# Patient Record
Sex: MALE | Race: WHITE | NOT HISPANIC OR LATINO | Employment: OTHER | URBAN - METROPOLITAN AREA
[De-identification: names, ages, dates, MRNs, and addresses within clinical notes are randomized per-mention and may not be internally consistent; named-entity substitution may affect disease eponyms.]

---

## 2017-01-30 ENCOUNTER — ALLSCRIPTS OFFICE VISIT (OUTPATIENT)
Dept: OTHER | Facility: OTHER | Age: 67
End: 2017-01-30

## 2017-01-30 DIAGNOSIS — R94.39 ABNORMAL RESULT OF OTHER CARDIOVASCULAR FUNCTION STUDY: ICD-10-CM

## 2017-01-30 DIAGNOSIS — R06.02 SHORTNESS OF BREATH: ICD-10-CM

## 2017-02-01 ENCOUNTER — GENERIC CONVERSION - ENCOUNTER (OUTPATIENT)
Dept: OTHER | Facility: OTHER | Age: 67
End: 2017-02-01

## 2017-02-06 ENCOUNTER — GENERIC CONVERSION - ENCOUNTER (OUTPATIENT)
Dept: OTHER | Facility: OTHER | Age: 67
End: 2017-02-06

## 2017-02-06 ENCOUNTER — HOSPITAL ENCOUNTER (OUTPATIENT)
Dept: NON INVASIVE DIAGNOSTICS | Facility: CLINIC | Age: 67
Discharge: HOME | End: 2017-02-06
Payer: MEDICARE

## 2017-02-06 DIAGNOSIS — R06.02 SHORTNESS OF BREATH: ICD-10-CM

## 2017-02-06 PROCEDURE — 93350 STRESS TTE ONLY: CPT

## 2017-02-08 ENCOUNTER — GENERIC CONVERSION - ENCOUNTER (OUTPATIENT)
Dept: OTHER | Facility: OTHER | Age: 67
End: 2017-02-08

## 2017-02-08 LAB
CHEST PAIN STATEMENT: NORMAL
MAX DIASTOLIC BP: 71 MMHG
MAX HEART RATE: 139 BPM
MAX PREDICTED HEART RATE: 152 BPM
MAX. SYSTOLIC BP: 218 MMHG
PROTOCOL NAME: NORMAL
REASON FOR TERMINATION: NORMAL
TARGET HR FORMULA: NORMAL
TEST INDICATION: NORMAL
TIME IN EXERCISE PHASE: 660 S

## 2017-02-16 ENCOUNTER — GENERIC CONVERSION - ENCOUNTER (OUTPATIENT)
Dept: OTHER | Facility: OTHER | Age: 67
End: 2017-02-16

## 2017-03-09 ENCOUNTER — HOSPITAL ENCOUNTER (OUTPATIENT)
Dept: CT IMAGING | Facility: HOSPITAL | Age: 67
Discharge: HOME | End: 2017-03-09
Attending: INTERNAL MEDICINE
Payer: MEDICARE

## 2017-03-09 VITALS
OXYGEN SATURATION: 98 % | RESPIRATION RATE: 18 BRPM | DIASTOLIC BLOOD PRESSURE: 70 MMHG | SYSTOLIC BLOOD PRESSURE: 127 MMHG | HEART RATE: 56 BPM

## 2017-03-09 DIAGNOSIS — R06.02 SHORTNESS OF BREATH: ICD-10-CM

## 2017-03-09 DIAGNOSIS — R94.39 ABNORMAL RESULT OF OTHER CARDIOVASCULAR FUNCTION STUDY: ICD-10-CM

## 2017-03-09 PROCEDURE — 75574 CT ANGIO HRT W/3D IMAGE: CPT

## 2017-03-09 RX ORDER — NITROGLYCERIN 0.4 MG/1
0.4 TABLET SUBLINGUAL ONCE
Status: COMPLETED | OUTPATIENT
Start: 2017-03-09 | End: 2017-03-09

## 2017-03-09 RX ADMIN — IODIXANOL 100 ML: 320 INJECTION, SOLUTION INTRAVASCULAR at 11:35

## 2017-03-09 RX ADMIN — NITROGLYCERIN 0.4 MG: 0.4 TABLET SUBLINGUAL at 11:24

## 2017-05-12 ENCOUNTER — ALLSCRIPTS OFFICE VISIT (OUTPATIENT)
Dept: OTHER | Facility: OTHER | Age: 67
End: 2017-05-12

## 2017-06-28 ENCOUNTER — TRANSCRIBE ORDERS (OUTPATIENT)
Dept: ADMINISTRATIVE | Facility: HOSPITAL | Age: 67
End: 2017-06-28

## 2017-06-28 ENCOUNTER — ALLSCRIPTS OFFICE VISIT (OUTPATIENT)
Dept: OTHER | Facility: OTHER | Age: 67
End: 2017-06-28

## 2017-06-28 DIAGNOSIS — R20.2 TINGLING SENSATION: Primary | ICD-10-CM

## 2017-07-12 ENCOUNTER — OFFICE VISIT (OUTPATIENT)
Dept: RADIOLOGY | Facility: CLINIC | Age: 67
End: 2017-07-12
Payer: MEDICARE

## 2017-07-12 DIAGNOSIS — R20.2 TINGLING SENSATION: ICD-10-CM

## 2017-07-12 PROCEDURE — 95911 NRV CNDJ TEST 9-10 STUDIES: CPT

## 2017-07-12 PROCEDURE — 95886 MUSC TEST DONE W/N TEST COMP: CPT

## 2017-07-21 ENCOUNTER — ALLSCRIPTS OFFICE VISIT (OUTPATIENT)
Dept: OTHER | Facility: OTHER | Age: 67
End: 2017-07-21

## 2017-07-24 ENCOUNTER — APPOINTMENT (OUTPATIENT)
Dept: PHYSICAL THERAPY | Facility: CLINIC | Age: 67
End: 2017-07-24
Payer: MEDICARE

## 2017-07-24 ENCOUNTER — ALLSCRIPTS OFFICE VISIT (OUTPATIENT)
Dept: OTHER | Facility: OTHER | Age: 67
End: 2017-07-24

## 2017-07-24 DIAGNOSIS — M72.0 PALMAR FASCIAL FIBROMATOSIS: ICD-10-CM

## 2017-07-24 PROCEDURE — 97760 ORTHOTIC MGMT&TRAING 1ST ENC: CPT

## 2017-07-24 PROCEDURE — G8984 CARRY CURRENT STATUS: HCPCS

## 2017-07-24 PROCEDURE — G8985 CARRY GOAL STATUS: HCPCS

## 2017-07-24 PROCEDURE — G8986 CARRY D/C STATUS: HCPCS

## 2017-07-31 ENCOUNTER — GENERIC CONVERSION - ENCOUNTER (OUTPATIENT)
Dept: OTHER | Facility: OTHER | Age: 67
End: 2017-07-31

## 2017-09-06 ENCOUNTER — HOSPITAL ENCOUNTER (OUTPATIENT)
Dept: RADIOLOGY | Facility: HOSPITAL | Age: 67
Discharge: HOME | End: 2017-09-06
Attending: ORTHOPAEDIC SURGERY
Payer: MEDICARE

## 2017-09-06 ENCOUNTER — ALLSCRIPTS OFFICE VISIT (OUTPATIENT)
Dept: OTHER | Facility: OTHER | Age: 67
End: 2017-09-06

## 2017-09-06 DIAGNOSIS — M54.10 RADICULOPATHY: ICD-10-CM

## 2017-09-06 DIAGNOSIS — M72.0 PALMAR FASCIAL FIBROMATOSIS: ICD-10-CM

## 2017-09-06 PROCEDURE — 72050 X-RAY EXAM NECK SPINE 4/5VWS: CPT

## 2017-09-12 ENCOUNTER — GENERIC CONVERSION - ENCOUNTER (OUTPATIENT)
Dept: OTHER | Facility: OTHER | Age: 67
End: 2017-09-12

## 2018-01-09 NOTE — RESULT NOTES
Message   I called, stress echo normal, but EKG portion abnl, I recommend follow up with card, pt already talked with cardiology and is scheduled for another diagnostic study     Verified Results  ECHO STRESS TEST W CONTRAST IF INDICATED  10:03AM Ethyl Blight Order Number: [id number]    - Patient Instructions: To schedule this appointment, please contact Central Scheduling at [phone]  Test Name Result Flag Reference   ECHO STRESS TEST W CONTRAST IF INDICATED (Report)     [address]   [place]   [place] , [address]   [phone]     Exercise Stress Echocardiography     Study date: 2017     Patient: Moses Martinez   MR number: [id number]   Account number: [de-identified]   : 1950   Age: 74 years   Gender: Male   Study date: 2017   Status: Outpatient   Location: [facility] lab   Height: 69 in   Weight: 171 lb   BP: 135// 73 mmHg     Indications: Detection of coronary artery disease  Diagnosis: R06 02 - Shortness of breath     Sonographer: Soledad Baird KAYLA AE-PE   Primary Physician: Rosalind Rubin MD   Referring Physician: Rosalind Rubin MD   Group: [facility]   cc: Loyd Cline MD   RN: Mariya Elkins RN   Interpreting Physician: Ariel Salinas MD     IMPRESSIONS:   Equivocal study after maximal exercise with reproduction of symptoms  Left ventricular systolic function was normal      SUMMARY     STRESS RESULTS:   Duration of exercise was 11 min and 0 sec  Maximal work rate was 13 4 METs  Maximal heart rate during stress was 139 bpm    There was no chest pain during stress  ECG CONCLUSIONS:   The stress ECG was consistent with myocardial ischemia  BASELINE:   There were no regional wall motion abnormalities  Estimated left ventricular ejection fraction was 60 %        ECHO CONCLUSIONS:   There was no diagnostic evidence for stress-induced ischemia  HISTORY: The patient is a 74year old  male  Chest pain status: no chest pain  Other symptoms: dyspnea of recent onset  Coronary artery disease risk factors: family history of coronary artery disease  Cardiovascular history: none   significant  PHYSICAL EXAM: Baseline physical exam screening: no wheezes audible  REST ECG: Normal sinus rhythm  PROCEDURE: The study was performed in the stress lab  The study was performed in the [facility] [place]  The procedure was explained to the patient and informed consent was obtained  Treadmill exercise testing was   performed, using the Cliff protocol  Stress and rest echocardiographic evaluation with 2D imaging was performed from multiple acoustic windows for evaluation of ventricular function  CLIFF PROTOCOL:   HR bpm SBP mmHg DBP mmHg Symptoms ST change Rhythm/conduct   Baseline 68 135 73 none none NSR   Stage 1 95 -- -- none none NSR   Stage 2 113 184 74 none none sinus tach   Stage 3 123 190 67 dyspnea horizontal depression, 1 0 mm sinus tach   Stage 4 136 149 68 dyspnea, fatigue same as above sinus tach, occasional PAC's, rare PVC's   Recovery 1 120 -- -- subsiding same as above sinus tach   Recovery 2 114 218 71 subsiding return to baseline sinus tach   Recovery 3 108 215 72 subsiding -- --   Recovery 5 99 205 98 none -- --     MEDICATIONS GIVEN: No medications or fluids given  STRESS RESULTS: Duration of exercise was 11 min and 0 sec  The patient exercised to protocol stage 4  Maximal work rate was 13 4 METs  Functional capacity was normal  Maximal heart rate during stress was 139 bpm  The heart rate response to   stress was normal  Maximal systolic blood pressure during stress was 218 mmHg  There was normal resting blood pressure with an appropriate response to stress  The rate-pressure product for the peak heart rate and blood pressure was 22929  There was no chest pain during stress   The stress test was terminated due to dyspnea and fatigue  NOTE: Dr Roslyn Vasquez present for testing  After the procedure, the patient was discharged to home  ECG CONCLUSIONS: The stress ECG was consistent with myocardial ischemia  Arrhythmia during stress: isolated atrial premature beats and isolated premature ventricular beats  STRESS 2D ECHO RESULTS:     BASELINE: There were no regional wall motion abnormalities  Left ventricular size was normal  Overall left ventricular systolic function was normal  Estimated left ventricular ejection fraction was 60 %   PEAK STRESS: There was no evidence for new left ventricular regional wall motion abnormalities  There was an appropriate reduction in left ventricular size  There was an appropriate augmentation in LV function  OTHER ECHO FINDINGS: Trace tricuspid regurgitation is present  There is no pericardial effusion  RV function was normal      ECHO CONCLUSIONS: There was no diagnostic evidence for stress-induced ischemia  The image quality was good       Prepared and electronically signed by     Maria A Shrestha MD   Signed [id number] 16:25:53

## 2018-01-10 NOTE — PROGRESS NOTES
Assessment    1  BPH (benign prostatic hyperplasia) (600 00) (N40 0)   2  Erectile dysfunction (607 84) (N52 9)   3  Hyperlipemia (272 4) (E78 5)   4  Impaired fasting glucose (790 21) (R73 01)   5  Shortness of breath on exertion (786 05) (R06 02)    Plan      BPH (benign prostatic hyperplasia), Erectile dysfunction    · Cialis 5 MG Oral Tablet; Take 1 tablet daily     Need for prophylactic vaccination and inoculation against influenza    · Fluzone High-Dose 0 5 ML Intramuscular Suspension Prefilled Syringe     Need for vaccination with 13-polyvalent pneumococcal conjugate vaccine    · Prevnar 13 Intramuscular Suspension     Shortness of breath on exertion    · EKG/ECG- POC; Status:Complete;   Done: [id number] 03:53PM    ECHO STRESS TEST W CONTRAST IF INDICATED; Status:Hold For - Scheduling; Requested IJI:[id number];   Perform:[facility]; THS:[id number]; Ordered; For:Shortness of breath on exertion; Ordered By:Génesis Joyner; Discussion/Summary    With some dyspnea on exertion while skiing at altitude which isn't normal for him  Will get stress echo  Carpal tunnel symptoms: try cock-up wrist splint for this    Epistaxis: pt gets intermittent nosebleeds, and for this reason I don't recommend going on ASA  Hyperchol: discussed calculated 10 yr risk of CVD of 13 7% which is greater than 7 5%, hence moderate to high intensity statin would be indicated  Will try atorvastatin 20 mg daily  Discussed potential risks of statins with myalgias  Impression: Subsequent Annual Wellness Visit, with preventive exam as well as age and risk appropriate counseling completed  Cardiovascular screening and counseling: the risks and benefits of screening were discussed and screening is current  Diabetes screening and counseling: the risks and benefits of screening were discussed and screening is current     Colorectal cancer screening and counseling: the risks and benefits of screening were discussed and screening is current  Prostate cancer screening and counseling: the risks and benefits of screening were discussed and screening is current  Chief Complaint  The patient presents today for annual Medicare wellness  History of Present Illness  HPI: has nightmares where pt is defending himself and hits things at night sometimes  Welcome to [employer] and Wellness Visits: The patient is being seen for the subsequent annual wellness visit  Medicare Screening and Risk Factors   Hospitalizations: he has been previously hospitalizied and none in past year  Medicare Screening Tests Risk Questions   Drug and Alcohol Use: The patient has never smoked cigarettes  The patient reports frequent alcohol use  Alcohol concern:   The patient has no concerns about alcohol abuse  Diet and Physical Activity: Current diet includes well balanced meals  He exercises daily  Exercise: strength training, elliptical    Mood Disorder and Cognitive Impairment Screening: He denies feeling down, depressed, or hopeless over the past two weeks  He denies feeling little interest or pleasure in doing things over the past two weeks  Cognitive impairment screening: denies difficulty learning/retaining new information, denies difficulty handling complex tasks, denies difficulty with reasoning, denies difficulty with spatial ability and orientation, denies difficulty with language and denies difficulty with behavior  Functional Ability/Level of Safety: Hearing is slightly decreased and a hearing aid is used  The patient is currently able to do activities of daily living without limitations, able to do instrumental activities of daily living without limitations and able to participate in social activities without limitations   Activities of daily living details: does not need help using the phone, no transportation help needed, does not need help shopping, no meal preparation help needed, does not need help doing housework, does not need help doing laundry, does not need help managing medications and does not need help managing money  Fall risk factors: The patient fell 0 times in the past 12 months  Home safety risk factors:  no unfamiliar surroundings, no loose rugs, no poor household lighting, no uneven floors, no household clutter, grab bars in the bathroom and handrails on the stairs  Advance Directives: Advance directives: living will, durable power of  for health care directives and advance directives  Co-Managers and Medical Equipment/Suppliers: See Patient Care Team   Preventive Quality Program 65 and Older: Urinary Incontinence Symptoms includes: urinary frequency and post-void dribbling        Review of Systems    Constitutional: no fever, no chills, no malaise and no fatigue  Cardiovascular: no chest pain, no palpitations, the heart is not racing, no lightheadedness and no lower extremity edema  Respiratory: SOB on exertion, but no shortness of breath, no wheezing and no cough  Gastrointestinal: no abdominal pain, no nausea, no vomiting, no diarrhea and no constipation  Genitourinary: no dysuria  Psychiatric: as noted in HPI, no anxiety and no depression  Active Problems    1  Encounter for colorectal cancer screening (V76 51) (Z12 11,Z12 12)   2  Hematuria (599 70) (R31 9)   3  Hyperlipemia (272 4) (E78 5)   4  Impaired fasting glucose (790 21) (R73 01)    Social History  The social history was reviewed and updated today  Vitals  Signs    Temperature: 97 8 F, OralHeart Rate: 57XOVYFEOHUOH: 16Systolic: 092, SittingDiastolic: 70, SittingWeight: 171 lb O2 Saturation: 98    Physical Exam    Constitutional   General appearance: No acute distress, well appearing and well nourished  Head and Face   Head and face: Normal     Eyes   Conjunctiva and lids: No erythema, swelling or discharge      Ears, Nose, Mouth, and Throat   External inspection of ears and nose: Normal     Oropharynx: Normal with no erythema, edema, exudate or lesions  Neck   Neck: Supple, symmetric, trachea midline, no masses  Thyroid: Normal, no thyromegaly  Pulmonary   Respiratory effort: No increased work of breathing or signs of respiratory distress  Auscultation of lungs: Clear to auscultation  Cardiovascular   Auscultation of heart: Normal rate and rhythm, normal S1 and S2, no murmurs  Carotid pulses: 2+ bilaterally  Examination of extremities for edema and/or varicosities: Normal     Abdomen   Abdomen: Non-tender, no masses  Liver and spleen: No hepatomegaly or splenomegaly  Lymphatic   Palpation of lymph nodes in neck: No lymphadenopathy  Musculoskeletal   Gait and station: Normal     Inspection/palpation of digits and nails: Normal without clubbing or cyanosis  Neurologic   Cortical function: Normal mental status  Psychiatric   Recent and remote memory: Intact      Mood and affect: Normal        Signatures   Electronically signed by : RONY Bright ; Jan 30 2017  4:22PM EST                       (Author)

## 2018-01-12 VITALS
SYSTOLIC BLOOD PRESSURE: 122 MMHG | WEIGHT: 172 LBS | RESPIRATION RATE: 16 BRPM | HEART RATE: 72 BPM | BODY MASS INDEX: 26.07 KG/M2 | DIASTOLIC BLOOD PRESSURE: 68 MMHG | HEIGHT: 68 IN

## 2018-01-12 NOTE — RESULT NOTES
Message   Glucose a little up, but average of sugars over past 3 months is good, no blood in urine, other labs all good, will also review at upcoming appt, stool test not back yet     Verified Results  (1) CBC/PLT/DIFF [id number] 07:25AM Jenniffer Bal     Test Name Result Flag Reference   WHITE BLOOD CELL COUNT 6 3 Thousand/uL  3 8-10 8   RED BLOOD CELL COUNT 4 77 Million/uL  4 20-5 80   HEMOGLOBIN 14 2 g/dL  13 2-17 1   HEMATOCRIT 43 4 %  38 5-50 0   MCV 91 0 fL  80 0-100 0   MCH 29 7 pg  27 0-33 0   MCHC 32 6 g/dL  32 0-36 0   RDW 13 8 %  11 0-15 0   PLATELET COUNT 981 Thousand/uL  140-400   MPV 8 6 fL  7 5-11 5   ABSOLUTE NEUTROPHILS 3736 cells/uL  8604-5011   ABSOLUTE LYMPHOCYTES 1846 cells/uL  850-3900   ABSOLUTE MONOCYTES 498 cells/uL  200-950   ABSOLUTE EOSINOPHILS 170 cells/uL     ABSOLUTE BASOPHILS 50 cells/uL  0-200   NEUTROPHILS 59 3 %     LYMPHOCYTES 29 3 %     MONOCYTES 7 9 %     EOSINOPHILS 2 7 %     BASOPHILS 0 8 %       (1) COMPREHENSIVE METABOLIC PANEL [id number] 94:53II Jenniffer Bal     Test Name Result Flag Reference   GLUCOSE 109 mg/dL H 65-99   Fasting reference interval   UREA NITROGEN (BUN) 15 mg/dL  7-25   CREATININE 0 88 mg/dL  0 70-1 25   For patients >52years of age, the reference limit  for Creatinine is approximately 13% higher for people  identified as -American  eGFR NON-AFR   AMERICAN 88 mL/min/1 73m2  > OR = 60   eGFR AFRICAN AMERICAN 102 mL/min/1 73m2  > OR = 60   BUN/CREATININE RATIO   8-95   NOT APPLICABLE (calc)   SODIUM 138 mmol/L  135-146   POTASSIUM 4 5 mmol/L  3 5-5 3   CHLORIDE 102 mmol/L     CARBON DIOXIDE 24 mmol/L  20-31   CALCIUM 9 0 mg/dL  8 6-10 3   PROTEIN, TOTAL 6 6 g/dL  6 1-8 1   ALBUMIN 4 3 g/dL  3 6-5 1   GLOBULIN 2 3 g/dL (calc)  1 9-3 7   ALBUMIN/GLOBULIN RATIO 1 9 (calc)  1 0-2 5   BILIRUBIN, TOTAL 1 2 mg/dL  0 2-1 2   ALKALINE PHOSPHATASE 56 U/L     AST 19 U/L  10-35   ALT 27 U/L  9-46     (1) LIPID PANEL, FASTING [id number] 07: 25AM Jill Chowdhury     Test Name Result Flag Reference   CHOLESTEROL, TOTAL 226 mg/dL H 125-200   HDL CHOLESTEROL 81 mg/dL  > OR = 40   TRIGLICERIDES 79 mg/dL  <785   LDL-CHOLESTEROL 129 mg/dL (calc)  <130   Desirable range <100 mg/dL for patients with CHD or  diabetes and <70 mg/dL for diabetic patients with  known heart disease  CHOL/HDLC RATIO 2 8 (calc)  < OR = 5 0   NON HDL CHOLESTEROL 145 mg/dL (calc)     Target for non-HDL cholesterol is 30 mg/dL higher than   LDL cholesterol target  (1) URINALYSIS w URINE C/S REFLEX (will reflex a microscopy if leukocytes, occult blood, or nitrites are not within normal limits) [id number] 07:25AM Jill Chowdhury     Test Name Result Flag Reference   COLOR YELLOW  YELLOW   APPEARANCE CLEAR  CLEAR   SPECIFIC GRAVITY 1 011  1 001-1 035   PH 6 0  5 0-8 0   GLUCOSE NEGATIVE  NEGATIVE   BILIRUBIN NEGATIVE  NEGATIVE   KETONES NEGATIVE  NEGATIVE   OCCULT BLOOD NEGATIVE  NEGATIVE   PROTEIN NEGATIVE  NEGATIVE   NITRITE NEGATIVE  NEGATIVE   LEUKOCYTE ESTERASE NEGATIVE  NEGATIVE   WBC NONE SEEN /HPF  < OR = 5   RBC NONE SEEN /HPF  < OR = 2   SQUAMOUS EPITHELIAL CELLS NONE SEEN /HPF  < OR = 5   BACTERIA NONE SEEN /HPF  NONE SEEN   HYALINE CAST NONE SEEN /LPF  NONE SEEN   REFLEXIVE URINE CULTURE NO CULTURE INDICATED       (Q) TSH, 3RD GENERATION [id number] 07:25AM Jill Chowdhury     Test Name Result Flag Reference   TSH 3 61 mIU/L  0 40-4 50     (Q) HEMOGLOBIN A1c [id number] 07:25AM Jill Chowdhury   REPORT COMMENT:  FASTING:YES  COLLECTION KIT GIVEN TO PATIENT  PATIENT ADVISED TO RETURN  Test Name Result Flag Reference   HEMOGLOBIN A1c 5 7 % of total Hgb H <5 7   According to ADA guidelines, hemoglobin A1c <7 0%  represents optimal control in non-pregnant diabetic  patients  Different metrics may apply to specific  patient populations  Standards of Medical Care in  [phone]  Diabetes Care   2013;36:s11-s66     For the purpose of screening for the presence of  diabetes  <5 7% Consistent with the absence of diabetes  5 7-6 4%    Consistent with increased risk for diabetes              (prediabetes)  >or=6 5%    Consistent with diabetes     This assay result is consistent with an increased risk  of diabetes  Currently, no consensus exists for use of hemoglobin  A1c for diagnosis of diabetes for children

## 2018-01-12 NOTE — RESULT NOTES
Message   stool test for colorectal cancer screening was normal     Verified Results  (Q) FECAL GLOBIN BY IMMUNOCHEM  (MEDICARE) [id number] 12:00AM Awa Jackson     Test Name Result Flag Reference   FECAL GLOBIN BY$IMMUNOCHEM  (MEDICARE)      FECAL GLOBIN BY IMMUNOCHEM   (MEDICARE)         MICRO NUMBER:      [id number]    TEST STATUS:       FINAL    SPECIMEN SOURCE:   INSURE () FOBT TEST CARD    SPECIMEN QUALITY:  ADEQUATE    RESULT:            Not Detected

## 2018-01-13 VITALS
SYSTOLIC BLOOD PRESSURE: 140 MMHG | BODY MASS INDEX: 25.92 KG/M2 | HEIGHT: 68 IN | RESPIRATION RATE: 16 BRPM | OXYGEN SATURATION: 96 % | WEIGHT: 171.01 LBS | DIASTOLIC BLOOD PRESSURE: 82 MMHG | HEART RATE: 62 BPM

## 2018-01-13 VITALS
WEIGHT: 174 LBS | HEIGHT: 68 IN | BODY MASS INDEX: 26.37 KG/M2 | HEART RATE: 62 BPM | DIASTOLIC BLOOD PRESSURE: 78 MMHG | SYSTOLIC BLOOD PRESSURE: 162 MMHG

## 2018-01-13 VITALS
WEIGHT: 171.25 LBS | HEART RATE: 66 BPM | SYSTOLIC BLOOD PRESSURE: 133 MMHG | DIASTOLIC BLOOD PRESSURE: 65 MMHG | HEIGHT: 68 IN | BODY MASS INDEX: 25.95 KG/M2

## 2018-01-13 VITALS
HEART RATE: 61 BPM | RESPIRATION RATE: 16 BRPM | DIASTOLIC BLOOD PRESSURE: 70 MMHG | TEMPERATURE: 97.8 F | OXYGEN SATURATION: 98 % | WEIGHT: 171 LBS | SYSTOLIC BLOOD PRESSURE: 130 MMHG | BODY MASS INDEX: 26 KG/M2

## 2018-01-14 VITALS
BODY MASS INDEX: 25.91 KG/M2 | HEIGHT: 68 IN | DIASTOLIC BLOOD PRESSURE: 82 MMHG | SYSTOLIC BLOOD PRESSURE: 155 MMHG | HEART RATE: 61 BPM | WEIGHT: 171 LBS

## 2018-01-14 VITALS
HEIGHT: 68 IN | SYSTOLIC BLOOD PRESSURE: 135 MMHG | HEART RATE: 60 BPM | WEIGHT: 171 LBS | DIASTOLIC BLOOD PRESSURE: 81 MMHG | BODY MASS INDEX: 25.91 KG/M2

## 2018-01-19 DIAGNOSIS — Z12.12 ENCOUNTER FOR SCREENING FOR MALIGNANT NEOPLASM OF RECTUM: ICD-10-CM

## 2018-01-19 DIAGNOSIS — Z11.59 ENCOUNTER FOR SCREENING FOR OTHER VIRAL DISEASES (CODE): ICD-10-CM

## 2018-01-19 DIAGNOSIS — R31.9 HEMATURIA: ICD-10-CM

## 2018-01-19 DIAGNOSIS — Z12.11 ENCOUNTER FOR SCREENING FOR MALIGNANT NEOPLASM OF COLON: ICD-10-CM

## 2018-01-19 DIAGNOSIS — E78.5 HYPERLIPIDEMIA: ICD-10-CM

## 2018-01-19 DIAGNOSIS — Z12.5 ENCOUNTER FOR SCREENING FOR MALIGNANT NEOPLASM OF PROSTATE: ICD-10-CM

## 2018-01-19 DIAGNOSIS — R73.01 IMPAIRED FASTING GLUCOSE: ICD-10-CM

## 2018-02-01 LAB
ALBUMIN SERPL-MCNC: 4.1 G/DL (ref 3.6–5.1)
ALBUMIN/GLOB SERPL: 1.7 (CALC) (ref 1–2.5)
ALP SERPL-CCNC: 55 U/L (ref 40–115)
ALT SERPL-CCNC: 31 U/L (ref 9–46)
APPEARANCE UR: CLEAR
AST SERPL-CCNC: 22 U/L (ref 10–35)
BASOPHILS # BLD AUTO: 63 CELLS/UL (ref 0–200)
BASOPHILS NFR BLD AUTO: 1.1 %
BILIRUB SERPL-MCNC: 1.6 MG/DL (ref 0.2–1.2)
BILIRUB UR QL STRIP: NEGATIVE
BUN SERPL-MCNC: 13 MG/DL (ref 7–25)
BUN/CREAT SERPL: ABNORMAL (CALC) (ref 6–22)
CALCIUM SERPL-MCNC: 9.2 MG/DL (ref 8.6–10.3)
CHLORIDE SERPL-SCNC: 103 MMOL/L (ref 98–110)
CHOLEST SERPL-MCNC: 169 MG/DL
CHOLEST/HDLC SERPL: 2.1 (CALC)
CO2 SERPL-SCNC: 27 MMOL/L (ref 20–31)
COLOR UR: YELLOW
CREAT SERPL-MCNC: 1 MG/DL (ref 0.7–1.25)
EOSINOPHIL # BLD AUTO: 182 CELLS/UL (ref 15–500)
EOSINOPHIL NFR BLD AUTO: 3.2 %
ERYTHROCYTE [DISTWIDTH] IN BLOOD BY AUTOMATED COUNT: 13.1 % (ref 11–15)
GLOBULIN SER CALC-MCNC: 2.4 G/DL (CALC) (ref 1.9–3.7)
GLUCOSE SERPL-MCNC: 113 MG/DL (ref 65–99)
GLUCOSE UR QL STRIP: NEGATIVE
HBA1C MFR BLD: 5.4 % OF TOTAL HGB
HCT VFR BLD AUTO: 41.5 % (ref 38.5–50)
HCV AB S/CO SERPL IA: 0.01
HCV AB SERPL QL IA: NORMAL
HDLC SERPL-MCNC: 79 MG/DL
HGB BLD-MCNC: 13.8 G/DL (ref 13.2–17.1)
HGB UR QL STRIP: NEGATIVE
KETONES UR QL STRIP: NEGATIVE
LDLC SERPL CALC-MCNC: 74 MG/DL (CALC)
LEUKOCYTE ESTERASE UR QL STRIP: NEGATIVE
LYMPHOCYTES # BLD AUTO: 1636 CELLS/UL (ref 850–3900)
LYMPHOCYTES NFR BLD AUTO: 28.7 %
MCH RBC QN AUTO: 29.8 PG (ref 27–33)
MCHC RBC AUTO-ENTMCNC: 33.3 G/DL (ref 32–36)
MCV RBC AUTO: 89.6 FL (ref 80–100)
MONOCYTES # BLD AUTO: 502 CELLS/UL (ref 200–950)
MONOCYTES NFR BLD AUTO: 8.8 %
NEUTROPHILS # BLD AUTO: 3317 CELLS/UL (ref 1500–7800)
NEUTROPHILS NFR BLD AUTO: 58.2 %
NITRITE UR QL STRIP: NEGATIVE
NONHDLC SERPL-MCNC: 90 MG/DL (CALC)
PH UR STRIP: 6 [PH] (ref 5–8)
PLATELET # BLD AUTO: 187 THOUSAND/UL (ref 140–400)
PMV BLD REES-ECKER: 10.5 FL (ref 7.5–12.5)
POTASSIUM SERPL-SCNC: 4.1 MMOL/L (ref 3.5–5.3)
PROT SERPL-MCNC: 6.5 G/DL (ref 6.1–8.1)
PROT UR QL STRIP: NEGATIVE
PSA SERPL-MCNC: 2.7 NG/ML
RBC # BLD AUTO: 4.63 MILLION/UL (ref 4.2–5.8)
SL AMB EGFR AFRICAN AMERICAN: 89 ML/MIN/1.73M2
SL AMB EGFR NON AFRICAN AMERICAN: 76 ML/MIN/1.73M2
SODIUM SERPL-SCNC: 137 MMOL/L (ref 135–146)
SP GR UR STRIP: 1.01 (ref 1–1.03)
TRIGL SERPL-MCNC: 75 MG/DL
TSH SERPL-ACNC: 2.95 MIU/L (ref 0.4–4.5)
WBC # BLD AUTO: 5.7 THOUSAND/UL (ref 3.8–10.8)

## 2018-02-06 ENCOUNTER — OFFICE VISIT (OUTPATIENT)
Dept: INTERNAL MEDICINE CLINIC | Facility: CLINIC | Age: 68
End: 2018-02-06
Payer: MEDICARE

## 2018-02-06 VITALS
TEMPERATURE: 97.8 F | DIASTOLIC BLOOD PRESSURE: 90 MMHG | BODY MASS INDEX: 27.15 KG/M2 | HEART RATE: 63 BPM | OXYGEN SATURATION: 98 % | SYSTOLIC BLOOD PRESSURE: 148 MMHG | WEIGHT: 173 LBS | HEIGHT: 67 IN | RESPIRATION RATE: 18 BRPM

## 2018-02-06 DIAGNOSIS — K21.9 CHRONIC GERD: ICD-10-CM

## 2018-02-06 DIAGNOSIS — R91.1 PULMONARY NODULE: ICD-10-CM

## 2018-02-06 DIAGNOSIS — R06.02 SHORT OF BREATH ON EXERTION: ICD-10-CM

## 2018-02-06 DIAGNOSIS — R06.02 SHORTNESS OF BREATH ON EXERTION: ICD-10-CM

## 2018-02-06 DIAGNOSIS — G56.03 BILATERAL CARPAL TUNNEL SYNDROME: ICD-10-CM

## 2018-02-06 DIAGNOSIS — E78.00 HYPERCHOLESTEREMIA: ICD-10-CM

## 2018-02-06 DIAGNOSIS — I10 HTN (HYPERTENSION), BENIGN: ICD-10-CM

## 2018-02-06 DIAGNOSIS — R17 ELEVATED BILIRUBIN: ICD-10-CM

## 2018-02-06 DIAGNOSIS — Z23 ENCOUNTER FOR IMMUNIZATION: ICD-10-CM

## 2018-02-06 DIAGNOSIS — Z00.00 MEDICARE ANNUAL WELLNESS VISIT, SUBSEQUENT: ICD-10-CM

## 2018-02-06 DIAGNOSIS — N52.9 ERECTILE DYSFUNCTION, UNSPECIFIED ERECTILE DYSFUNCTION TYPE: ICD-10-CM

## 2018-02-06 DIAGNOSIS — R73.01 IMPAIRED FASTING GLUCOSE: ICD-10-CM

## 2018-02-06 DIAGNOSIS — Z23 NEED FOR INFLUENZA VACCINATION: Primary | ICD-10-CM

## 2018-02-06 PROCEDURE — 90662 IIV NO PRSV INCREASED AG IM: CPT | Performed by: INTERNAL MEDICINE

## 2018-02-06 PROCEDURE — 99214 OFFICE O/P EST MOD 30 MIN: CPT | Performed by: INTERNAL MEDICINE

## 2018-02-06 PROCEDURE — 90471 IMMUNIZATION ADMIN: CPT | Performed by: INTERNAL MEDICINE

## 2018-02-06 PROCEDURE — G0439 PPPS, SUBSEQ VISIT: HCPCS | Performed by: INTERNAL MEDICINE

## 2018-02-06 RX ORDER — ATORVASTATIN CALCIUM 20 MG/1
20 TABLET, FILM COATED ORAL DAILY
COMMUNITY
Start: 2017-01-30 | End: 2018-02-06 | Stop reason: SDUPTHER

## 2018-02-06 RX ORDER — ALUMINUM ZIRCONIUM OCTACHLOROHYDREX GLY 16 G/100G
GEL TOPICAL DAILY
COMMUNITY
Start: 2017-01-30

## 2018-02-06 RX ORDER — ATORVASTATIN CALCIUM 20 MG/1
20 TABLET, FILM COATED ORAL DAILY
Qty: 90 TABLET | Refills: 3 | Status: SHIPPED | OUTPATIENT
Start: 2018-02-06 | End: 2019-01-21 | Stop reason: SDUPTHER

## 2018-02-06 RX ORDER — OMEPRAZOLE 20 MG/1
CAPSULE, DELAYED RELEASE ORAL DAILY PRN
COMMUNITY
Start: 2017-01-30 | End: 2021-06-22 | Stop reason: SDUPTHER

## 2018-02-06 RX ORDER — TADALAFIL 5 MG/1
1 TABLET ORAL DAILY
COMMUNITY
Start: 2017-01-30 | End: 2018-04-13 | Stop reason: SDUPTHER

## 2018-02-06 RX ORDER — UBIDECARENONE 75 MG
CAPSULE ORAL
COMMUNITY
Start: 2017-01-30 | End: 2018-07-31

## 2018-02-06 NOTE — PROGRESS NOTES
Assessment/Plan:    Bilateral carpal tunnel syndrome   Patient struck to try cock-up wrist splints at night to see if improvement, and follow-up hand surgeon if further interventions are needed such as carpal tunnel release, but I recommend conservative treatment 1st     Pulmonary nodule   This showed up incidentally on the coronary calcium testing he was doing, patient has a slip to get this Re checked in May 2018    Shortness of breath on exertion   I recommend patient follow up with Cardiology for this including review of his coronary calcium testing, and have discussions about possible cardiac catheterization  Elevated bilirubin   Discussed with patient this is most likely related to Gilbert's Syndrome where the bilirubin can be slightly elevated when fasting for blood tests, all other liver function tests are normal     Impaired fasting glucose   Glucose is slightly elevated, but not in the diabetic range, hemoglobin A1c is excellent, no need for medications, patient encouraged to continue to watch his diet for this  We can continue to monitor the A1c on a yearly basis  Hypercholesteremia    Continue statin, continue to watch diet  Recent LDL cholesterol was excellent  Chronic GERD   GERD: can do trial off the proton pump inhibitor to see if symptoms of heartburn return  Try behavioral changes to reduce GERD including watching diet and avoiding foods that cause symptoms  Common foods that cause symptoms are coffee, alcohol, chocolate, spicy foods, and fatty foods  Try to titrate down med slowly to avoid possible rebound hyperacidity  Patient will try every other day dosing for now    Erectile dysfunction   Patient will follow up with Urology for this tomorrow    Will also check testosterone level    HTN (hypertension), benign   Slightly elevated today, patient not on medications for this, he has a little workup with dealing with multiple medical concerns at the moment, which could explain his mildly elevated blood pressure  His last blood pressure was excellent in the office so will not start medications for this  Continue to monitor  Diagnoses and all orders for this visit:    Need for influenza vaccination  -     Flu Vaccine High Dose Split Preservative Free IM Medicare annual wellness visit, subsequent    Encounter for immunization    Bilateral carpal tunnel syndrome    Pulmonary nodule    Short of breath on exertion  -     Ambulatory referral to Cardiology; Future    Elevated bilirubin    Shortness of breath on exertion    Impaired fasting glucose    Hypercholesteremia  -     atorvastatin (LIPITOR) 20 mg tablet; Take 1 tablet (20 mg total) by mouth daily    Chronic GERD    Erectile dysfunction, unspecified erectile dysfunction type  -     Testosterone; Future    HTN (hypertension), benign    Other orders  -     Discontinue: atorvastatin (LIPITOR) 20 mg tablet; Take 20 mg by mouth daily            Subjective:      Patient ID: Ruthellen Bernheim is a 69 y o  male  Hand issues:  Patient had surgery on right 5th finger, he also has carpal tunnel syndrome has been doing exercises for this      Hypercholesterolemia:  Patient tolerating statin without any significant muscle aches  Impaired fasting glucose:  Patient watches his diet for this  GERD: pt titrated down PPI from 40 mg to 20 mg and was   Doing well on the 20 mg, then when he stopped it he had return of his symptoms  Erectile dysfunction:  Patient has been using Cialis 5 mg daily without much benefit  He also used  Viagra without much benefit  The following portions of the patient's history were reviewed and updated as appropriate: allergies, current medications, past family history, past medical history, past social history, past surgical history and problem list     No family history on file  No past medical history on file    Social History     Social History    Marital status: /Civil Union     Spouse name: N/A  Number of children: N/A    Years of education: N/A     Occupational History          Social History Main Topics    Smoking status: Never Smoker    Smokeless tobacco: Not on file    Alcohol use 8 4 oz/week     14 Standard drinks or equivalent per week      Comment: 2 Drinks a night    Drug use: Unknown    Sexual activity: Not on file     Other Topics Concern    Not on file     Social History Narrative    No narrative on file       Current Outpatient Prescriptions:     atorvastatin (LIPITOR) 20 mg tablet, Take 1 tablet (20 mg total) by mouth daily, Disp: 90 tablet, Rfl: 3    B Complex-C (SUPER B COMPLEX PO), Take by mouth, Disp: , Rfl:     cyanocobalamin (VITAMIN B-12) 100 mcg tablet, Take by mouth, Disp: , Rfl:     omeprazole (PriLOSEC) 20 mg delayed release capsule, Take by mouth, Disp: , Rfl:     Probiotic Product (PROBIOTIC DAILY PO), Take by mouth, Disp: , Rfl:     psyllium (METAMUCIL SMOOTH TEXTURE) 58 6 % powder, Take by mouth, Disp: , Rfl:     tadalafil (CIALIS) 5 MG tablet, Take 1 tablet by mouth daily, Disp: , Rfl:   No Known Allergies      Review of Systems   Constitutional: Negative for chills, fatigue and fever  HENT: Negative for congestion, nosebleeds and postnasal drip  Eyes: Negative for pain and visual disturbance  Respiratory: Positive for shortness of breath (with exertion)  Negative for cough, chest tightness and wheezing  Cardiovascular: Negative for chest pain, palpitations and leg swelling  Gastrointestinal: Negative for abdominal pain, constipation, diarrhea, nausea and vomiting  Endocrine: Negative for polydipsia and polyuria  Genitourinary: Negative for dysuria, flank pain and hematuria  Musculoskeletal: Negative for arthralgias  Skin: Negative for rash  Neurological: Negative for dizziness, tremors and headaches  Hematological: Does not bruise/bleed easily  Psychiatric/Behavioral: Negative for confusion and dysphoric mood   The patient is not nervous/anxious  Objective:     Physical Exam   Constitutional: He is oriented to person, place, and time  He appears well-developed and well-nourished  No distress  HENT:   Head: Normocephalic and atraumatic  Right Ear: External ear normal    Left Ear: External ear normal    Eyes: Conjunctivae are normal  No scleral icterus  Neck: Normal range of motion  Neck supple  No tracheal deviation present  No thyromegaly present  Cardiovascular: Normal rate, regular rhythm and normal heart sounds  No murmur heard  Pulmonary/Chest: Effort normal and breath sounds normal  No respiratory distress  He has no wheezes  He has no rales  Abdominal: Soft  Bowel sounds are normal  There is no tenderness  There is no rebound and no guarding  Hernia confirmed negative in the right inguinal area and confirmed negative in the left inguinal area  Genitourinary: Testes normal and penis normal  Right testis shows no mass  Left testis shows no mass  Musculoskeletal: He exhibits no edema  Lymphadenopathy:     He has no cervical adenopathy  Neurological: He is alert and oriented to person, place, and time  Psychiatric: He has a normal mood and affect  His behavior is normal  Judgment and thought content normal    Vitals reviewed

## 2018-02-06 NOTE — ASSESSMENT & PLAN NOTE
Slightly elevated today, patient not on medications for this, he has a little workup with dealing with multiple medical concerns at the moment, which could explain his mildly elevated blood pressure  His last blood pressure was excellent in the office so will not start medications for this  Continue to monitor

## 2018-02-06 NOTE — ASSESSMENT & PLAN NOTE
Glucose is slightly elevated, but not in the diabetic range, hemoglobin A1c is excellent, no need for medications, patient encouraged to continue to watch his diet for this  We can continue to monitor the A1c on a yearly basis

## 2018-02-06 NOTE — PROGRESS NOTES
HPI:  Pedro Simms is a 69 y o  male here for his Subsequent Wellness Visit  There is no problem list on file for this patient  No past medical history on file  Past Surgical History:   Procedure Laterality Date    APPENDECTOMY      As a child    INGUINAL HERNIA REPAIR Left 2015     No family history on file  History   Smoking Status    Never Smoker   Smokeless Tobacco    Not on file     History   Alcohol Use    8 4 oz/week    14 Standard drinks or equivalent per week     Comment: 2 Drinks a night      History   Drug use: Unknown       Current Outpatient Prescriptions   Medication Sig Dispense Refill    atorvastatin (LIPITOR) 20 mg tablet Take 20 mg by mouth daily        B Complex-C (SUPER B COMPLEX PO) Take by mouth      cyanocobalamin (VITAMIN B-12) 100 mcg tablet Take by mouth      omeprazole (PriLOSEC) 20 mg delayed release capsule Take by mouth      Probiotic Product (PROBIOTIC DAILY PO) Take by mouth      psyllium (METAMUCIL SMOOTH TEXTURE) 58 6 % powder Take by mouth      tadalafil (CIALIS) 5 MG tablet Take 1 tablet by mouth daily       No current facility-administered medications for this visit        No Known Allergies  Immunization History   Administered Date(s) Administered    Influenza Split High Dose Preservative Free IM 11/02/2015, 01/30/2017    Pneumococcal Conjugate 13-Valent 01/30/2017    Pneumococcal Polysaccharide PPV23 09/03/2014       Patient Care Team:  Balbir Caal MD as PCP - General    Medicare Screening Tests and Risk Assessments:  AWV Clinical     ISAR:       Once in a Lifetime Medicare Screening:       Medicare Screening Tests and Risk Assessment:   AAA Risk Assessment    Osteoporosis Risk Assessment    HIV Risk Assessment        Drug and Alcohol Use:   Tobacco use    Tobacco use duration    Tobacco Cessation Readiness    Alcohol use    Alcohol Treatment Readiness   Illicit Drug Use        Diet & Exercise:   Diet   How many servings a day of the following: Exercise        Cognitive Impairment Screening:   Cognitive Impairment Screening        Functional Ability/Level of Safety:   Hearing    Hearing Impairment Assessment    Current Activities    Help needed with the folllowing:    ADL    Fall Risk   Injury History       Home Safety:   Home Safety Risk Factors       Advanced Directives:   Advanced Directives    Patient's End of Life Decisions        Urinary Incontinence:       Glaucoma:            Provider Screening    No data filed        No exam data present    Physical Exam :  Physical Exam    Reviewed Updated [facility] Prior Wellness Visits:   Last Medicare wellness visit information was reviewed, patient interviewed , no change since last AWVno  Last Medicare wellness visit information was reviewed, patient interviewed and updates made to the record today yes    Assessment and Plan:  No diagnosis found      Health Maintenance Due   Topic Date Due    DTaP,Tdap,and Td Vaccines (1 - Tdap) 08/13/1957    GLAUCOMA SCREENING 67+ YR  08/13/2015    INFLUENZA VACCINE  09/01/2017

## 2018-02-06 NOTE — ASSESSMENT & PLAN NOTE
GERD: can do trial off the proton pump inhibitor to see if symptoms of heartburn return  Try behavioral changes to reduce GERD including watching diet and avoiding foods that cause symptoms  Common foods that cause symptoms are coffee, alcohol, chocolate, spicy foods, and fatty foods  Try to titrate down med slowly to avoid possible rebound hyperacidity   Patient will try every other day dosing for now

## 2018-02-06 NOTE — ASSESSMENT & PLAN NOTE
Discussed with patient this is most likely related to Gilbert's Syndrome where the bilirubin can be slightly elevated when fasting for blood tests, all other liver function tests are normal

## 2018-02-06 NOTE — ASSESSMENT & PLAN NOTE
This showed up incidentally on the coronary calcium testing he was doing, patient has a slip to get this Re checked in May 2018

## 2018-02-06 NOTE — ASSESSMENT & PLAN NOTE
I recommend patient follow up with Cardiology for this including review of his coronary calcium testing, and have discussions about possible cardiac catheterization

## 2018-02-07 ENCOUNTER — OFFICE VISIT (OUTPATIENT)
Dept: UROLOGY | Facility: CLINIC | Age: 68
End: 2018-02-07
Payer: MEDICARE

## 2018-02-07 VITALS
DIASTOLIC BLOOD PRESSURE: 60 MMHG | SYSTOLIC BLOOD PRESSURE: 122 MMHG | BODY MASS INDEX: 27.94 KG/M2 | HEIGHT: 67 IN | WEIGHT: 178 LBS

## 2018-02-07 DIAGNOSIS — I25.9 CHRONIC ISCHEMIC HEART DISEASE: ICD-10-CM

## 2018-02-07 DIAGNOSIS — R06.00 DYSPNEA ON EXERTION: Primary | ICD-10-CM

## 2018-02-07 DIAGNOSIS — N40.0 BENIGN PROSTATIC HYPERPLASIA WITHOUT LOWER URINARY TRACT SYMPTOMS: Primary | ICD-10-CM

## 2018-02-07 LAB
SL AMB  POCT GLUCOSE, UA: NORMAL
SL AMB LEUKOCYTE ESTERASE,UA: NORMAL
SL AMB POCT BILIRUBIN,UA: NORMAL
SL AMB POCT BLOOD,UA: NORMAL
SL AMB POCT CLARITY,UA: CLEAR
SL AMB POCT COLOR,UA: YELLOW
SL AMB POCT KETONES,UA: NORMAL
SL AMB POCT NITRITE,UA: NORMAL
SL AMB POCT PH,UA: 5
SL AMB POCT SPECIFIC GRAVITY,UA: NORMAL
SL AMB POCT URINE PROTEIN: NEGATIVE

## 2018-02-07 PROCEDURE — 99213 OFFICE O/P EST LOW 20 MIN: CPT | Performed by: UROLOGY

## 2018-02-07 PROCEDURE — 81000 URINALYSIS NONAUTO W/SCOPE: CPT | Performed by: UROLOGY

## 2018-02-07 NOTE — PROGRESS NOTES
Progress Note - Urology  Yogi Sue 69 y o  male MRN: [id number]  Encounter: [id number]      Chief Complaint:   Chief Complaint   Patient presents with    Benign Prostatic Hypertrophy     1 Year FU / PSA 2 7 (01/31/18)       HPI:     follow-up BPH  He has no significant changes symptomatology  Nocturia x1 no hesitation to flow feels comfortable has some degree of urgency at times  No UTI symptoms  Does complain of diminished sexual function  He is active 5 - 6 times a week  He is unable to achieve orgasm half the time  He also reports that the erection is not as firm as he would anticipated it should be      MEDS:    Current Outpatient Prescriptions:     atorvastatin (LIPITOR) 20 mg tablet, Take 1 tablet (20 mg total) by mouth daily, Disp: 90 tablet, Rfl: 3    B Complex-C (SUPER B COMPLEX PO), Take by mouth, Disp: , Rfl:     collagenase clostridium histolyticum (XIAFLEX) 0 9 MG SOLR, Inject 0 31 mL as directed, Disp: , Rfl:     cyanocobalamin (VITAMIN B-12) 100 mcg tablet, Take by mouth, Disp: , Rfl:     omeprazole (PriLOSEC) 20 mg delayed release capsule, Take by mouth, Disp: , Rfl:     Probiotic Product (PROBIOTIC DAILY PO), Take by mouth, Disp: , Rfl:     psyllium (METAMUCIL SMOOTH TEXTURE) 58 6 % powder, Take by mouth, Disp: , Rfl:     tadalafil (CIALIS) 5 MG tablet, Take 1 tablet by mouth daily, Disp: , Rfl:       PMH:  Past Medical History:   Diagnosis Date    ED (erectile dysfunction)     GERD (gastroesophageal reflux disease)     Hypercholesteremia          ROS:  Review of Systems   Constitutional: Negative  Respiratory: Negative  Cardiovascular:         Patient  States he experienced some shortness of breath with heart rate above 130 on his exercise routine  Vitals:  Blood pressure 122/60, height 5' 7" (1 702 m), weight 80 7 kg (178 lb)  Physical Exam:       Well-developed male  No acute distress appears his stated age  The back reveals no CVA tenderness    The abdomen is soft  There is no pattern megaly or splenomegaly no tenderness is noted  Bladder is not distended  No inguinal adenopathy  Phallus is normal scrotum is normal testes normal rectal tone is normal prostate is symmetrical benign roughly 28 g    Lab, Imaging and other studies:  Recent Results (from the past 48 hour(s))   POCT urine dip    Collection Time: 02/07/18  9:03 AM   Result Value Ref Range     COLOR,UA YELLOW      CLARITY,UA CLEAR      SPECIFIC GRAVITY,UA N/A      PH,UA 5     LEUKOCYTE ESTERASE,UA NEG      NITRITE,UA NEG     GLUCOSE, UA NEG      KETONES,UA NEG      BILIRUBIN,UA N/A      BLOOD,UA NEG     SL AMB POCT URINE PROTEIN Negative          IMPRESSION:     BPH   erectile dysfunction (mild)      PLAN:     patient currently using Cialis 5 mg nightly and will continue this    No intervention required for the BPH at this time

## 2018-02-10 LAB
REF LAB TEST NAME: NORMAL
REF LAB TEST: NORMAL
SL AMB CLIENT CONTACT: NORMAL

## 2018-02-13 LAB — TESTOST SERPL-MCNC: 608 NG/DL (ref 250–827)

## 2018-02-14 ENCOUNTER — TELEPHONE (OUTPATIENT)
Dept: INTERNAL MEDICINE CLINIC | Facility: CLINIC | Age: 68
End: 2018-02-14

## 2018-02-14 DIAGNOSIS — J06.9 UPPER RESPIRATORY TRACT INFECTION, UNSPECIFIED TYPE: Primary | ICD-10-CM

## 2018-02-14 RX ORDER — OSELTAMIVIR PHOSPHATE 75 MG/1
75 CAPSULE ORAL EVERY 12 HOURS SCHEDULED
Qty: 10 CAPSULE | Refills: 0 | Status: SHIPPED | OUTPATIENT
Start: 2018-02-14 | End: 2018-02-19

## 2018-02-26 ENCOUNTER — HOSPITAL ENCOUNTER (OUTPATIENT)
Dept: NON INVASIVE DIAGNOSTICS | Facility: CLINIC | Age: 68
Discharge: HOME | End: 2018-02-26
Payer: MEDICARE

## 2018-02-26 DIAGNOSIS — I25.9 CHRONIC ISCHEMIC HEART DISEASE: ICD-10-CM

## 2018-02-26 DIAGNOSIS — R06.00 DYSPNEA ON EXERTION: ICD-10-CM

## 2018-02-26 PROCEDURE — 93306 TTE W/DOPPLER COMPLETE: CPT | Performed by: INTERNAL MEDICINE

## 2018-02-26 PROCEDURE — 78452 HT MUSCLE IMAGE SPECT MULT: CPT

## 2018-02-26 PROCEDURE — A9502 TC99M TETROFOSMIN: HCPCS

## 2018-02-26 PROCEDURE — 93306 TTE W/DOPPLER COMPLETE: CPT

## 2018-02-26 PROCEDURE — 93017 CV STRESS TEST TRACING ONLY: CPT

## 2018-02-27 LAB
CHEST PAIN STATEMENT: NORMAL
MAX DIASTOLIC BP: 74 MMHG
MAX HEART RATE: 133 BPM
MAX PREDICTED HEART RATE: 151 BPM
MAX. SYSTOLIC BP: 180 MMHG
PROTOCOL NAME: NORMAL
REASON FOR TERMINATION: NORMAL
TARGET HR FORMULA: NORMAL
TEST INDICATION: NORMAL
TIME IN EXERCISE PHASE: NORMAL

## 2018-03-22 ENCOUNTER — OFFICE VISIT (OUTPATIENT)
Dept: CARDIOLOGY CLINIC | Facility: CLINIC | Age: 68
End: 2018-03-22
Payer: MEDICARE

## 2018-03-22 VITALS
SYSTOLIC BLOOD PRESSURE: 138 MMHG | WEIGHT: 173.3 LBS | RESPIRATION RATE: 16 BRPM | HEIGHT: 67 IN | HEART RATE: 72 BPM | DIASTOLIC BLOOD PRESSURE: 72 MMHG | BODY MASS INDEX: 27.2 KG/M2

## 2018-03-22 DIAGNOSIS — E78.00 HYPERCHOLESTEREMIA: ICD-10-CM

## 2018-03-22 DIAGNOSIS — R06.02 SOB (SHORTNESS OF BREATH) ON EXERTION: Primary | ICD-10-CM

## 2018-03-22 DIAGNOSIS — R06.02 SHORT OF BREATH ON EXERTION: ICD-10-CM

## 2018-03-22 DIAGNOSIS — R06.02 SHORTNESS OF BREATH ON EXERTION: Primary | ICD-10-CM

## 2018-03-22 DIAGNOSIS — I10 HTN (HYPERTENSION), BENIGN: ICD-10-CM

## 2018-03-22 PROCEDURE — 99204 OFFICE O/P NEW MOD 45 MIN: CPT | Performed by: INTERNAL MEDICINE

## 2018-03-22 RX ORDER — RANITIDINE 150 MG/1
150 TABLET ORAL DAILY
Refills: 0 | COMMUNITY
Start: 2018-02-07 | End: 2018-07-31

## 2018-03-22 NOTE — PROGRESS NOTES
Cardiology Follow Up    Trina Loaiza  1950  [id number]  [address] [facility]  [address] [place] [address] [address]    1  Shortness of breath on exertion     2  Short of breath on exertion  Ambulatory referral to Cardiology    Cardiac catheterization   3  Hypercholesteremia  Cardiac catheterization   4  HTN (hypertension), benign  Cardiac catheterization      History:  Jesusita Lindo has had what he proved describes is progressive dyspnea on exertion for 2 years  He is very active  He works out on a regular basis  He told me when he does things like running he will feel winded at the start but then after running for a bit will feel well  Throughout this  He has been 14  He 1st started noticing the dyspnea with being up at high altitude skiing and powder  He in fact ski yesterday without incident    He denies any chest pain orthopnea paroxysmal nocturnal dyspnea or syncope    Patient Active Problem List   Diagnosis    Bilateral carpal tunnel syndrome    Pulmonary nodule    Shortness of breath on exertion    Elevated bilirubin    Impaired fasting glucose    Hypercholesteremia    Chronic GERD    Erectile dysfunction    HTN (hypertension), benign     Past Medical History:   Diagnosis Date    ED (erectile dysfunction)     GERD (gastroesophageal reflux disease)     Hypercholesteremia      Social History     Social History    Marital status: /Civil Union     Spouse name: N/A    Number of children: N/A    Years of education: N/A     Occupational History          Social History Main Topics    Smoking status: Never Smoker    Smokeless tobacco: Never Used    Alcohol use 8 4 oz/week     14 Standard drinks or equivalent per week      Comment: 2 Drinks a night    Drug use: No    Sexual activity: Not on file     Other Topics Concern    Not on file     Social History Narrative    No narrative on file      Family History   Problem Relation Age of Onset    Colon cancer Father     Heart attack Mother     Breast cancer Mother      Past Surgical History:   Procedure Laterality Date    APPENDECTOMY      As a child    INGUINAL HERNIA REPAIR Left 2015       Current Outpatient Prescriptions:     atorvastatin (LIPITOR) 20 mg tablet, Take 1 tablet (20 mg total) by mouth daily, Disp: 90 tablet, Rfl: 3    omeprazole (PriLOSEC) 20 mg delayed release capsule, Take by mouth, Disp: , Rfl:     Probiotic Product (PROBIOTIC DAILY PO), Take by mouth, Disp: , Rfl:     psyllium (METAMUCIL SMOOTH TEXTURE) 58 6 % powder, Take by mouth, Disp: , Rfl:     tadalafil (CIALIS) 5 MG tablet, Take 1 tablet by mouth daily, Disp: , Rfl:     B Complex-C (SUPER B COMPLEX PO), Take by mouth, Disp: , Rfl:     collagenase clostridium histolyticum (XIAFLEX) 0 9 MG SOLR, Inject 0 31 mL as directed, Disp: , Rfl:     cyanocobalamin (VITAMIN B-12) 100 mcg tablet, Take by mouth, Disp: , Rfl:     ranitidine (ZANTAC) 150 mg tablet, Take 150 mg by mouth daily, Disp: , Rfl: 0  No Known Allergies    Labs:  Hospital Outpatient Visit on 02/26/2018   Component Date Value    Protocol Name 02/26/2018 CLIFF     Time In Exercise Phase 02/26/2018 00:08:01     MAX   SYSTOLIC BP 17/57/4134 836     Max Diastolic Bp 11/60/4505 74     Max Heart Rate 02/26/2018 133     Max Predicted Heart Rate 02/26/2018 151     Reason for Termination 02/26/2018 Dyspnea, throat "dryness"     Test Indication 02/26/2018 Screening for CAD     Target Hr Formular 02/26/2018 (220 - Age)*100%     Chest Pain Statement 02/26/2018 none    Orders Only on 02/12/2018   Component Date Value    TESTOSTERONE TOTAL 02/12/2018 608    Office Visit on 02/07/2018   Component Date Value     COLOR,UA 02/07/2018 YELLOW      CLARITY,UA 02/07/2018 CLEAR      SPECIFIC GRAVITY,UA 02/07/2018 N/A      PH,UA 02/07/2018 5     LEUKOCYTE ESTERASE,UA 02/07/2018 NEG      Zarate Pipe 02/07/2018 NEG     GLUCOSE, UA 02/07/2018 NEG      KETONES,UA 02/07/2018 NEG      BILIRUBIN,UA 02/07/2018 N/A      BLOOD,UA 02/07/2018 NEG     SL AMB POCT URINE PROTEIN 02/07/2018 Negative    Orders Only on 01/31/2018   Component Date Value    Test Name 01/31/2018  FECAL GLOBIN BY     Test Code 01/31/2018  32194L     Client Contact 01/31/2018 TRACY CONNECT     Report Always Message Si* 01/31/2018      Always Message 01/31/2018      Fecal Globin by Immunoch* 01/31/2018     Orders Only on 01/31/2018   Component Date Value    Prostate Specific Antige* 01/31/2018 2 7    Orders Only on 01/31/2018   Component Date Value    Total Cholesterol 01/31/2018 169     SL AMB HDL CHOLESTEROL 01/31/2018 79     Triglycerides 01/31/2018 75     SL AMB LDL-CHOLESTEROL 01/31/2018 74     SL AMB CHOL/HDLC RATIO 01/31/2018 2 1     SL AMB NON HDL CHOLESTER* 01/31/2018 90     SL AMB GLUCOSE 01/31/2018 113*    BUN 01/31/2018 13     Creatinine, Serum 01/31/2018 1 00     eGFR Non  01/31/2018 76     SL AMB EGFR  AMER* 01/31/2018 89     SL AMB BUN/CREATININE RA* 96/16/1097 NOT APPLICABLE     SL AMB SODIUM 01/31/2018 137     SL AMB POTASSIUM 01/31/2018 4 1     SL AMB CHLORIDE 01/31/2018 103     SL AMB CARBON DIOXIDE 01/31/2018 27     SL AMB CALCIUM 01/31/2018 9 2     SL AMB PROTEIN, TOTAL 01/31/2018 6 5     Serum Albumin 01/31/2018 4 1     SL AMB GLOBULIN 01/31/2018 2 4     SL AMB ALBUMIN/GLOBULIN * 01/31/2018 1 7     SL AMB BILIRUBIN, TOTAL 01/31/2018 1 6*    SL AMB ALKALINE PHOSPHAT* 01/31/2018 55     SL AMB AST 01/31/2018 22     SL AMB ALT 01/31/2018 31     SL AMB LAB WHITE BLOOD C* 01/31/2018 5 7     SL AMB LAB RED BLOOD MEGHAN* 01/31/2018 4 63     Hemoglobin 01/31/2018 13 8     Hematocrit 01/31/2018 41 5     MCV 01/31/2018 89 6     MCH 01/31/2018 29 8     MCHC 01/31/2018 33 3     RDW 01/31/2018 13 1     Platelet Count 76/38/9580 187     SL AMB MPV 01/31/2018 10 5     Neutrophils (Absolute) 01/31/2018 3317     Lymphocytes (Absolute) 01/31/2018 1636     Monocytes (Absolute) 01/31/2018 502     Eosinophils (Absolute) 01/31/2018 182     Basophils (Absolute) 01/31/2018 63     Neutrophils 01/31/2018 58 2     Lymphocytes 01/31/2018 28 7     Monocytes 01/31/2018 8 8     Eosinophils 01/31/2018 3 2     Basophils 01/31/2018 1 1     SL AMB HEP C AB 01/31/2018 NON-REACTIVE     Signal to Cut-Off 01/31/2018 0 01     TSH 01/31/2018 2 95     Hemoglobin A1C 01/31/2018 5 4     SL AMB COLOR, URINE 01/31/2018 YELLOW     Urine Appearance 01/31/2018 CLEAR     Specific Gravity 01/31/2018 1 012     Ph 01/31/2018 6 0     SL AMB GLUCOSE, URINE, Q* 01/31/2018 NEGATIVE     SL AMB BILIRUBIN, URINE 01/31/2018 NEGATIVE     SL AMB KETONE, URINE, QU* 01/31/2018 NEGATIVE     SL AMB BLOOD, URINE 01/31/2018 NEGATIVE     SL AMB PROTEIN, URINE, Q* 01/31/2018 NEGATIVE     SL AMB NITRITES URINE, Q* 01/31/2018 NEGATIVE     SL AMB LEUKOCYTE ESTERAS* 01/31/2018 NEGATIVE      Imaging: No results found  Review of Systems:  Review of Systems    Physical Exam:  Physical Exam    Discussion/Summary:  Brayden Alejandro has progressive dyspnea on exertion  He underwent exercise echocardiography in February of 2017  At that time he walked 11 minutes on a Abdifatah protocol  He had ST segment depression consistent with ischemia however that echocardiography at peak stress showed no evidence of ischemia  He subsequently had a cardiac CT done  The CT noted a calcium score the 40s  It described a 50% lad lesion and a PDA that filled well but appeared to be occluded and collateralized  Upon further review by a cardiology specialist in [place] he felt that the PDA was a dominant vessel off the circumflex and in fact not occlusion with collateralization  Brayden Alejandro had a nuclear stress test earlier this month he walked 8 minutes on a Abdifatah Abdifatah protocol and the test was terminated secondary to achievement of his maximum predicted heart rate  He told me he feels he could walk further  He again had ischemic ST changes on the EKG in the setting of baseline ST T wave changes and again the nuclear portion of the test showed no ischemia  He has also had a echocardiogram that showed normal left ventricular size and function  Top-normal right ventricular size and no significant valvular pathology  Pulmonary artery pressures appear normal  His summary to Janelle Hakan has dyspnea on exertion  He has good functional capacity  He has ischemic EKG changes with exercise but no imaging abnormalities  He also has a coronary CT that suggests obstruction of LAD and occlusion of PDA  I told him his dyspnea is possibly related to coronary artery disease however the results are variable on different studies  I also told him it may represent deconditioning  I also told him at this time lung disease cannot be at absolutely excluded though there is nothing that points to that I reviewed with him assessing the coronary anatomy to rule out obstructive coronary artery disease as the cause of his symptoms I described risks of the catheterization procedure in detail and also if there was a tight obstruction the risks of stenting as per the consent form he agrees and accepts and would like to proceed with coronary angiography  This will be scheduled in the near future  Further recommendations will be made based on the findings

## 2018-03-28 LAB
ALBUMIN SERPL-MCNC: 4.2 G/DL (ref 3.6–5.1)
ALBUMIN/GLOB SERPL: 2 (CALC) (ref 1–2.5)
ALP SERPL-CCNC: 62 U/L (ref 40–115)
ALT SERPL-CCNC: 28 U/L (ref 9–46)
AST SERPL-CCNC: 19 U/L (ref 10–35)
BASOPHILS # BLD AUTO: 51 CELLS/UL (ref 0–200)
BASOPHILS NFR BLD AUTO: 0.9 %
BILIRUB SERPL-MCNC: 1.1 MG/DL (ref 0.2–1.2)
BUN SERPL-MCNC: 18 MG/DL (ref 7–25)
BUN/CREAT SERPL: ABNORMAL (CALC) (ref 6–22)
CALCIUM SERPL-MCNC: 9.2 MG/DL (ref 8.6–10.3)
CHLORIDE SERPL-SCNC: 105 MMOL/L (ref 98–110)
CO2 SERPL-SCNC: 27 MMOL/L (ref 20–31)
CREAT SERPL-MCNC: 0.95 MG/DL (ref 0.7–1.25)
EOSINOPHIL # BLD AUTO: 188 CELLS/UL (ref 15–500)
EOSINOPHIL NFR BLD AUTO: 3.3 %
ERYTHROCYTE [DISTWIDTH] IN BLOOD BY AUTOMATED COUNT: 12.9 % (ref 11–15)
GLOBULIN SER CALC-MCNC: 2.1 G/DL (CALC) (ref 1.9–3.7)
GLUCOSE SERPL-MCNC: 118 MG/DL (ref 65–99)
HCT VFR BLD AUTO: 42.4 % (ref 38.5–50)
HGB BLD-MCNC: 14.2 G/DL (ref 13.2–17.1)
LYMPHOCYTES # BLD AUTO: 1431 CELLS/UL (ref 850–3900)
LYMPHOCYTES NFR BLD AUTO: 25.1 %
MCH RBC QN AUTO: 30.4 PG (ref 27–33)
MCHC RBC AUTO-ENTMCNC: 33.5 G/DL (ref 32–36)
MCV RBC AUTO: 90.8 FL (ref 80–100)
MONOCYTES # BLD AUTO: 393 CELLS/UL (ref 200–950)
MONOCYTES NFR BLD AUTO: 6.9 %
NEUTROPHILS # BLD AUTO: 3637 CELLS/UL (ref 1500–7800)
NEUTROPHILS NFR BLD AUTO: 63.8 %
PLATELET # BLD AUTO: 187 THOUSAND/UL (ref 140–400)
PMV BLD REES-ECKER: 10.2 FL (ref 7.5–12.5)
POTASSIUM SERPL-SCNC: 4.6 MMOL/L (ref 3.5–5.3)
PROT SERPL-MCNC: 6.3 G/DL (ref 6.1–8.1)
RBC # BLD AUTO: 4.67 MILLION/UL (ref 4.2–5.8)
SL AMB EGFR AFRICAN AMERICAN: 94 ML/MIN/1.73M2
SL AMB EGFR NON AFRICAN AMERICAN: 81 ML/MIN/1.73M2
SODIUM SERPL-SCNC: 140 MMOL/L (ref 135–146)
WBC # BLD AUTO: 5.7 THOUSAND/UL (ref 3.8–10.8)

## 2018-04-06 RX ORDER — ASPIRIN 81 MG/1
324 TABLET, CHEWABLE ORAL ONCE
Status: CANCELLED | OUTPATIENT
Start: 2018-04-06 | End: 2018-04-06

## 2018-04-06 RX ORDER — SODIUM CHLORIDE 9 MG/ML
125 INJECTION, SOLUTION INTRAVENOUS CONTINUOUS
Status: CANCELLED | OUTPATIENT
Start: 2018-04-06

## 2018-04-09 ENCOUNTER — HOSPITAL ENCOUNTER (OUTPATIENT)
Dept: NON INVASIVE DIAGNOSTICS | Facility: HOSPITAL | Age: 68
Discharge: HOME | End: 2018-04-09
Attending: INTERNAL MEDICINE | Admitting: INTERNAL MEDICINE
Payer: MEDICARE

## 2018-04-09 VITALS
RESPIRATION RATE: 18 BRPM | DIASTOLIC BLOOD PRESSURE: 60 MMHG | SYSTOLIC BLOOD PRESSURE: 126 MMHG | HEIGHT: 68 IN | TEMPERATURE: 97.6 F | WEIGHT: 161 LBS | HEART RATE: 59 BPM | BODY MASS INDEX: 24.4 KG/M2 | OXYGEN SATURATION: 94 %

## 2018-04-09 DIAGNOSIS — R06.02 SOB (SHORTNESS OF BREATH) ON EXERTION: ICD-10-CM

## 2018-04-09 LAB
ANION GAP SERPL CALCULATED.3IONS-SCNC: 4 MMOL/L (ref 4–13)
ATRIAL RATE: 56 BPM
BUN SERPL-MCNC: 12 MG/DL (ref 5–25)
CALCIUM SERPL-MCNC: 8.7 MG/DL
CHLORIDE SERPL-SCNC: 109 MMOL/L (ref 100–108)
CHOLEST SERPL-MCNC: 165 MG/DL (ref 50–200)
CO2 SERPL-SCNC: 27 MMOL/L (ref 21–32)
CREAT SERPL-MCNC: 0.94 MG/DL (ref 0.6–1.3)
ERYTHROCYTE [DISTWIDTH] IN BLOOD BY AUTOMATED COUNT: 13.1 % (ref 11.6–15.1)
GFR SERPL CREATININE-BSD FRML MDRD: 82 ML/MIN/1.73SQ M
GLUCOSE P FAST SERPL-MCNC: 107 MG/DL (ref 65–99)
GLUCOSE SERPL-MCNC: 107 MG/DL (ref 65–140)
HCT VFR BLD AUTO: 42.9 % (ref 36.5–49.3)
HDLC SERPL-MCNC: 80 MG/DL (ref 40–60)
HGB BLD-MCNC: 14.4 G/DL (ref 12–17)
INR PPP: 0.95 (ref 0.86–1.16)
LDLC SERPL CALC-MCNC: 72 MG/DL (ref 0–100)
MAGNESIUM SERPL-MCNC: 2 MG/DL (ref 1.6–2.6)
MCH RBC QN AUTO: 30.1 PG (ref 26.8–34.3)
MCHC RBC AUTO-ENTMCNC: 33.6 G/DL (ref 31.4–37.4)
MCV RBC AUTO: 90 FL (ref 82–98)
NONHDLC SERPL-MCNC: 85 MG/DL
P AXIS: 64 DEGREES
PLATELET # BLD AUTO: 179 THOUSANDS/UL (ref 149–390)
PMV BLD AUTO: 9.6 FL (ref 8.9–12.7)
POTASSIUM SERPL-SCNC: 4.2 MMOL/L (ref 3.5–5.3)
PR INTERVAL: 124 MS
PROTHROMBIN TIME: 12.7 SECONDS (ref 12.1–14.4)
QRS AXIS: 7 DEGREES
QRSD INTERVAL: 86 MS
QT INTERVAL: 410 MS
QTC INTERVAL: 395 MS
RBC # BLD AUTO: 4.79 MILLION/UL (ref 3.88–5.62)
SODIUM SERPL-SCNC: 140 MMOL/L (ref 136–145)
T WAVE AXIS: -3 DEGREES
TRIGL SERPL-MCNC: 67 MG/DL
VENTRICULAR RATE: 56 BPM
WBC # BLD AUTO: 5.61 THOUSAND/UL (ref 4.31–10.16)

## 2018-04-09 PROCEDURE — 93458 L HRT ARTERY/VENTRICLE ANGIO: CPT | Performed by: INTERNAL MEDICINE

## 2018-04-09 PROCEDURE — 85027 COMPLETE CBC AUTOMATED: CPT | Performed by: INTERNAL MEDICINE

## 2018-04-09 PROCEDURE — 99152 MOD SED SAME PHYS/QHP 5/>YRS: CPT | Performed by: INTERNAL MEDICINE

## 2018-04-09 PROCEDURE — 85610 PROTHROMBIN TIME: CPT | Performed by: INTERNAL MEDICINE

## 2018-04-09 PROCEDURE — 80048 BASIC METABOLIC PNL TOTAL CA: CPT | Performed by: INTERNAL MEDICINE

## 2018-04-09 PROCEDURE — C1769 GUIDE WIRE: HCPCS | Performed by: INTERNAL MEDICINE

## 2018-04-09 PROCEDURE — 93005 ELECTROCARDIOGRAM TRACING: CPT | Performed by: INTERNAL MEDICINE

## 2018-04-09 PROCEDURE — 99153 MOD SED SAME PHYS/QHP EA: CPT | Performed by: INTERNAL MEDICINE

## 2018-04-09 PROCEDURE — C1894 INTRO/SHEATH, NON-LASER: HCPCS | Performed by: INTERNAL MEDICINE

## 2018-04-09 PROCEDURE — 80061 LIPID PANEL: CPT | Performed by: INTERNAL MEDICINE

## 2018-04-09 PROCEDURE — 93010 ELECTROCARDIOGRAM REPORT: CPT | Performed by: INTERNAL MEDICINE

## 2018-04-09 PROCEDURE — 83735 ASSAY OF MAGNESIUM: CPT | Performed by: INTERNAL MEDICINE

## 2018-04-09 RX ORDER — LIDOCAINE HYDROCHLORIDE 10 MG/ML
INJECTION, SOLUTION INFILTRATION; PERINEURAL CODE/TRAUMA/SEDATION MEDICATION
Status: COMPLETED | OUTPATIENT
Start: 2018-04-09 | End: 2018-04-09

## 2018-04-09 RX ORDER — LOSARTAN POTASSIUM 25 MG/1
25 TABLET ORAL DAILY
Qty: 30 TABLET | Refills: 3 | Status: SHIPPED | OUTPATIENT
Start: 2018-04-10 | End: 2018-07-31 | Stop reason: SDUPTHER

## 2018-04-09 RX ORDER — HEPARIN SODIUM 1000 [USP'U]/ML
INJECTION, SOLUTION INTRAVENOUS; SUBCUTANEOUS CODE/TRAUMA/SEDATION MEDICATION
Status: COMPLETED | OUTPATIENT
Start: 2018-04-09 | End: 2018-04-09

## 2018-04-09 RX ORDER — FENTANYL CITRATE 50 UG/ML
INJECTION, SOLUTION INTRAMUSCULAR; INTRAVENOUS CODE/TRAUMA/SEDATION MEDICATION
Status: COMPLETED | OUTPATIENT
Start: 2018-04-09 | End: 2018-04-09

## 2018-04-09 RX ORDER — ASPIRIN 81 MG/1
81 TABLET, CHEWABLE ORAL DAILY
COMMUNITY
End: 2019-09-11 | Stop reason: ALTCHOICE

## 2018-04-09 RX ORDER — NITROGLYCERIN 20 MG/100ML
INJECTION INTRAVENOUS CODE/TRAUMA/SEDATION MEDICATION
Status: COMPLETED | OUTPATIENT
Start: 2018-04-09 | End: 2018-04-09

## 2018-04-09 RX ORDER — SODIUM CHLORIDE 9 MG/ML
125 INJECTION, SOLUTION INTRAVENOUS CONTINUOUS
Status: DISCONTINUED | OUTPATIENT
Start: 2018-04-09 | End: 2018-04-09

## 2018-04-09 RX ORDER — VERAPAMIL HYDROCHLORIDE 2.5 MG/ML
INJECTION, SOLUTION INTRAVENOUS CODE/TRAUMA/SEDATION MEDICATION
Status: COMPLETED | OUTPATIENT
Start: 2018-04-09 | End: 2018-04-09

## 2018-04-09 RX ORDER — SODIUM CHLORIDE 9 MG/ML
200 INJECTION, SOLUTION INTRAVENOUS CONTINUOUS
Status: DISCONTINUED | OUTPATIENT
Start: 2018-04-09 | End: 2018-04-09 | Stop reason: HOSPADM

## 2018-04-09 RX ORDER — ASPIRIN 81 MG/1
324 TABLET, CHEWABLE ORAL ONCE
Status: COMPLETED | OUTPATIENT
Start: 2018-04-09 | End: 2018-04-09

## 2018-04-09 RX ORDER — LOSARTAN POTASSIUM 25 MG/1
25 TABLET ORAL DAILY
Status: DISCONTINUED | OUTPATIENT
Start: 2018-04-10 | End: 2018-04-09 | Stop reason: HOSPADM

## 2018-04-09 RX ORDER — LOSARTAN POTASSIUM 25 MG/1
25 TABLET ORAL DAILY
Status: DISCONTINUED | OUTPATIENT
Start: 2018-04-09 | End: 2018-04-09

## 2018-04-09 RX ORDER — MIDAZOLAM HYDROCHLORIDE 1 MG/ML
INJECTION INTRAMUSCULAR; INTRAVENOUS CODE/TRAUMA/SEDATION MEDICATION
Status: COMPLETED | OUTPATIENT
Start: 2018-04-09 | End: 2018-04-09

## 2018-04-09 RX ADMIN — IOHEXOL 80 ML: 350 INJECTION, SOLUTION INTRAVENOUS at 14:15

## 2018-04-09 RX ADMIN — HEPARIN SODIUM 4000 UNITS: 1000 INJECTION INTRAVENOUS; SUBCUTANEOUS at 14:02

## 2018-04-09 RX ADMIN — FENTANYL CITRATE 50 MCG: 50 INJECTION, SOLUTION INTRAMUSCULAR; INTRAVENOUS at 13:57

## 2018-04-09 RX ADMIN — MIDAZOLAM 2 MG: 1 INJECTION INTRAMUSCULAR; INTRAVENOUS at 13:58

## 2018-04-09 RX ADMIN — FENTANYL CITRATE 25 MCG: 50 INJECTION, SOLUTION INTRAMUSCULAR; INTRAVENOUS at 14:02

## 2018-04-09 RX ADMIN — ASPIRIN 81 MG 324 MG: 81 TABLET ORAL at 10:57

## 2018-04-09 RX ADMIN — FENTANYL CITRATE 25 MCG: 50 INJECTION, SOLUTION INTRAMUSCULAR; INTRAVENOUS at 13:59

## 2018-04-09 RX ADMIN — NITROGLYCERIN 200 MCG: 20 INJECTION INTRAVENOUS at 14:02

## 2018-04-09 RX ADMIN — VERAPAMIL HYDROCHLORIDE 2.5 MG: 2.5 INJECTION INTRAVENOUS at 14:02

## 2018-04-09 RX ADMIN — LIDOCAINE HYDROCHLORIDE 1 ML: 10 INJECTION, SOLUTION INFILTRATION; PERINEURAL at 13:59

## 2018-04-09 RX ADMIN — SODIUM CHLORIDE 125 ML/HR: 0.9 INJECTION, SOLUTION INTRAVENOUS at 10:57

## 2018-04-09 RX ADMIN — MIDAZOLAM 1 MG: 1 INJECTION INTRAMUSCULAR; INTRAVENOUS at 14:02

## 2018-04-09 RX ADMIN — MIDAZOLAM 1 MG: 1 INJECTION INTRAMUSCULAR; INTRAVENOUS at 13:59

## 2018-04-09 NOTE — H&P (VIEW-ONLY)
Cardiology Follow Up    Ozzy Doll  1950  [id number]  [address] [facility]  [address] [place] [address] [address]    1  Shortness of breath on exertion     2  Short of breath on exertion  Ambulatory referral to Cardiology    Cardiac catheterization   3  Hypercholesteremia  Cardiac catheterization   4  HTN (hypertension), benign  Cardiac catheterization      History:  Earnest Young has had what he proved describes is progressive dyspnea on exertion for 2 years  He is very active  He works out on a regular basis  He told me when he does things like running he will feel winded at the start but then after running for a bit will feel well  Throughout this  He has been 14  He 1st started noticing the dyspnea with being up at high altitude skiing and powder  He in fact ski yesterday without incident    He denies any chest pain orthopnea paroxysmal nocturnal dyspnea or syncope    Patient Active Problem List   Diagnosis    Bilateral carpal tunnel syndrome    Pulmonary nodule    Shortness of breath on exertion    Elevated bilirubin    Impaired fasting glucose    Hypercholesteremia    Chronic GERD    Erectile dysfunction    HTN (hypertension), benign     Past Medical History:   Diagnosis Date    ED (erectile dysfunction)     GERD (gastroesophageal reflux disease)     Hypercholesteremia      Social History     Social History    Marital status: /Civil Union     Spouse name: N/A    Number of children: N/A    Years of education: N/A     Occupational History          Social History Main Topics    Smoking status: Never Smoker    Smokeless tobacco: Never Used    Alcohol use 8 4 oz/week     14 Standard drinks or equivalent per week      Comment: 2 Drinks a night    Drug use: No    Sexual activity: Not on file     Other Topics Concern    Not on file     Social History Narrative    No narrative on file      Family History   Problem Relation Age of Onset    Colon cancer Father     Heart attack Mother     Breast cancer Mother      Past Surgical History:   Procedure Laterality Date    APPENDECTOMY      As a child    INGUINAL HERNIA REPAIR Left 2015       Current Outpatient Prescriptions:     atorvastatin (LIPITOR) 20 mg tablet, Take 1 tablet (20 mg total) by mouth daily, Disp: 90 tablet, Rfl: 3    omeprazole (PriLOSEC) 20 mg delayed release capsule, Take by mouth, Disp: , Rfl:     Probiotic Product (PROBIOTIC DAILY PO), Take by mouth, Disp: , Rfl:     psyllium (METAMUCIL SMOOTH TEXTURE) 58 6 % powder, Take by mouth, Disp: , Rfl:     tadalafil (CIALIS) 5 MG tablet, Take 1 tablet by mouth daily, Disp: , Rfl:     B Complex-C (SUPER B COMPLEX PO), Take by mouth, Disp: , Rfl:     collagenase clostridium histolyticum (XIAFLEX) 0 9 MG SOLR, Inject 0 31 mL as directed, Disp: , Rfl:     cyanocobalamin (VITAMIN B-12) 100 mcg tablet, Take by mouth, Disp: , Rfl:     ranitidine (ZANTAC) 150 mg tablet, Take 150 mg by mouth daily, Disp: , Rfl: 0  No Known Allergies    Labs:  Hospital Outpatient Visit on 02/26/2018   Component Date Value    Protocol Name 02/26/2018 CLIFF     Time In Exercise Phase 02/26/2018 00:08:01     MAX   SYSTOLIC BP 92/04/9384 038     Max Diastolic Bp 81/68/8978 74     Max Heart Rate 02/26/2018 133     Max Predicted Heart Rate 02/26/2018 151     Reason for Termination 02/26/2018 Dyspnea, throat "dryness"     Test Indication 02/26/2018 Screening for CAD     Target Hr Formular 02/26/2018 (220 - Age)*100%     Chest Pain Statement 02/26/2018 none    Orders Only on 02/12/2018   Component Date Value    TESTOSTERONE TOTAL 02/12/2018 608    Office Visit on 02/07/2018   Component Date Value     COLOR,UA 02/07/2018 YELLOW      CLARITY,UA 02/07/2018 CLEAR      SPECIFIC GRAVITY,UA 02/07/2018 N/A      PH,UA 02/07/2018 5     LEUKOCYTE ESTERASE,UA 02/07/2018 NEG      Larina Porch 02/07/2018 NEG     GLUCOSE, UA 02/07/2018 NEG      KETONES,UA 02/07/2018 NEG      BILIRUBIN,UA 02/07/2018 N/A      BLOOD,UA 02/07/2018 NEG     SL AMB POCT URINE PROTEIN 02/07/2018 Negative    Orders Only on 01/31/2018   Component Date Value    Test Name 01/31/2018  FECAL GLOBIN BY     Test Code 01/31/2018  36033C     Client Contact 01/31/2018 TRACY CONNECT     Report Always Message Si* 01/31/2018      Always Message 01/31/2018      Fecal Globin by Immunoch* 01/31/2018     Orders Only on 01/31/2018   Component Date Value    Prostate Specific Antige* 01/31/2018 2 7    Orders Only on 01/31/2018   Component Date Value    Total Cholesterol 01/31/2018 169     SL AMB HDL CHOLESTEROL 01/31/2018 79     Triglycerides 01/31/2018 75     SL AMB LDL-CHOLESTEROL 01/31/2018 74     SL AMB CHOL/HDLC RATIO 01/31/2018 2 1     SL AMB NON HDL CHOLESTER* 01/31/2018 90     SL AMB GLUCOSE 01/31/2018 113*    BUN 01/31/2018 13     Creatinine, Serum 01/31/2018 1 00     eGFR Non  01/31/2018 76     SL AMB EGFR  AMER* 01/31/2018 89     SL AMB BUN/CREATININE RA* 23/96/4940 NOT APPLICABLE     SL AMB SODIUM 01/31/2018 137     SL AMB POTASSIUM 01/31/2018 4 1     SL AMB CHLORIDE 01/31/2018 103     SL AMB CARBON DIOXIDE 01/31/2018 27     SL AMB CALCIUM 01/31/2018 9 2     SL AMB PROTEIN, TOTAL 01/31/2018 6 5     Serum Albumin 01/31/2018 4 1     SL AMB GLOBULIN 01/31/2018 2 4     SL AMB ALBUMIN/GLOBULIN * 01/31/2018 1 7     SL AMB BILIRUBIN, TOTAL 01/31/2018 1 6*    SL AMB ALKALINE PHOSPHAT* 01/31/2018 55     SL AMB AST 01/31/2018 22     SL AMB ALT 01/31/2018 31     SL AMB LAB WHITE BLOOD C* 01/31/2018 5 7     SL AMB LAB RED BLOOD MEGHAN* 01/31/2018 4 63     Hemoglobin 01/31/2018 13 8     Hematocrit 01/31/2018 41 5     MCV 01/31/2018 89 6     MCH 01/31/2018 29 8     MCHC 01/31/2018 33 3     RDW 01/31/2018 13 1     Platelet Count 21/96/8011 187     SL AMB MPV 01/31/2018 10 5     Neutrophils (Absolute) 01/31/2018 3317     Lymphocytes (Absolute) 01/31/2018 1636     Monocytes (Absolute) 01/31/2018 502     Eosinophils (Absolute) 01/31/2018 182     Basophils (Absolute) 01/31/2018 63     Neutrophils 01/31/2018 58 2     Lymphocytes 01/31/2018 28 7     Monocytes 01/31/2018 8 8     Eosinophils 01/31/2018 3 2     Basophils 01/31/2018 1 1     SL AMB HEP C AB 01/31/2018 NON-REACTIVE     Signal to Cut-Off 01/31/2018 0 01     TSH 01/31/2018 2 95     Hemoglobin A1C 01/31/2018 5 4     SL AMB COLOR, URINE 01/31/2018 YELLOW     Urine Appearance 01/31/2018 CLEAR     Specific Gravity 01/31/2018 1 012     Ph 01/31/2018 6 0     SL AMB GLUCOSE, URINE, Q* 01/31/2018 NEGATIVE     SL AMB BILIRUBIN, URINE 01/31/2018 NEGATIVE     SL AMB KETONE, URINE, QU* 01/31/2018 NEGATIVE     SL AMB BLOOD, URINE 01/31/2018 NEGATIVE     SL AMB PROTEIN, URINE, Q* 01/31/2018 NEGATIVE     SL AMB NITRITES URINE, Q* 01/31/2018 NEGATIVE     SL AMB LEUKOCYTE ESTERAS* 01/31/2018 NEGATIVE      Imaging: No results found  Review of Systems:  Review of Systems    Physical Exam:  Physical Exam    Discussion/Summary:  Rigoberto Galicia has progressive dyspnea on exertion  He underwent exercise echocardiography in February of 2017  At that time he walked 11 minutes on a Abdifatah protocol  He had ST segment depression consistent with ischemia however that echocardiography at peak stress showed no evidence of ischemia  He subsequently had a cardiac CT done  The CT noted a calcium score the 40s  It described a 50% lad lesion and a PDA that filled well but appeared to be occluded and collateralized  Upon further review by a cardiology specialist in [place] he felt that the PDA was a dominant vessel off the circumflex and in fact not occlusion with collateralization  Rigoberto Galicia had a nuclear stress test earlier this month he walked 8 minutes on a Abdifatah Abdifatah protocol and the test was terminated secondary to achievement of his maximum predicted heart rate  He told me he feels he could walk further  He again had ischemic ST changes on the EKG in the setting of baseline ST T wave changes and again the nuclear portion of the test showed no ischemia  He has also had a echocardiogram that showed normal left ventricular size and function  Top-normal right ventricular size and no significant valvular pathology  Pulmonary artery pressures appear normal  His summary to [facility] has dyspnea on exertion  He has good functional capacity  He has ischemic EKG changes with exercise but no imaging abnormalities  He also has a coronary CT that suggests obstruction of LAD and occlusion of PDA  I told him his dyspnea is possibly related to coronary artery disease however the results are variable on different studies  I also told him it may represent deconditioning  I also told him at this time lung disease cannot be at absolutely excluded though there is nothing that points to that I reviewed with him assessing the coronary anatomy to rule out obstructive coronary artery disease as the cause of his symptoms I described risks of the catheterization procedure in detail and also if there was a tight obstruction the risks of stenting as per the consent form he agrees and accepts and would like to proceed with coronary angiography  This will be scheduled in the near future  Further recommendations will be made based on the findings

## 2018-04-09 NOTE — INTERVAL H&P NOTE
Update    H&P reviewed  After examining the patient, I find no changed to the H&P since it had been written  Patient re-evaluated  Accept Dr Haddad's office note from 3/22 as history and physical     Plan  Elective coronary angio in v/o dyspnea with exertion and abnor Ex stress  Risks and benefits were explained  Informed consent obtained       Sushant Waddell MD/April 9, 2018/7:41 PM

## 2018-04-09 NOTE — PROCEDURES
Elective coronary angiography and left heart catheterization    Indication: Dyspnea with exertion with abnormal exercise stress test    Right radial access was employed  5F TIG cath was used  Findings: Minimal luminal irregularities of prox LAD and prox Ramus w/o obstructive CAD  Recommendations: Start anti-hypertensive Rx  Continue ASA and statin rx       Complications: None

## 2018-04-13 DIAGNOSIS — N52.9 ERECTILE DYSFUNCTION, UNSPECIFIED ERECTILE DYSFUNCTION TYPE: Primary | ICD-10-CM

## 2018-04-13 RX ORDER — TADALAFIL 5 MG/1
5 TABLET ORAL DAILY
Qty: 90 TABLET | Refills: 3 | Status: SHIPPED | OUTPATIENT
Start: 2018-04-13 | End: 2019-02-04 | Stop reason: SDUPTHER

## 2018-04-13 RX ORDER — TADALAFIL 5 MG/1
5 TABLET ORAL DAILY PRN
Qty: 10 TABLET | Refills: 0 | Status: CANCELLED | OUTPATIENT
Start: 2018-04-13

## 2018-04-13 RX ORDER — TADALAFIL 5 MG/1
5 TABLET ORAL DAILY
Qty: 90 TABLET | Refills: 0 | Status: CANCELLED | OUTPATIENT
Start: 2018-04-13

## 2018-05-12 DIAGNOSIS — R91.1 SOLITARY PULMONARY NODULE: ICD-10-CM

## 2018-06-26 DIAGNOSIS — R91.1 SOLITARY LUNG NODULE: Primary | ICD-10-CM

## 2018-06-29 ENCOUNTER — OFFICE VISIT (OUTPATIENT)
Dept: PLASTIC SURGERY | Facility: CLINIC | Age: 68
End: 2018-06-29
Payer: MEDICARE

## 2018-06-29 VITALS — WEIGHT: 166 LBS | HEIGHT: 68 IN | BODY MASS INDEX: 25.16 KG/M2

## 2018-06-29 DIAGNOSIS — L98.9 SKIN LESION OF BACK: Primary | ICD-10-CM

## 2018-06-29 PROCEDURE — 99202 OFFICE O/P NEW SF 15 MIN: CPT | Performed by: SURGERY

## 2018-06-29 PROCEDURE — 88305 TISSUE EXAM BY PATHOLOGIST: CPT | Performed by: PATHOLOGY

## 2018-06-29 PROCEDURE — 11100 PR BIOPSY OF SKIN LESION: CPT | Performed by: SURGERY

## 2018-06-29 NOTE — PROGRESS NOTES
Assessment/Plan:  Please see HPI  The 2 larger, darkly pigmented lesions of the left back are clinically consistent with seborrheic keratoses  There is an area of the upper mid back with a more suspicious appearance, and this was biopsied utilizing a shave technique today  After prepping with alcohol, the area was infiltrated with xylocaine with epinephrine and a shave biopsy performed with a 15 blade  The base was cauterized with silver nitrate and a Band-Aid applied  Small skin tag of the right cheek was excised sharply after the area was prepped and infiltrated with xylocaine with epinephrine  It too was cauterized with silver nitrate  We will await biopsy results of the midback prior to addressing these lesions, usual post biopsy instructions were given  Diagnoses and all orders for this visit:    Skin lesion of back  -     Cancel: Tissue Exam  -     Tissue Exam          Subjective:  Lesions of back     Patient ID: Sudhakar Wheatley is a 69 y o  male  HPI he has been made aware of several lesions of his back that have enlarged over time, this has been pointed out by his wife  He is here for evaluation  The following portions of the patient's history were reviewed and updated as appropriate: allergies, current medications, past family history, past medical history, past social history, past surgical history and problem list     Review of Systems   Constitutional: Negative for chills and fever  HENT: Negative for hearing loss  Eyes: Positive for visual disturbance  Negative for discharge  Wears eyeglasses   Respiratory: Positive for shortness of breath  Negative for chest tightness  Shortness of breath with exertion   Cardiovascular: Negative for chest pain and leg swelling  Genitourinary: Negative for dysuria  Musculoskeletal: Negative for gait problem  Skin: Negative for rash  Allergic/Immunologic: Negative for immunocompromised state     Neurological: Negative for seizures and headaches  Hematological: Does not bruise/bleed easily  Psychiatric/Behavioral: Negative for dysphoric mood  The patient is not nervous/anxious  Objective:      Ht 5' 8" (1 727 m)   Wt 75 3 kg (166 lb)   BMI 25 24 kg/m²          Physical Exam   Constitutional: He appears well-developed and well-nourished  HENT:   Head: Normocephalic  Eyes: Pupils are equal, round, and reactive to light  Neck: Normal range of motion  Pulmonary/Chest: Effort normal    Musculoskeletal: Normal range of motion  Neurological: He is alert  Skin: Skin is warm and dry  Dark brown, nearly black pigmented lesions left lateral and left lower back, approximately 2 5 cm in diameter, slightly elevated from surrounding skin, clinically consistent with seborrheic keratoses    1 5 cm lesion of upper mid back, irregular margins, slightly elevated from surrounding skin, pink in color with central ulceration, rule out neoplasm

## 2018-07-20 PROBLEM — D04.5 SQUAMOUS CELL CARCINOMA IN SITU (SCCIS) OF SKIN OF BACK: Status: ACTIVE | Noted: 2018-07-20

## 2018-07-26 ENCOUNTER — HOSPITAL ENCOUNTER (OUTPATIENT)
Dept: CT IMAGING | Facility: HOSPITAL | Age: 68
Discharge: HOME | End: 2018-07-26
Payer: MEDICARE

## 2018-07-26 DIAGNOSIS — R91.1 SOLITARY PULMONARY NODULE: ICD-10-CM

## 2018-07-26 PROCEDURE — 71250 CT THORAX DX C-: CPT

## 2018-07-27 DIAGNOSIS — R91.1 PULMONARY NODULE: Primary | ICD-10-CM

## 2018-07-31 DIAGNOSIS — R06.02 SOB (SHORTNESS OF BREATH) ON EXERTION: ICD-10-CM

## 2018-07-31 DIAGNOSIS — I10 HTN (HYPERTENSION), BENIGN: Primary | ICD-10-CM

## 2018-07-31 RX ORDER — LOSARTAN POTASSIUM 25 MG/1
25 TABLET ORAL DAILY
Qty: 30 TABLET | Refills: 10 | Status: CANCELLED | OUTPATIENT
Start: 2018-07-31

## 2018-07-31 RX ORDER — LOSARTAN POTASSIUM 25 MG/1
25 TABLET ORAL DAILY
Qty: 30 TABLET | Refills: 5 | Status: SHIPPED | OUTPATIENT
Start: 2018-07-31 | End: 2018-11-13 | Stop reason: SDUPTHER

## 2018-07-31 NOTE — PRE-PROCEDURE INSTRUCTIONS
Pre-Surgery Instructions:   Medication Instructions    aspirin 81 mg chewable tablet Instructed patient per Anesthesia Guidelines   atorvastatin (LIPITOR) 20 mg tablet Instructed patient per Anesthesia Guidelines   losartan (COZAAR) 25 mg tablet Instructed patient per Anesthesia Guidelines   omeprazole (PriLOSEC) 20 mg delayed release capsule Instructed patient per Anesthesia Guidelines   Probiotic Product (PROBIOTIC DAILY PO) Instructed patient per Anesthesia Guidelines   psyllium (METAMUCIL SMOOTH TEXTURE) 58 6 % powder Instructed patient per Anesthesia Guidelines   tadalafil (CIALIS) 5 MG tablet Instructed patient per Anesthesia Guidelines  Pre-op and bathing instructions given  Patient will purchase hibiclens

## 2018-08-03 ENCOUNTER — ANESTHESIA EVENT (OUTPATIENT)
Dept: PERIOP | Facility: AMBULARY SURGERY CENTER | Age: 68
End: 2018-08-03
Payer: MEDICARE

## 2018-08-10 NOTE — H&P
Assessment/Plan:  Please see HPI  The 2 larger, darkly pigmented lesions of the left back are clinically consistent with seborrheic keratoses  There is an area of the upper mid back with a more suspicious appearance, and this was biopsied utilizing a shave technique today  After prepping with alcohol, the area was infiltrated with xylocaine with epinephrine and a shave biopsy performed with a 15 blade  The base was cauterized with silver nitrate and a Band-Aid applied  Small skin tag of the right cheek was excised sharply after the area was prepped and infiltrated with xylocaine with epinephrine  It too was cauterized with silver nitrate  We will await biopsy results of the midback prior to addressing these lesions, usual post biopsy instructions were given             Diagnoses and all orders for this visit:     Skin lesion of back  -     Cancel: Tissue Exam  -     Tissue Exam            Subjective:  Lesions of back      Patient ID: Piotr Mcintosh is a 69 y o  male      HPI he has been made aware of several lesions of his back that have enlarged over time, this has been pointed out by his wife  He is here for evaluation      The following portions of the patient's history were reviewed and updated as appropriate: allergies, current medications, past family history, past medical history, past social history, past surgical history and problem list      Review of Systems   Constitutional: Negative for chills and fever  HENT: Negative for hearing loss  Eyes: Positive for visual disturbance  Negative for discharge  Wears eyeglasses   Respiratory: Positive for shortness of breath  Negative for chest tightness  Shortness of breath with exertion   Cardiovascular: Negative for chest pain and leg swelling  Genitourinary: Negative for dysuria  Musculoskeletal: Negative for gait problem  Skin: Negative for rash  Allergic/Immunologic: Negative for immunocompromised state     Neurological: Negative for seizures and headaches  Hematological: Does not bruise/bleed easily  Psychiatric/Behavioral: Negative for dysphoric mood  The patient is not nervous/anxious            Objective:        Ht 5' 8" (1 727 m)   Wt 75 3 kg (166 lb)   BMI 25 24 kg/m²             Physical Exam   Constitutional: He appears well-developed and well-nourished  HENT:   Head: Normocephalic  Eyes: Pupils are equal, round, and reactive to light  Neck: Normal range of motion  Pulmonary/Chest: CTAB without wheezes Heart: RRR without murmurs  Musculoskeletal: Normal range of motion  Neurological: He is alert  Skin: Skin is warm and dry  Dark brown, nearly black pigmented lesions left lateral and left lower back, approximately 2 5 cm in diameter, slightly elevated from surrounding skin, clinically consistent with seborrheic keratoses    1 5 cm lesion of upper mid back, irregular margins, slightly elevated from surrounding skin, pink in color with central ulceration, rule out neoplasm

## 2018-08-14 ENCOUNTER — ANESTHESIA (OUTPATIENT)
Dept: PERIOP | Facility: AMBULARY SURGERY CENTER | Age: 68
End: 2018-08-14
Payer: MEDICARE

## 2018-08-14 ENCOUNTER — HOSPITAL ENCOUNTER (OUTPATIENT)
Facility: AMBULARY SURGERY CENTER | Age: 68
Setting detail: OUTPATIENT SURGERY
Discharge: HOME | End: 2018-08-14
Attending: SURGERY | Admitting: SURGERY
Payer: MEDICARE

## 2018-08-14 VITALS
HEART RATE: 79 BPM | HEIGHT: 69 IN | SYSTOLIC BLOOD PRESSURE: 146 MMHG | RESPIRATION RATE: 18 BRPM | TEMPERATURE: 97.4 F | DIASTOLIC BLOOD PRESSURE: 65 MMHG | BODY MASS INDEX: 24.73 KG/M2 | OXYGEN SATURATION: 95 % | WEIGHT: 167 LBS

## 2018-08-14 DIAGNOSIS — D04.5 SQUAMOUS CELL CARCINOMA IN SITU (SCCIS) OF SKIN OF BACK: ICD-10-CM

## 2018-08-14 PROCEDURE — 88305 TISSUE EXAM BY PATHOLOGIST: CPT | Performed by: PATHOLOGY

## 2018-08-14 PROCEDURE — 13100 CMPLX RPR TRUNK 1.1-2.5 CM: CPT | Performed by: SURGERY

## 2018-08-14 PROCEDURE — 11302 SHAVE SKIN LESION 1.1-2.0 CM: CPT | Performed by: SURGERY

## 2018-08-14 PROCEDURE — 11603 EXC TR-EXT MAL+MARG 2.1-3 CM: CPT | Performed by: SURGERY

## 2018-08-14 PROCEDURE — 11303 SHAVE SKIN LESION >2.0 CM: CPT | Performed by: SURGERY

## 2018-08-14 RX ORDER — MIDAZOLAM HYDROCHLORIDE 1 MG/ML
INJECTION INTRAMUSCULAR; INTRAVENOUS AS NEEDED
Status: DISCONTINUED | OUTPATIENT
Start: 2018-08-14 | End: 2018-08-14 | Stop reason: SURG

## 2018-08-14 RX ORDER — SODIUM CHLORIDE, SODIUM LACTATE, POTASSIUM CHLORIDE, CALCIUM CHLORIDE 600; 310; 30; 20 MG/100ML; MG/100ML; MG/100ML; MG/100ML
125 INJECTION, SOLUTION INTRAVENOUS CONTINUOUS
Status: DISCONTINUED | OUTPATIENT
Start: 2018-08-14 | End: 2018-08-14 | Stop reason: HOSPADM

## 2018-08-14 RX ORDER — MAGNESIUM HYDROXIDE 1200 MG/15ML
LIQUID ORAL AS NEEDED
Status: DISCONTINUED | OUTPATIENT
Start: 2018-08-14 | End: 2018-08-14 | Stop reason: HOSPADM

## 2018-08-14 RX ORDER — CEFAZOLIN SODIUM 1 G/3ML
INJECTION, POWDER, FOR SOLUTION INTRAMUSCULAR; INTRAVENOUS AS NEEDED
Status: DISCONTINUED | OUTPATIENT
Start: 2018-08-14 | End: 2018-08-14 | Stop reason: SURG

## 2018-08-14 RX ORDER — FENTANYL CITRATE/PF 50 MCG/ML
25 SYRINGE (ML) INJECTION
Status: DISCONTINUED | OUTPATIENT
Start: 2018-08-14 | End: 2018-08-14 | Stop reason: HOSPADM

## 2018-08-14 RX ORDER — LIDOCAINE HYDROCHLORIDE AND EPINEPHRINE 10; 10 MG/ML; UG/ML
INJECTION, SOLUTION INFILTRATION; PERINEURAL AS NEEDED
Status: DISCONTINUED | OUTPATIENT
Start: 2018-08-14 | End: 2018-08-14 | Stop reason: HOSPADM

## 2018-08-14 RX ORDER — FENTANYL CITRATE 50 UG/ML
INJECTION, SOLUTION INTRAMUSCULAR; INTRAVENOUS AS NEEDED
Status: DISCONTINUED | OUTPATIENT
Start: 2018-08-14 | End: 2018-08-14 | Stop reason: SURG

## 2018-08-14 RX ORDER — LIDOCAINE HYDROCHLORIDE 10 MG/ML
INJECTION, SOLUTION INFILTRATION; PERINEURAL AS NEEDED
Status: DISCONTINUED | OUTPATIENT
Start: 2018-08-14 | End: 2018-08-14 | Stop reason: SURG

## 2018-08-14 RX ORDER — ONDANSETRON 2 MG/ML
INJECTION INTRAMUSCULAR; INTRAVENOUS AS NEEDED
Status: DISCONTINUED | OUTPATIENT
Start: 2018-08-14 | End: 2018-08-14 | Stop reason: SURG

## 2018-08-14 RX ORDER — PROPOFOL 10 MG/ML
INJECTION, EMULSION INTRAVENOUS AS NEEDED
Status: DISCONTINUED | OUTPATIENT
Start: 2018-08-14 | End: 2018-08-14 | Stop reason: SURG

## 2018-08-14 RX ORDER — PROPOFOL 10 MG/ML
INJECTION, EMULSION INTRAVENOUS CONTINUOUS PRN
Status: DISCONTINUED | OUTPATIENT
Start: 2018-08-14 | End: 2018-08-14 | Stop reason: SURG

## 2018-08-14 RX ORDER — SODIUM CHLORIDE 9 MG/ML
INJECTION, SOLUTION INTRAVENOUS CONTINUOUS PRN
Status: DISCONTINUED | OUTPATIENT
Start: 2018-08-14 | End: 2018-08-14 | Stop reason: SURG

## 2018-08-14 RX ADMIN — ONDANSETRON 4 MG: 2 INJECTION INTRAMUSCULAR; INTRAVENOUS at 10:50

## 2018-08-14 RX ADMIN — CEFAZOLIN SODIUM 1000 MG: 1 INJECTION, POWDER, FOR SOLUTION INTRAMUSCULAR; INTRAVENOUS at 10:55

## 2018-08-14 RX ADMIN — PROPOFOL 40 MG: 10 INJECTION, EMULSION INTRAVENOUS at 10:52

## 2018-08-14 RX ADMIN — PROPOFOL 75 MCG/KG/MIN: 10 INJECTION, EMULSION INTRAVENOUS at 10:55

## 2018-08-14 RX ADMIN — SODIUM CHLORIDE: 0.9 INJECTION, SOLUTION INTRAVENOUS at 10:20

## 2018-08-14 RX ADMIN — PROPOFOL 20 MG: 10 INJECTION, EMULSION INTRAVENOUS at 11:00

## 2018-08-14 RX ADMIN — MIDAZOLAM HYDROCHLORIDE 2 MG: 1 INJECTION, SOLUTION INTRAMUSCULAR; INTRAVENOUS at 10:50

## 2018-08-14 RX ADMIN — FENTANYL CITRATE 100 MCG: 50 INJECTION, SOLUTION INTRAMUSCULAR; INTRAVENOUS at 10:50

## 2018-08-14 RX ADMIN — LIDOCAINE HYDROCHLORIDE 50 MG: 10 INJECTION, SOLUTION INFILTRATION; PERINEURAL at 10:50

## 2018-08-14 NOTE — OP NOTE
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 [D04 5]    Procedure:  1  Excision squamous cell carcinoma in situ mid back 2 8 cm excision, 4 8 cm complex closure mid back wound 2  Shave excision lesion left mid back 2 1 cm 3  Shave excision lesion left lower back 1 2 cm  Specimen(s):  ID Type Source Tests Collected by Time Destination   1 : midline scc insitu Tissue Back TISSUE Dk Shrestha MD 2018 1107    2 : midline lesion Tissue Back TISSUE EXAM Don Tesfaye MD 2018 1111    3 : left inferior lesion Tissue Back TISSUE EXAM Don Tesfaye MD 2018 1112        Estimated Blood Loss:   Minimal    Drains: The areas were marked for excision and infiltrated with xylocaine with epinephrine  Squamous cell carcinoma in situ the upper back was marked for an elliptical excision to include a margin of normal-appearing skin  A 15 blade used to create the skin incision is carried down through the dermis, the lesion was excised deep in the plane of subcutaneous fat utilizing the Bovie cautery  The specimen was marked with sutures and placed in formalin  In order to close the wound without significant, USP, widened circumferential undermining of the wound margins was performed  This wide undermining was performed with the Bovie cautery deep to the dermis around the entire circumference of the wound  The wound was then irrigated hemostasis assured with the Bovie cautery  Closure was then undertaken with 3-0 PDS sutures buried at the level of the deep dermis, this was followed by running subcuticular 3-0 strata fix  Benzoin Steri-Strips and a dry sterile dressing were applied  The lesions of the left mid and left lower back were then shaved at the surface of the surrounding skin utilizing an 11 blade  The base was then cauterized and bacitracin and Band-Aids applied  Patient was transferred to the recovery room stable condition  Anesthesia Type:   IV Sedation with Anesthesia    Operative Indications:  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 [D04 5]  Seborrheic keratoses    Operative Findings:  As above    Complications:   None    Procedure and Technique:  The patient was seen in the holding area, the surgical sites were marked with his participation  We reviewed the planned procedure as well as potential risks, complications, and limitations  He was taken to the operating room and underwent introduction of intravenous sedation by the anesthesia personnel  The operative field was prepped and draped in sterile fashion a proper time-out was performed  2 5 loupe magnification was used aid visualization     I was present for the entire procedure    Patient Disposition:  PACU     SIGNATURE: Benjamin Valdez MD  DATE: August 14, 2018  TIME: 12:24 PM

## 2018-08-14 NOTE — ANESTHESIA PREPROCEDURE EVALUATION
Review of Systems/Medical History    Chart reviewed  No history of anesthetic complications     Cardiovascular  EKG reviewed, Exercise tolerance (METS): >4, Exercise comment: METS as high as 10; pt runs and ski Hyperlipidemia, Hypertension ,    Pulmonary  Negative pulmonary ROS Shortness of breath,   Comment: SOB with (-) w/u and pt METS to 10; SOB noted at increased elevation while skiing     GI/Hepatic    GERD well controlled,        Negative  ROS        Endo/Other    Comment: SCC    GYN       Hematology   Musculoskeletal  Negative musculoskeletal ROS        Neurology  Negative neurology ROS      Psychology           Physical Exam    Airway    Mallampati score: III  TM Distance: >3 FB  Neck ROM: full     Dental   No notable dental hx     Cardiovascular      Pulmonary      Other Findings        Anesthesia Plan  ASA Score- 2     Anesthesia Type- IV sedation with anesthesia with ASA Monitors  Additional Monitors:   Airway Plan:     Comment: GA backup  Plan Factors-    Induction- intravenous  Postoperative Plan-     Informed Consent- Anesthetic plan and risks discussed with patient  I personally reviewed this patient with the CRNA  Discussed and agreed on the Anesthesia Plan with the DUNG Cline

## 2018-08-14 NOTE — DISCHARGE INSTRUCTIONS
[facility]  Dr Karina Starks   [address] [facility], [address]  Phone: [phone]     Postoperative Instructions for Outpatient Surgery     These instructions are being provided by your doctor to give you basic guidelines during your post-op recovery  Please let our office know if your contact information has changed       Please call the office today for an appointment in about 1 week for postoperative care      Dressings: Please leave upper back outer dressing intact for 24 hours  Please leave inner steristrips intact, do not peel off  If they start to peel, please reinforce with new steristrips  Please leave lower back bandaids intact for 24 hours  Please apply bacitracin to lower back 3 times a day for 5-7 days      Activity Restrictions: None     Bathing: You may shower tomorrow evening (8/15/18)     Medications:    Resume pre-op medications     You may take tylenol, aleve, or ibuprofen for pain control

## 2018-08-15 ENCOUNTER — TELEPHONE (OUTPATIENT)
Dept: PLASTIC SURGERY | Facility: CLINIC | Age: 68
End: 2018-08-15

## 2018-08-15 NOTE — TELEPHONE ENCOUNTER
Patient states he is doing well post op with no questions  Follow up scheduled for 8/28/18 for suture removal  Encouraged to call sooner with any questions

## 2018-08-28 ENCOUNTER — OFFICE VISIT (OUTPATIENT)
Dept: PLASTIC SURGERY | Facility: CLINIC | Age: 68
End: 2018-08-28

## 2018-08-28 DIAGNOSIS — Z98.890 POST-OPERATIVE STATE: Primary | ICD-10-CM

## 2018-08-28 PROCEDURE — 99024 POSTOP FOLLOW-UP VISIT: CPT

## 2018-08-28 NOTE — PATIENT INSTRUCTIONS
Post Op Scar Care Instructions:  -Massage silicone scar gel (ex, Biocorneum, [employer] Silicone Scar Gel, Scar Away) into incision twice daily for 3 months  Or   -Apply silicone scar patch (ex, Oleeva) to incision for up to 23 hours per day for 3 months    -Wear SPF 30+ on incision at all times with any sun exposure  Call with any questions or concerns

## 2018-08-28 NOTE — PROGRESS NOTES
Patient presents for suture removal s/p SCCIS excision of upper mid back and two SKs of lower back 8/14/18  Incision on upper back well healed  Sutures removed  Low back lesions shaved  Scabbing still present but healing well  Pathology reviewed with patient who verbalized understanding that margins of upper back excision were negative and low back lesions were benign seborrheic keratoses  Post op scar care instructions reviewed as noted in Patient Instructions  Declined AVS  Encouraged to follow up in 6-8 weeks with Dr Gabrielle Bro as needed

## 2018-09-05 ENCOUNTER — OFFICE VISIT (OUTPATIENT)
Dept: INTERNAL MEDICINE CLINIC | Facility: CLINIC | Age: 68
End: 2018-09-05
Payer: MEDICARE

## 2018-09-05 ENCOUNTER — TELEPHONE (OUTPATIENT)
Dept: INTERNAL MEDICINE CLINIC | Facility: CLINIC | Age: 68
End: 2018-09-05

## 2018-09-05 VITALS
WEIGHT: 171.6 LBS | HEIGHT: 69 IN | DIASTOLIC BLOOD PRESSURE: 66 MMHG | BODY MASS INDEX: 25.42 KG/M2 | SYSTOLIC BLOOD PRESSURE: 142 MMHG | OXYGEN SATURATION: 97 % | HEART RATE: 73 BPM

## 2018-09-05 DIAGNOSIS — I10 HTN (HYPERTENSION), BENIGN: ICD-10-CM

## 2018-09-05 DIAGNOSIS — Z23 NEEDS FLU SHOT: ICD-10-CM

## 2018-09-05 DIAGNOSIS — E78.2 MIXED HYPERLIPIDEMIA: ICD-10-CM

## 2018-09-05 DIAGNOSIS — R73.01 IMPAIRED FASTING GLUCOSE: ICD-10-CM

## 2018-09-05 DIAGNOSIS — Z23 NEED FOR SHINGLES VACCINE: Primary | ICD-10-CM

## 2018-09-05 DIAGNOSIS — K21.9 CHRONIC GERD: ICD-10-CM

## 2018-09-05 DIAGNOSIS — R91.1 PULMONARY NODULE: ICD-10-CM

## 2018-09-05 DIAGNOSIS — M65.351 TRIGGER LITTLE FINGER OF RIGHT HAND: ICD-10-CM

## 2018-09-05 PROCEDURE — G0008 ADMIN INFLUENZA VIRUS VAC: HCPCS | Performed by: INTERNAL MEDICINE

## 2018-09-05 PROCEDURE — 99214 OFFICE O/P EST MOD 30 MIN: CPT | Performed by: INTERNAL MEDICINE

## 2018-09-05 PROCEDURE — 90662 IIV NO PRSV INCREASED AG IM: CPT

## 2018-09-05 NOTE — PROGRESS NOTES
Assessment and Plan:    Problem List Items Addressed This Visit     None        Health Maintenance Due   Topic Date Due    CRC Screening: Colonoscopy  1950    INFLUENZA VACCINE  09/01/2018         HPI:  Latasha Chow is a 77 y o  male here for his {AWV Welcome/Initial/Subsequent:92759}    Patient Active Problem List   Diagnosis    Bilateral carpal tunnel syndrome    Pulmonary nodule    SOB (shortness of breath) on exertion    Elevated bilirubin    Impaired fasting glucose    Hypercholesteremia    Chronic GERD    Erectile dysfunction    HTN (hypertension), benign    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     Past Medical History:   Diagnosis Date    Cancer ([address] )     scc back    Carpal tunnel syndrome     ED (erectile dysfunction)     GERD (gastroesophageal reflux disease)     [place] (hard of hearing)     wears hearing aids    Hypercholesteremia     Hypertension     Numbness in both hands      Past Surgical History:   Procedure Laterality Date    APPENDECTOMY      As a child    COLONOSCOPY      INGUINAL HERNIA REPAIR Left 2015    MASS EXCISION N/A 8/14/2018    Procedure: BACK/TRUNK MULTIPLE SEBBORHEIC KERATOSIS SHAVE/EXCISION; COMPLEX CLOSURE VERSUS FLAP RECONSTRUCTION;  Surgeon: Mohsen Oneill MD;  [facility];  Service: Plastics    SQUAMOUS CELL CARCINOMA EXCISION N/A 8/14/2018    Procedure: BACK SCC IN SITU EXCISION;  Surgeon: Mohsen Oneill MD;  [facility];  Service: Plastics     Family History   Problem Relation Age of Onset    Colon cancer Father     Heart attack Mother     Breast cancer Mother      History   Smoking Status    Never Smoker   Smokeless Tobacco    Never Used     History   Alcohol Use    8 4 oz/week    14 Standard drinks or equivalent per week     Comment: 2 Drinks a night      History   Drug Use No       Current Outpatient Prescriptions   Medication Sig Dispense Refill    aspirin 81 mg chewable tablet Chew 81 mg daily      atorvastatin (LIPITOR) 20 mg tablet Take 1 tablet (20 mg total) by mouth daily 90 tablet 3    losartan (COZAAR) 25 mg tablet Take 1 tablet (25 mg total) by mouth daily 30 tablet 5    omeprazole (PriLOSEC) 20 mg delayed release capsule Take by mouth      Probiotic Product (PROBIOTIC DAILY PO) Take by mouth      psyllium (METAMUCIL SMOOTH TEXTURE) 58 6 % powder Take by mouth      tadalafil (CIALIS) 5 MG tablet Take 1 tablet (5 mg total) by mouth daily 90 tablet 3     No current facility-administered medications for this visit        No Known Allergies  Immunization History   Administered Date(s) Administered    Influenza Split High Dose Preservative Free IM 11/02/2015, 01/30/2017, 02/06/2018    Pneumococcal Conjugate 13-Valent 01/30/2017    Pneumococcal Polysaccharide PPV23 09/03/2014       Patient Care Team:  Jared Freedman MD as PCP - General    Medicare Screening Tests and Risk Assessments:  Medicare Annual Wellness Visit

## 2018-09-05 NOTE — ASSESSMENT & PLAN NOTE
This look better on the repeat CT scan, more discoid an appearance suggestive of scar, order already entered for repeat CT of the chest July 2019

## 2018-09-05 NOTE — PROGRESS NOTES
Assessment/Plan:    Trigger little finger of right hand  Will refer ortho surg    HTN (hypertension), benign    Borderline, continue losartan 25 mg daily  If trending upwards, can always increase to 50 mg  Impaired fasting glucose   Continue to watch diet, hemoglobin A1c has been excellent  Chronic GERD    I recommend decreasing omeprazole to 20 mg every other day to see how he does  Mixed hyperlipidemia    Continue with healthy diet, regular exercise, and statin    Pulmonary nodule   This look better on the repeat CT scan, more discoid an appearance suggestive of scar, order already entered for repeat CT of the chest July 2019       Diagnoses and all orders for this visit:    Need for shingles vaccine  -     Zoster Vac Recomb Adjuvanted ([address]) 50 MCG SUSR; Inject 50 mcg into a muscle once for 1 dose Repeat one dose in 2 to 6 months    Needs flu shot  -     influenza vaccine, [id number], high-dose, PF 0 5 mL, for patients 65 yr+ (FLUZONE HIGH-DOSE)    Trigger little finger of right hand  -     Ambulatory referral to Orthopedic Surgery; Future    HTN (hypertension), benign    Impaired fasting glucose    Chronic GERD    Mixed hyperlipidemia    Pulmonary nodule    Other orders  -     Cancel: Ambulatory referral to Colorectal Surgery; Future  -     Cancel: influenza vaccine, [id number], quadrivalent, recombinant, PF, 0 5 mL, for patients 18 yr+ (FLUBLOK)  -     Cancel: influenza vaccine, [id number], high-dose, PF 0 5 mL, for patients 65 yr+ (FLUZONE HIGH-DOSE)          Subjective:      Patient ID: Oksana Hernandez is a 77 y o  male  Hypertension:  Patient on losartan 25 mg daily, tolerating it well  Patient ranges between systolic blood pressure 057 to 140 at home, with higher readings in the morning     hypercholesterolemia:  Patient tolerating statin without any significant muscle aches      GERD:  Patient has decreased about was all from 40 mg daily 20 mg daily and is not having any significant GERD problems  The following portions of the patient's history were reviewed and updated as appropriate: allergies, current medications, past family history, past medical history, past social history, past surgical history and problem list     Review of Systems   Constitutional: Negative for chills, fatigue and fever  HENT: Negative for congestion, nosebleeds, postnasal drip, sore throat and trouble swallowing  Eyes: Negative for pain  Respiratory: Negative for cough, chest tightness, shortness of breath and wheezing  Cardiovascular: Negative for chest pain, palpitations and leg swelling  Gastrointestinal: Negative for abdominal pain, constipation, diarrhea, nausea and vomiting  Endocrine: Negative for polydipsia and polyuria  Genitourinary: Negative for dysuria, flank pain and hematuria  Musculoskeletal: Negative for arthralgias  Skin: Negative for rash  Neurological: Negative for dizziness, tremors and headaches  Hematological: Does not bruise/bleed easily  Psychiatric/Behavioral: Negative for confusion and dysphoric mood  The patient is not nervous/anxious  Objective:      /66   Pulse 73   Ht 5' 9" (1 753 m)   Wt 77 8 kg (171 lb 9 6 oz)   SpO2 97%   BMI 25 34 kg/m²          Physical Exam   Constitutional: He is oriented to person, place, and time  He appears well-developed and well-nourished  No distress  HENT:   Head: Normocephalic and atraumatic  Right Ear: External ear normal    Left Ear: External ear normal    Eyes: Conjunctivae are normal  No scleral icterus  Neck: Normal range of motion  Neck supple  No tracheal deviation present  No thyromegaly present  Cardiovascular: Normal rate, regular rhythm and normal heart sounds  No murmur heard  Pulmonary/Chest: Effort normal and breath sounds normal  No respiratory distress  He has no wheezes  He has no rales  Abdominal: Soft  Bowel sounds are normal  There is no tenderness   There is no rebound and no guarding  Musculoskeletal: He exhibits no edema  Lymphadenopathy:     He has no cervical adenopathy  Neurological: He is alert and oriented to person, place, and time  Psychiatric: He has a normal mood and affect  His behavior is normal  Judgment and thought content normal    Vitals reviewed

## 2018-09-05 NOTE — PATIENT INSTRUCTIONS
Problem List Items Addressed This Visit     Pulmonary nodule      This look better on the repeat CT scan, more discoid an appearance suggestive of scar, order already entered for repeat CT of the chest July 2019         Impaired fasting glucose      Continue to watch diet, hemoglobin A1c has been excellent  Mixed hyperlipidemia       Continue with healthy diet, regular exercise, and statin         Chronic GERD       I recommend decreasing omeprazole to 20 mg every other day to see how he does  HTN (hypertension), benign       Borderline, continue losartan 25 mg daily  If trending upwards, can always increase to 50 mg           Trigger little finger of right hand     Will refer ortho surg         Relevant Orders    Ambulatory referral to Orthopedic Surgery      Other Visit Diagnoses     Need for shingles vaccine    -  Primary    Relevant Medications    Zoster Vac Recomb Adjuvanted ([facility]) 50 MCG SUSR    Needs flu shot        Relevant Orders    influenza vaccine, [id number], high-dose, PF 0 5 mL, for patients 65 yr+ (FLUZONE HIGH-DOSE)       and patient will check with Dr Brown to see when he is due for his next colonoscopy

## 2018-09-06 DIAGNOSIS — Z12.5 SCREENING FOR PROSTATE CANCER: ICD-10-CM

## 2018-09-06 DIAGNOSIS — R73.01 IMPAIRED FASTING GLUCOSE: ICD-10-CM

## 2018-09-06 DIAGNOSIS — E78.2 MIXED HYPERLIPIDEMIA: Primary | ICD-10-CM

## 2018-10-24 ENCOUNTER — OFFICE VISIT (OUTPATIENT)
Dept: OBGYN CLINIC | Facility: HOSPITAL | Age: 68
End: 2018-10-24
Payer: MEDICARE

## 2018-10-24 VITALS
DIASTOLIC BLOOD PRESSURE: 80 MMHG | HEIGHT: 68 IN | HEART RATE: 64 BPM | WEIGHT: 173 LBS | SYSTOLIC BLOOD PRESSURE: 160 MMHG | BODY MASS INDEX: 26.22 KG/M2

## 2018-10-24 DIAGNOSIS — M72.0 DUPUYTREN'S CONTRACTURE OF RIGHT HAND: ICD-10-CM

## 2018-10-24 PROCEDURE — 99214 OFFICE O/P EST MOD 30 MIN: CPT | Performed by: ORTHOPAEDIC SURGERY

## 2018-10-24 NOTE — PROGRESS NOTES
ASSESSMENT/PLAN:    Assessment:   Right hand ring and small Dupuytren's contracture  Cubital tunnel syndrome    Plan:   Resume activities as tolerated, handout provided, will maintain home exercises to improve flexion contractures  Discussed surgical intervention, repeating Xiaflex injection, versus observation  With regards to known cubital tunnel syndrome, patient wishes to observe this  Follow Up:  2  month(s)    To Do Next Visit:   re-check flexion contractures at his right ring and small fingers    General Discussions:  Dupuytren's Disease: The anatomy and physiology of Dupuytren's disease were discussed with the patient today in the office  Increased scar formation within the interval between the skin volarly and the flexor tendons dorsally can result in pit formation, nodular formation, or eventual cord formation  These pathologic cords can cause contracture at either the metacarpophalangeal joint, proximal interphalangeal joint, or both  As the cords progress towards the proximal interphalangeal joint, the neurovascular structures of the finger may become involved within the disease process  While this is a genetic condition, variable penetrance occurs within family trees  Conservative treatment options including therapy to maintain joint mobility and a tabletop test were discussed  Other treatments include Xiaflex and possible surgical intervention  Operative Discussions:    none scheduled at today's visit       _____________________________________________________  CHIEF COMPLAINT:  Chief Complaint   Patient presents with    Right Hand - Pain         SUBJECTIVE:  Chayo Starks is a 77y o  year old male who presents for follow up regarding his right hand Dupuytren's contracture at his ring and small finger  He had Xiaflex injections July 2017 with relief until recently  He denies any locking or catching or triggering of any finger at his right hand    He notes intermittent numbness and tingling which he was diagnosed with carpal as well as cubital tunnel via EMG last year however his symptoms only bother him at night      PAST MEDICAL HISTORY:  Past Medical History:   Diagnosis Date    Cancer ([address] )     scc back    Carpal tunnel syndrome     ED (erectile dysfunction)     GERD (gastroesophageal reflux disease)     [place] (hard of hearing)     wears hearing aids    Hypercholesteremia     Hypertension     Numbness in both hands        PAST SURGICAL HISTORY:  Past Surgical History:   Procedure Laterality Date    APPENDECTOMY      As a child    COLONOSCOPY      INGUINAL HERNIA REPAIR Left 2015    MASS EXCISION N/A 8/14/2018    Procedure: BACK/TRUNK MULTIPLE SEBBORHEIC KERATOSIS SHAVE/EXCISION; COMPLEX CLOSURE VERSUS FLAP RECONSTRUCTION;  Surgeon: Shannan Britt MD;  [facility];  Service: Plastics    SQUAMOUS CELL CARCINOMA EXCISION N/A 8/14/2018    Procedure: BACK SCC IN SITU EXCISION;  Surgeon: Shannan Britt MD;  [facility];  Service: Plastics       FAMILY HISTORY:  Family History   Problem Relation Age of Onset    Colon cancer Father     Heart attack Mother     Breast cancer Mother        SOCIAL HISTORY:  Social History   Substance Use Topics    Smoking status: Never Smoker    Smokeless tobacco: Never Used    Alcohol use 8 4 oz/week     14 Standard drinks or equivalent per week      Comment: 2 Drinks a night       MEDICATIONS:    Current Outpatient Prescriptions:     aspirin 81 mg chewable tablet, Chew 81 mg daily, Disp: , Rfl:     atorvastatin (LIPITOR) 20 mg tablet, Take 1 tablet (20 mg total) by mouth daily, Disp: 90 tablet, Rfl: 3    losartan (COZAAR) 25 mg tablet, Take 1 tablet (25 mg total) by mouth daily, Disp: 30 tablet, Rfl: 5    omeprazole (PriLOSEC) 20 mg delayed release capsule, Take by mouth, Disp: , Rfl:     Probiotic Product (PROBIOTIC DAILY PO), Take by mouth, Disp: , Rfl:     psyllium (METAMUCIL SMOOTH TEXTURE) 58 6 % powder, Take by mouth, Disp: , Rfl:     tadalafil (CIALIS) 5 MG tablet, Take 1 tablet (5 mg total) by mouth daily, Disp: 90 tablet, Rfl: 3    ALLERGIES:  No Known Allergies    REVIEW OF SYSTEMS:  Pertinent items are noted in HPI  A comprehensive review of systems was negative  LABS:  HgA1c:   Lab Results   Component Value Date    HGBA1C 5 4 01/31/2018     BMP:   Lab Results   Component Value Date    GLUCOSE 122 06/03/2015    CALCIUM 8 7 04/09/2018     04/09/2018    K 4 2 04/09/2018    CO2 27 04/09/2018     (H) 04/09/2018    BUN 12 04/09/2018    CREATININE 0 94 04/09/2018           _____________________________________________________  PHYSICAL EXAMINATION:  General: well developed and well nourished, alert, oriented times 3 and appears comfortable  Psychiatric: Normal  HEENT: Trachea Midline, No torticollis  Cardiovascular: No discernable arrhythmia  Pulmonary: No wheezing or stridor  Skin: No masses, erthema, lacerations, fluctation, ulcerations  Neurovascular: Sensation Intact to the Median, Ulnar, Radial Nerve, Motor Intact to the Median, Ulnar, Radial Nerve and Pulses Intact    MUSCULOSKELETAL EXAMINATION:  RIGHT SIDE:  Right Hand    cental cord inline with ring and small finger with pit and nodules at distal escobar flexion crease    Ring finger: 20 degree flexion contracture PIP flexion contracture, no contracture at the MP joint  Small finger: 38 degree flexion contracture PIP, no contracture at the MP joint        _____________________________________________________  STUDIES REVIEWED:  No Studies to review      PROCEDURES PERFORMED:  Procedures  No Procedures performed today   Scribe Attestation    I,:   Carey Khan am acting as a scribe while in the presence of the attending physician :        I,:   Vivi Serrano MD personally performed the services described in this documentation    as scribed in my presence :

## 2018-10-24 NOTE — PATIENT INSTRUCTIONS
Dupuytren's Contracture   WHAT YOU NEED TO KNOW:   What is Dupuytren's contracture? Dupuytren's contracture occurs when tissues in your hand thicken  The thickened tissue may form cords that extend from your palm to your finger  The cord may shorten, and your palm or finger may become stuck in a bent position  Dupuytren's contracture may occur in one or both of your hands  It is most common in the ring and little fingers  What increases my risk for Dupuytren's contracture? The cause of Dupuytren's contracture may not be known  Any of the following can increase your risk:  · Family history of the condition    · Older age    · Medical conditions, such as diabetes or seizures    · Alcohol use    · Hand trauma  What are the signs and symptoms of Dupuytren's contracture? · Thickened skin on your palm    · Changes on your palm, such as dimples or pitting    · One or more raised or firm lumps under the skin of your palm or finger    · A thick, firm cord of tissue on your palm or finger    · One or more bent fingers or a bent palm  How is Dupuytren's contracture diagnosed? Your healthcare provider will examine your hand and count any lumps you may have  He may measure the angle of your bent fingers  He will ask you to place your palms flat on a table to see how much you can straighten them  How is Dupuytren's contracture treated? You may not need treatment if your symptoms are mild  If your fingers become bent or you have difficulty using your hand, you may need any of the following:  · A steroid or enzyme injection  can help decrease inflammation and straighten your finger  · Surgery  may be needed to divide or remove the thickened tissue that is causing the contracture  Ask for more information on the type of surgery you may need  How can I manage my symptoms? · Use heat and massage  Apply heat on your hand to warm up your muscles and gently massage your fingers and palm  · Stretch your fingers    BALALIKEA them backward from your palm to straighten them  Do not hold objects with a tight   · Go to physical and occupational therapy  A physical therapist teaches you exercises to improve movement and strength and decrease pain  An occupational therapist teaches you skills to help with your daily activities  · Wear your splint as directed  You may need to wear a splint to help straighten your fingers  You may need to wear the splint all the time, during the day, or during the night  · Limit alcohol  Ask how much alcohol you should drink  A drink of alcohol is 12 ounces of beer, 5 ounces of wine, or 1½ ounces of liquor  When should I contact my healthcare provider? · You have a fever  · There is a new lump, dimple, or dent on your palm or finger  · You have a pocket of fluid under your skin  · Your palm or finger becomes bent again  · You feel tingling or a pricking feeling on your hand  · You have trouble straightening your finger or palm  · You have questions or concerns about your condition or care  When should I seek immediate care or call 911? · You have severe pain in your hand  · You cannot use your hand at all  CARE AGREEMENT:   You have the right to help plan your care  Learn about your health condition and how it may be treated  Discuss treatment options with your caregivers to decide what care you want to receive  You always have the right to refuse treatment  The above information is an  only  It is not intended as medical advice for individual conditions or treatments  Talk to your doctor, nurse or pharmacist before following any medical regimen to see if it is safe and effective for you  © 2017 [employer] Information is for End User's use only and may not be sold, redistributed or otherwise used for commercial purposes   All illustrations and images included in CareNotes® are the copyrighted property of [employer]  or [facility] [employer]

## 2018-11-13 ENCOUNTER — TELEPHONE (OUTPATIENT)
Dept: INTERNAL MEDICINE CLINIC | Facility: CLINIC | Age: 68
End: 2018-11-13

## 2018-11-13 DIAGNOSIS — I10 HTN (HYPERTENSION), BENIGN: ICD-10-CM

## 2018-11-13 RX ORDER — LOSARTAN POTASSIUM 50 MG/1
50 TABLET ORAL DAILY
Qty: 90 TABLET | Refills: 3 | Status: SHIPPED | OUTPATIENT
Start: 2018-11-13 | End: 2019-03-18 | Stop reason: SDUPTHER

## 2018-11-13 NOTE — TELEPHONE ENCOUNTER
Blood pressure generally spikes the morning due to what call the catecholamine release meaning adrenaline is stronger in the morning when people wake up, that is normal to have blood pressure higher in the morning than in the afternoon

## 2018-11-13 NOTE — TELEPHONE ENCOUNTER
Based on those readings he should increase the losartan from 25 mg daily to 50 mg daily, I sent a prescription to his pharmacy also

## 2018-11-21 ENCOUNTER — LAB REQUISITION (OUTPATIENT)
Dept: LAB | Facility: HOSPITAL | Age: 68
End: 2018-11-21
Payer: MEDICARE

## 2018-11-21 DIAGNOSIS — Z86.010 HISTORY OF COLONIC POLYPS: ICD-10-CM

## 2018-11-21 DIAGNOSIS — D12.5 BENIGN NEOPLASM OF SIGMOID COLON: ICD-10-CM

## 2018-11-21 DIAGNOSIS — K57.30 DIVERTICULOSIS OF LARGE INTESTINE WITHOUT PERFORATION OR ABSCESS WITHOUT BLEEDING: ICD-10-CM

## 2018-11-21 DIAGNOSIS — D12.3 BENIGN NEOPLASM OF TRANSVERSE COLON: ICD-10-CM

## 2018-11-21 PROCEDURE — 88305 TISSUE EXAM BY PATHOLOGIST: CPT | Performed by: PATHOLOGY

## 2019-01-09 ENCOUNTER — OFFICE VISIT (OUTPATIENT)
Dept: OBGYN CLINIC | Facility: HOSPITAL | Age: 69
End: 2019-01-09
Payer: MEDICARE

## 2019-01-09 VITALS
SYSTOLIC BLOOD PRESSURE: 165 MMHG | WEIGHT: 175.6 LBS | DIASTOLIC BLOOD PRESSURE: 76 MMHG | HEIGHT: 68 IN | BODY MASS INDEX: 26.61 KG/M2 | HEART RATE: 71 BPM

## 2019-01-09 DIAGNOSIS — M72.0 DUPUYTREN CONTRACTURE: Primary | ICD-10-CM

## 2019-01-09 PROCEDURE — 99213 OFFICE O/P EST LOW 20 MIN: CPT | Performed by: ORTHOPAEDIC SURGERY

## 2019-01-09 NOTE — PROGRESS NOTES
ASSESSMENT/PLAN:    Assessment:   Right hand Dupuytren's disease, stable  Right carpal and cubital tunnel syndrome, stable    Plan:   Dupuytren's has not worsened and it is not bothering him at this point so we will continue to watch this  Patient states he still has n/t, but improved and mostly just at night so will continue to watch this as well  Signs of worsening CTS and CuTS discussed and pt to call if these occur  Of note, pt's BP somewhat elevated even with repeat measurements - advise calling PCP and monitoring at home  Follow Up:  PRN      _____________________________________________________  CHIEF COMPLAINT:  Chief Complaint   Patient presents with    Right Hand - Follow-up         SUBJECTIVE:  Vira Tam is a 77y o  year old male who presents for follow up regarding right Dupuytren's disease  He is s/p Xiaflex injection in the summer with recurrence  He states he has not noticed any worsening of his contractures since his last appointment  He still is able to preform all of his activities without issue and no pain  He also continues to have n/t, primarily at night, but states that, if anything, this has improved somewhat       PAST MEDICAL HISTORY:  Past Medical History:   Diagnosis Date    Cancer ([address] )     scc back    Carpal tunnel syndrome     ED (erectile dysfunction)     GERD (gastroesophageal reflux disease)     [place] (hard of hearing)     wears hearing aids    Hypercholesteremia     Hypertension     Numbness in both hands        PAST SURGICAL HISTORY:  Past Surgical History:   Procedure Laterality Date    APPENDECTOMY      As a child    COLONOSCOPY      INGUINAL HERNIA REPAIR Left 2015    MASS EXCISION N/A 8/14/2018    Procedure: BACK/TRUNK MULTIPLE SEBBORHEIC KERATOSIS SHAVE/EXCISION; COMPLEX CLOSURE VERSUS FLAP RECONSTRUCTION;  Surgeon: Fede Katz MD;  [facility];  Service: Plastics    SQUAMOUS CELL CARCINOMA EXCISION N/A 8/14/2018    Procedure: Allan Rodríguez SCC IN SITU EXCISION;  Surgeon: Vesna Koenig MD;  [facility];  Service: Plastics       FAMILY HISTORY:  Family History   Problem Relation Age of Onset    Colon cancer Father     Heart attack Mother     Breast cancer Mother        SOCIAL HISTORY:  Social History   Substance Use Topics    Smoking status: Never Smoker    Smokeless tobacco: Never Used    Alcohol use 8 4 oz/week     14 Standard drinks or equivalent per week      Comment: 2 Drinks a night       MEDICATIONS:    Current Outpatient Prescriptions:     aspirin 81 mg chewable tablet, Chew 81 mg daily, Disp: , Rfl:     atorvastatin (LIPITOR) 20 mg tablet, Take 1 tablet (20 mg total) by mouth daily, Disp: 90 tablet, Rfl: 3    losartan (COZAAR) 50 mg tablet, Take 1 tablet (50 mg total) by mouth daily, Disp: 90 tablet, Rfl: 3    omeprazole (PriLOSEC) 20 mg delayed release capsule, Take by mouth, Disp: , Rfl:     Probiotic Product (PROBIOTIC DAILY PO), Take by mouth, Disp: , Rfl:     psyllium (METAMUCIL SMOOTH TEXTURE) 58 6 % powder, Take by mouth, Disp: , Rfl:     tadalafil (CIALIS) 5 MG tablet, Take 1 tablet (5 mg total) by mouth daily, Disp: 90 tablet, Rfl: 3    ALLERGIES:  No Known Allergies    REVIEW OF SYSTEMS:  Pertinent items are noted in HPI  A comprehensive review of systems was negative      LABS:  HgA1c:   Lab Results   Component Value Date    HGBA1C 5 4 01/31/2018     BMP:   Lab Results   Component Value Date    GLUCOSE 122 06/03/2015    CALCIUM 8 7 04/09/2018     12/20/2016    K 4 2 04/09/2018    CO2 27 04/09/2018     (H) 04/09/2018    BUN 12 04/09/2018    CREATININE 0 94 04/09/2018           _____________________________________________________  PHYSICAL EXAMINATION:  General: well developed and well nourished, alert, oriented times 3 and appears comfortable  Psychiatric: Normal  HEENT: Trachea Midline, No torticollis  Cardiovascular: No discernable arrhythmia  Pulmonary: No wheezing or stridor  Skin: No Erythema, No Lacerations, No Fluctuation, No Ulcerations  Neurovascular: Sensation Intact to the Median, Ulnar, Radial Nerve, Motor Intact to the Median, Ulnar, Radial Nerve and Pulses Intact    MUSCULOSKELETAL EXAMINATION:  RIGHT SIDE:  Finger:  Patient with central cord with pits and nodules in ling with ring and small fingers    Ring finger PIP joint contracture of 14 degrees and small finger PIP contracture of 38 degrees  No MP joint contractures    _____________________________________________________  STUDIES REVIEWED:  No Studies to review      PROCEDURES PERFORMED:  Procedures  No Procedures performed today   Scribe Attestation    I,:   Tiffany Garland PA-C am acting as a scribe while in the presence of the attending physician :        I,:   Sanjuanita Salcedo MD personally performed the services described in this documentation    as scribed in my presence :

## 2019-01-16 ENCOUNTER — TELEPHONE (OUTPATIENT)
Dept: OTHER | Facility: OTHER | Age: 69
End: 2019-01-16

## 2019-01-18 NOTE — TELEPHONE ENCOUNTER
I spoke with the patient he had wanted to confirm his appt with Dr Duane June and wanted to know if the labs that Dr Duane June is still ok  I rvwd that it is good for 1 year

## 2019-01-21 DIAGNOSIS — E78.00 HYPERCHOLESTEREMIA: ICD-10-CM

## 2019-01-21 RX ORDER — ATORVASTATIN CALCIUM 20 MG/1
20 TABLET, FILM COATED ORAL DAILY
Qty: 90 TABLET | Refills: 3 | Status: SHIPPED | OUTPATIENT
Start: 2019-01-21 | End: 2020-01-06 | Stop reason: SDUPTHER

## 2019-02-01 ENCOUNTER — TELEPHONE (OUTPATIENT)
Dept: UROLOGY | Facility: MEDICAL CENTER | Age: 69
End: 2019-02-01

## 2019-02-01 NOTE — TELEPHONE ENCOUNTER
Patient would like a refill of Cialis  He has a yearly followup 03/19/19  Fax to him at [phone]    He said it's been faxed to him in the past

## 2019-02-01 NOTE — TELEPHONE ENCOUNTER
Pt  Called back and said that for his Cialis 5 mg he would like 84 tablets and 3 refills   Pt is aware that we do not have a provider here until Monday and will wait until then

## 2019-02-04 DIAGNOSIS — N52.9 ERECTILE DYSFUNCTION, UNSPECIFIED ERECTILE DYSFUNCTION TYPE: ICD-10-CM

## 2019-02-04 RX ORDER — TADALAFIL 5 MG/1
5 TABLET ORAL DAILY
Qty: 84 TABLET | Refills: 3 | Status: SHIPPED | OUTPATIENT
Start: 2019-02-04 | End: 2019-02-12 | Stop reason: SDUPTHER

## 2019-02-11 NOTE — TELEPHONE ENCOUNTER
Patient would like rx sent to pharmacy to be canceled  Patient wants rx faxed to him per request on 2/1/19  Please print the rx and fax to him [phone]

## 2019-02-11 NOTE — TELEPHONE ENCOUNTER
Patient states he needs a copy of the script faxed over to him  Patient does not get medication from here  He gets his medication from a [facility] mail order pharmacy and needs to actual script  Please fax script [phone]  Patient requesting 3 refills with 90 day supply on each time   On his Cialis

## 2019-02-12 DIAGNOSIS — N52.9 ERECTILE DYSFUNCTION, UNSPECIFIED ERECTILE DYSFUNCTION TYPE: ICD-10-CM

## 2019-02-12 RX ORDER — TADALAFIL 5 MG/1
5 TABLET ORAL DAILY
Qty: 84 TABLET | Refills: 3 | Status: SHIPPED | OUTPATIENT
Start: 2019-02-12 | End: 2020-08-20 | Stop reason: SDUPTHER

## 2019-02-20 ENCOUNTER — TELEPHONE (OUTPATIENT)
Dept: INTERNAL MEDICINE CLINIC | Facility: CLINIC | Age: 69
End: 2019-02-20

## 2019-02-20 DIAGNOSIS — Z12.11 SCREEN FOR COLON CANCER: Primary | ICD-10-CM

## 2019-02-20 NOTE — TELEPHONE ENCOUNTER
Patient is asking if you can order the ensure fecal kit  Patient went for his blood work and the lab gave him the kit but he needs an order for it      Please advise    Fax order to patient once entered to

## 2019-02-21 LAB
ALBUMIN SERPL-MCNC: 4.3 G/DL (ref 3.6–5.1)
ALBUMIN/GLOB SERPL: 1.7 (CALC) (ref 1–2.5)
ALP SERPL-CCNC: 58 U/L (ref 40–115)
ALT SERPL-CCNC: 32 U/L (ref 9–46)
AST SERPL-CCNC: 21 U/L (ref 10–35)
BASOPHILS # BLD AUTO: 70 CELLS/UL (ref 0–200)
BASOPHILS NFR BLD AUTO: 1.2 %
BILIRUB SERPL-MCNC: 2.4 MG/DL (ref 0.2–1.2)
BUN SERPL-MCNC: 15 MG/DL (ref 7–25)
BUN/CREAT SERPL: ABNORMAL (CALC) (ref 6–22)
CALCIUM SERPL-MCNC: 9.6 MG/DL (ref 8.6–10.3)
CHLORIDE SERPL-SCNC: 102 MMOL/L (ref 98–110)
CHOLEST SERPL-MCNC: 174 MG/DL
CHOLEST/HDLC SERPL: 2.5 (CALC)
CO2 SERPL-SCNC: 28 MMOL/L (ref 20–32)
CREAT SERPL-MCNC: 0.9 MG/DL (ref 0.7–1.18)
EOSINOPHIL # BLD AUTO: 220 CELLS/UL (ref 15–500)
EOSINOPHIL NFR BLD AUTO: 3.8 %
ERYTHROCYTE [DISTWIDTH] IN BLOOD BY AUTOMATED COUNT: 13.1 % (ref 11–15)
GLOBULIN SER CALC-MCNC: 2.5 G/DL (CALC) (ref 1.9–3.7)
GLUCOSE SERPL-MCNC: 118 MG/DL (ref 65–99)
HBA1C MFR BLD: 5.6 % OF TOTAL HGB
HCT VFR BLD AUTO: 43.7 % (ref 38.5–50)
HDLC SERPL-MCNC: 70 MG/DL
HGB BLD-MCNC: 14.9 G/DL (ref 13.2–17.1)
LDLC SERPL CALC-MCNC: 85 MG/DL (CALC)
LYMPHOCYTES # BLD AUTO: 1955 CELLS/UL (ref 850–3900)
LYMPHOCYTES NFR BLD AUTO: 33.7 %
MCH RBC QN AUTO: 30.3 PG (ref 27–33)
MCHC RBC AUTO-ENTMCNC: 34.1 G/DL (ref 32–36)
MCV RBC AUTO: 88.8 FL (ref 80–100)
MONOCYTES # BLD AUTO: 545 CELLS/UL (ref 200–950)
MONOCYTES NFR BLD AUTO: 9.4 %
NEUTROPHILS # BLD AUTO: 3010 CELLS/UL (ref 1500–7800)
NEUTROPHILS NFR BLD AUTO: 51.9 %
NONHDLC SERPL-MCNC: 104 MG/DL (CALC)
PLATELET # BLD AUTO: 207 THOUSAND/UL (ref 140–400)
PMV BLD REES-ECKER: 10.6 FL (ref 7.5–12.5)
POTASSIUM SERPL-SCNC: 4.4 MMOL/L (ref 3.5–5.3)
PROT SERPL-MCNC: 6.8 G/DL (ref 6.1–8.1)
PSA SERPL-MCNC: 2.8 NG/ML
RBC # BLD AUTO: 4.92 MILLION/UL (ref 4.2–5.8)
SL AMB EGFR AFRICAN AMERICAN: 100 ML/MIN/1.73M2
SL AMB EGFR NON AFRICAN AMERICAN: 86 ML/MIN/1.73M2
SODIUM SERPL-SCNC: 138 MMOL/L (ref 135–146)
TRIGL SERPL-MCNC: 93 MG/DL
TSH SERPL-ACNC: 3.12 MIU/L (ref 0.4–4.5)
WBC # BLD AUTO: 5.8 THOUSAND/UL (ref 3.8–10.8)

## 2019-02-21 NOTE — RESULT ENCOUNTER NOTE
Labs are okay, glucose a little elevated, but the average of his sugars over 3 months is normal, PSA is in the normal range, but slightly up from previous, will review in more detail at upcoming appt

## 2019-03-18 ENCOUNTER — OFFICE VISIT (OUTPATIENT)
Dept: INTERNAL MEDICINE CLINIC | Facility: CLINIC | Age: 69
End: 2019-03-18
Payer: MEDICARE

## 2019-03-18 VITALS
WEIGHT: 176 LBS | SYSTOLIC BLOOD PRESSURE: 128 MMHG | TEMPERATURE: 98 F | HEART RATE: 76 BPM | OXYGEN SATURATION: 97 % | BODY MASS INDEX: 26.67 KG/M2 | DIASTOLIC BLOOD PRESSURE: 72 MMHG | HEIGHT: 68 IN

## 2019-03-18 DIAGNOSIS — K21.9 CHRONIC GERD: ICD-10-CM

## 2019-03-18 DIAGNOSIS — E78.2 MIXED HYPERLIPIDEMIA: ICD-10-CM

## 2019-03-18 DIAGNOSIS — N52.9 ERECTILE DYSFUNCTION, UNSPECIFIED ERECTILE DYSFUNCTION TYPE: ICD-10-CM

## 2019-03-18 DIAGNOSIS — F41.9 ANXIETY: ICD-10-CM

## 2019-03-18 DIAGNOSIS — Z00.00 MEDICARE ANNUAL WELLNESS VISIT, SUBSEQUENT: ICD-10-CM

## 2019-03-18 DIAGNOSIS — I10 HTN (HYPERTENSION), BENIGN: Primary | ICD-10-CM

## 2019-03-18 DIAGNOSIS — R73.01 IMPAIRED FASTING GLUCOSE: ICD-10-CM

## 2019-03-18 PROCEDURE — G0439 PPPS, SUBSEQ VISIT: HCPCS | Performed by: INTERNAL MEDICINE

## 2019-03-18 PROCEDURE — 99214 OFFICE O/P EST MOD 30 MIN: CPT | Performed by: INTERNAL MEDICINE

## 2019-03-18 RX ORDER — ESCITALOPRAM OXALATE 10 MG/1
10 TABLET ORAL DAILY
Qty: 30 TABLET | Refills: 5 | Status: SHIPPED | OUTPATIENT
Start: 2019-03-18 | End: 2019-09-11 | Stop reason: ALTCHOICE

## 2019-03-18 RX ORDER — LOSARTAN POTASSIUM 100 MG/1
100 TABLET ORAL DAILY
Qty: 90 TABLET | Refills: 3 | Status: SHIPPED | OUTPATIENT
Start: 2019-03-18 | End: 2020-01-29

## 2019-03-18 NOTE — ASSESSMENT & PLAN NOTE
This is mild, and more related to stress, discussed a daily medication like escitalopram, and explained that he should not feel any different taking the medication, but after couple of weeks he may notice an improvement in his symptoms  Also discussed medical marijuana as the possible treatment

## 2019-03-18 NOTE — PATIENT INSTRUCTIONS
Problem List Items Addressed This Visit        Digestive    Chronic GERD     Patient did try every other day dosing of the proton pump inhibitor and had return of his symptoms, so continue the low-dose daily            Endocrine    Impaired fasting glucose     Continue with healthy diet and exercise and monitoring         Relevant Orders    Hemoglobin A1C    Comprehensive metabolic panel       Cardiovascular and Mediastinum    HTN (hypertension), benign - Primary     With his slight elevation in blood pressure, I recommend increasing losartan from 50 mg daily to 100 mg daily and continuing to monitor blood pressure         Relevant Medications    losartan (COZAAR) 100 MG tablet       Genitourinary    Erectile dysfunction     Continue medication as needed            Other    Mixed hyperlipidemia     Continue with healthy diet and exercise         Medicare annual wellness visit, subsequent     Discussed preventative health, cancer screening, immunizations, and safety issues  I recommend getting the Shingrix shot to help prevent Shingles  You can get it a pharmacy, and they can administer it there  It is a two shot series with the second shot needed between 2-6 months after the first shot  I would not recommend getting the shot before an important or fun event in case you were to have a reaction to the shot like a sore arm or flu-like symptoms  I make the same recommendation about any shot, as people can have a reaction to any shot  Anxiety     This is mild, and more related to stress, discussed a daily medication like escitalopram, and explained that he should not feel any different taking the medication, but after couple of weeks he may notice an improvement in his symptoms  Also discussed medical marijuana as the possible treatment           Relevant Medications    escitalopram (LEXAPRO) 10 mg tablet

## 2019-03-18 NOTE — PROGRESS NOTES
Assessment/Plan:    Medicare annual wellness visit, subsequent  Discussed preventative health, cancer screening, immunizations, and safety issues  I recommend getting the Shingrix shot to help prevent Shingles  You can get it a pharmacy, and they can administer it there  It is a two shot series with the second shot needed between 2-6 months after the first shot  I would not recommend getting the shot before an important or fun event in case you were to have a reaction to the shot like a sore arm or flu-like symptoms  I make the same recommendation about any shot, as people can have a reaction to any shot  HTN (hypertension), benign  With his slight elevation in blood pressure, I recommend increasing losartan from 50 mg daily to 100 mg daily and continuing to monitor blood pressure    Anxiety  This is mild, and more related to stress, discussed a daily medication like escitalopram, and explained that he should not feel any different taking the medication, but after couple of weeks he may notice an improvement in his symptoms  Also discussed medical marijuana as the possible treatment  Erectile dysfunction  Continue medication as needed    Mixed hyperlipidemia  Continue with healthy diet and exercise    Chronic GERD  Patient did try every other day dosing of the proton pump inhibitor and had return of his symptoms, so continue the low-dose daily    Impaired fasting glucose  Continue with healthy diet and exercise and monitoring       Diagnoses and all orders for this visit:    HTN (hypertension), benign  -     losartan (COZAAR) 100 MG tablet; Take 1 tablet (100 mg total) by mouth daily    Medicare annual wellness visit, subsequent    Anxiety  -     escitalopram (LEXAPRO) 10 mg tablet;  Take 1 tablet (10 mg total) by mouth daily    Erectile dysfunction, unspecified erectile dysfunction type    Mixed hyperlipidemia    Chronic GERD    Impaired fasting glucose  -     Hemoglobin A1C; Future  - Comprehensive metabolic panel; Future          Subjective:      Patient ID: Nancy Paul is a 77 y o  male  Hypertension:  Patient reports compliance with losartan, blood pressures have been averaging in the 130s, sometimes he gets in the 150s in the morning  No cardiopulmonary complaints  Anxiety: This is mild, patient has more stress and worries about things and wants to be able to enjoy life more by being able to relax a little bit  GERD:  Patient not have any problems with this currently no blood in the stool, no problems with food getting stuck  He takes a low-dose omeprazole 20 mg daily  The following portions of the patient's history were reviewed and updated as appropriate: allergies, current medications, past family history, past medical history, past social history, past surgical history and problem list     Review of Systems   Constitutional: Negative for chills, fatigue and fever  HENT: Negative for congestion, nosebleeds, postnasal drip, sore throat and trouble swallowing  Eyes: Negative for pain  Respiratory: Negative for cough, chest tightness, shortness of breath and wheezing  Cardiovascular: Negative for chest pain, palpitations and leg swelling  Gastrointestinal: Negative for abdominal pain, constipation, diarrhea, nausea and vomiting  Endocrine: Negative for polydipsia and polyuria  Genitourinary: Negative for dysuria, flank pain and hematuria  Musculoskeletal: Negative for arthralgias  Skin: Negative for rash  Neurological: Negative for dizziness, tremors and headaches  Hematological: Does not bruise/bleed easily  Psychiatric/Behavioral: Negative for confusion and dysphoric mood  The patient is not nervous/anxious  Objective:      /72   Pulse 76   Temp 98 °F (36 7 °C)   Ht 5' 8" (1 727 m)   Wt 79 8 kg (176 lb)   SpO2 97%   BMI 26 76 kg/m²          Physical Exam   Constitutional: He is oriented to person, place, and time   He appears well-developed and well-nourished  No distress  HENT:   Head: Normocephalic and atraumatic  Right Ear: External ear normal    Left Ear: External ear normal    Eyes: Conjunctivae are normal  No scleral icterus  Neck: Normal range of motion  Neck supple  No tracheal deviation present  No thyromegaly present  Cardiovascular: Normal rate, regular rhythm and normal heart sounds  No murmur heard  Pulmonary/Chest: Effort normal and breath sounds normal  No respiratory distress  He has no wheezes  He has no rales  Abdominal: Soft  Bowel sounds are normal  There is no tenderness  There is no rebound and no guarding  Musculoskeletal: He exhibits no edema  Lymphadenopathy:     He has no cervical adenopathy  Neurological: He is alert and oriented to person, place, and time  Psychiatric: He has a normal mood and affect  His behavior is normal  Judgment and thought content normal    Vitals reviewed

## 2019-03-18 NOTE — ASSESSMENT & PLAN NOTE
Patient did try every other day dosing of the proton pump inhibitor and had return of his symptoms, so continue the low-dose daily

## 2019-03-18 NOTE — ASSESSMENT & PLAN NOTE
With his slight elevation in blood pressure, I recommend increasing losartan from 50 mg daily to 100 mg daily and continuing to monitor blood pressure

## 2019-03-18 NOTE — PROGRESS NOTES
Assessment and Plan:    Problem List Items Addressed This Visit        Other    Medicare annual wellness visit, subsequent - Primary     Discussed preventative health, cancer screening, immunizations, and safety issues  I recommend getting the Shingrix shot to help prevent Shingles  You can get it a pharmacy, and they can administer it there  It is a two shot series with the second shot needed between 2-6 months after the first shot  I would not recommend getting the shot before an important or fun event in case you were to have a reaction to the shot like a sore arm or flu-like symptoms  I make the same recommendation about any shot, as people can have a reaction to any shot  Health Maintenance Due   Topic Date Due    BMI: Followup Plan  08/13/1968    Fall Risk  02/06/2019    Depression Screening PHQ  02/06/2019    Medicare Annual Wellness Visit (AWV)  02/06/2019         HPI:  Yogi Sue is a 77 y o  male here for his Subsequent Wellness Visit      Patient Active Problem List   Diagnosis    Bilateral carpal tunnel syndrome    Pulmonary nodule    SOB (shortness of breath) on exertion    Elevated bilirubin    Impaired fasting glucose    Mixed hyperlipidemia    Chronic GERD    Erectile dysfunction    HTN (hypertension), benign    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    Trigger little finger of right hand    Dupuytren contracture    Medicare annual wellness visit, subsequent     Past Medical History:   Diagnosis Date    Cancer ([address] )     scc back    Carpal tunnel syndrome     ED (erectile dysfunction)     GERD (gastroesophageal reflux disease)     [place] hearing[place]     wears hearing aids    Hypercholesteremia     Hypertension     Numbness in both hands      Past Surgical History:   Procedure Laterality Date    APPENDECTOMY      As a child    COLONOSCOPY      INGUINAL HERNIA REPAIR Left 2015    MASS EXCISION N/A 8/14/2018    Procedure: BACK/TRUNK MULTIPLE SEBBORHEIC KERATOSIS SHAVE/EXCISION; COMPLEX CLOSURE VERSUS FLAP RECONSTRUCTION;  Surgeon: Fede Caldwell MD;  Location: [facility];  Service: Plastics    SQUAMOUS CELL CARCINOMA EXCISION N/A 8/14/2018    Procedure: BACK SCC IN SITU EXCISION;  Surgeon: Fede Caldwell MD;  Location: [facility];  Service: Plastics     Family History   Problem Relation Age of Onset    Colon cancer Father     Heart attack Mother     Breast cancer Mother      Social History     Tobacco Use   Smoking Status Never Smoker   Smokeless Tobacco Never Used     Social History     Substance and Sexual Activity   Alcohol Use Yes    Alcohol/week: 8 4 oz    Types: 14 Standard drinks or equivalent per week    Comment: 2 Drinks a night      Social History     Substance and Sexual Activity   Drug Use No       Current Outpatient Medications   Medication Sig Dispense Refill    aspirin 81 mg chewable tablet Chew 81 mg daily      atorvastatin (LIPITOR) 20 mg tablet Take 1 tablet (20 mg total) by mouth daily 90 tablet 3    losartan (COZAAR) 50 mg tablet Take 1 tablet (50 mg total) by mouth daily 90 tablet 3    omeprazole (PriLOSEC) 20 mg delayed release capsule Take by mouth      Probiotic Product (PROBIOTIC DAILY PO) Take by mouth      psyllium (METAMUCIL SMOOTH TEXTURE) 58 6 % powder Take by mouth      tadalafil (CIALIS) 5 MG tablet Take 1 tablet (5 mg total) by mouth daily 84 tablet 3     No current facility-administered medications for this visit  No Known Allergies  Immunization History   Administered Date(s) Administered    Influenza Split High Dose Preservative Free IM 11/02/2015, 01/30/2017, 02/06/2018    Influenza, high dose seasonal 0 5 mL 09/05/2018    Pneumococcal Conjugate 13-Valent 01/30/2017    Pneumococcal Polysaccharide PPV23 09/03/2014       Patient Care Team:  Kade Lopez MD as PCP - General    Medicare Screening Tests and Risk Assessments:  Leola Giovany is here for his Subsequent Wellness visit  Health Risk Assessment:  Patient rates overall health as excellent  Patient feels that their physical health rating is Same  Eyesight was rated as Same  Hearing was rated as Same  Patient feels that their emotional and mental health rating is Same  Pain experienced by patient in the last 7 days has been None  Patient states that he has experienced no weight loss or gain in last 6 months  Emotional/Mental Health:  Patient has been feeling nervous/anxious  PHQ-9 Depression Screening:    Frequency of the following problems over the past two weeks:      1  Little interest or pleasure in doing things: 0 - not at all      2  Feeling down, depressed, or hopeless: 0 - not at all  PHQ-2 Score: 0          Broken Bones/Falls: Fall Risk Assessment:    In the past year, patient has experienced: No history of falling in past year          Bladder/Bowel:  Patient has leaked urine accidently in the last six months  Patient reports no loss of bowel control  (Additional Comments: Follows Dr Candelaria [facility])    Immunizations:  Patient has had a flu vaccination within the last year  Patient has received a pneumonia shot  Patient has received a shingles shot  Patient has received tetanus/diphtheria shot  Date of tetanus/diphtheria shot: 9/10/2018    Home Safety:  Patient does not have trouble with stairs inside or outside of their home  Patient currently reports that there are no safety hazards present in home, working smoke alarms, working carbon monoxide detectors  Preventative Screenings:   prostate cancer screen performed, 2/20/2019  colon cancer screen completed, 11/27/2018  cholesterol screen completed, 2/20/2019  glaucoma eye exam completed, (Additional Comments: Dr Gautam Serrano Dr )    Nutrition:  Current diet: Regular with servings of the following:    Medications:  Patient is not currently taking any over-the-counter supplements  Patient is able to manage medications      Lifestyle Choices:  Patient reports no tobacco use  Patient has not smoked or used tobacco in the past   Patient reports alcohol use  Alcohol use per week: 14  Patient drives a vehicle  Patient wears seat belt  Current level of exercise of physical activity described by patient as: weights gym skii  Activities of Daily Living:  Can get out of bed by his or her self, able to dress self, able to make own meals, able to do own shopping, able to bathe self, can do own laundry/housekeeping, can manage own money, pay bills and track expenses    Previous Hospitalizations:  No hospitalization or ED visit in past 12 months        Advanced Directives:  Patient has decided on a power of   Patient has spoken to designated power of   Patient has completed advanced directive  Preventative Screening/Counseling:      Cardiovascular:      General: Risks and Benefits Discussed and Screening Current          Diabetes:      General: Risks and Benefits Discussed and Screening Current          Colorectal Cancer:      General: Risks and Benefits Discussed and Screening Current          Prostate Cancer:      General: Risks and Benefits Discussed and Screening Current          Osteoporosis:      General: Risks and Benefits Discussed          AAA:      General: Risks and Benefits Discussed          Glaucoma:      General: Risks and Benefits Discussed          HIV:      General: Risks and Benefits Discussed          Hepatitis C:      General: Risks and Benefits Discussed and Screening Not Indicated        Advanced Directives:   Patient has living will for healthcare, has durable POA for healthcare, patient has an advanced directive       Immunizations:      Influenza: Risks & Benefits Discussed, Influenza Recommended Annually and Influenza UTD This Year      Pneumococcal: Risks & Benefits Discussed and Lifetime Vaccine Completed      Shingrix: Risks & Benefits Discussed      TDAP: Risks & Benefits Discussed and Tdap Vaccine UTD      Other Preventative Counseling (Non-Medicare):  Car/seat belt/driving safety reviewed, Skin self-exam and Sunscreen use

## 2019-03-22 ENCOUNTER — OFFICE VISIT (OUTPATIENT)
Dept: UROLOGY | Facility: AMBULATORY SURGERY CENTER | Age: 69
End: 2019-03-22
Payer: MEDICARE

## 2019-03-22 VITALS
HEIGHT: 68 IN | WEIGHT: 171 LBS | BODY MASS INDEX: 25.91 KG/M2 | SYSTOLIC BLOOD PRESSURE: 118 MMHG | DIASTOLIC BLOOD PRESSURE: 64 MMHG

## 2019-03-22 DIAGNOSIS — N40.1 BPH WITH OBSTRUCTION/LOWER URINARY TRACT SYMPTOMS: Primary | ICD-10-CM

## 2019-03-22 DIAGNOSIS — N13.8 BPH WITH OBSTRUCTION/LOWER URINARY TRACT SYMPTOMS: Primary | ICD-10-CM

## 2019-03-22 PROCEDURE — 99213 OFFICE O/P EST LOW 20 MIN: CPT | Performed by: PHYSICIAN ASSISTANT

## 2019-03-22 NOTE — PROGRESS NOTES
UROLOGY PROGRESS NOTE   Patient Identifiers: Natalie Birmingham (MRN [id number])  Date of Service: 3/22/2019    Subjective:    49-year-old man history of BPH  PSA  2 8  Last PSA was 2 7  AUA index score 13  He uses daily Cialis 5 mg  Patient has  no complaints  Objective:     VITALS:    Vitals:    03/22/19 1527   BP: 118/64     AUA SYMPTOM SCORE      Most Recent Value   AUA SYMPTOM SCORE   How often have you had a sensation of not emptying your bladder completely after you finished urinating? 3   How often have you had to urinate again less than two hours after you finished urinating? 3   How often have you found you stopped and started again several times when you urinate?  0   How often have you found it difficult to postpone urination? 4   How often have you had a weak urinary stream?  1   How often have you had to push or strain to begin urination? 0   How many times did you most typically get up to urinate from the time you went to bed at night until the time you got up in the morning?   2   Quality of Life: If you were to spend the rest of your life with your urinary condition just the way it is now, how would you feel about that?  2   AUA SYMPTOM SCORE  13            LABS:  Lab Results   Component Value Date    HGB 14 9 02/20/2019    HCT 43 7 02/20/2019    WBC 5 8 02/20/2019     02/20/2019   ]    Lab Results   Component Value Date     12/20/2016    K 4 4 02/20/2019     02/20/2019    CO2 28 02/20/2019    BUN 15 02/20/2019    CREATININE 0 94 04/09/2018    CALCIUM 9 6 02/20/2019    GLUCOSE 122 06/03/2015   ]        INPATIENT MEDS:    Current Outpatient Medications:     atorvastatin (LIPITOR) 20 mg tablet, Take 1 tablet (20 mg total) by mouth daily, Disp: 90 tablet, Rfl: 3    escitalopram (LEXAPRO) 10 mg tablet, Take 1 tablet (10 mg total) by mouth daily, Disp: 30 tablet, Rfl: 5    losartan (COZAAR) 100 MG tablet, Take 1 tablet (100 mg total) by mouth daily, Disp: 90 tablet, Rfl: 3    omeprazole (PriLOSEC) 20 mg delayed release capsule, Take by mouth, Disp: , Rfl:     Probiotic Product (PROBIOTIC DAILY PO), Take by mouth, Disp: , Rfl:     psyllium (METAMUCIL SMOOTH TEXTURE) 58 6 % powder, Take by mouth, Disp: , Rfl:     tadalafil (CIALIS) 5 MG tablet, Take 1 tablet (5 mg total) by mouth daily, Disp: 84 tablet, Rfl: 3    aspirin 81 mg chewable tablet, Chew 81 mg daily, Disp: , Rfl:       Physical Exam:   /64 (BP Location: Left arm, Patient Position: Sitting, Cuff Size: Adult)   Ht 5' 8" (1 727 m)   Wt 77 6 kg (171 lb)   BMI 26 00 kg/m²   GEN: no acute distress    RESP: breathing comfortably with no accessory muscle use    ABD: soft, non-tender, non-distended   INCISION:    EXT: no significant peripheral edema   (Male): Penis circumcised, phallus normal, meatus patent  Testicles descended into scrotum bilaterally without masses nor tenderness  No inguinal hernias bilaterally  LOWELL: Prostate is enlarged at 35 grams  The prostate is not boggy  The prostate is not tender  No nodules noted      RADIOLOGY:    none     Assessment:    1  BPH   2   Erectile dysfunction     Plan:   - Cialis 5 mg daily for BPH  - follow-up 1 year with PSA prior to visit  -  -

## 2019-06-28 ENCOUNTER — TELEPHONE (OUTPATIENT)
Dept: INTERNAL MEDICINE CLINIC | Facility: CLINIC | Age: 69
End: 2019-06-28

## 2019-09-11 ENCOUNTER — CONSULT (OUTPATIENT)
Dept: INTERNAL MEDICINE CLINIC | Facility: CLINIC | Age: 69
End: 2019-09-11
Payer: MEDICARE

## 2019-09-11 VITALS
OXYGEN SATURATION: 97 % | TEMPERATURE: 98.1 F | HEIGHT: 68 IN | SYSTOLIC BLOOD PRESSURE: 126 MMHG | HEART RATE: 74 BPM | BODY MASS INDEX: 26.07 KG/M2 | DIASTOLIC BLOOD PRESSURE: 75 MMHG | WEIGHT: 172 LBS

## 2019-09-11 DIAGNOSIS — Z01.818 PREOP EXAM FOR INTERNAL MEDICINE: Primary | ICD-10-CM

## 2019-09-11 DIAGNOSIS — R73.01 IMPAIRED FASTING GLUCOSE: ICD-10-CM

## 2019-09-11 DIAGNOSIS — E78.2 MIXED HYPERLIPIDEMIA: ICD-10-CM

## 2019-09-11 DIAGNOSIS — Z23 NEEDS FLU SHOT: ICD-10-CM

## 2019-09-11 DIAGNOSIS — I10 HTN (HYPERTENSION), BENIGN: ICD-10-CM

## 2019-09-11 PROCEDURE — 90662 IIV NO PRSV INCREASED AG IM: CPT

## 2019-09-11 PROCEDURE — 99214 OFFICE O/P EST MOD 30 MIN: CPT | Performed by: INTERNAL MEDICINE

## 2019-09-11 PROCEDURE — G0008 ADMIN INFLUENZA VIRUS VAC: HCPCS

## 2019-09-11 NOTE — ASSESSMENT & PLAN NOTE
Controlled, continue med along with healthy diet and exercise    Patient does notice some elevated blood pressure right when he wakes up which I explained is fairly normal

## 2019-09-11 NOTE — PATIENT INSTRUCTIONS
Problem List Items Addressed This Visit        Endocrine    Impaired fasting glucose     Continue with healthy diet and exercise            Cardiovascular and Mediastinum    HTN (hypertension), benign     Controlled, continue med along with healthy diet and exercise  Patient does notice some elevated blood pressure right when he wakes up which I explained is fairly normal            Other    Mixed hyperlipidemia     Continue statin along with healthy diet and exercise         Preop exam for internal medicine - Primary     Patient is medically cleared for cataract surgery, no cardiopulmonary complaints, exam today is normal   Patient has chronic conditions of hypercholesterolemia and hypertension are well controlled             Other Visit Diagnoses     Needs flu shot        Relevant Orders    influenza vaccine, [id number], high-dose, PF 0 5 mL (FLUZONE HIGH-DOSE)

## 2019-09-11 NOTE — PROGRESS NOTES
Assessment/Plan:    Preop exam for internal medicine  Patient is medically cleared for cataract surgery, no cardiopulmonary complaints, exam today is normal   Patient has chronic conditions of hypercholesterolemia and hypertension are well controlled  HTN (hypertension), benign  Controlled, continue med along with healthy diet and exercise  Patient does notice some elevated blood pressure right when he wakes up which I explained is fairly normal    Impaired fasting glucose  Continue with healthy diet and exercise    Mixed hyperlipidemia  Continue statin along with healthy diet and exercise       Diagnoses and all orders for this visit:    Preop exam for internal medicine    HTN (hypertension), benign    Impaired fasting glucose    Mixed hyperlipidemia    Needs flu shot  -     influenza vaccine, [id number], high-dose, PF 0 5 mL (FLUZONE HIGH-DOSE)          Subjective:      Patient ID: Jesi Zhou is a 68 y o  male  Patient here for preop medical clearance:  No cardiopulmonary complaints, no chest pain, no shortness of breath, no lightheadedness  The following portions of the patient's history were reviewed and updated as appropriate: allergies, current medications, past family history, past medical history, past social history, past surgical history and problem list     Review of Systems   Constitutional: Negative for chills, fatigue and fever  HENT: Negative for congestion, nosebleeds, postnasal drip, sore throat and trouble swallowing  Eyes: Negative for pain  Respiratory: Negative for cough, chest tightness, shortness of breath and wheezing  Cardiovascular: Negative for chest pain, palpitations and leg swelling  Gastrointestinal: Negative for abdominal pain, constipation, diarrhea, nausea and vomiting  Endocrine: Negative for polydipsia and polyuria  Genitourinary: Negative for dysuria, flank pain and hematuria  Musculoskeletal: Negative for arthralgias  Skin: Negative for rash  Neurological: Negative for dizziness, tremors and headaches  Hematological: Does not bruise/bleed easily  Psychiatric/Behavioral: Negative for confusion and dysphoric mood  The patient is nervous/anxious  Objective:      /75   Pulse 74   Temp 98 1 °F (36 7 °C)   Ht 5' 8" (1 727 m)   Wt 78 kg (172 lb)   SpO2 97%   BMI 26 15 kg/m²          Physical Exam   Constitutional: He is oriented to person, place, and time  He appears well-developed and well-nourished  No distress  HENT:   Head: Normocephalic and atraumatic  Right Ear: External ear normal    Left Ear: External ear normal    Eyes: Conjunctivae are normal  No scleral icterus  Neck: Normal range of motion  Neck supple  No tracheal deviation present  No thyromegaly present  Cardiovascular: Normal rate, regular rhythm and normal heart sounds  No murmur heard  Pulmonary/Chest: Effort normal and breath sounds normal  No respiratory distress  He has no wheezes  He has no rales  Abdominal: Soft  Bowel sounds are normal  There is no tenderness  There is no rebound and no guarding  Musculoskeletal: He exhibits no edema  Lymphadenopathy:     He has no cervical adenopathy  Neurological: He is alert and oriented to person, place, and time  Psychiatric: He has a normal mood and affect  His behavior is normal  Judgment and thought content normal    Vitals reviewed  BMI Counseling: Body mass index is 26 15 kg/m²  The BMI is above normal  Nutrition recommendations include reducing portion sizes, decreasing overall calorie intake, 3-5 servings of fruits/vegetables daily, reducing fast food intake, consuming healthier snacks, decreasing soda and/or juice intake, moderation in carbohydrate intake and increasing intake of lean protein  Exercise recommendations include exercising 3-5 times per week

## 2019-09-24 ENCOUNTER — CLINICAL SUPPORT (OUTPATIENT)
Dept: INTERNAL MEDICINE CLINIC | Facility: CLINIC | Age: 69
End: 2019-09-24
Payer: MEDICARE

## 2019-09-24 DIAGNOSIS — Z01.818 PRE-OP TESTING: Primary | ICD-10-CM

## 2019-09-24 PROCEDURE — 93000 ELECTROCARDIOGRAM COMPLETE: CPT

## 2019-10-11 ENCOUNTER — TELEPHONE (OUTPATIENT)
Dept: INTERNAL MEDICINE CLINIC | Facility: CLINIC | Age: 69
End: 2019-10-11

## 2019-10-11 DIAGNOSIS — I10 HTN (HYPERTENSION), BENIGN: Primary | ICD-10-CM

## 2019-10-11 RX ORDER — METOPROLOL SUCCINATE 25 MG/1
25 TABLET, EXTENDED RELEASE ORAL DAILY
Qty: 90 TABLET | Refills: 3 | Status: SHIPPED | OUTPATIENT
Start: 2019-10-11 | End: 2019-10-18 | Stop reason: SDUPTHER

## 2019-10-11 NOTE — TELEPHONE ENCOUNTER
Dr Yesi Marquis the pt called stating on Monday he had cataract sx his blood pressure is 168/80   30 minute later his BP has decreased  Today the patient was evaluated by Derm and his BP is 190/80 20 minutes later is was 168/82 30 minutes later it was 150/80  The patient is concerned over the spikes in his BP  Yossi Hawkins thought the pt may need to be on a betablocker per the pt  The patient is taking his Losartan at 7 AM is this the correct time? The patient has no symptoms when his BP spikes  The patient would  Like a call to discuss

## 2019-10-18 ENCOUNTER — TELEPHONE (OUTPATIENT)
Dept: INTERNAL MEDICINE CLINIC | Facility: CLINIC | Age: 69
End: 2019-10-18

## 2019-10-18 DIAGNOSIS — I10 HTN (HYPERTENSION), BENIGN: ICD-10-CM

## 2019-10-18 RX ORDER — METOPROLOL SUCCINATE 25 MG/1
50 TABLET, EXTENDED RELEASE ORAL DAILY
Qty: 90 TABLET | Refills: 3
Start: 2019-10-18 | End: 2020-01-13

## 2019-10-18 NOTE — TELEPHONE ENCOUNTER
He can take metoprolol XL 50mg from 25mg  He needs to monitor the BP AND the heart rate which can decrease with the metoprolol  Call with readings in a week

## 2019-10-18 NOTE — TELEPHONE ENCOUNTER
Blood pressure running in the 150's for the past 3-4 days  150/77 am and 60 pulse  140/73 midday    Patient is asking if he can double up his blood pressure medication      Please advise

## 2019-10-18 NOTE — TELEPHONE ENCOUNTER
Per Karen Hutson patient can take 1 tablet 1/2 instead of 2 full pills  Patient will call with an update Monday  Patient was advised to call the office(on-call doctor) if he has any concerns

## 2019-10-23 ENCOUNTER — OFFICE VISIT (OUTPATIENT)
Dept: CARDIOLOGY CLINIC | Facility: CLINIC | Age: 69
End: 2019-10-23
Payer: MEDICARE

## 2019-10-23 VITALS
RESPIRATION RATE: 16 BRPM | SYSTOLIC BLOOD PRESSURE: 148 MMHG | BODY MASS INDEX: 27.16 KG/M2 | DIASTOLIC BLOOD PRESSURE: 82 MMHG | HEART RATE: 60 BPM | HEIGHT: 68 IN | WEIGHT: 179.2 LBS

## 2019-10-23 DIAGNOSIS — I10 ESSENTIAL HYPERTENSION: Primary | ICD-10-CM

## 2019-10-23 DIAGNOSIS — I10 HTN (HYPERTENSION), BENIGN: ICD-10-CM

## 2019-10-23 DIAGNOSIS — E78.2 MIXED HYPERLIPIDEMIA: ICD-10-CM

## 2019-10-23 PROCEDURE — 99214 OFFICE O/P EST MOD 30 MIN: CPT | Performed by: INTERNAL MEDICINE

## 2019-10-23 RX ORDER — LISINOPRIL 20 MG/1
20 TABLET ORAL DAILY
Qty: 90 TABLET | Refills: 1 | Status: SHIPPED | OUTPATIENT
Start: 2019-10-23 | End: 2020-03-23 | Stop reason: SDUPTHER

## 2019-10-23 RX ORDER — HYDROCHLOROTHIAZIDE 25 MG/1
25 TABLET ORAL DAILY
Qty: 90 TABLET | Refills: 3 | Status: SHIPPED | OUTPATIENT
Start: 2019-10-23 | End: 2020-03-25 | Stop reason: SINTOL

## 2019-10-23 NOTE — PROGRESS NOTES
Cardiology Follow Up    Elizabeth Kowalski  1950  [id number]  [address] [facility] [id number] [phone]    1  Essential hypertension  Basic metabolic panel    lisinopril (ZESTRIL) 20 mg tablet    hydrochlorothiazide (HYDRODIURIL) 25 mg tablet   2  HTN (hypertension), benign     3  Mixed hyperlipidemia         Interval History:  Ezra Mayo presents to the office today because of concerns that his blood pressure is elevated  He has a history of hypertension is been maintained on Cozaar for the past few years  More recently since cataract surgery he notes this blood pressures been elevated  He was recently placed on a beta-blocker however his blood pressure is still not under control  He does feel anxious at times  He has not gained weight or is taking nonsteroidals    He denies chest pain shortness of breath orthopnea paroxysmal nocturnal dyspnea or syncope    Patient Active Problem List   Diagnosis    Bilateral carpal tunnel syndrome    Pulmonary nodule    SOB (shortness of breath) on exertion    Elevated bilirubin    Impaired fasting glucose    Mixed hyperlipidemia    Chronic GERD    Erectile dysfunction    HTN (hypertension), benign    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    Trigger little finger of right hand    Dupuytren contracture    Medicare annual wellness visit, subsequent    Anxiety    Preop exam for internal medicine     Past Medical History:   Diagnosis Date    Cancer ([facility] [address] )     scc back    Carpal tunnel syndrome     ED (erectile dysfunction)     GERD (gastroesophageal reflux disease)     [place] (hard of hearing)     wears hearing aids    Hypercholesteremia     Hypertension     Numbness in both hands      Social History     Socioeconomic History    Marital status: /Civil Union     Spouse name: Not on file    Number of children: Not on file    Years of education: Not on file    Highest education level: Not on file   Occupational History    Occupation:    Social Needs    Financial resource strain: Not on file    Food insecurity:     Worry: Not on file     Inability: Not on file   flatev needs:     Medical: Not on file     Non-medical: Not on file   Tobacco Use    Smoking status: Never Smoker    Smokeless tobacco: Never Used   Substance and Sexual Activity    Alcohol use:  Yes     Alcohol/week: 14 0 standard drinks     Types: 14 Standard drinks or equivalent per week     Comment: 2 Drinks a night    Drug use: No    Sexual activity: Yes   Lifestyle    Physical activity:     Days per week: Not on file     Minutes per session: Not on file    Stress: Not on file   Relationships    Social connections:     Talks on phone: Not on file     Gets together: Not on file     Attends Mu-ism service: Not on file     Active member of club or organization: Not on file     Attends meetings of clubs or organizations: Not on file     Relationship status: Not on file    Intimate partner violence:     Fear of current or ex partner: Not on file     Emotionally abused: Not on file     Physically abused: Not on file     Forced sexual activity: Not on file   Other Topics Concern    Not on file   Social History Narrative    Not on file      Family History   Problem Relation Age of Onset    Colon cancer Father     Heart attack Mother     Breast cancer Mother      Past Surgical History:   Procedure Laterality Date    APPENDECTOMY      As a child    COLONOSCOPY      INGUINAL HERNIA REPAIR Left 2015    MASS EXCISION N/A 8/14/2018    Procedure: BACK/TRUNK MULTIPLE [address]; COMPLEX CLOSURE VERSUS FLAP RECONSTRUCTION;  Surgeon: Maryuri Colorado MD;  [facility];  Service: Plastics    SQUAMOUS CELL CARCINOMA EXCISION N/A 8/14/2018    Procedure: BACK SCC IN SITU EXCISION;  Surgeon: Maryuri Colorado MD; [facility];  Service: Plastics       Current Outpatient Medications:     atorvastatin (LIPITOR) 20 mg tablet, Take 1 tablet (20 mg total) by mouth daily, Disp: 90 tablet, Rfl: 3    losartan (COZAAR) 100 MG tablet, Take 1 tablet (100 mg total) by mouth daily, Disp: 90 tablet, Rfl: 3    metoprolol succinate (TOPROL-XL) 25 mg 24 hr tablet, Take 2 tablets (50 mg total) by mouth daily, Disp: 90 tablet, Rfl: 3    omeprazole (PriLOSEC) 20 mg delayed release capsule, Take by mouth, Disp: , Rfl:     Probiotic Product (PROBIOTIC DAILY PO), Take by mouth, Disp: , Rfl:     psyllium (METAMUCIL SMOOTH TEXTURE) 58 6 % powder, Take by mouth, Disp: , Rfl:     tadalafil (CIALIS) 5 MG tablet, Take 1 tablet (5 mg total) by mouth daily, Disp: 84 tablet, Rfl: 3    hydrochlorothiazide (HYDRODIURIL) 25 mg tablet, Take 1 tablet (25 mg total) by mouth daily, Disp: 90 tablet, Rfl: 3    lisinopril (ZESTRIL) 20 mg tablet, Take 1 tablet (20 mg total) by mouth daily, Disp: 90 tablet, Rfl: 1  No Known Allergies    Labs:  No visits with results within 2 Month(s) from this visit  Latest known visit with results is:   Orders Only on 02/21/2019   Component Date Value    Fecal Globin by Immunoch* 02/21/2019       Imaging: No results found  Review of Systems:  Review of Systems   Constitutional: Negative for fatigue  HENT: Negative for nosebleeds  Eyes: Negative for redness  Respiratory: Negative for chest tightness and shortness of breath  Cardiovascular: Negative for chest pain, palpitations and leg swelling  Gastrointestinal: Negative for abdominal pain  Endocrine: Negative for polyuria  Genitourinary: Negative for urgency  Musculoskeletal: Negative for arthralgias  Skin: Negative for rash  Neurological: Negative for dizziness and syncope  Psychiatric/Behavioral: Negative for confusion and sleep disturbance  The patient is not nervous/anxious          Physical Exam:  Physical Exam   Constitutional: He is oriented to person, place, and time  He appears well-developed and well-nourished  HENT:   Head: Normocephalic and atraumatic  Nose: Nose normal    Mouth/Throat: Oropharynx is clear and moist    Eyes: Pupils are equal, round, and reactive to light  Neck: Neck supple  Cardiovascular: Normal rate, regular rhythm, normal heart sounds and intact distal pulses  Pulmonary/Chest: Effort normal and breath sounds normal    Abdominal: Soft  Bowel sounds are normal    Musculoskeletal: Normal range of motion  Neurological: He is alert and oriented to person, place, and time  Skin: Skin is warm and dry  Psychiatric: He has a normal mood and affect  His behavior is normal  Judgment and thought content normal        Discussion/Summary:  Keith's blood pressure in the office today is 148/82  He provided me with a diary that shows it at times in the 160s  On I have decided to chief changes Cozaar to lisinopril 20 mg daily I will had 25 mg hydrochlorothiazide  I told him to discontinue the beta-blockers it is not tolerated  I have told him to measures blood pressure twice weekly 2 hours after taking his medications while resting  On hydrochlorothiazide and will check a BMP in 2 weeks  If blood pressure continues elevated we will consider a not taking the lisinopril or adding amlodipine  He will keep me apprised of his numbers

## 2019-10-30 ENCOUNTER — TELEPHONE (OUTPATIENT)
Dept: INTERNAL MEDICINE CLINIC | Facility: CLINIC | Age: 69
End: 2019-10-30

## 2019-10-30 DIAGNOSIS — R91.1 PULMONARY NODULE: Primary | ICD-10-CM

## 2019-10-30 NOTE — TELEPHONE ENCOUNTER
Pt needs new order for CT Chest WO contrast current order  in July, once entered fax to number below:    Fax: [phone]

## 2019-11-06 ENCOUNTER — HOSPITAL ENCOUNTER (OUTPATIENT)
Dept: RADIOLOGY | Facility: HOSPITAL | Age: 69
Discharge: HOME | End: 2019-11-06
Payer: MEDICARE

## 2019-11-06 DIAGNOSIS — R91.1 PULMONARY NODULE: ICD-10-CM

## 2019-11-06 DIAGNOSIS — R91.1 PULMONARY NODULE: Primary | ICD-10-CM

## 2019-11-06 LAB
ALBUMIN SERPL-MCNC: 4.1 G/DL (ref 3.6–5.1)
ALBUMIN/GLOB SERPL: 1.7 (CALC) (ref 1–2.5)
ALP SERPL-CCNC: 64 U/L (ref 40–115)
ALT SERPL-CCNC: 23 U/L (ref 9–46)
AST SERPL-CCNC: 20 U/L (ref 10–35)
BILIRUB SERPL-MCNC: 1.2 MG/DL (ref 0.2–1.2)
BUN SERPL-MCNC: 30 MG/DL (ref 7–25)
BUN/CREAT SERPL: 30 (CALC) (ref 6–22)
CALCIUM SERPL-MCNC: 9.2 MG/DL (ref 8.6–10.3)
CHLORIDE SERPL-SCNC: 105 MMOL/L (ref 98–110)
CO2 SERPL-SCNC: 28 MMOL/L (ref 20–32)
CREAT SERPL-MCNC: 1.01 MG/DL (ref 0.7–1.18)
GLOBULIN SER CALC-MCNC: 2.4 G/DL (CALC) (ref 1.9–3.7)
GLUCOSE SERPL-MCNC: 108 MG/DL (ref 65–99)
POTASSIUM SERPL-SCNC: 4.6 MMOL/L (ref 3.5–5.3)
PROT SERPL-MCNC: 6.5 G/DL (ref 6.1–8.1)
SL AMB EGFR AFRICAN AMERICAN: 86 ML/MIN/1.73M2
SL AMB EGFR NON AFRICAN AMERICAN: 74 ML/MIN/1.73M2
SODIUM SERPL-SCNC: 141 MMOL/L (ref 135–146)

## 2019-11-06 PROCEDURE — 71250 CT THORAX DX C-: CPT

## 2019-11-07 LAB — HBA1C MFR BLD: 5.8 % OF TOTAL HGB

## 2019-11-20 ENCOUNTER — OFFICE VISIT (OUTPATIENT)
Dept: OBGYN CLINIC | Facility: HOSPITAL | Age: 69
End: 2019-11-20
Payer: MEDICARE

## 2019-11-20 VITALS
BODY MASS INDEX: 26.22 KG/M2 | SYSTOLIC BLOOD PRESSURE: 121 MMHG | WEIGHT: 173 LBS | DIASTOLIC BLOOD PRESSURE: 72 MMHG | HEART RATE: 67 BPM | HEIGHT: 68 IN

## 2019-11-20 DIAGNOSIS — M54.12 RADICULOPATHY, CERVICAL REGION: Primary | ICD-10-CM

## 2019-11-20 DIAGNOSIS — M72.0 DUPUYTREN'S CONTRACTURE OF RIGHT HAND: ICD-10-CM

## 2019-11-20 PROCEDURE — 99213 OFFICE O/P EST LOW 20 MIN: CPT | Performed by: ORTHOPAEDIC SURGERY

## 2019-11-20 NOTE — PROGRESS NOTES
ASSESSMENT/PLAN:    Assessment:   Right ring and small dupuytren's contractures  Cervical radiculopathy, arthritis        Plan:   Start PT for the cervical spine  Follow up with Dr Juanjose Yousif for the C-spine    Follow Up:  3  month(s)    To Do Next Visit:   Discuss and schedule dupuytren's surgery    General Discussions:     Dupuytren's Disease: The anatomy and physiology of Dupuytren's disease were discussed with the patient today in the office  Increased scar formation within the interval between the skin volarly and the flexor tendons dorsally can result in pit formation, nodular formation, or eventual cord formation  These pathologic cords can cause contracture at either the metacarpophalangeal joint, proximal interphalangeal joint, or both  As the cords progress towards the proximal interphalangeal joint, the neurovascular structures of the finger may become involved within the disease process  While this is a genetic condition, variable penetrance occurs within family trees  Conservative treatment options including therapy to maintain joint mobility and a tabletop test were discussed  Other treatments include Xiaflex and possible surgical intervention  _____________________________________________________  CHIEF COMPLAINT:  Chief Complaint   Patient presents with    Right Hand - Follow-up         SUBJECTIVE:  Nasim Almanzar is a 68y o  year old male who presents for follow up regarding Dupuytrens Disease of the  right  ring finger and small finger  Patient had Xiaflex injections in 2017  He notes worsening of his contractures  He also complains of B/L hand numbness and tingling  He states this started over 1 yr ago when raking leaves which caused the wrist pain  He states the neck pain improved but the N/T has stayed  He states the numbness wakes him from sleep at night         PAST MEDICAL HISTORY:  Past Medical History:   Diagnosis Date    Cancer ([facility] [address] )     scc back    Carpal tunnel syndrome     ED (erectile dysfunction)     GERD (gastroesophageal reflux disease)     [place] (hard of hearing)     wears hearing aids    Hypercholesteremia     Hypertension     Numbness in both hands        PAST SURGICAL HISTORY:  Past Surgical History:   Procedure Laterality Date    APPENDECTOMY      As a child    COLONOSCOPY      INGUINAL HERNIA REPAIR Left 2015    MASS EXCISION N/A 8/14/2018    Procedure: BACK/TRUNK MULTIPLE SEBBORHEIC KERATOSIS SHAVE/EXCISION; COMPLEX CLOSURE VERSUS FLAP RECONSTRUCTION;  Surgeon: Samina Ordoñez MD;  [facility];  Service: Plastics    SQUAMOUS CELL CARCINOMA EXCISION N/A 8/14/2018    Procedure: BACK SCC IN SITU EXCISION;  Surgeon: Samina Ordoñez MD;  [facility];  Service: Plastics       FAMILY HISTORY:  Family History   Problem Relation Age of Onset    Colon cancer Father     Heart attack Mother     Breast cancer Mother        SOCIAL HISTORY:  Social History     Tobacco Use    Smoking status: Never Smoker    Smokeless tobacco: Never Used   Substance Use Topics    Alcohol use:  Yes     Alcohol/week: 14 0 standard drinks     Types: 14 Standard drinks or equivalent per week     Comment: 2 Drinks a night    Drug use: No       MEDICATIONS:    Current Outpatient Medications:     atorvastatin (LIPITOR) 20 mg tablet, Take 1 tablet (20 mg total) by mouth daily, Disp: 90 tablet, Rfl: 3    hydrochlorothiazide (HYDRODIURIL) 25 mg tablet, Take 1 tablet (25 mg total) by mouth daily, Disp: 90 tablet, Rfl: 3    lisinopril (ZESTRIL) 20 mg tablet, Take 1 tablet (20 mg total) by mouth daily, Disp: 90 tablet, Rfl: 1    NON FORMULARY, CBD/THC oil daily, Disp: , Rfl:     omeprazole (PriLOSEC) 20 mg delayed release capsule, Take by mouth, Disp: , Rfl:     Probiotic Product (PROBIOTIC DAILY PO), Take by mouth, Disp: , Rfl:     psyllium (METAMUCIL SMOOTH TEXTURE) 58 6 % powder, Take by mouth, Disp: , Rfl:     tadalafil (CIALIS) 5 MG tablet, Take 1 tablet (5 mg total) by mouth daily, Disp: 84 tablet, Rfl: 3    losartan (COZAAR) 100 MG tablet, Take 1 tablet (100 mg total) by mouth daily (Patient not taking: Reported on 11/20/2019), Disp: 90 tablet, Rfl: 3    metoprolol succinate (TOPROL-XL) 25 mg 24 hr tablet, Take 2 tablets (50 mg total) by mouth daily (Patient not taking: Reported on 11/20/2019), Disp: 90 tablet, Rfl: 3    ALLERGIES:  No Known Allergies    REVIEW OF SYSTEMS:  Pertinent items are noted in HPI  A comprehensive review of systems was negative  LABS:  HgA1c:   Lab Results   Component Value Date    HGBA1C 5 8 (H) 11/06/2019     BMP:   Lab Results   Component Value Date    GLUCOSE 122 06/03/2015    CALCIUM 9 2 11/06/2019     12/20/2016    K 4 6 11/06/2019    CO2 28 11/06/2019     11/06/2019    BUN 30 (H) 11/06/2019    CREATININE 1 01 11/06/2019           _____________________________________________________    PHYSICAL EXAMINATION:  /72   Pulse 67   Ht 5' 8" (1 727 m)   Wt 78 5 kg (173 lb)   BMI 26 30 kg/m²   General: well developed and well nourished, alert, oriented times 3 and appears comfortable  Psychiatric: Normal  HEENT: Trachea Midline, No torticollis  Cardiovascular: No discernable arrhythmia  Pulmonary: No wheezing or stridor  Skin: No masses, erythema, lacerations, fluctation, ulcerations  Neurovascular: Sensation Intact to the Median, Ulnar, Radial Nerve, Motor Intact to the Median, Ulnar, Radial Nerve and Pulses Intact    MUSCULOSKELETAL EXAMINATION:  RIGHT hand:  Patient with central cord with pits and nodules in line with ring and small fingers    Ring finger PIP joint contracture of 30 degrees and small finger PIP contracture of 60 degrees  No MP joint contractures  B/L wrist and elbow: Mildly positive derkin's compression, negative tinels, negative phalens, negative elbow flexion compression test, negative tinel's elbow, no subluxating ulnar nerve  Decreased cervical ROM in all planes    Shoulder abd 5/5  Bicep 5/5  Triceps 5/5   Wrist flexion and extension 5/5  Intrinsics 5/5  AIN 5/5  APB 5/5  _____________________________________________________  STUDIES REVIEWED:  No Studies to review      PROCEDURES PERFORMED:  Procedures  No Procedures performed today      Scribe Attestation    I,:   Violette Greer am acting as a scribe while in the presence of the attending physician :        I,:   Annette Diaz MD personally performed the services described in this documentation    as scribed in my presence :

## 2019-12-02 ENCOUNTER — EVALUATION (OUTPATIENT)
Dept: PHYSICAL THERAPY | Facility: CLINIC | Age: 69
End: 2019-12-02
Payer: MEDICARE

## 2019-12-02 DIAGNOSIS — M54.12 CERVICAL RADICULOPATHY: Primary | ICD-10-CM

## 2019-12-02 DIAGNOSIS — R20.2 TINGLING OF BOTH UPPER EXTREMITIES: ICD-10-CM

## 2019-12-02 PROCEDURE — 97163 PT EVAL HIGH COMPLEX 45 MIN: CPT | Performed by: PHYSICAL THERAPIST

## 2019-12-02 PROCEDURE — 97112 NEUROMUSCULAR REEDUCATION: CPT | Performed by: PHYSICAL THERAPIST

## 2019-12-02 NOTE — PROGRESS NOTES
PT Evaluation     Today's date: 2019  Patient name: Berry Blanca  : 1950  MRN: [id number]  Referring provider: Adalid Canseco MD  Dx:   Encounter Diagnosis     ICD-10-CM    1  Cervical radiculopathy M54 12    2  Tingling of both upper extremities R20 2                   Assessment  Assessment details: Pt is a 67 yo male presenting with tingling of BUE with differential diagnosis of carpal tunnel syndrome vs thoracic outlet syndrome  He had no change in symptoms with repeated motions or distraction of cervical spine  Adverse neural tension normal bilaterally  Special tests for thoracic outlet syndrome and carpal tunnel syndrome both positive  Plan to address restrictions in both areas and reassess each session  Will refer back to physician if no change  Pt in agreement with this plan  Functional limitations: significantly interrupted sleep (appx every hour)  Goals  STG - 2 weeks  1  Independent with HEP  2  Improve postural awareness to decrease forward head posture during daily tasks     LTG - 4 weeks  1  Increase strength of scapular stabilizers by 1 MMT grade to improve posture  2  Tolerate sleeping 4-6 hours without pain to improve quality of life  3  Improve ROM of C/S retraction and extension by 25% to improve posture and body mechanics  Plan  Plan details: Provided with and reviewed initial written HEP  Reviewed physical exam findings and plan of care  All questions answered to patient's satisfaction      Patient would benefit from: skilled physical therapy  Planned modality interventions: low level laser therapy  Planned therapy interventions: manual therapy, neuromuscular re-education, patient education, postural training, therapeutic activities, therapeutic exercise and home exercise program  Frequency: 2x week  Duration in weeks: 4  Treatment plan discussed with: patient        Subjective Evaluation    History of Present Illness  Mechanism of injury: Pt reports appx 1 5 years ago he was raking and his "neck went out"  It hurt for appx 4 weeks afterwards and seemed to heal on its own  He has had numbness in his right fingers and somewhat in his left fingers since then  The numbness and pain are much worse at night when he sleeps on either side  He arrives today with referral to outpatient PT  PMH significant for OA, hernia repair, HTN, high cholesterol, GERD  Pain  Current pain ratin  At best pain ratin  At worst pain rating: 10  Location: BUE tingling and pain  Relieving factors: change in position  Progression: worsening    Social Support    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    Exercise history: skiier, ellipitlcal, treadmill, light free weights      Diagnostic Tests  No diagnostic tests performed  Treatments  Previous treatment: massage  Patient Goals  Patient goals for therapy: increased motion and decreased pain          Objective     Postural Observations  Seated posture: poor  Standing posture: fair  Correction of posture: has no consistent effect    Additional Postural Observation Details  Elevated first rib and clavicle bilaterally R>L    Tenderness     Additional Tenderness Details  No distinct TTP throughout C/S    Active Range of Motion   Cervical/Thoracic Spine       Cervical    Subcranial retraction:   Restriction level: moderate  Flexion: 45 degrees   Extension: 40 (upper cervical only) degrees     Restriction level: moderate  Left lateral flexion: 31 degrees      Right lateral flexion: 22 degrees      Left rotation: 65 degrees  Right rotation: 65 degrees             Joint Play     Comments: Hypomobile throughout T/S  Mechanical Assessment    Cervical    Seated retraction: repeated movements   Pain location: no change  Seated Extension: repeated movements  Pain location: peripheralized  Supine retractation: repeated movements  Pain location: no change  Supine extension: repeated movements  Pain location: no change    Thoracic      Lumbar      Strength/Myotome Testing     Left Shoulder     Isolated Muscles   Lower trapezius: 3+   Middle trapezius: 4-   Serratus anterior: 4-     Right Shoulder     Isolated Muscles   Lower trapezius: 3+   Middle trapezius: 4-   Serratus anterior: 3-     Tests   Cervical   Negative repeated extension and neck flexor muscle endurance test      Left Shoulder   Negative ULTT1, ULTT3 and ULTT4  Right Shoulder   Negative ULTT1, ULTT3 and ULTT4  Left Wrist/Hand   Positive Phalen's sign  Right Wrist/Hand   Positive Phalen's sign       Additional Tests Details  + cyriax release test for thoracic outlet syndrome (symptoms began at 15 sec and were relieved at 55 sec)  Negative cervical distraction (no change in hand tingling)      Flowsheet Rows      Most Recent Value   PT/OT G-Codes   Current Score  53   Projected Score  63                   Precautions: none    Daily Treatment Diary       Manuals 12/2            RU spread B             Max laser B carpal tunnel             1st rib inf mob B             Clav inf mob B             Catherine clav STM                                                    Exercise Diary              Bwd UBE             Wrist ext str             Peg board in wrist flex             Posture t-band             Serr wall slide             Lower trap wall slide             Serr bear hug             Prone YT             Prone scap sq                                                                                                                                                                                      Modalities

## 2019-12-09 ENCOUNTER — OFFICE VISIT (OUTPATIENT)
Dept: OBGYN CLINIC | Facility: HOSPITAL | Age: 69
End: 2019-12-09
Payer: MEDICARE

## 2019-12-09 VITALS
WEIGHT: 173 LBS | DIASTOLIC BLOOD PRESSURE: 69 MMHG | HEIGHT: 68 IN | BODY MASS INDEX: 26.22 KG/M2 | SYSTOLIC BLOOD PRESSURE: 108 MMHG | HEART RATE: 83 BPM

## 2019-12-09 DIAGNOSIS — M54.12 RADICULOPATHY, CERVICAL REGION: Primary | ICD-10-CM

## 2019-12-09 PROCEDURE — 99214 OFFICE O/P EST MOD 30 MIN: CPT | Performed by: ORTHOPAEDIC SURGERY

## 2019-12-09 RX ORDER — GABAPENTIN 300 MG/1
300 CAPSULE ORAL DAILY
Qty: 60 CAPSULE | Refills: 0 | Status: SHIPPED | OUTPATIENT
Start: 2019-12-09 | End: 2020-01-13

## 2019-12-09 NOTE — PROGRESS NOTES
Assessment/Plan:    Cervical radiculopathy is likely given the degree of multilevel cervical stenosis DDD and foraminal narrowing    He already has an EMG that confirms history of bilateral carpal tunnel and cubital tunnel syndrome  · Start physical therapy for cervical spine including traction and stretching  · Start gabapentin  · Follow up in 4 weeks       Problem List Items Addressed This Visit     None      Visit Diagnoses     Radiculopathy, cervical region    -  Primary    Relevant Orders    Ambulatory referral to Physical Therapy            Subjective:      Patient ID: Elizabeth Kowalski is a 68 y o  male  HPI  Ezra Mayo is a 68year old male who presents to the office for evaluation of bilateral hand numbness ongoing for 2 5 years following raking leaves  He was referred to us by Dr Waldo Campo  He is experiencing bilateral hand numbness that occasionally wakes him up at night  He has noticed increased numbness with certain head positions  He notes difficulty with dexterity  He has mild neck pain  He has scheduled physical therapy but has not attended any sessions yet  The following portions of the patient's history were reviewed and updated as appropriate: current medications, past family history, past medical history, past social history, past surgical history and problem list     Review of Systems   Constitutional: Negative for fever and unexpected weight change  HENT: Negative for hearing loss, nosebleeds and sore throat  Eyes: Negative for pain, redness and visual disturbance  Respiratory: Negative for cough, shortness of breath and wheezing  Cardiovascular: Negative for chest pain, palpitations and leg swelling  Gastrointestinal: Negative for abdominal pain, nausea and vomiting  Endocrine: Negative for polydipsia and polyuria  Genitourinary: Negative for dysuria and hematuria  Skin: Negative for rash and wound  Neurological: Negative for dizziness and headaches  Psychiatric/Behavioral: Negative for agitation and suicidal ideas  Objective:      /69   Pulse 83   Ht 5' 8" (1 727 m)   Wt 78 5 kg (173 lb)   BMI 26 30 kg/m²          Physical Exam   Constitutional: He is oriented to person, place, and time  He appears well-developed and well-nourished  HENT:   Head: Normocephalic and atraumatic  Eyes: Pupils are equal, round, and reactive to light  Neck: Neck supple  Cardiovascular: Intact distal pulses  Pulmonary/Chest: Breath sounds normal    Neurological: He is alert and oriented to person, place, and time  Skin: Skin is warm and dry  Psychiatric: He has a normal mood and affect   His behavior is normal        Patient ambulates without assistance  Non-tender to palpation   Strength C5-T1 5/5 bilaterally  Sensation C5-T1 intact bilaterally    Imaging  X-rays of cervical spine 09/06/2017 were reviewed and shows degenerative changes    Scribe Attestation    I,:   Bethany Owusu am acting as a scribe while in the presence of the attending physician :        I,:   Sukhjinder Ramos MD personally performed the services described in this documentation    as scribed in my presence :

## 2019-12-10 ENCOUNTER — OFFICE VISIT (OUTPATIENT)
Dept: PHYSICAL THERAPY | Facility: CLINIC | Age: 69
End: 2019-12-10
Payer: MEDICARE

## 2019-12-10 DIAGNOSIS — M54.12 CERVICAL RADICULOPATHY: Primary | ICD-10-CM

## 2019-12-10 DIAGNOSIS — R20.2 TINGLING OF BOTH UPPER EXTREMITIES: ICD-10-CM

## 2019-12-10 PROCEDURE — 97110 THERAPEUTIC EXERCISES: CPT | Performed by: PHYSICAL THERAPIST

## 2019-12-10 PROCEDURE — 97112 NEUROMUSCULAR REEDUCATION: CPT | Performed by: PHYSICAL THERAPIST

## 2019-12-10 NOTE — PROGRESS NOTES
Daily Note     Today's date: 12/10/2019  Patient name: Suyapa Bradley  : 1950  MRN: [id number]  Referring provider: Mark Lund MD  Dx:   Encounter Diagnosis     ICD-10-CM    1  Cervical radiculopathy M54 12    2  Tingling of both upper extremities R20 2                   Subjective: "The exercises did nothing for me  We're just working towards an MRI "      Objective: See treatment diary below      Assessment: Pt requires constant cueing for proper form  Attempted cervical traction today  Plan: Continue per plan of care              Precautions: none    Daily Treatment Diary       Manuals             RU spread B             Max laser B carpal tunnel             1st rib inf mob B             Clav inf mob B             Catherine clav STM             Cervical Traction 20# x10'                                      Exercise Diary              Bwd UBE 4'            Wrist ext str             Peg board in wrist flex             Posture t-band GTB x15ea            Serr wall slide x15            Lower trap wall slide             Serr bear hug GTB x15            Prone YT x15            Prone scap sq                                                                                                                                                                                      Modalities

## 2019-12-12 ENCOUNTER — OFFICE VISIT (OUTPATIENT)
Dept: PHYSICAL THERAPY | Facility: CLINIC | Age: 69
End: 2019-12-12
Payer: MEDICARE

## 2019-12-12 DIAGNOSIS — M54.12 CERVICAL RADICULOPATHY: Primary | ICD-10-CM

## 2019-12-12 DIAGNOSIS — R20.2 TINGLING OF BOTH UPPER EXTREMITIES: ICD-10-CM

## 2019-12-12 PROCEDURE — 97112 NEUROMUSCULAR REEDUCATION: CPT | Performed by: PHYSICAL THERAPIST

## 2019-12-12 PROCEDURE — 97110 THERAPEUTIC EXERCISES: CPT | Performed by: PHYSICAL THERAPIST

## 2019-12-12 PROCEDURE — 97012 MECHANICAL TRACTION THERAPY: CPT | Performed by: PHYSICAL THERAPIST

## 2019-12-12 NOTE — PROGRESS NOTES
Daily Note     Today's date: 2019  Patient name: Silvia Cotton  : 1950  MRN: [id number]  Referring provider: Willard Hilario MD  Dx:   Encounter Diagnosis     ICD-10-CM    1  Cervical radiculopathy M54 12    2  Tingling of both upper extremities R20 2                   Subjective: Pt reports feeling better for the rest of the day after last visit but it did not change the numbness at night  Last night was his worst night  He has tried the cyriax release position with no change  Objective: See treatment diary below      Assessment: Tolerated treatment well  Patient reports no changes with session  Numbness and tingling occurred in RUE after appx 2 min of right side lying despite correction of posture  Plan: Continue per plan of care              Precautions: none    Daily Treatment Diary       Manuals 12/10 12/12           RU spread B  KT           Max laser B carpal tunnel             1st rib inf mob B  KT           Clav inf mob B  KT           Catherine clav STM  KT           Cervical Traction 20# x10' 15-20# x10'                                     Exercise Diary              Bwd UBE 4'            Wrist ext str             Peg board in wrist flex             Posture t-band GTB x15ea x20 ea grn           Serr wall slide x15 x20           Lower trap wall slide  x20           Serr bear hug GTB x15 NV           Prone YT x15 NV           Prone scap sq             Doorway pec str  20"x3           C/S retraction + extension  10x                                                                                                      Postural Ed  Sleep w/ roll 10'                                                  Modalities

## 2019-12-16 ENCOUNTER — OFFICE VISIT (OUTPATIENT)
Dept: PHYSICAL THERAPY | Facility: CLINIC | Age: 69
End: 2019-12-16
Payer: MEDICARE

## 2019-12-16 DIAGNOSIS — R20.2 TINGLING OF BOTH UPPER EXTREMITIES: ICD-10-CM

## 2019-12-16 DIAGNOSIS — M54.12 CERVICAL RADICULOPATHY: Primary | ICD-10-CM

## 2019-12-16 PROCEDURE — 97012 MECHANICAL TRACTION THERAPY: CPT | Performed by: PHYSICAL THERAPIST

## 2019-12-16 PROCEDURE — 97112 NEUROMUSCULAR REEDUCATION: CPT | Performed by: PHYSICAL THERAPIST

## 2019-12-16 PROCEDURE — 97140 MANUAL THERAPY 1/> REGIONS: CPT | Performed by: PHYSICAL THERAPIST

## 2019-12-16 NOTE — PROGRESS NOTES
Daily Note     Today's date: 2019  Patient name: Jesus Martins  : 1950  MRN: [id number]  Referring provider: Valente Varela MD  Dx:   Encounter Diagnosis     ICD-10-CM    1  Cervical radiculopathy M54 12    2  Tingling of both upper extremities R20 2                   Subjective: Pt reports feeling no changes in night time tingling since last visit  Objective: See treatment diary below      Assessment: Tolerated treatment well  Patient reported increased numbness and tingling in lateral aspect of right hand with lower trap wall slides  Unable to recreate with adverse neural tension tests, repeated motions of C/S, or mobs of clavicle & 1st rib  Pt continues to report feeling better after mechanical traction, however, admits no change in numbness and tingling of hands  Plan: Continue per plan of care              Precautions: none    Daily Treatment Diary       Manuals 12/10 12/12 12/16          RU spread B  KT           Max laser B carpal tunnel             1st rib inf mob B  KT KT          Clav inf mob B  KT KT          Catherine clav STM  KT           Cervical Traction 20# x10' 15-20# x10' 15# x10'                                    Exercise Diary              Bwd UBE 4'  6'          Wrist ext str             Peg board in wrist flex             Posture t-band GTB x15ea x20 ea grn x20 ea blue          Serr wall slide x15 x20 x20          Lower trap wall slide  x20 x30          Serr bear hug GTB x15 NV x30 grn          Prone YT x15 NV           Prone scap sq             Doorway pec str  20"x3           C/S retraction + extension  10x                                                                                                      Postural Ed  Sleep w/ roll 10'                                                  Modalities

## 2019-12-18 ENCOUNTER — OFFICE VISIT (OUTPATIENT)
Dept: PHYSICAL THERAPY | Facility: CLINIC | Age: 69
End: 2019-12-18
Payer: MEDICARE

## 2019-12-18 DIAGNOSIS — R20.2 TINGLING OF BOTH UPPER EXTREMITIES: ICD-10-CM

## 2019-12-18 DIAGNOSIS — M54.12 CERVICAL RADICULOPATHY: Primary | ICD-10-CM

## 2019-12-18 PROCEDURE — 97140 MANUAL THERAPY 1/> REGIONS: CPT

## 2019-12-18 PROCEDURE — 97112 NEUROMUSCULAR REEDUCATION: CPT

## 2019-12-18 PROCEDURE — 97012 MECHANICAL TRACTION THERAPY: CPT

## 2019-12-18 NOTE — PROGRESS NOTES
Daily Note     Today's date: 2019  Patient name: Penny Vidal  : 1950  MRN: [id number]  Referring provider: Cinthia Braden MD  Dx:   Encounter Diagnosis     ICD-10-CM    1  Cervical radiculopathy M54 12    2  Tingling of both upper extremities R20 2                   Subjective: Pt reports that his symptoms the last few days have been improving with less radiating symptoms in his fingertips  Objective: See treatment diary below      Assessment: Tolerated treatment well requiring excessive verbal cuing for exercise performance and to promote functional form  He continues to benefit from cervical traction and was re educated on the importance of his HEP with compliance noted  Patient demonstrated fatigue post treatment, exhibited good technique with therapeutic exercises and would benefit from continued PT       Plan: Continue per plan of care              Precautions: none    Daily Treatment Diary       Manuals 12/10 12/12 12/16 12/18         RU spread B  KT           Max laser B carpal tunnel             1st rib inf mob B  KT KT          Clav inf mob B  KT KT          Catherine clav STM  KT           Cervical Traction 20# x10' 15-20# x10' 15# x10' 15-20# x10'                                   Exercise Diary              Bwd UBE 4'  6' 6'         Wrist ext str             Peg board in wrist flex             Posture t-band GTB x15ea x20 ea grn x20 ea blue x25 ea blue         Serr wall slide x15 x20 x20 x20         Lower trap wall slide  x20 x30 x30         Serr bear hug GTB x15 NV x30 grn x30 btb         Prone YT x15 NV           Prone scap sq             Doorway pec str  20"x3  20"x3         C/S retraction + extension  10x                                                                                                      Postural Ed  Sleep w/ roll 10'                                                  Modalities

## 2019-12-19 ENCOUNTER — APPOINTMENT (OUTPATIENT)
Dept: PHYSICAL THERAPY | Facility: CLINIC | Age: 69
End: 2019-12-19
Payer: MEDICARE

## 2019-12-23 ENCOUNTER — OFFICE VISIT (OUTPATIENT)
Dept: PHYSICAL THERAPY | Facility: CLINIC | Age: 69
End: 2019-12-23
Payer: MEDICARE

## 2019-12-23 DIAGNOSIS — R20.2 TINGLING OF BOTH UPPER EXTREMITIES: ICD-10-CM

## 2019-12-23 DIAGNOSIS — M54.12 CERVICAL RADICULOPATHY: Primary | ICD-10-CM

## 2019-12-23 PROCEDURE — 97012 MECHANICAL TRACTION THERAPY: CPT | Performed by: PHYSICAL THERAPIST

## 2019-12-23 PROCEDURE — 97112 NEUROMUSCULAR REEDUCATION: CPT | Performed by: PHYSICAL THERAPIST

## 2019-12-23 NOTE — PROGRESS NOTES
Daily Note     Today's date: 2019  Patient name: Grady Bonilla  : 1950  MRN: [id number]  Referring provider: Vivian Cleveland MD  Dx:   Encounter Diagnosis     ICD-10-CM    1  Cervical radiculopathy M54 12    2  Tingling of both upper extremities R20 2                   Subjective: Pt reports no change in severity or frequency of hand numbness/tingling so far with PT  He does feel less stiff after mechanical traction though  Objective: See treatment diary below      Assessment: Tolerated treatment well  Patient exhibited good technique with therapeutic exercises  Good recall of exercises today  Plan: Continue per plan of care              Precautions: none    Daily Treatment Diary       Manuals 12/10 12/12 12/16 12/18 12/23        RU spread B  KT           Max laser B carpal tunnel             1st rib inf mob B  KT KT          Clav inf mob B  KT KT          Catherine clav STM  KT           Cervical Traction 20# x10' 15-20# x10' 15# x10' 15-20# x10' 17# x10'                                  Exercise Diary              Bwd UBE 4'  6' 6' 8'        Wrist ext str             Peg board in wrist flex             Posture t-band GTB x15ea x20 ea grn x20 ea blue x25 ea blue x30 ea blue        Serr wall slide x15 x20 x20 x20         Lower trap wall slide  x20 x30 x30 x30 blue        Serr bear hug GTB x15 NV x30 grn x30 btb x30 blue        Prone YT x15 NV   20x ea        Prone scap sq     5"x20 ea        Doorway pec str  20"x3  20"x3 20"x3        C/S retraction + extension  10x                                                                                                      Postural Ed  Sleep w/ roll 10'                                                  Modalities

## 2019-12-26 ENCOUNTER — OFFICE VISIT (OUTPATIENT)
Dept: PHYSICAL THERAPY | Facility: CLINIC | Age: 69
End: 2019-12-26
Payer: MEDICARE

## 2019-12-26 DIAGNOSIS — M54.12 CERVICAL RADICULOPATHY: Primary | ICD-10-CM

## 2019-12-26 DIAGNOSIS — R20.2 TINGLING OF BOTH UPPER EXTREMITIES: ICD-10-CM

## 2019-12-26 PROCEDURE — 97110 THERAPEUTIC EXERCISES: CPT | Performed by: PHYSICAL THERAPIST

## 2019-12-26 PROCEDURE — 97112 NEUROMUSCULAR REEDUCATION: CPT | Performed by: PHYSICAL THERAPIST

## 2019-12-26 PROCEDURE — 97012 MECHANICAL TRACTION THERAPY: CPT | Performed by: PHYSICAL THERAPIST

## 2019-12-26 NOTE — PROGRESS NOTES
Daily Note     Today's date: 2019  Patient name: Brittany Foreman  : 1950  MRN: [id number]  Referring provider: Liang De La Cruz MD  Dx:   Encounter Diagnosis     ICD-10-CM    1  Cervical radiculopathy M54 12    2  Tingling of both upper extremities R20 2                   Subjective: he reports he forgot he had therapy today and completed the bear hugs and TB extensions at home prior to realizing he had to come to PT today  No issues after previous session  Objective: See treatment diary below      Assessment: Patient tolerated treatment well  Held on tb bear hugs and extensions secondary to patient already completing the two exercises at home  Able to increase resistance to black TB for rows/high rows, provided patient with band to take home  He had increased sensation of tingling with prone T's and continues to report relief with mechanical traction  He will benefit from continued PT  Plan: Continue per plan of care              Precautions: none    Daily Treatment Diary       Manuals 12/10 12/12 12/16 12/18 12/23 12/26       RU spread B  KT           Max laser B carpal tunnel             1st rib inf mob B  KT KT          Clav inf mob B  KT KT          Catherine clav STM  KT           Cervical Traction 20# x10' 15-20# x10' 15# x10' 15-20# x10' 17# x10' 17# x10                                 Exercise Diary              Bwd UBE 4'  6' 6' 8' 8'       Wrist ext str             Peg board in wrist flex             Posture t-band GTB x15ea x20 ea grn x20 ea blue x25 ea blue x30 ea blue x30 rows & high rows only black       Serr wall slide x15 x20 x20 x20  x20       Lower trap wall slide  x20 x30 x30 x30 blue x30 blue       Serr bear hug GTB x15 NV x30 grn x30 btb x30 blue Did @ home       Prone YT x15 NV   20x ea 20x ea No Ts      Prone scap sq     5"x20 ea 5" x20ea       Doorway pec str  20"x3  20"x3 20"x3 20"x3       C/S retraction + extension  10x Postural Ed  Sleep w/ roll 10'                                                  Modalities

## 2020-01-02 ENCOUNTER — EVALUATION (OUTPATIENT)
Dept: PHYSICAL THERAPY | Facility: CLINIC | Age: 70
End: 2020-01-02
Payer: MEDICARE

## 2020-01-02 DIAGNOSIS — R20.2 TINGLING OF BOTH UPPER EXTREMITIES: ICD-10-CM

## 2020-01-02 DIAGNOSIS — M25.561 ACUTE PAIN OF RIGHT KNEE: ICD-10-CM

## 2020-01-02 DIAGNOSIS — M54.12 CERVICAL RADICULOPATHY: Primary | ICD-10-CM

## 2020-01-02 PROCEDURE — 97161 PT EVAL LOW COMPLEX 20 MIN: CPT | Performed by: PHYSICAL THERAPIST

## 2020-01-02 PROCEDURE — 97110 THERAPEUTIC EXERCISES: CPT | Performed by: PHYSICAL THERAPIST

## 2020-01-02 NOTE — PROGRESS NOTES
Reassessment    Today's date: 2020  Patient name: Layton Segura  : 1950  MRN: [id number]  Referring provider: Chantel Umanzor MD  Dx:   Encounter Diagnosis     ICD-10-CM    1  Cervical radiculopathy M54 12    2  Tingling of both upper extremities R20 2    3  Acute pain of right knee M25 561            **Right knee pain added 20**       Assessment  Assessment details: Pt demonstrates no change in frequency or severity of BUE tingling despite good attendance to PT and compliance with HEP  Trialed both manual and mechanical traction without change  No change in BUE tingling with mechanical testing of cervical spine, however, symptoms are recreated with carpal tunnel testing  Plan to hold PT for cervical spine until pt has follow up with physician due to no change in status with 4 weeks of PT  Pt evaluated today for right knee pain x2-3 weeks  He demonstrates significantly limited flexibility and the below functional limitations  He is a good candidate for outpatient physical therapy to address the impairments related to his knee and allow return to prior functional level  Recommend PT 1-2x/week x 2-4 weeks  Functional limitations: significantly interrupted sleep (no change), pain negotiating stairs, pain with functional transfers after prolonged sitting, somewhat limited sitting tolerance, pain with recreational activities, pain jumping    Goals  STG - 2 weeks  1  Independent with HEP - Met for neck  2  Improve postural awareness to decrease forward head posture during daily tasks - MET     LTG - 4 weeks  1  Increase strength of scapular stabilizers by 1 MMT grade to improve posture  - MET  2  Tolerate sleeping 4-6 hours without pain to improve quality of life  - no change  3  Improve ROM of C/S retraction and extension by 25% to improve posture and body mechanics  - MET    New Goals for Knee 20:  1  Independent with HEP  2   Normalize RLE flexibility to improve body mechanics during functional tasks  3  Tolerate jogging and stair negotiation without pain to allow resuming PLOF  4  Resume all recreational activities without pain or limitation to allow resuming PLOF  Plan  Plan details: Provided with and reviewed initial written HEP  Reviewed physical exam findings and plan of care  All questions answered to patient's satisfaction  Patient would benefit from: skilled physical therapy  Planned modality interventions: low level laser therapy  Planned therapy interventions: manual therapy, neuromuscular re-education, patient education, postural training, therapeutic activities, therapeutic exercise and home exercise program  Frequency:1- 2x week  Duration in weeks: 2-4  Treatment plan discussed with: patient        Subjective Evaluation  Pt reports no changes in frequency or severity of tingling in BUE at night  He has been doing his exercises regularly and feels they help with his posture but they have not changed his chief complaint  Unrelated, two weeks ago he was playing platform tennis and hurt his right knee  It had been getting better but he noticed it was causing pain when he was skiing two days ago  He would like to be evaluated for his knee pain today  Pain  Current pain ratin-3  At best pain ratin  At worst pain ratin  Location: R knee    Social Support    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    Exercise history: skiier, ellipitlcal, treadmill, light free weights      Diagnostic Tests  No diagnostic tests performed  Treatments  Previous treatment: massage  Patient Goals  Patient goals for therapy: increased motion and decreased pain          Objective     Postural Observations  Seated posture: fair  Standing posture: fair  Correction of posture: has no consistent effect    Tenderness     Additional Tenderness Details  No distinct TTP throughout C/S    Active Range of Motion   Cervical/Thoracic Spine       Cervical    Subcranial retraction:   Restriction level: minimal  Flexion: 45 degrees   Extension: 45 degrees   Left lateral flexion: 30 degrees      Right lateral flexion: 30 degrees      Left rotation: 65 degrees  Right rotation: 70 degrees           Joint Play     Comments: Hypomobile throughout T/S (improved from IE)    Mechanical Assessment    Cervical    Seated retraction: repeated movements   Pain location: no change  Seated Extension: repeated movements  Pain location: no change  Supine retractation: repeated movements  Pain location: no change  Supine extension: repeated movements  Pain location: no change      Strength/Myotome Testing     Left Shoulder     Isolated Muscles   Lower trapezius: 4   Middle trapezius: 4+   Serratus anterior: 4+     Right Shoulder     Isolated Muscles   Lower trapezius: 4   Middle trapezius: 4+  Serratus anterior: 3-     Right Hip  Flexion: 5/5  Extension: 4+/5  Abduction: 5/5  ER: 5/5  IR: 5/5    Right Knee  Flexion: 5/5  Prone Flexion: 4/5  Extension: 5/5    Tests   Cervical   Negative repeated extension and neck flexor muscle endurance test      Left Shoulder   Negative median, ulnar, radial    Right Shoulder   Negative median, ulnar, radial    Left Wrist/Hand   Positive Phalen's sign  Positive Tinel    Right Wrist/Hand   Positive Phalen's sign   Positive Tinel    Right Knee  Negative Kesha's , Patellar grind, Anterior Drawer, Posterior Drawer, Valgus/Varus Stress Test    Precautions: none    Daily Treatment Diary       Manuals 12/10 12/12 12/16 12/18 12/23 12/26 1/2      RU spread B  KT                        1st rib inf mob B  KT KT          Clav inf mob B  KT KT          Catherine clav STM  KT           Cervical Traction 20# x10' 15-20# x10' 15# x10' 15-20# x10' 17# x10' 17# x10       Max Laser R knee             Post TC mobs             Exercise Diary              Bwd UBE 4'  6' 6' 8' 8' 8'      Wrist ext str             Peg board in wrist flex             Posture t-band GTB x15ea x20 ea grn x20 ea blue x25 ea blue x30 ea blue x30 rows & high rows only black       Serr wall slide x15 x20 x20 x20  x20       Lower trap wall slide  x20 x30 x30 x30 blue x30 blue       Serr bear hug GTB x15 NV x30 grn x30 btb x30 blue Did @ home       Prone YT x15 NV   20x ea 20x ea       Prone scap sq     5"x20 ea 5" x20ea       Doorway pec str  20"x3  20"x3 20"x3 20"x3       C/S retraction + extension  10x                                     Hamstring stretch w/ strap       30"x3      Prone quad str w/ strap       30"x3      Hooklying quad/hip flexor str       30"x3      Calf str at step       30"x3                   Postural Ed  Sleep w/ roll 10'                                                  Modalities

## 2020-01-06 ENCOUNTER — OFFICE VISIT (OUTPATIENT)
Dept: PHYSICAL THERAPY | Facility: CLINIC | Age: 70
End: 2020-01-06
Payer: MEDICARE

## 2020-01-06 DIAGNOSIS — M25.561 ACUTE PAIN OF RIGHT KNEE: Primary | ICD-10-CM

## 2020-01-06 DIAGNOSIS — E78.00 HYPERCHOLESTEREMIA: ICD-10-CM

## 2020-01-06 PROCEDURE — 97110 THERAPEUTIC EXERCISES: CPT | Performed by: PHYSICAL THERAPIST

## 2020-01-06 PROCEDURE — 97112 NEUROMUSCULAR REEDUCATION: CPT | Performed by: PHYSICAL THERAPIST

## 2020-01-06 RX ORDER — ATORVASTATIN CALCIUM 20 MG/1
20 TABLET, FILM COATED ORAL DAILY
Qty: 90 TABLET | Refills: 3 | Status: SHIPPED | OUTPATIENT
Start: 2020-01-06 | End: 2021-01-11 | Stop reason: SDUPTHER

## 2020-01-06 NOTE — PROGRESS NOTES
Daily Note     Today's date: 2020  Patient name: German Hamilton  : 1950  MRN: [id number]  Referring provider: Jacinda Doll MD  Dx:   Encounter Diagnosis     ICD-10-CM    1  Acute pain of right knee M25 561                   Subjective: Pt reports "it's gradually getting better"  Objective: See treatment diary below      Assessment: Tolerated treatment well  Patient would benefit from continued PT  Required frequent cueing for form during all exercises  Plan: Continue per plan of care              Precautions: none    Daily Treatment Diary         Manuals 12/10 12/12 12/16 12/18 12/23 12/26 1/2 1/6     RU spread B  KT                        1st rib inf mob B  KT KT          Clav inf mob B  KT KT          Catherine clav STM  KT           Cervical Traction 20# x10' 15-20# x10' 15# x10' 15-20# x10' 17# x10' 17# x10       Max Laser R knee             Post TC mobs        KT     Exercise Diary              Bwd UBE 4'  6' 6' 8' 8' 8'      Posture t-band GTB x15ea x20 ea grn x20 ea blue x25 ea blue x30 ea blue x30 rows & high rows only black       Serr wall slide x15 x20 x20 x20  x20       Lower trap wall slide  x20 x30 x30 x30 blue x30 blue       Serr bear hug GTB x15 NV x30 grn x30 btb x30 blue Did @ home       Prone YT x15 NV   20x ea 20x ea       Prone scap sq     5"x20 ea 5" x20ea       Doorway pec str  20"x3  20"x3 20"x3 20"x3       C/S retraction + extension  10x                        Bike        10'     Hamstring stretch w/ strap       30"x3 30"x3     Prone quad str w/ strap       30"x3 30"x3     Hooklying quad/hip flexor str       30"x3 30"x3     Calf str at step       30"x3 30"x3     Step Up F/L/C BOSU        20x ea     SL reach to cone        20x     Lunge on BOSU        20x                                            Postural Ed  Sleep w/ roll 10'                                                  Modalities

## 2020-01-09 ENCOUNTER — OFFICE VISIT (OUTPATIENT)
Dept: OBGYN CLINIC | Facility: HOSPITAL | Age: 70
End: 2020-01-09
Attending: ORTHOPAEDIC SURGERY
Payer: MEDICARE

## 2020-01-09 ENCOUNTER — OFFICE VISIT (OUTPATIENT)
Dept: PHYSICAL THERAPY | Facility: CLINIC | Age: 70
End: 2020-01-09
Payer: MEDICARE

## 2020-01-09 VITALS
HEIGHT: 68 IN | WEIGHT: 177 LBS | DIASTOLIC BLOOD PRESSURE: 76 MMHG | BODY MASS INDEX: 26.83 KG/M2 | SYSTOLIC BLOOD PRESSURE: 155 MMHG | HEART RATE: 56 BPM

## 2020-01-09 DIAGNOSIS — M25.561 ACUTE PAIN OF RIGHT KNEE: Primary | ICD-10-CM

## 2020-01-09 DIAGNOSIS — M54.12 RADICULOPATHY, CERVICAL REGION: Primary | ICD-10-CM

## 2020-01-09 PROCEDURE — 97112 NEUROMUSCULAR REEDUCATION: CPT | Performed by: PHYSICAL THERAPIST

## 2020-01-09 PROCEDURE — 97110 THERAPEUTIC EXERCISES: CPT | Performed by: PHYSICAL THERAPIST

## 2020-01-09 PROCEDURE — 99213 OFFICE O/P EST LOW 20 MIN: CPT | Performed by: ORTHOPAEDIC SURGERY

## 2020-01-09 NOTE — PROGRESS NOTES
Daily Note     Today's date: 2020  Patient name: Izabella Angel  : 1950  MRN: [id number]  Referring provider: Viktor Levine MD  Dx:   Encounter Diagnosis     ICD-10-CM    1  Acute pain of right knee M25 561                   Subjective: Pt reports his knee is feeling much better  Objective: See treatment diary below      Assessment: Tolerated treatment well   Patient exhibited good technique with therapeutic exercises      Plan: Discharge to HEP     Precautions: none    Daily Treatment Diary         Manuals 12/10 12/12 12/16 12/18 12/23 12/26 1/2 1/6 1/9    RU spread B  KT                        1st rib inf mob B  KT KT          Clav inf mob B  KT KT          Catherine clav STM  KT           Cervical Traction 20# x10' 15-20# x10' 15# x10' 15-20# x10' 17# x10' 17# x10       Max Laser R knee             Post TC mobs        KT KT    Exercise Diary              Bwd UBE 4'  6' 6' 8' 8' 8'      Posture t-band GTB x15ea x20 ea grn x20 ea blue x25 ea blue x30 ea blue x30 rows & high rows only black       Serr wall slide x15 x20 x20 x20  x20       Lower trap wall slide  x20 x30 x30 x30 blue x30 blue       Serr bear hug GTB x15 NV x30 grn x30 btb x30 blue Did @ home       Prone YT x15 NV   20x ea 20x ea       Prone scap sq     5"x20 ea 5" x20ea       Doorway pec str  20"x3  20"x3 20"x3 20"x3       C/S retraction + extension  10x                        Bike        10' 10'    Hamstring stretch w/ strap       30"x3 30"x3 30"x3    Prone quad str w/ strap       30"x3 30"x3 30"x3    Hooklying quad/hip flexor str       30"x3 30"x3 30"x3    Calf str at step       30"x3 30"x3 30"x3    Step Up F/L/C BOSU        20x ea 20x ea    SL reach to cone        20x 20x    Lunge on BOSU        20x 20x                                           Postural Ed  Sleep w/ roll 10'                                                  Modalities

## 2020-01-09 NOTE — PROGRESS NOTES
69 y o male  Here for follow-up for bilateral upper extremity pain and numbness and tingling  Since his last visit, he has gone through several sessions of physical therapy without any appreciable improvement in his pain numbness and tingling  Continues to have right worse than left arm symptoms      Review of Systems  Review of systems negative unless otherwise specified in HPI    Past Medical History  Past Medical History:   Diagnosis Date    Cancer ([address] )     scc back    Carpal tunnel syndrome     ED (erectile dysfunction)     GERD (gastroesophageal reflux disease)     [place] (hard of hearing)     wears hearing aids    Hypercholesteremia     Hypertension     Numbness in both hands        Past Surgical History  Past Surgical History:   Procedure Laterality Date    APPENDECTOMY      As a child    COLONOSCOPY      INGUINAL HERNIA REPAIR Left 2015    MASS EXCISION N/A 8/14/2018    Procedure: BACK/TRUNK MULTIPLE SEBBORHEIC KERATOSIS SHAVE/EXCISION; COMPLEX CLOSURE VERSUS FLAP RECONSTRUCTION;  Surgeon: Cat Jaeger MD;  [facility];  Service: Plastics    SQUAMOUS CELL CARCINOMA EXCISION N/A 8/14/2018    Procedure: BACK SCC IN SITU EXCISION;  Surgeon: Cat Jaeger MD;  [facility];  Service: Plastics       Current Medications  Current Outpatient Medications on File Prior to Visit   Medication Sig Dispense Refill    atorvastatin (LIPITOR) 20 mg tablet Take 1 tablet (20 mg total) by mouth daily 90 tablet 3    gabapentin (NEURONTIN) 300 mg capsule Take 1 capsule (300 mg total) by mouth daily 60 capsule 0    hydrochlorothiazide (HYDRODIURIL) 25 mg tablet Take 1 tablet (25 mg total) by mouth daily 90 tablet 3    lisinopril (ZESTRIL) 20 mg tablet Take 1 tablet (20 mg total) by mouth daily 90 tablet 1    losartan (COZAAR) 100 MG tablet Take 1 tablet (100 mg total) by mouth daily 90 tablet 3    metoprolol succinate (TOPROL-XL) 25 mg 24 hr tablet Take 2 tablets (50 mg total) by mouth daily 90 tablet 3    NON FORMULARY CBD/THC oil daily      omeprazole (PriLOSEC) 20 mg delayed release capsule Take by mouth      Probiotic Product (PROBIOTIC DAILY PO) Take by mouth      psyllium (METAMUCIL SMOOTH TEXTURE) 58 6 % powder Take by mouth      tadalafil (CIALIS) 5 MG tablet Take 1 tablet (5 mg total) by mouth daily 84 tablet 3     No current facility-administered medications on file prior to visit  Recent Labs [facility])  0   Lab Value Date/Time    HCT 43 7 02/20/2019 0749    HCT 42 9 04/09/2018 1049    HCT 43 4 12/20/2016 0725    HGB 14 9 02/20/2019 0749    HGB 14 4 04/09/2018 1049    HGB 14 2 12/20/2016 0725    WBC 5 8 02/20/2019 0749    WBC 5 61 04/09/2018 1049    WBC NONE SEEN 12/20/2016 0725    WBC 6 3 12/20/2016 0725    INR 0 95 04/09/2018 1049    INR 0 92 11/08/2014 1049    GLUCOSE 122 06/03/2015 0748    HGBA1C 5 8 (H) 11/06/2019 0748         Physical exam  · General: Awake, Alert, Oriented  · Eyes: Pupils equal, round and reactive to light  · Heart: regular rate and rhythm  · Lungs: No audible wheezing  · Abdomen: soft    Cervical spine:  Skin is intact  5/5 motor strength C5 through T1   Sensation is intact C5 through T1   Fingers are warm well perfused  Imaging    No new imaging to review    Procedure   plan    Assessment/Plan:   69 y o male  Bilateral cervical radiculopathy with right  Greater than left pain  Plans as follows:     We will obtain an MRI of the cervical spine   follow-up once MRI is complete to review the results and further   Review treatment options

## 2020-01-11 ENCOUNTER — HOSPITAL ENCOUNTER (OUTPATIENT)
Dept: MRI IMAGING | Facility: HOSPITAL | Age: 70
Discharge: HOME | End: 2020-01-11
Payer: MEDICARE

## 2020-01-11 DIAGNOSIS — M54.12 RADICULOPATHY, CERVICAL REGION: ICD-10-CM

## 2020-01-11 PROCEDURE — 72141 MRI NECK SPINE W/O DYE: CPT

## 2020-01-13 ENCOUNTER — OFFICE VISIT (OUTPATIENT)
Dept: OBGYN CLINIC | Facility: HOSPITAL | Age: 70
End: 2020-01-13
Payer: MEDICARE

## 2020-01-13 ENCOUNTER — APPOINTMENT (OUTPATIENT)
Dept: PHYSICAL THERAPY | Facility: CLINIC | Age: 70
End: 2020-01-13
Payer: MEDICARE

## 2020-01-13 VITALS
HEIGHT: 68 IN | SYSTOLIC BLOOD PRESSURE: 138 MMHG | WEIGHT: 177 LBS | DIASTOLIC BLOOD PRESSURE: 75 MMHG | BODY MASS INDEX: 26.83 KG/M2 | HEART RATE: 80 BPM

## 2020-01-13 DIAGNOSIS — M54.12 RADICULOPATHY, CERVICAL REGION: Primary | ICD-10-CM

## 2020-01-13 PROCEDURE — 99213 OFFICE O/P EST LOW 20 MIN: CPT | Performed by: ORTHOPAEDIC SURGERY

## 2020-01-13 RX ORDER — GABAPENTIN 300 MG/1
300 CAPSULE ORAL
Qty: 60 CAPSULE | Refills: 0 | Status: SHIPPED | OUTPATIENT
Start: 2020-01-13 | End: 2020-03-25 | Stop reason: ALTCHOICE

## 2020-01-13 NOTE — PROGRESS NOTES
69 y o male Here for follow-up for bilateral upper extremity numbness and tingling  He obtained a cervical MRI and is here to review the results  His symptoms are unchanged since last visit      Review of Systems  Review of systems negative unless otherwise specified in HPI    Past Medical History  Past Medical History:   Diagnosis Date    Cancer ([address] )     scc back    Carpal tunnel syndrome     ED (erectile dysfunction)     GERD (gastroesophageal reflux disease)     [place] (hard of hearing)     wears hearing aids    Hypercholesteremia     Hypertension     Numbness in both hands        Past Surgical History  Past Surgical History:   Procedure Laterality Date    APPENDECTOMY      As a child    COLONOSCOPY      INGUINAL HERNIA REPAIR Left 2015    MASS EXCISION N/A 8/14/2018    Procedure: BACK/TRUNK MULTIPLE SEBBORHEIC KERATOSIS SHAVE/EXCISION; COMPLEX CLOSURE VERSUS FLAP RECONSTRUCTION;  Surgeon: James Lombardi MD;  [facility];  Service: Plastics    SQUAMOUS CELL CARCINOMA EXCISION N/A 8/14/2018    Procedure: BACK SCC IN SITU EXCISION;  Surgeon: James Lombardi MD;  [facility];  Service: Plastics       Current Medications  Current Outpatient Medications on File Prior to Visit   Medication Sig Dispense Refill    atorvastatin (LIPITOR) 20 mg tablet Take 1 tablet (20 mg total) by mouth daily 90 tablet 3    hydrochlorothiazide (HYDRODIURIL) 25 mg tablet Take 1 tablet (25 mg total) by mouth daily 90 tablet 3    lisinopril (ZESTRIL) 20 mg tablet Take 1 tablet (20 mg total) by mouth daily 90 tablet 1    NON FORMULARY CBD/THC oil daily      omeprazole (PriLOSEC) 20 mg delayed release capsule Take by mouth      Probiotic Product (PROBIOTIC DAILY PO) Take by mouth      psyllium (METAMUCIL SMOOTH TEXTURE) 58 6 % powder Take by mouth      tadalafil (CIALIS) 5 MG tablet Take 1 tablet (5 mg total) by mouth daily 84 tablet 3    losartan (COZAAR) 100 MG tablet Take 1 tablet (100 mg total) by mouth daily (Patient not taking: Reported on 1/13/2020) 90 tablet 3    [DISCONTINUED] gabapentin (NEURONTIN) 300 mg capsule Take 1 capsule (300 mg total) by mouth daily 60 capsule 0    [DISCONTINUED] metoprolol succinate (TOPROL-XL) 25 mg 24 hr tablet Take 2 tablets (50 mg total) by mouth daily 90 tablet 3     No current facility-administered medications on file prior to visit  Recent Labs [facility])  0   Lab Value Date/Time    HCT 43 7 02/20/2019 0749    HCT 42 9 04/09/2018 1049    HCT 43 4 12/20/2016 0725    HGB 14 9 02/20/2019 0749    HGB 14 4 04/09/2018 1049    HGB 14 2 12/20/2016 0725    WBC 5 8 02/20/2019 0749    WBC 5 61 04/09/2018 1049    WBC NONE SEEN 12/20/2016 0725    WBC 6 3 12/20/2016 0725    INR 0 95 04/09/2018 1049    INR 0 92 11/08/2014 1049    GLUCOSE 122 06/03/2015 0748    HGBA1C 5 8 (H) 11/06/2019 0748         Physical exam  · General: Awake, Alert, Oriented  · Eyes: Pupils equal, round and reactive to light  · Heart: regular rate and rhythm  · Lungs: No audible wheezing  · Abdomen: soft    Cervical spine:   Skin is intact  There is no tenderness to palpation  5/5 motor strength C5 through T1   Sensation is intact C5 through T1   Cortés is negative bilateral    Imaging   MRI of cervical spine reviewed personally today showing multilevel cervical DDD most severe in   C3-4, C4-5 and C5-6 levels with right-sided greater than left-sided foraminal stenosis at the same levels  There is no increased cord signal    Procedure    none    Assessment/Plan:   69 y o male  Multilevel cervical DDD most severe at  C3-4 C4-5 and C5-6 levels with foraminal stenosis  This point time we recommend that he go to physical therapy  Prescription of gabapentin was sent to the pharmacy  She had these interventions fail to relieve his symptoms significantly, we would consider cervical cortisone injections at that time  Follow-up

## 2020-01-14 ENCOUNTER — TELEPHONE (OUTPATIENT)
Dept: OBGYN CLINIC | Facility: HOSPITAL | Age: 70
End: 2020-01-14

## 2020-01-14 NOTE — TELEPHONE ENCOUNTER
Pt  Called in stating pharmacy is not able to fill the Gabapentin script because insurance is denying it  Spoke to pharmacist who stated an order for Gabapentin had come through on 12/09/2019 from Dr Antonio Lyman but was canceled and that is why it isn't going through  Pharmacy will contact insurance co  To let them know the December order was never filled

## 2020-01-16 ENCOUNTER — APPOINTMENT (OUTPATIENT)
Dept: PHYSICAL THERAPY | Facility: CLINIC | Age: 70
End: 2020-01-16
Payer: MEDICARE

## 2020-01-17 ENCOUNTER — EVALUATION (OUTPATIENT)
Dept: PHYSICAL THERAPY | Facility: MEDICAL CENTER | Age: 70
End: 2020-01-17
Payer: MEDICARE

## 2020-01-17 DIAGNOSIS — M54.12 RADICULOPATHY, CERVICAL REGION: ICD-10-CM

## 2020-01-17 PROCEDURE — 97140 MANUAL THERAPY 1/> REGIONS: CPT | Performed by: PHYSICAL THERAPIST

## 2020-01-17 PROCEDURE — 97012 MECHANICAL TRACTION THERAPY: CPT | Performed by: PHYSICAL THERAPIST

## 2020-01-17 PROCEDURE — 97162 PT EVAL MOD COMPLEX 30 MIN: CPT | Performed by: PHYSICAL THERAPIST

## 2020-01-17 NOTE — PROGRESS NOTES
PT Evaluation     Today's date: 2020  Patient name: Migdalia Jones  : 1950  MRN: [id number]  Referring provider: Aj Sanchez  Dx:   Encounter Diagnosis     ICD-10-CM    1  Radiculopathy, cervical region M54 12 Ambulatory referral to Physical Therapy                  Assessment/Plan  Patient is a very pleasant 67 yo male who presents with symptoms of neck pain and B UE numbing/pain secondary to advanced degenerative changes  Symptoms are consistent with C6-7 B transverse foraminal stenosis with beginning stages of myelopathy  Patient demonstrates C4-7 ERSR somatic dysfunction with concurrent poor activation of Longus Capitus and Longus Coli with loss of feed forward mechanism  No myotomal loss at this time however sensory changes in C6-7 dermatome greater on right  Patient will benefit from skilled PT services to address issues of pain and radiculopathy with manual therapy, cervical stability training, neurodynamics, and postural control  Subjective  Patient states that he is having issues of numbing in his hands B worse in right during the night and at times during the day  Patient is concerned also with neck pain and difficulty with cervical rotation  Patient has seen Dr Jessica Meraz who ordered MRI  Surgical intervention is being considered however conservative measures are being considered first       Objective  Red Flags: MRI showed possible cord compression that Dr Jessica Meraz is aware of and has given ok to physical therapy services  Pain: 4/10 in neck EOR  Reflexes: 2+ B UE  Posture: forward head increased thoracic kyphosis  AROM: limited cervical lateral flexion and rotation by 40% B    No recreation of symptoms with AROM or with OP  PROM: deferred  PAROM: limited in nearly all testing most dramatic with comparable sign with UPA of C4-7 right  Palpation: ttp of suboccipital musculature  Special Tests: + compression test, + spurlings A, + ULTTA right, - hoffmans, - babinski  Coordination of Movement/strengthe: Patient demonstrates poor activation of Longus Capitus and Longus Coli with loss of feed forward mechanism with reaching tasks  Patient was able to perform activation with cueing  Goals  - Pt I with initial HEP in 1-2 visits  - Improve ROM to [facility]  - Improved Longus Coli and Longus Capitus activation and return of feed forward mechanism   - Increase Functional Status Measure measured by FOTO by MDIC  - decreased tingling in hands at night by 80%           Precautions: advanced cervical degenerative changes      Manual  1/17/2020              C4-7 UPA R Gr  Iv+ 30x3             Thoracic pistol Gr  v            C6-7 rotational mob Gr  Iv-  10x3                                          Exercise Diary                                                                                                                                                                                                                                                                                      Modalities              Mechanical traction  8min 30/10   25lbs

## 2020-01-23 ENCOUNTER — TELEPHONE (OUTPATIENT)
Dept: INTERNAL MEDICINE CLINIC | Facility: CLINIC | Age: 70
End: 2020-01-23

## 2020-01-23 DIAGNOSIS — M25.569 CHRONIC KNEE PAIN, UNSPECIFIED LATERALITY: Primary | ICD-10-CM

## 2020-01-23 DIAGNOSIS — G89.29 CHRONIC KNEE PAIN, UNSPECIFIED LATERALITY: Primary | ICD-10-CM

## 2020-01-23 NOTE — TELEPHONE ENCOUNTER
The patient was in a few weeks ago to see you about knee pain  It was recommended the patient start physical therapy  He completed the therapy  The patient is still in pain  He would like you to recommend a knee doctor for him that would give a cortisone shot  He is going on a skiing trip and would like to be able to enjoy himself   Please advise, thank you

## 2020-01-24 ENCOUNTER — APPOINTMENT (OUTPATIENT)
Dept: RADIOLOGY | Facility: MEDICAL CENTER | Age: 70
End: 2020-01-24
Payer: MEDICARE

## 2020-01-24 ENCOUNTER — OFFICE VISIT (OUTPATIENT)
Dept: OBGYN CLINIC | Facility: MEDICAL CENTER | Age: 70
End: 2020-01-24
Payer: MEDICARE

## 2020-01-24 ENCOUNTER — OFFICE VISIT (OUTPATIENT)
Dept: PHYSICAL THERAPY | Facility: MEDICAL CENTER | Age: 70
End: 2020-01-24
Payer: MEDICARE

## 2020-01-24 VITALS
WEIGHT: 170 LBS | SYSTOLIC BLOOD PRESSURE: 137 MMHG | HEIGHT: 68 IN | HEART RATE: 89 BPM | DIASTOLIC BLOOD PRESSURE: 79 MMHG | BODY MASS INDEX: 25.76 KG/M2

## 2020-01-24 DIAGNOSIS — M25.561 RIGHT KNEE PAIN, UNSPECIFIED CHRONICITY: ICD-10-CM

## 2020-01-24 DIAGNOSIS — M54.12 CERVICAL RADICULOPATHY: ICD-10-CM

## 2020-01-24 DIAGNOSIS — M25.561 ACUTE PAIN OF RIGHT KNEE: Primary | ICD-10-CM

## 2020-01-24 DIAGNOSIS — M25.561 ACUTE PAIN OF RIGHT KNEE: ICD-10-CM

## 2020-01-24 DIAGNOSIS — M54.12 RADICULOPATHY, CERVICAL REGION: Primary | ICD-10-CM

## 2020-01-24 DIAGNOSIS — M17.11 PRIMARY OSTEOARTHRITIS OF RIGHT KNEE: ICD-10-CM

## 2020-01-24 PROCEDURE — 97110 THERAPEUTIC EXERCISES: CPT | Performed by: PHYSICAL THERAPIST

## 2020-01-24 PROCEDURE — 99213 OFFICE O/P EST LOW 20 MIN: CPT | Performed by: EMERGENCY MEDICINE

## 2020-01-24 PROCEDURE — 97012 MECHANICAL TRACTION THERAPY: CPT | Performed by: PHYSICAL THERAPIST

## 2020-01-24 PROCEDURE — 20610 DRAIN/INJ JOINT/BURSA W/O US: CPT | Performed by: EMERGENCY MEDICINE

## 2020-01-24 PROCEDURE — 97140 MANUAL THERAPY 1/> REGIONS: CPT | Performed by: PHYSICAL THERAPIST

## 2020-01-24 PROCEDURE — 73562 X-RAY EXAM OF KNEE 3: CPT

## 2020-01-24 RX ORDER — LIDOCAINE HYDROCHLORIDE 10 MG/ML
4 INJECTION, SOLUTION INFILTRATION; PERINEURAL
Status: COMPLETED | OUTPATIENT
Start: 2020-01-24 | End: 2020-01-24

## 2020-01-24 RX ORDER — TRIAMCINOLONE ACETONIDE 40 MG/ML
40 INJECTION, SUSPENSION INTRA-ARTICULAR; INTRAMUSCULAR
Status: COMPLETED | OUTPATIENT
Start: 2020-01-24 | End: 2020-01-24

## 2020-01-24 RX ADMIN — TRIAMCINOLONE ACETONIDE 40 MG: 40 INJECTION, SUSPENSION INTRA-ARTICULAR; INTRAMUSCULAR at 13:00

## 2020-01-24 RX ADMIN — LIDOCAINE HYDROCHLORIDE 4 ML: 10 INJECTION, SOLUTION INFILTRATION; PERINEURAL at 13:00

## 2020-01-24 NOTE — PROGRESS NOTES
Assessment/Plan:    Diagnoses and all orders for this visit:    Acute pain of right knee  -     XR knee 3 vw right non injury; Future  -     Large joint arthrocentesis: R knee    Primary osteoarthritis of right knee  -     Large joint arthrocentesis: R knee    Other orders  -     Cancel: Large joint arthrocentesis    We have provided a right knee steroid injection  Discuss other options such as PT, medications and visco   Activity as tolerated  Return if symptoms worsen or fail to improve  Chief Complaint:     Chief Complaint   Patient presents with    Right Knee - Pain       Subjective:   Patient ID: Cornelia Tracey is a 68 y o  male  New patient presents for approximately 1 month of right anterior knee pain and discomfort and discomfort denies any specific injury however he notes that he was playing tennis a country club the day before onset  He has also a skin year and has both cross-country ski and downhill ski aid since onset  Patient states he works out 6 days per week no previous knee issues no mechanical symptoms or significant swelling noted  Review of Systems   Constitutional: Negative for chills and fever  HENT: Negative for sore throat and trouble swallowing  Eyes: Negative for pain and discharge  Respiratory: Negative for choking and shortness of breath  Cardiovascular: Negative for chest pain and palpitations  Gastrointestinal: Negative for abdominal pain and vomiting  Endocrine: Negative for cold intolerance and heat intolerance  Musculoskeletal: Positive for arthralgias, neck pain and neck stiffness  Neurological: Negative for dizziness and weakness  Psychiatric/Behavioral: Negative for self-injury and suicidal ideas  The following portions of the patient's chart were reviewed and updated as appropriate:    Allergy:  No Known Allergies      Past Medical History:   Diagnosis Date    Cancer ([facility] [address] )     scc back    Carpal tunnel syndrome     ED (erectile dysfunction)     GERD (gastroesophageal reflux disease)     [place] (hard of hearing)     wears hearing aids    Hypercholesteremia     Hypertension     Numbness in both hands        Past Surgical History:   Procedure Laterality Date    APPENDECTOMY      As a child    COLONOSCOPY      INGUINAL HERNIA REPAIR Left 2015    MASS EXCISION N/A 8/14/2018    Procedure: BACK/TRUNK MULTIPLE SEBBORHEIC KERATOSIS SHAVE/EXCISION; COMPLEX CLOSURE VERSUS FLAP RECONSTRUCTION;  Surgeon: James Lombardi MD;  Location: [facility];  Service: Plastics    SQUAMOUS CELL CARCINOMA EXCISION N/A 8/14/2018    Procedure: BACK SCC IN SITU EXCISION;  Surgeon: James Lombardi MD;  Location: [facility];  Service: Plastics       Social History     Socioeconomic History    Marital status: /Civil Union     Spouse name: Not on file    Number of children: Not on file    Years of education: Not on file    Highest education level: Not on file   Occupational History    Occupation:    Social Needs    Financial resource strain: Not on file    Food insecurity:     Worry: Not on file     Inability: Not on file   [employer] needs:     Medical: Not on file     Non-medical: Not on file   Tobacco Use    Smoking status: Never Smoker    Smokeless tobacco: Never Used   Substance and Sexual Activity    Alcohol use:  Yes     Alcohol/week: 14 0 standard drinks     Types: 14 Standard drinks or equivalent per week     Comment: 2 Drinks a night    Drug use: No    Sexual activity: Yes   Lifestyle    Physical activity:     Days per week: Not on file     Minutes per session: Not on file    Stress: Not on file   Relationships    Social connections:     Talks on phone: Not on file     Gets together: Not on file     Attends Yazdanism service: Not on file     Active member of club or organization: Not on file     Attends meetings of clubs or organizations: Not on file     Relationship status: Not on file   Wells Intimate partner violence:     Fear of current or ex partner: Not on file     Emotionally abused: Not on file     Physically abused: Not on file     Forced sexual activity: Not on file   Other Topics Concern    Not on file   Social History Narrative    Not on file       Family History   Problem Relation Age of Onset    Colon cancer Father     Heart attack Mother     Breast cancer Mother        Medications:    Current Outpatient Medications:     atorvastatin (LIPITOR) 20 mg tablet, Take 1 tablet (20 mg total) by mouth daily, Disp: 90 tablet, Rfl: 3    gabapentin (NEURONTIN) 300 mg capsule, Take 1 capsule (300 mg total) by mouth daily at bedtime, Disp: 60 capsule, Rfl: 0    hydrochlorothiazide (HYDRODIURIL) 25 mg tablet, Take 1 tablet (25 mg total) by mouth daily (Patient taking differently: Take 12 5 mg by mouth daily ), Disp: 90 tablet, Rfl: 3    lisinopril (ZESTRIL) 20 mg tablet, Take 1 tablet (20 mg total) by mouth daily, Disp: 90 tablet, Rfl: 1    omeprazole (PriLOSEC) 20 mg delayed release capsule, Take by mouth, Disp: , Rfl:     Probiotic Product (PROBIOTIC DAILY PO), Take by mouth, Disp: , Rfl:     psyllium (METAMUCIL SMOOTH TEXTURE) 58 6 % powder, Take by mouth, Disp: , Rfl:     tadalafil (CIALIS) 5 MG tablet, Take 1 tablet (5 mg total) by mouth daily, Disp: 84 tablet, Rfl: 3    losartan (COZAAR) 100 MG tablet, Take 1 tablet (100 mg total) by mouth daily (Patient not taking: Reported on 1/13/2020), Disp: 90 tablet, Rfl: 3    NON FORMULARY, CBD/THC oil daily, Disp: , Rfl:     Patient Active Problem List   Diagnosis    Bilateral carpal tunnel syndrome    Pulmonary nodule    SOB (shortness of breath) on exertion    Elevated bilirubin    Impaired fasting glucose    Mixed hyperlipidemia    Chronic GERD    Erectile dysfunction    HTN (hypertension), benign    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    Trigger little finger of right hand    Dupuytren contracture    Medicare annual wellness visit, subsequent    Anxiety    Preop exam for internal medicine    Radiculopathy, cervical region       Objective:  /79   Pulse 89   Ht 5' 8" (1 727 m)   Wt 77 1 kg (170 lb)   BMI 25 85 kg/m²     Right Knee Exam     Tenderness   The patient is experiencing no tenderness  Range of Motion   The patient has normal right knee ROM  Tests   Kesha:  Medial - negative Lateral - negative  Varus: negative Valgus: negative  Lachman:  Anterior - negative        Other   Erythema: absent  Sensation: normal  Swelling: mild      Left Knee Exam     Other   Swelling: none            Physical Exam   Constitutional: He is oriented to person, place, and time  He appears well-developed and well-nourished  HENT:   Head: Normocephalic and atraumatic  Eyes: Conjunctivae are normal    Neck: Neck supple  Pulmonary/Chest: Effort normal    Neurological: He is alert and oriented to person, place, and time  Skin: Skin is warm and dry  Psychiatric: He has a normal mood and affect  His behavior is normal    Vitals reviewed  Neurologic Exam     Mental Status   Oriented to person, place, and time  Large joint arthrocentesis: R knee  Date/Time: 1/24/2020 1:00 PM  Consent given by: patient  Site marked: site marked  Timeout: Immediately prior to procedure a time out was called to verify the correct patient, procedure, equipment, support staff and site/side marked as required   Supporting Documentation  Indications: pain   Procedure Details  Location: knee - R knee  Preparation: Patient was prepped and draped in the usual sterile fashion  Needle size: 22 G  Ultrasound guidance: no  Approach: anterolateral  Medications administered: 4 mL lidocaine 1 %; 40 mg triamcinolone acetonide 40 mg/mL    Patient tolerance: patient tolerated the procedure well with no immediate complications  Dressing:  Sterile dressing applied          I have personally reviewed pertinent films in PACS

## 2020-01-24 NOTE — PROGRESS NOTES
Daily Note     Today's date: 2020  Patient name: Silvia Cotton  : 1950  MRN: [id number]  Referring provider: Sonya Pickett  Dx:   Encounter Diagnosis     ICD-10-CM    1  Radiculopathy, cervical region M54 12    2  Acute pain of right knee M25 561    3  Cervical radiculopathy M54 12                   Subjective: patient states that he is feeling about the same having the some of the numbing and tingling at night  Objective: See treatment diary below      Assessment: Tolerated treatment well  Patient demonstrated fatigue post treatment, exhibited good technique with therapeutic exercises and would benefit from continued PT      Plan: Continue per plan of care  Precautions: advanced cervical degenerative changes      Manual  2020              C4-7 UPA R Gr  Iv+ 30x3             Thoracic pistol Gr  v            C6-7 rotational mob Gr  Iv-  10x3                                          Exercise Diary              Self snags cervical 10x2                                                                                                                                                                                                                                                                       Modalities              Mechanical traction  8min 30/10   25lbs

## 2020-01-24 NOTE — PATIENT INSTRUCTIONS
You may use Advil (ibuprofen) 600mg every 6 hours OR Aleve (naproxen) 250-500mg every 12 hours as needed for pain and inflammation  You may also take Tylenol 500mg every 4-6 hours as needed  Check with your primary care physician to see if these medications are safe to take and to make sure they do not interfere with your other medications and medical issues  Steroid Joint Injection   AMBULATORY CARE:   What you need to know about steroid joint injection:  A steroid joint injection is a procedure to inject steroid medicine into a joint  Steroid medicine decreases pain and inflammation  The injection may also contain an anesthetic (numbing medicine) to decrease pain  It may be done to treat conditions such as arthritis, gout, or carpal tunnel syndrome  The injections may be given in your knee, ankle, shoulder, elbow, wrist, or ankle  How to prepare for steroid joint injection:  Your healthcare provider will talk to you about how to prepare for this procedure  He will tell you what medicines to take or not take on the day of your procedure  You may need to stop taking blood thinners several days before your procedure  What will happen during steroid joint injection:  You may be given local anesthesia to numb the area where the injection will be given  With local anesthesia, you may still feel pressure during the procedure, but you should not feel any pain  Your healthcare provider may use ultrasound or fluoroscopy (a type of x-ray) to guide the needle to the right area  He will then inject the steroid into your joint  A bandage will be placed on the injection site  What will happen after steroid joint injection:  You may have redness, warmth, or sweating in your face and chest right after the steroid injection  Steroids can affect blood sugar levels  If you have diabetes, you should check your blood sugars closely in the first 24 hours after your procedure     Risks of steroid joint injection:  You may get an infection in your joint  The injection may also cause more pain during the first 24 to 36 hours  You may need more than one injection to feel pain relief  The skin near the injection site may be damaged and become discolored or indented  This can happen if the steroid is placed too close to your skin  A tendon near the injection site may rupture or a nerve can be damaged  Contact your healthcare provider if:   · You have fever or chills  · You have redness or swelling in the injection site  · You have more pain than usual in your joint for more than 72 hours  · You have questions or concerns about your condition or care  Medicines:   · Pain medicine  may be given  Ask how to take this medicine safely  · Take your medicine as directed  Contact your healthcare provider if you think your medicine is not helping or if you have side effects  Tell him or her if you are allergic to any medicine  Keep a list of the medicines, vitamins, and herbs you take  Include the amounts, and when and why you take them  Bring the list or the pill bottles to follow-up visits  Carry your medicine list with you in case of an emergency  Self-care:   · Leave the bandage on for 8 to 12 hours  Care for your wound as directed  · Rest the area  as directed  You may need to decrease weight on certain joints, such as the knee, for a period of time  Ask when you can return to your daily activities  · Elevate  your limb where the steroid injection was given  Elevate the limb above the level of your heart as often as you can  This will help decrease swelling and pain  Prop your limb on pillows or blankets to keep it elevated comfortably  · Apply ice  on your joint for 15 to 20 minutes every hour or as directed  Use an ice pack, or put crushed ice in a plastic bag  Cover it with a towel  Ice helps prevent tissue damage and decreases swelling and pain    Follow up with your healthcare provider as directed:  Write down your questions so you remember to ask them during your visits  © 2017 [employer] Information is for End User's use only and may not be sold, redistributed or otherwise used for commercial purposes  All illustrations and images included in CareNotes® are the copyrighted property of A [employer]  or Brendan Mccollum  The above information is an  only  It is not intended as medical advice for individual conditions or treatments  Talk to your doctor, nurse or pharmacist before following any medical regimen to see if it is safe and effective for you  Arthritis   AMBULATORY CARE:   Arthritis  is a disease that causes inflammation in one or more joints  There are many types of arthritis, such as osteoarthritis, rheumatoid arthritis, and septic arthritis  Some types cause inflammation in the joints  Other types wear away the cartilage between joints  This makes the bones of the joint rub together when you move the joint  Your symptoms may be constant, or symptoms may come and go  Arthritis often gets worse over time and can cause permanent joint damage  Common signs and symptoms of arthritis:   · Pain, swelling, or stiffness in the joint    · Limited range of motion in the joint    · Warmth or redness over the joint    · Tenderness when you touch the joint    · Stiff joints in the morning that loosen with movement    · A creaking or grinding sound when you move the joint    · Fever  Seek care immediately if:   · You have a fever and severe joint pain or swelling  · You cannot move the affected joint  · You have severe joint pain you cannot tolerate  Contact your healthcare provider if:   · Your pain or swelling does not get better with treatment  · You have questions or concerns about your condition or care  Treatment  will depend on the type of arthritis you have and if it is severe   You may need any of the following:  · Acetaminophen  decreases pain and fever  It is available without a doctor's order  Ask how much to take and how often to take it  Follow directions  Acetaminophen can cause liver damage if not taken correctly  · NSAIDs , such as ibuprofen, help decrease swelling, pain, and fever  This medicine is available with or without a doctor's order  NSAIDs can cause stomach bleeding or kidney problems in certain people  If you take blood thinner medicine, always ask your healthcare provider if NSAIDs are safe for you  Always read the medicine label and follow directions  · Steroid medicine  helps reduce swelling and pain  · Surgery  may be needed to repair or replace a damaged joint  Manage arthritis:   · Rest your painful joint so it can heal   Your healthcare provider may recommend crutches or a walker if the affected joint is in a leg  · Apply ice or heat to the joint  Both can help decrease swelling and pain  Ice may also help prevent tissue damage  Use an ice pack, or put crushed ice in a plastic bag  Cover it with a towel and place it on your joint for 15 to 20 minutes every hour or as directed  You can apply heat for 20 minutes every 2 hours  Heat treatment includes hot packs or heat lamps  · Elevate your joint  Elevation helps reduce swelling and pain  Raise your joint above the level of your heart as often as you can  Prop your painful joint on pillows to keep it above your heart comfortably  · Go to physical or occupational therapy as directed  A physical therapist can teach you exercises to improve flexibility and range of motion  You may also be shown non-weight-bearing exercises that are safe for your joints, such as swimming  Exercise can help keep your joints flexible and reduce pain  An occupational therapist can help you learn to do your daily activities when your joints are stiff or sore  · Maintain a healthy weight  Extra weight puts increased pressure on your joints   Ask your healthcare provider what you should weigh  If you need to lose weight, he can help you create a weight loss program  Weight loss can help reduce pain and increase your ability to do your activities  The amount of exercise you do may vary each day, depending on your symptoms  · Wear flat or low-heeled shoes  This will help decrease pain and reduce pressure on your ankle, knee, and hip joints  · Use support devices as directed  You may be given splints to wear on your hands to help your joints rest and to decrease inflammation  While you sleep, use a pillow that is firm enough to support your neck and head  Other equipment  that may help you move and prevent falls:  · Orthotic shoes or insoles  help support your feet when you walk  · Crutches, a cane, or a walker  may help decrease your risk for falling  They also decrease stress on affected joints  · Devices to prevent falls  include raised toilet seats and bathtub bars to help you get up from sitting  Handrails can be placed in areas where you need balance and support  Follow up with your healthcare provider or rheumatologist as directed:  Write down your questions so you remember to ask them during your visits  © 2017 [employer] Information is for End User's use only and may not be sold, redistributed or otherwise used for commercial purposes  All illustrations and images included in CareNotes® are the copyrighted property of [employer]  or Brendan Mccollum  The above information is an  only  It is not intended as medical advice for individual conditions or treatments  Talk to your doctor, nurse or pharmacist before following any medical regimen to see if it is safe and effective for you

## 2020-01-27 ENCOUNTER — OFFICE VISIT (OUTPATIENT)
Dept: PHYSICAL THERAPY | Facility: MEDICAL CENTER | Age: 70
End: 2020-01-27
Payer: MEDICARE

## 2020-01-27 DIAGNOSIS — M54.12 RADICULOPATHY, CERVICAL REGION: Primary | ICD-10-CM

## 2020-01-27 DIAGNOSIS — M25.561 ACUTE PAIN OF RIGHT KNEE: ICD-10-CM

## 2020-01-27 DIAGNOSIS — M54.12 CERVICAL RADICULOPATHY: ICD-10-CM

## 2020-01-27 PROCEDURE — 97012 MECHANICAL TRACTION THERAPY: CPT | Performed by: PHYSICAL THERAPIST

## 2020-01-27 PROCEDURE — 97140 MANUAL THERAPY 1/> REGIONS: CPT | Performed by: PHYSICAL THERAPIST

## 2020-01-27 NOTE — PROGRESS NOTES
Daily Note     Today's date: 2020  Patient name: Jesus Martins  : 1950  MRN: [id number]  Referring provider: Maldonado Wilkes  Dx:   Encounter Diagnosis     ICD-10-CM    1  Radiculopathy, cervical region M54 12    2  Acute pain of right knee M25 561    3  Cervical radiculopathy M54 12                   Subjective: patient states that he is feeling that over the past several days he has been feeling better and having less discomfort and tingling at night  Objective: See treatment diary below      Assessment: Tolerated treatment well  Patient demonstrated fatigue post treatment, exhibited good technique with therapeutic exercises and would benefit from continued PT      Plan: Continue per plan of care  Precautions: advanced cervical degenerative changes      Manual  2020              C4-7 UPA R Gr  Iv+ 30x3             Thoracic pistol Gr  v            C6-7 rotational mob Gr  Iv-  10x3                                          Exercise Diary              Self snags cervical 10x2                                                                                                                                                                                                                                                                       Modalities              Mechanical traction  8min 30/10   25lbs

## 2020-01-29 ENCOUNTER — OFFICE VISIT (OUTPATIENT)
Dept: CARDIOLOGY CLINIC | Facility: CLINIC | Age: 70
End: 2020-01-29
Payer: MEDICARE

## 2020-01-29 VITALS
BODY MASS INDEX: 26.07 KG/M2 | SYSTOLIC BLOOD PRESSURE: 138 MMHG | WEIGHT: 172 LBS | HEIGHT: 68 IN | RESPIRATION RATE: 16 BRPM | HEART RATE: 80 BPM | DIASTOLIC BLOOD PRESSURE: 70 MMHG

## 2020-01-29 DIAGNOSIS — E78.2 MIXED HYPERLIPIDEMIA: ICD-10-CM

## 2020-01-29 DIAGNOSIS — I10 ESSENTIAL HYPERTENSION: Primary | ICD-10-CM

## 2020-01-29 PROCEDURE — 99214 OFFICE O/P EST MOD 30 MIN: CPT | Performed by: INTERNAL MEDICINE

## 2020-01-29 RX ORDER — AMLODIPINE BESYLATE 5 MG/1
5 TABLET ORAL DAILY
Qty: 90 TABLET | Refills: 3 | Status: SHIPPED | OUTPATIENT
Start: 2020-01-29 | End: 2020-12-07 | Stop reason: SDUPTHER

## 2020-01-29 NOTE — PROGRESS NOTES
Cardiology Follow Up    Vinson April 1950  [id number]  [address] [facility] [id number] [phone]    1  Essential hypertension  amLODIPine (NORVASC) 5 mg tablet   2  Mixed hyperlipidemia         Interval History:  No complaints  Tolerates all activity  Denies chest pain shortness of breath orthopnea paroxysmal nocturnal dyspnea or syncope  Patient Active Problem List   Diagnosis    Bilateral carpal tunnel syndrome    Pulmonary nodule    SOB (shortness of breath) on exertion    Elevated bilirubin    Impaired fasting glucose    Mixed hyperlipidemia    Chronic GERD    Erectile dysfunction    HTN (hypertension), benign    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    Trigger little finger of right hand    Dupuytren contracture    Medicare annual wellness visit, subsequent    Anxiety    Preop exam for internal medicine    Radiculopathy, cervical region     Past Medical History:   Diagnosis Date    Cancer ([facility] [address] )     scc back    Carpal tunnel syndrome     ED (erectile dysfunction)     GERD (gastroesophageal reflux disease)     [place] (hard of hearing)     wears hearing aids    Hypercholesteremia     Hypertension     Numbness in both hands      Social History     Socioeconomic History    Marital status: /Civil Union     Spouse name: Not on file    Number of children: Not on file    Years of education: Not on file    Highest education level: Not on file   Occupational History    Occupation:    Social Needs    Financial resource strain: Not on file    Food insecurity:     Worry: Not on file     Inability: Not on file   [employer] needs:     Medical: Not on file     Non-medical: Not on file   Tobacco Use    Smoking status: Never Smoker    Smokeless tobacco: Never Used   Substance and Sexual Activity    Alcohol use:  Yes     Alcohol/week: 14 0 standard drinks     Types: 14 Standard drinks or equivalent per week     Comment: 2 Drinks a night    Drug use: No    Sexual activity: Yes   Lifestyle    Physical activity:     Days per week: Not on file     Minutes per session: Not on file    Stress: Not on file   Relationships    Social connections:     Talks on phone: Not on file     Gets together: Not on file     Attends Hinduism service: Not on file     Active member of club or organization: Not on file     Attends meetings of clubs or organizations: Not on file     Relationship status: Not on file    Intimate partner violence:     Fear of current or ex partner: Not on file     Emotionally abused: Not on file     Physically abused: Not on file     Forced sexual activity: Not on file   Other Topics Concern    Not on file   Social History Narrative    Not on file      Family History   Problem Relation Age of Onset    Colon cancer Father     Heart attack Mother     Breast cancer Mother      Past Surgical History:   Procedure Laterality Date    APPENDECTOMY      As a child    COLONOSCOPY      INGUINAL HERNIA REPAIR Left 2015    MASS EXCISION N/A 8/14/2018    Procedure: BACK/TRUNK MULTIPLE [address]; COMPLEX CLOSURE VERSUS FLAP RECONSTRUCTION;  Surgeon: Rebekah Juarez MD;  [facility];  Service: Plastics    SQUAMOUS CELL CARCINOMA EXCISION N/A 8/14/2018    Procedure: BACK SCC IN SITU EXCISION;  Surgeon: Rebekah Juarez MD;  [facility];  Service: Plastics       Current Outpatient Medications:     atorvastatin (LIPITOR) 20 mg tablet, Take 1 tablet (20 mg total) by mouth daily, Disp: 90 tablet, Rfl: 3    gabapentin (NEURONTIN) 300 mg capsule, Take 1 capsule (300 mg total) by mouth daily at bedtime, Disp: 60 capsule, Rfl: 0    hydrochlorothiazide (HYDRODIURIL) 25 mg tablet, Take 1 tablet (25 mg total) by mouth daily (Patient taking differently: Take 12 5 mg by mouth daily ), Disp: 90 tablet, Rfl: 3    lisinopril (ZESTRIL) 20 mg tablet, Take 1 tablet (20 mg total) by mouth daily, Disp: 90 tablet, Rfl: 1    omeprazole (PriLOSEC) 20 mg delayed release capsule, Take by mouth, Disp: , Rfl:     Probiotic Product (PROBIOTIC DAILY PO), Take by mouth, Disp: , Rfl:     psyllium (METAMUCIL SMOOTH TEXTURE) 58 6 % powder, Take by mouth, Disp: , Rfl:     tadalafil (CIALIS) 5 MG tablet, Take 1 tablet (5 mg total) by mouth daily, Disp: 84 tablet, Rfl: 3    amLODIPine (NORVASC) 5 mg tablet, Take 1 tablet (5 mg total) by mouth daily, Disp: 90 tablet, Rfl: 3  No Known Allergies    Labs:  No visits with results within 2 Month(s) from this visit  Latest known visit with results is:   Orders Only on 11/06/2019   Component Date Value    Hemoglobin A1C 11/06/2019 5 8*     Imaging: Mri Cervical Spine Wo Contrast    Result Date: 1/14/2020  Narrative: MRI CERVICAL SPINE WITHOUT CONTRAST INDICATION: M54 12: Radiculopathy, cervical region  numbness and tingling both arms and into fingers x 2 yrs COMPARISON:  9/6/2017 TECHNIQUE:  Sagittal T1, sagittal T2, sagittal inversion recovery, axial T2, axial  2D merge IMAGE QUALITY:  Diagnostic FINDINGS: ALIGNMENT:  There is nonspecific straightening of the cervical lordosis without subluxation  MARROW SIGNAL:  Scattered degenerative endplate changes  No focally suspicious marrow lesions  No bone marrow edema or compression abnormality  CERVICAL AND VISUALIZED THORACIC CORD:  There is cord compression at the C5-C6 level  Questionable subtle faint high signal at this level seen on image 7, series 2, correlating finding on image 23, series 5  PREVERTEBRAL AND PARASPINAL SOFT TISSUES:  Normal  VISUALIZED POSTERIOR FOSSA:  The visualized posterior fossa demonstrates no abnormal signal  CERVICAL DISC SPACES: C2-C3:  Normal  C3-C4:  There is a disc osteophyte complex with a superimposed left neural foraminal disc protrusion    Moderate central canal and left neural foraminal narrowing  Mild to moderate right neural foraminal narrowing  C4-C5:  There is a disc osteophyte complex with a superimposed right neural foraminal disc protrusion  There is moderate to severe central canal narrowing  Severe right neural foraminal narrowing  Mild left neural foraminal narrowing  C5-C6:  There is a disc osteophyte complex with a superimposed left neural foraminal disc herniation, protrusion type  Severe central canal narrowing  Moderate bilateral neural foraminal narrowing  C6-C7:  Loss of disc height and signal noted  There is a left neural foraminal disc protrusion  There is moderate left neural foraminal narrowing  Mild central canal narrowing  Mild right neural foraminal narrowing  C7-T1:  Normal  UPPER THORACIC DISC SPACES:  Normal      Impression: 1  Cord compression at C4-5  Questionable faint dusky high signal in the cord at this level, possibly myelomalacia versus cord edema versus potentially artifact  Spine surgical assessment recommended  2   Scattered moderate to advanced multilevel spondylotic changes elsewhere  Workstation performed: [id number]       Review of Systems:  Review of Systems   Constitutional: Negative for fatigue  HENT: Negative for nosebleeds  Eyes: Negative for redness  Respiratory: Negative for chest tightness and shortness of breath  Cardiovascular: Negative for chest pain, palpitations and leg swelling  Gastrointestinal: Negative for abdominal pain  Endocrine: Negative for polyuria  Genitourinary: Negative for urgency  Musculoskeletal: Negative for arthralgias  Skin: Negative for rash  Neurological: Negative for dizziness and syncope  Psychiatric/Behavioral: Negative for confusion and sleep disturbance  The patient is not nervous/anxious  Physical Exam:  Physical Exam   Constitutional: He is oriented to person, place, and time  He appears well-developed and well-nourished  HENT:   Head: Normocephalic and atraumatic  Nose: Nose normal    Mouth/Throat: Oropharynx is clear and moist    Eyes: Pupils are equal, round, and reactive to light  Neck: Neck supple  Cardiovascular: Normal rate, regular rhythm, normal heart sounds and intact distal pulses  Pulmonary/Chest: Effort normal and breath sounds normal    Abdominal: Soft  Bowel sounds are normal    Musculoskeletal: Normal range of motion  Neurological: He is alert and oriented to person, place, and time  Skin: Skin is warm and dry  Psychiatric: He has a normal mood and affect  His behavior is normal  Judgment and thought content normal        Discussion/Summary:  Her blood pressure currently controlled on lisinopril 20 mg daily and hydrochlorothiazide 12 5 mg daily  When originally given hydrochlorothiazide 25 mg daily he had some difficulty with erections  It improved transiently on the 12 5 mg dose but is having difficulty again  We decided to discontinue hydrochlorothiazide and add amlodipine 5 mg to his medical regimen  We will continue to monitor his blood pressure  He continues on statin  The this is followed by his primary physician  I will see him again in 6 months

## 2020-01-30 ENCOUNTER — TELEPHONE (OUTPATIENT)
Dept: OBGYN CLINIC | Facility: HOSPITAL | Age: 70
End: 2020-01-30

## 2020-01-30 DIAGNOSIS — M25.561 ACUTE PAIN OF RIGHT KNEE: Primary | ICD-10-CM

## 2020-01-30 RX ORDER — MELOXICAM 7.5 MG/1
TABLET ORAL
Qty: 30 TABLET | Refills: 1 | Status: SHIPPED | OUTPATIENT
Start: 2020-01-30 | End: 2020-03-25 | Stop reason: ALTCHOICE

## 2020-01-30 NOTE — TELEPHONE ENCOUNTER
Patient of Dr Abril Sesay     Patient called stating he is experiencing pain in his R knee   Patient states he is going on a long flight/trip and would like a medication to be prescribed incase his pain flares up while he is out of country         Pharmacy:  [address] [place] [address] Phone:  [phone] Fax:  [phone]    Address:  [address] [place] [id number] GUDELIA #:  --           Patient Phone # [phone]

## 2020-01-31 ENCOUNTER — OFFICE VISIT (OUTPATIENT)
Dept: PHYSICAL THERAPY | Facility: MEDICAL CENTER | Age: 70
End: 2020-01-31
Payer: MEDICARE

## 2020-01-31 DIAGNOSIS — M25.561 ACUTE PAIN OF RIGHT KNEE: ICD-10-CM

## 2020-01-31 DIAGNOSIS — M54.12 CERVICAL RADICULOPATHY: ICD-10-CM

## 2020-01-31 DIAGNOSIS — M54.12 RADICULOPATHY, CERVICAL REGION: Primary | ICD-10-CM

## 2020-01-31 PROCEDURE — 97140 MANUAL THERAPY 1/> REGIONS: CPT | Performed by: PHYSICAL THERAPIST

## 2020-01-31 PROCEDURE — 97110 THERAPEUTIC EXERCISES: CPT | Performed by: PHYSICAL THERAPIST

## 2020-01-31 PROCEDURE — 97012 MECHANICAL TRACTION THERAPY: CPT | Performed by: PHYSICAL THERAPIST

## 2020-01-31 NOTE — PROGRESS NOTES
Daily Note     Today's date: 2020  Patient name: Nikki Carias  : 1950  MRN: [id number]  Referring provider: Roxana Kanner  Dx:   Encounter Diagnosis     ICD-10-CM    1  Radiculopathy, cervical region M54 12    2  Acute pain of right knee M25 561    3  Cervical radiculopathy M54 12                   Subjective: patient states that he is feeling that over the past several days he has been feeling better and having less discomfort and tingling at night  Notes that he feels as though the treatment is making a difference however now that he is going to be gone on vacation for the next several weeks he is not sure what will happen  Objective: See treatment diary below      Assessment: Tolerated treatment well  Patient demonstrated fatigue post treatment, exhibited good technique with therapeutic exercises and would benefit from continued PT  Patient is progressing however symptom resolution is slow  Since he has only been treated for 3 visits and he is now traveling for several weeks it will be difficult to see the effectiveness of treatment  Plan: Continue per plan of care  Precautions: advanced cervical degenerative changes      Manual  2020                C4-7 UPA R Gr  Iv+ 30x3             Thoracic pistol Gr  v            C6-7 rotational mob Gr  Iv-  10x3            C5-7 transverse glide prone  Right  Gr  Iv  10x5                             Exercise Diary              Self snags cervical 10x2                                                                                                                                                                                                                                                                       Modalities              Mechanical traction  8min 30/10   25lbs

## 2020-02-14 ENCOUNTER — OFFICE VISIT (OUTPATIENT)
Dept: PHYSICAL THERAPY | Facility: MEDICAL CENTER | Age: 70
End: 2020-02-14
Payer: MEDICARE

## 2020-02-14 DIAGNOSIS — M54.12 RADICULOPATHY, CERVICAL REGION: Primary | ICD-10-CM

## 2020-02-14 DIAGNOSIS — M54.12 CERVICAL RADICULOPATHY: ICD-10-CM

## 2020-02-14 DIAGNOSIS — M25.561 ACUTE PAIN OF RIGHT KNEE: ICD-10-CM

## 2020-02-14 PROCEDURE — 97140 MANUAL THERAPY 1/> REGIONS: CPT | Performed by: PHYSICAL THERAPIST

## 2020-02-14 PROCEDURE — 97012 MECHANICAL TRACTION THERAPY: CPT | Performed by: PHYSICAL THERAPIST

## 2020-02-14 PROCEDURE — 97110 THERAPEUTIC EXERCISES: CPT | Performed by: PHYSICAL THERAPIST

## 2020-02-14 NOTE — PROGRESS NOTES
Daily Note     Today's date: 2020  Patient name: Chaitanya Anderson  : 1950  MRN: [id number]  Referring provider: Marlene Ahmadi  Dx:   Encounter Diagnosis     ICD-10-CM    1  Radiculopathy, cervical region M54 12    2  Acute pain of right knee M25 561    3  Cervical radiculopathy M54 12                   Subjective: patient states that he is feeling that overall he is 30% better and notes that his sleeping has been improved  He would like to continue with treatment  Objective: See treatment diary below      Assessment: Tolerated treatment well  Patient demonstrated fatigue post treatment, exhibited good technique with therapeutic exercises and would benefit from continued PT  Patient is progressing however symptom resolution is slow  Steady lasting improvement  Plan: Continue per plan of care  Precautions: advanced cervical degenerative changes      Manual  2020                C4-7 UPA R Gr  Iv+ 30x3             Thoracic pistol Gr  v            C6-7 rotational mob Gr  Iv-  10x3            C5-7 transverse glide prone  Right  Gr  Iv  10x5            Seated left lateral flexion with right glide of C6-7 Gr  Iv [id number]                Exercise Diary              Self snags cervical 10x2                                                                                                                                                                                                                                                                       Modalities              Mechanical traction  8min 30/10   25lbs

## 2020-02-18 ENCOUNTER — OFFICE VISIT (OUTPATIENT)
Dept: PHYSICAL THERAPY | Facility: MEDICAL CENTER | Age: 70
End: 2020-02-18
Payer: MEDICARE

## 2020-02-18 DIAGNOSIS — M54.12 CERVICAL RADICULOPATHY: ICD-10-CM

## 2020-02-18 DIAGNOSIS — M25.561 ACUTE PAIN OF RIGHT KNEE: ICD-10-CM

## 2020-02-18 DIAGNOSIS — M54.12 RADICULOPATHY, CERVICAL REGION: Primary | ICD-10-CM

## 2020-02-18 PROCEDURE — 97110 THERAPEUTIC EXERCISES: CPT | Performed by: PHYSICAL THERAPIST

## 2020-02-18 PROCEDURE — 97012 MECHANICAL TRACTION THERAPY: CPT | Performed by: PHYSICAL THERAPIST

## 2020-02-18 PROCEDURE — 97140 MANUAL THERAPY 1/> REGIONS: CPT | Performed by: PHYSICAL THERAPIST

## 2020-02-18 NOTE — PROGRESS NOTES
Daily Note     Today's date: 2020  Patient name: Soha Cardenas  : 1950  MRN: [id number]  Referring provider: Violetta Sandoval  Dx:   Encounter Diagnosis     ICD-10-CM    1  Radiculopathy, cervical region M54 12    2  Acute pain of right knee M25 561    3  Cervical radiculopathy M54 12                   Subjective: patient states that he is feeling that overall he is 40% better and notes that his sleeping has been improved  He would like to continue with treatment  Patient feels as though he is making good incremental change and is very happy with his progression  Objective: See treatment diary below      Assessment: Tolerated treatment well  Patient demonstrated fatigue post treatment, exhibited good technique with therapeutic exercises and would benefit from continued PT  Patient is progressing however symptom resolution is slow  Steady lasting improvement  Plan: Continue per plan of care  Precautions: advanced cervical degenerative changes      Manual  2020                C4-7 UPA R Gr  Iv+ 30x3             Thoracic pistol Gr  v            C6-7 rotational mob Gr  Iv-  10x3            C5-7 transverse glide prone  Right  Gr  Iv  10x5            Seated left lateral flexion with right glide of C6-7 Gr  Iv [id number]                Exercise Diary              Self snags cervical 10x2                                                                                                                                                                                                                                                                       Modalities              Mechanical traction  8min 30/10   25lbs

## 2020-02-19 ENCOUNTER — OFFICE VISIT (OUTPATIENT)
Dept: OBGYN CLINIC | Facility: HOSPITAL | Age: 70
End: 2020-02-19
Payer: MEDICARE

## 2020-02-19 VITALS
BODY MASS INDEX: 26.19 KG/M2 | HEIGHT: 68 IN | HEART RATE: 60 BPM | SYSTOLIC BLOOD PRESSURE: 142 MMHG | WEIGHT: 172.8 LBS | DIASTOLIC BLOOD PRESSURE: 72 MMHG

## 2020-02-19 DIAGNOSIS — M72.0 DUPUYTREN'S CONTRACTURE OF RIGHT HAND: Primary | ICD-10-CM

## 2020-02-19 PROCEDURE — 3077F SYST BP >= 140 MM HG: CPT | Performed by: ORTHOPAEDIC SURGERY

## 2020-02-19 PROCEDURE — 3008F BODY MASS INDEX DOCD: CPT | Performed by: ORTHOPAEDIC SURGERY

## 2020-02-19 PROCEDURE — 4040F PNEUMOC VAC/ADMIN/RCVD: CPT | Performed by: ORTHOPAEDIC SURGERY

## 2020-02-19 PROCEDURE — 1160F RVW MEDS BY RX/DR IN RCRD: CPT | Performed by: ORTHOPAEDIC SURGERY

## 2020-02-19 PROCEDURE — 99214 OFFICE O/P EST MOD 30 MIN: CPT | Performed by: ORTHOPAEDIC SURGERY

## 2020-02-19 PROCEDURE — 3078F DIAST BP <80 MM HG: CPT | Performed by: ORTHOPAEDIC SURGERY

## 2020-02-19 PROCEDURE — 1036F TOBACCO NON-USER: CPT | Performed by: ORTHOPAEDIC SURGERY

## 2020-02-19 RX ORDER — CEFAZOLIN SODIUM 2 G/50ML
2000 SOLUTION INTRAVENOUS ONCE
Status: CANCELLED | OUTPATIENT
Start: 2020-08-27 | End: 2020-02-19

## 2020-02-19 NOTE — PROGRESS NOTES
ASSESSMENT/PLAN:    Assessment:   R ring and small finger Dupuytren's disease with contracture    Plan:   Since pt did not have great success with Xiaflex injection, he would like to proceed with open Dupuytren's release of the R ring and small fingers  Typical postoperative protocol/recovery d/w pt today  Also explained that there is a possibility that this could recur and does not prevent Dupuytren's from affecting other fingers    Regarding the n/t - since pt is getting some improvement and has known cervical spine disease, will continue to treat his c-spine  If he continues to have residual symptoms, can look into treating carpal/cubital tunnel syndromes  Follow Up: After Surgery    To Do Next Visit:  Sutures out    Operative Discussions:  Standard Consent: The risks and benefits of the procedure were explained to the patient, which include, but are not limited to: Bleeding, infection, recurrence, pain, scar, damage to tendons, damage to nerves, and damage to blood vessels, failure to give desired results and complications related to anesthesia  These risks, along with alternative conservative treatment options, and postoperative protocols were voiced back and understood by the patient  All questions were answered to the patient's satisfaction  The patient agrees to comply with a standard postoperative protocol, and is willing to proceed  Education was provided via written and auditory forms  There were no barriers to learning  Written handouts regarding wound care, incision and scar care, and general preoperative information was provided to the patient  Prior to surgery, the patient may be requested to stop all anti-inflammatory medications  Prophylactic aspirin, Plavix, and Coumadin may be allowed to be continued  Medications including vitamin E , ginkgo, and fish oil are requested to be stopped approximately one week prior to surgery    Hypertensive medications and beta blockers, if taken, should be continued  _____________________________________________________  CHIEF COMPLAINT:  Chief Complaint   Patient presents with    Right Hand - Follow-up         SUBJECTIVE:  Nayely Alvares is a 68 y o  male who presents for follow up regarding Dupuytren's disease  He had Xiaflex injection into R small finger near the MCP joint in 2017  He states that while this helped, his contracture has returned and he is even starting to notice his ring finger bending too  He would like to know what else he can do since the Xiaflex injection did not provide lasting relief  Pt still having n/t in BUE with nocturnal symptoms  Known cervical spine disease for which he is seeing Dr Suresh Noe  Started therapy for this with some improvement  States at this time, when he has nighttime symptoms, it seems to be him moving his neck which improves this rather than shaking his arms  Of note, pt does have an EMG from 2017 in media       PAST MEDICAL HISTORY:  Past Medical History:   Diagnosis Date    Cancer ([address] )     scc back    Carpal tunnel syndrome     ED (erectile dysfunction)     GERD (gastroesophageal reflux disease)     [place] (hard of hearing)     wears hearing aids    Hypercholesteremia     Hypertension     Numbness in both hands        PAST SURGICAL HISTORY:  Past Surgical History:   Procedure Laterality Date    APPENDECTOMY      As a child    COLONOSCOPY      INGUINAL HERNIA REPAIR Left 2015    MASS EXCISION N/A 8/14/2018    Procedure: BACK/TRUNK MULTIPLE SEBBORHEIC KERATOSIS SHAVE/EXCISION; COMPLEX CLOSURE VERSUS FLAP RECONSTRUCTION;  Surgeon: Juliette Mcginnis MD;  [facility];  Service: Plastics    SQUAMOUS CELL CARCINOMA EXCISION N/A 8/14/2018    Procedure: BACK SCC IN SITU EXCISION;  Surgeon: Juliette Mcginnis MD;  [facility];  Service: Plastics       FAMILY HISTORY:  Family History   Problem Relation Age of Onset    Colon cancer Father     Heart attack Mother     Breast cancer Mother        SOCIAL HISTORY:  Social History     Tobacco Use    Smoking status: Never Smoker    Smokeless tobacco: Never Used   Substance Use Topics    Alcohol use: Yes     Alcohol/week: 14 0 standard drinks     Types: 14 Standard drinks or equivalent per week     Comment: 2 Drinks a night    Drug use: No       MEDICATIONS:    Current Outpatient Medications:     amLODIPine (NORVASC) 5 mg tablet, Take 1 tablet (5 mg total) by mouth daily, Disp: 90 tablet, Rfl: 3    atorvastatin (LIPITOR) 20 mg tablet, Take 1 tablet (20 mg total) by mouth daily, Disp: 90 tablet, Rfl: 3    lisinopril (ZESTRIL) 20 mg tablet, Take 1 tablet (20 mg total) by mouth daily, Disp: 90 tablet, Rfl: 1    omeprazole (PriLOSEC) 20 mg delayed release capsule, Take by mouth, Disp: , Rfl:     Probiotic Product (PROBIOTIC DAILY PO), Take by mouth, Disp: , Rfl:     psyllium (METAMUCIL SMOOTH TEXTURE) 58 6 % powder, Take by mouth, Disp: , Rfl:     tadalafil (CIALIS) 5 MG tablet, Take 1 tablet (5 mg total) by mouth daily, Disp: 84 tablet, Rfl: 3    gabapentin (NEURONTIN) 300 mg capsule, Take 1 capsule (300 mg total) by mouth daily at bedtime, Disp: 60 capsule, Rfl: 0    hydrochlorothiazide (HYDRODIURIL) 25 mg tablet, Take 1 tablet (25 mg total) by mouth daily (Patient taking differently: Take 12 5 mg by mouth daily ), Disp: 90 tablet, Rfl: 3    meloxicam (MOBIC) 7 5 mg tablet, 1-2 tabs PO daily as needed for pain, Disp: 30 tablet, Rfl: 1    ALLERGIES:  No Known Allergies    REVIEW OF SYSTEMS:  Pertinent items are noted in HPI  A comprehensive review of systems was negative      LABS:  HgA1c:   Lab Results   Component Value Date    HGBA1C 5 8 (H) 11/06/2019     BMP:   Lab Results   Component Value Date    GLUCOSE 122 06/03/2015    CALCIUM 9 2 11/06/2019     12/20/2016    K 4 6 11/06/2019    CO2 28 11/06/2019     11/06/2019    BUN 30 (H) 11/06/2019    CREATININE 1 01 11/06/2019 _____________________________________________________  PHYSICAL EXAMINATION:  Vital signs: /72   Pulse 60   Ht 5' 8" (1 727 m)   Wt 78 4 kg (172 lb 12 8 oz)   BMI 26 27 kg/m²   General: well developed and well nourished, alert, oriented times 3 and appears comfortable  Psychiatric: Normal  HEENT: Trachea Midline, No torticollis  Cardiovascular: No discernable arrhythmia  Pulmonary: No wheezing or stridor  Skin: No masses, erythema, lacerations, fluctation, ulcerations  Neurovascular: Sensation Intact to the Median, Ulnar, Radial Nerve, Motor Intact to the Median, Ulnar, Radial Nerve and Pulses Intact    MUSCULOSKELETAL EXAMINATION:  RIGHT SIDE:  Finger:  Ulnar lateral cord of ring finger with PIP contracture of 45 degrees  Central cord to radial spiral cord of small finger with PIP contracture of 60 degrees       _____________________________________________________  STUDIES REVIEWED:  No New Studies to review      PROCEDURES PERFORMED:  Procedures  No Procedures performed today   Scribe Attestation    I,:   Yamile Omalley PA-C am acting as a scribe while in the presence of the attending physician :        I,:   Susi Almanzar MD personally performed the services described in this documentation    as scribed in my presence :

## 2020-02-19 NOTE — H&P
ASSESSMENT/PLAN:    Assessment:   R ring and small finger Dupuytren's disease with contracture    Plan:   Since pt did not have great success with Xiaflex injection, he would like to proceed with open Dupuytren's release of the R ring and small fingers  Typical postoperative protocol/recovery d/w pt today  Also explained that there is a possibility that this could recur and does not prevent Dupuytren's from affecting other fingers    Regarding the n/t - since pt is getting some improvement and has known cervical spine disease, will continue to treat his c-spine  If he continues to have residual symptoms, can look into treating carpal/cubital tunnel syndromes  Follow Up: After Surgery    To Do Next Visit:  Sutures out    Operative Discussions:  Standard Consent: The risks and benefits of the procedure were explained to the patient, which include, but are not limited to: Bleeding, infection, recurrence, pain, scar, damage to tendons, damage to nerves, and damage to blood vessels, failure to give desired results and complications related to anesthesia  These risks, along with alternative conservative treatment options, and postoperative protocols were voiced back and understood by the patient  All questions were answered to the patient's satisfaction  The patient agrees to comply with a standard postoperative protocol, and is willing to proceed  Education was provided via written and auditory forms  There were no barriers to learning  Written handouts regarding wound care, incision and scar care, and general preoperative information was provided to the patient  Prior to surgery, the patient may be requested to stop all anti-inflammatory medications  Prophylactic aspirin, Plavix, and Coumadin may be allowed to be continued  Medications including vitamin E , ginkgo, and fish oil are requested to be stopped approximately one week prior to surgery    Hypertensive medications and beta blockers, if taken, should be continued  _____________________________________________________  CHIEF COMPLAINT:  Chief Complaint   Patient presents with    Right Hand - Follow-up         SUBJECTIVE:  Tomasz Heart is a 68 y o  male who presents for follow up regarding Dupuytren's disease  He had Xiaflex injection into R small finger near the MCP joint in 2017  He states that while this helped, his contracture has returned and he is even starting to notice his ring finger bending too  He would like to know what else he can do since the Xiaflex injection did not provide lasting relief  Pt still having n/t in BUE with nocturnal symptoms  Known cervical spine disease for which he is seeing Dr Dante Ferguson  Started therapy for this with some improvement  States at this time, when he has nighttime symptoms, it seems to be him moving his neck which improves this rather than shaking his arms  Of note, pt does have an EMG from 2017 in media       PAST MEDICAL HISTORY:  Past Medical History:   Diagnosis Date    Cancer ([address] )     scc back    Carpal tunnel syndrome     ED (erectile dysfunction)     GERD (gastroesophageal reflux disease)     [place] hearing[place]     wears hearing aids    Hypercholesteremia     Hypertension     Numbness in both hands        PAST SURGICAL HISTORY:  Past Surgical History:   Procedure Laterality Date    APPENDECTOMY      As a child    COLONOSCOPY      INGUINAL HERNIA REPAIR Left 2015    MASS EXCISION N/A 8/14/2018    Procedure: BACK/TRUNK MULTIPLE SEBBORHEIC KERATOSIS SHAVE/EXCISION; COMPLEX CLOSURE VERSUS FLAP RECONSTRUCTION;  Surgeon: Fabricio Meadows MD;  Location: [facility];  Service: Plastics    SQUAMOUS CELL CARCINOMA EXCISION N/A 8/14/2018    Procedure: BACK SCC IN SITU EXCISION;  Surgeon: Fabricio Meadows MD;  Location: [facility];  Service: Plastics       FAMILY HISTORY:  Family History   Problem Relation Age of Onset    Colon cancer Father     Heart attack Mother     Breast cancer Mother        SOCIAL HISTORY:  Social History     Tobacco Use    Smoking status: Never Smoker    Smokeless tobacco: Never Used   Substance Use Topics    Alcohol use: Yes     Alcohol/week: 14 0 standard drinks     Types: 14 Standard drinks or equivalent per week     Comment: 2 Drinks a night    Drug use: No       MEDICATIONS:    Current Outpatient Medications:     amLODIPine (NORVASC) 5 mg tablet, Take 1 tablet (5 mg total) by mouth daily, Disp: 90 tablet, Rfl: 3    atorvastatin (LIPITOR) 20 mg tablet, Take 1 tablet (20 mg total) by mouth daily, Disp: 90 tablet, Rfl: 3    lisinopril (ZESTRIL) 20 mg tablet, Take 1 tablet (20 mg total) by mouth daily, Disp: 90 tablet, Rfl: 1    omeprazole (PriLOSEC) 20 mg delayed release capsule, Take by mouth, Disp: , Rfl:     Probiotic Product (PROBIOTIC DAILY PO), Take by mouth, Disp: , Rfl:     psyllium (METAMUCIL SMOOTH TEXTURE) 58 6 % powder, Take by mouth, Disp: , Rfl:     tadalafil (CIALIS) 5 MG tablet, Take 1 tablet (5 mg total) by mouth daily, Disp: 84 tablet, Rfl: 3    gabapentin (NEURONTIN) 300 mg capsule, Take 1 capsule (300 mg total) by mouth daily at bedtime, Disp: 60 capsule, Rfl: 0    hydrochlorothiazide (HYDRODIURIL) 25 mg tablet, Take 1 tablet (25 mg total) by mouth daily (Patient taking differently: Take 12 5 mg by mouth daily ), Disp: 90 tablet, Rfl: 3    meloxicam (MOBIC) 7 5 mg tablet, 1-2 tabs PO daily as needed for pain, Disp: 30 tablet, Rfl: 1    ALLERGIES:  No Known Allergies    REVIEW OF SYSTEMS:  Pertinent items are noted in HPI  A comprehensive review of systems was negative      LABS:  HgA1c:   Lab Results   Component Value Date    HGBA1C 5 8 (H) 11/06/2019     BMP:   Lab Results   Component Value Date    GLUCOSE 122 06/03/2015    CALCIUM 9 2 11/06/2019     12/20/2016    K 4 6 11/06/2019    CO2 28 11/06/2019     11/06/2019    BUN 30 (H) 11/06/2019    CREATININE 1 01 11/06/2019 _____________________________________________________  PHYSICAL EXAMINATION:  Vital signs: /72   Pulse 60   Ht 5' 8" (1 727 m)   Wt 78 4 kg (172 lb 12 8 oz)   BMI 26 27 kg/m²    General: well developed and well nourished, alert, oriented times 3 and appears comfortable  Psychiatric: Normal  HEENT: Trachea Midline, No torticollis  Cardiovascular: No discernable arrhythmia  Pulmonary: No wheezing or stridor  Skin: No masses, erythema, lacerations, fluctation, ulcerations  Neurovascular: Sensation Intact to the Median, Ulnar, Radial Nerve, Motor Intact to the Median, Ulnar, Radial Nerve and Pulses Intact    MUSCULOSKELETAL EXAMINATION:  RIGHT SIDE:  Finger:  Ulnar lateral cord of ring finger with PIP contracture of 45 degrees  Central cord to radial spiral cord of small finger with PIP contracture of 60 degrees       _____________________________________________________  STUDIES REVIEWED:  No New Studies to review      PROCEDURES PERFORMED:  Procedures  No Procedures performed today

## 2020-02-20 ENCOUNTER — TELEPHONE (OUTPATIENT)
Dept: OBGYN CLINIC | Facility: HOSPITAL | Age: 70
End: 2020-02-20

## 2020-02-20 ENCOUNTER — APPOINTMENT (OUTPATIENT)
Dept: PHYSICAL THERAPY | Facility: MEDICAL CENTER | Age: 70
End: 2020-02-20
Payer: MEDICARE

## 2020-02-20 NOTE — TELEPHONE ENCOUNTER
I called patient and left a voicemail to call me back to change surgery date with Dr Carolyne Brewer

## 2020-02-20 NOTE — TELEPHONE ENCOUNTER
Patient is calling in stating that he needs to change his surgery date that is scheduled on 4/21  He is asking if this can be moved to 4/7  He is asking for a call back relating this      Call back# [phone]

## 2020-02-20 NOTE — TELEPHONE ENCOUNTER
Patient is calling because he wants to change his surgery date  He's asking to move it up a couple weeks  Please call the patient at [phone] but after 10am   Patient's post op appt will need to be changed

## 2020-02-20 NOTE — TELEPHONE ENCOUNTER
S/w patient - Dr Kaaren Kanner is out of the office 04/07 & 04/09 - the other dates patient requested are unavailable  He is aware  He will keep DOS 04/21

## 2020-02-20 NOTE — TELEPHONE ENCOUNTER
Jenniffer Fraire is calling back asking for [facility] again    Call was transferred to [facility]

## 2020-03-02 ENCOUNTER — TELEPHONE (OUTPATIENT)
Dept: UROLOGY | Facility: MEDICAL CENTER | Age: 70
End: 2020-03-02

## 2020-03-02 ENCOUNTER — OFFICE VISIT (OUTPATIENT)
Dept: PHYSICAL THERAPY | Facility: MEDICAL CENTER | Age: 70
End: 2020-03-02
Payer: MEDICARE

## 2020-03-02 ENCOUNTER — TELEPHONE (OUTPATIENT)
Dept: INTERNAL MEDICINE CLINIC | Facility: CLINIC | Age: 70
End: 2020-03-02

## 2020-03-02 DIAGNOSIS — M54.12 CERVICAL RADICULOPATHY: ICD-10-CM

## 2020-03-02 DIAGNOSIS — M54.12 RADICULOPATHY, CERVICAL REGION: Primary | ICD-10-CM

## 2020-03-02 DIAGNOSIS — M25.561 ACUTE PAIN OF RIGHT KNEE: ICD-10-CM

## 2020-03-02 DIAGNOSIS — E78.2 MIXED HYPERLIPIDEMIA: ICD-10-CM

## 2020-03-02 DIAGNOSIS — R73.01 IMPAIRED FASTING GLUCOSE: Primary | ICD-10-CM

## 2020-03-02 PROCEDURE — 97140 MANUAL THERAPY 1/> REGIONS: CPT | Performed by: PHYSICAL THERAPIST

## 2020-03-02 PROCEDURE — 97012 MECHANICAL TRACTION THERAPY: CPT | Performed by: PHYSICAL THERAPIST

## 2020-03-02 NOTE — TELEPHONE ENCOUNTER
Patient called in said he has an appointment on 3/25/20 wanted to know if you needed him to do labs? He had labs completed in November that were abnormal should he retest and will his insurance cover them? He said he is going to do his PSA for Dr Haylee Gonzalez, would like to have them done together      Please advise

## 2020-03-02 NOTE — PROGRESS NOTES
Daily Note     Today's date: 3/2/2020  Patient name: Safia Moreau  : 1950  MRN: [id number]  Referring provider: oJnny Mckeon  Dx:   Encounter Diagnosis     ICD-10-CM    1  Radiculopathy, cervical region M54 12    2  Acute pain of right knee M25 561    3  Cervical radiculopathy M54 12                   Subjective: patient states that he is feeling better and is no longer waking with tingling in his hands  Patient is now able to sleep through the night without increased symptoms  Patient continues to have some issues of stiffness at times and wakes in the morning with occasional tingling in fingertips  Objective: See treatment diary below      Assessment: Tolerated treatment well  Patient demonstrated fatigue post treatment, exhibited good technique with therapeutic exercises and would benefit from continued PT  Patient is progressing and his symptoms are nearly undetectable at times  He continues to demonstrate hypomobility of cervical spine, though improved  Focus on first and second rib mobilization seems to be helpful in reduction of symptoms  Plan: Continue per plan of care  Precautions: advanced cervical degenerative changes      Manual  3/2/2020                C4-7 UPA R Gr  Iv+ 30x3             Thoracic pistol Gr  v            C6-7 rotational mob Gr  Iv-  10x3            C5-7 transverse glide prone  Right  Gr  Iv  10x5            Seated left lateral flexion with right glide of C6-7 Gr  Iv [id number]                Exercise Diary              Self snags cervical 10x2                                                                                                                                                                                                                                                                       Modalities              Mechanical traction  8min 33/10   25lbs

## 2020-03-02 NOTE — TELEPHONE ENCOUNTER
Patient called to get his psa order faxed to [phone]  Sent Fax    Patient also wanted to add to his visit ED issues  Patient states his blood pressure medication is affecting him and would like to discuss    Patient scheduled with Dr Rajan Winslow on 03/23

## 2020-03-02 NOTE — TELEPHONE ENCOUNTER
Spoke to patient gave him message that labs have been put in chart  Patient requested we fax to [phone]  Sent fax, done

## 2020-03-04 ENCOUNTER — OFFICE VISIT (OUTPATIENT)
Dept: PHYSICAL THERAPY | Facility: MEDICAL CENTER | Age: 70
End: 2020-03-04
Payer: MEDICARE

## 2020-03-04 DIAGNOSIS — M54.12 CERVICAL RADICULOPATHY: ICD-10-CM

## 2020-03-04 DIAGNOSIS — M54.12 RADICULOPATHY, CERVICAL REGION: Primary | ICD-10-CM

## 2020-03-04 DIAGNOSIS — M25.561 ACUTE PAIN OF RIGHT KNEE: ICD-10-CM

## 2020-03-04 PROCEDURE — 97110 THERAPEUTIC EXERCISES: CPT | Performed by: PHYSICAL THERAPIST

## 2020-03-04 PROCEDURE — 97012 MECHANICAL TRACTION THERAPY: CPT | Performed by: PHYSICAL THERAPIST

## 2020-03-04 PROCEDURE — 97140 MANUAL THERAPY 1/> REGIONS: CPT | Performed by: PHYSICAL THERAPIST

## 2020-03-04 NOTE — PROGRESS NOTES
Daily Note     Today's date: 3/4/2020  Patient name: Rosanne Kimball  : 1950  MRN: [id number]  Referring provider: Rachana Topete  Dx:   Encounter Diagnosis     ICD-10-CM    1  Radiculopathy, cervical region M54 12    2  Acute pain of right knee M25 561    3  Cervical radiculopathy M54 12                   Subjective: patient states that he is feeling better notes 80% improvement  Patient states that he is feeling good about his progress  Objective: See treatment diary below      Assessment: Tolerated treatment well  Patient demonstrated fatigue post treatment, exhibited good technique with therapeutic exercises and would benefit from continued PT  Patient is progressing and his symptoms are nearly undetectable at times  He continues to demonstrate hypomobility of cervical spine, though improved  Focus on first and second rib mobilization seems to be helpful in reduction of symptoms  Manual therapy and traction seem to be most helpful for patient in reducing his symptom  Plan: Continue per plan of care  Precautions: advanced cervical degenerative changes      Manual  3/4/2020                C4-7 UPA R Gr  Iv+ 30x3             Thoracic pistol Gr  v            C6-7 rotational mob Gr  Iv-  10x3            C5-7 transverse glide prone  Right  Gr  Iv  10x5            Seated left lateral flexion with right glide of C6-7 Gr  Iv [id number]                Exercise Diary              Self snags cervical 10x2                                                                                                                                                                                                                                                                       Modalities              Mechanical traction  8min 33/10   25lbs

## 2020-03-10 ENCOUNTER — OFFICE VISIT (OUTPATIENT)
Dept: PHYSICAL THERAPY | Facility: MEDICAL CENTER | Age: 70
End: 2020-03-10
Payer: MEDICARE

## 2020-03-10 DIAGNOSIS — M25.561 ACUTE PAIN OF RIGHT KNEE: ICD-10-CM

## 2020-03-10 DIAGNOSIS — M54.12 RADICULOPATHY, CERVICAL REGION: Primary | ICD-10-CM

## 2020-03-10 DIAGNOSIS — M54.12 CERVICAL RADICULOPATHY: ICD-10-CM

## 2020-03-10 PROCEDURE — 97140 MANUAL THERAPY 1/> REGIONS: CPT | Performed by: PHYSICAL THERAPIST

## 2020-03-10 PROCEDURE — 97012 MECHANICAL TRACTION THERAPY: CPT | Performed by: PHYSICAL THERAPIST

## 2020-03-10 PROCEDURE — 97110 THERAPEUTIC EXERCISES: CPT | Performed by: PHYSICAL THERAPIST

## 2020-03-10 NOTE — PROGRESS NOTES
Daily Note     Today's date: 3/10/2020  Patient name: Maranda Barraza  : 1950  MRN: [id number]  Referring provider: Olvin Verma  Dx:   Encounter Diagnosis     ICD-10-CM    1  Radiculopathy, cervical region M54 12    2  Acute pain of right knee M25 561    3  Cervical radiculopathy M54 12                   Subjective: patient states that he is feeling better though he notes an increase in his symptoms last night after lifting weights overhead  Objective: See treatment diary below      Assessment: Tolerated treatment well  Patient demonstrated fatigue post treatment, exhibited good technique with therapeutic exercises and would benefit from continued PT  Patient is progressing and his symptoms are nearly undetectable at times  He continues to demonstrate hypomobility of cervical spine, though improved  Focus on first and second rib mobilization seems to be helpful in reduction of symptoms  Manual therapy and traction seem to be most helpful for patient in reducing his symptom  Plan: Continue per plan of care  Precautions: advanced cervical degenerative changes      Manual  3/10/2020                C4-7 UPA R Gr  Iv+ 30x3             Thoracic pistol Gr  v            C6-7 rotational mob Gr  Iv-  10x3            C5-7 transverse glide prone  Right  Gr  Iv  10x5            Seated left lateral flexion with right glide of C6-7 Gr  Iv [id number]                Exercise Diary              Self snags cervical 10x2                                                                                                                                                                                                                                                                       Modalities              Mechanical traction  8min 33/10   25lbs

## 2020-03-16 ENCOUNTER — TELEPHONE (OUTPATIENT)
Dept: OTHER | Facility: OTHER | Age: 70
End: 2020-03-16

## 2020-03-16 NOTE — TELEPHONE ENCOUNTER
I called patient, he is outside the 14 day window having traveled over a month ago  He does not have any shortness of breath, he has been checking his temperature has not had any fevers    Patient told to follow up if his condition were to change

## 2020-03-18 ENCOUNTER — OFFICE VISIT (OUTPATIENT)
Dept: PHYSICAL THERAPY | Facility: MEDICAL CENTER | Age: 70
End: 2020-03-18
Payer: MEDICARE

## 2020-03-18 ENCOUNTER — TELEPHONE (OUTPATIENT)
Dept: UROLOGY | Facility: CLINIC | Age: 70
End: 2020-03-18

## 2020-03-18 DIAGNOSIS — M54.12 CERVICAL RADICULOPATHY: ICD-10-CM

## 2020-03-18 DIAGNOSIS — M54.12 RADICULOPATHY, CERVICAL REGION: Primary | ICD-10-CM

## 2020-03-18 DIAGNOSIS — M25.561 ACUTE PAIN OF RIGHT KNEE: ICD-10-CM

## 2020-03-18 LAB
ALBUMIN SERPL-MCNC: 4.4 G/DL (ref 3.6–5.1)
ALBUMIN/GLOB SERPL: 1.9 (CALC) (ref 1–2.5)
ALP SERPL-CCNC: 55 U/L (ref 35–144)
ALT SERPL-CCNC: 36 U/L (ref 9–46)
AST SERPL-CCNC: 21 U/L (ref 10–35)
BASOPHILS # BLD AUTO: 59 CELLS/UL (ref 0–200)
BASOPHILS NFR BLD AUTO: 1.1 %
BILIRUB SERPL-MCNC: 1.7 MG/DL (ref 0.2–1.2)
BUN SERPL-MCNC: 12 MG/DL (ref 7–25)
BUN/CREAT SERPL: ABNORMAL (CALC) (ref 6–22)
CALCIUM SERPL-MCNC: 9.4 MG/DL (ref 8.6–10.3)
CHLORIDE SERPL-SCNC: 102 MMOL/L (ref 98–110)
CHOLEST SERPL-MCNC: 181 MG/DL
CHOLEST/HDLC SERPL: 2.3 (CALC)
CO2 SERPL-SCNC: 28 MMOL/L (ref 20–32)
CREAT SERPL-MCNC: 0.86 MG/DL (ref 0.7–1.18)
EOSINOPHIL # BLD AUTO: 189 CELLS/UL (ref 15–500)
EOSINOPHIL NFR BLD AUTO: 3.5 %
ERYTHROCYTE [DISTWIDTH] IN BLOOD BY AUTOMATED COUNT: 13.1 % (ref 11–15)
GLOBULIN SER CALC-MCNC: 2.3 G/DL (CALC) (ref 1.9–3.7)
GLUCOSE SERPL-MCNC: 109 MG/DL (ref 65–99)
HBA1C MFR BLD: 5.6 % OF TOTAL HGB
HCT VFR BLD AUTO: 41.7 % (ref 38.5–50)
HDLC SERPL-MCNC: 79 MG/DL
HGB BLD-MCNC: 14.1 G/DL (ref 13.2–17.1)
LDLC SERPL CALC-MCNC: 87 MG/DL (CALC)
LYMPHOCYTES # BLD AUTO: 1490 CELLS/UL (ref 850–3900)
LYMPHOCYTES NFR BLD AUTO: 27.6 %
MCH RBC QN AUTO: 30.7 PG (ref 27–33)
MCHC RBC AUTO-ENTMCNC: 33.8 G/DL (ref 32–36)
MCV RBC AUTO: 90.7 FL (ref 80–100)
MONOCYTES # BLD AUTO: 491 CELLS/UL (ref 200–950)
MONOCYTES NFR BLD AUTO: 9.1 %
NEUTROPHILS # BLD AUTO: 3170 CELLS/UL (ref 1500–7800)
NEUTROPHILS NFR BLD AUTO: 58.7 %
NONHDLC SERPL-MCNC: 102 MG/DL (CALC)
PLATELET # BLD AUTO: 192 THOUSAND/UL (ref 140–400)
PMV BLD REES-ECKER: 10.3 FL (ref 7.5–12.5)
POTASSIUM SERPL-SCNC: 4.3 MMOL/L (ref 3.5–5.3)
PROT SERPL-MCNC: 6.7 G/DL (ref 6.1–8.1)
PSA SERPL-MCNC: 2.7 NG/ML
RBC # BLD AUTO: 4.6 MILLION/UL (ref 4.2–5.8)
SL AMB EGFR AFRICAN AMERICAN: 101 ML/MIN/1.73M2
SL AMB EGFR NON AFRICAN AMERICAN: 87 ML/MIN/1.73M2
SODIUM SERPL-SCNC: 138 MMOL/L (ref 135–146)
TRIGL SERPL-MCNC: 68 MG/DL
TSH SERPL-ACNC: 2.48 MIU/L (ref 0.4–4.5)
WBC # BLD AUTO: 5.4 THOUSAND/UL (ref 3.8–10.8)

## 2020-03-18 PROCEDURE — 97012 MECHANICAL TRACTION THERAPY: CPT | Performed by: PHYSICAL THERAPIST

## 2020-03-18 PROCEDURE — 97140 MANUAL THERAPY 1/> REGIONS: CPT | Performed by: PHYSICAL THERAPIST

## 2020-03-18 NOTE — PROGRESS NOTES
Daily Note     Today's date: 3/18/2020  Patient name: Brian Mascorro  : 1950  MRN: [id number]  Referring provider: Daksha Reyes  Dx:   Encounter Diagnosis     ICD-10-CM    1  Radiculopathy, cervical region M54 12    2  Acute pain of right knee M25 561    3  Cervical radiculopathy M54 12                   Subjective: Danii Glez reports he has been sleeping 3-4x/week without waking up and he used to wake up every night  Objective: See treatment diary below      Assessment: Tolerated treatment well  Patient would benefit from continued PT  Patient inquired about home traction unit  Briefly discussed possible benefits of home unit but instructed patient to discuss with primary PT  Plan: Continue per plan of care  Precautions: advanced cervical degenerative changes      Manual  3/10/2020     3/18           C4-7 UPA R Gr  Iv+ 30x3  Gr IV 30x3           Thoracic pistol Gr  v Gr JR           C6-7 rotational mob Gr  Iv-  10x3 Gr IV 10x3           C5-7 transverse glide prone  Right  Gr  Iv  10x5 Right Gr IV 10x5           Seated left lateral flexion with right glide of C6-7 Gr  Iv [id number] NP               Exercise Diary              Self snags cervical 10x2 10x2                                                                                                                                                                                                                                                                      Modalities              Mechanical traction  8min 33/10   25lbs    8min 33/10 25#

## 2020-03-18 NOTE — TELEPHONE ENCOUNTER
Patient calls with issues with sexual dysfunction  He has been on Cialis 5 mg daily but recently had a change in his blood pressure medication  Currently he is taking amlodipine 5 mg daily and lisinopril 20 mg daily  He normally had sexual relations 3 to 4 times a week  He is now not achieving climax and his erections or weak with difficulty penetration  He admits to performance anxiety  I have reassured him that he is very active he does elliptical training 45 minutes a day and he is able to downhill ski an exercise more than an hour daily  He has no history of cardiac disease  He has no chest pain  He has no evidence of neuropathy  He has no claudication  I suggested that he avoid sexual activity and watching porn for the next 10 days and try to let nature work spontaneously  We will do his routine follow-up for prostate screening and other men's health issues in the next 6 weeks pending guidelines on the corona virus  He has traveled extensively and has been in [facility] recently as February

## 2020-03-19 NOTE — TELEPHONE ENCOUNTER
Patient returned call  Scheduled with Dr Haylee Gonzalez 5/27  Reported to be instructed to call back 4/1 in regard to change in ED directions  No further action needed

## 2020-03-20 ENCOUNTER — APPOINTMENT (OUTPATIENT)
Dept: PHYSICAL THERAPY | Facility: MEDICAL CENTER | Age: 70
End: 2020-03-20
Payer: MEDICARE

## 2020-03-23 DIAGNOSIS — I10 ESSENTIAL HYPERTENSION: ICD-10-CM

## 2020-03-24 ENCOUNTER — APPOINTMENT (OUTPATIENT)
Dept: PHYSICAL THERAPY | Facility: MEDICAL CENTER | Age: 70
End: 2020-03-24
Payer: MEDICARE

## 2020-03-24 RX ORDER — LISINOPRIL 20 MG/1
20 TABLET ORAL DAILY
Qty: 90 TABLET | Refills: 2 | Status: SHIPPED | OUTPATIENT
Start: 2020-03-24 | End: 2020-09-30 | Stop reason: SDUPTHER

## 2020-03-25 ENCOUNTER — TELEMEDICINE (OUTPATIENT)
Dept: INTERNAL MEDICINE CLINIC | Facility: CLINIC | Age: 70
End: 2020-03-25
Payer: MEDICARE

## 2020-03-25 ENCOUNTER — TELEPHONE (OUTPATIENT)
Dept: INTERNAL MEDICINE CLINIC | Facility: CLINIC | Age: 70
End: 2020-03-25

## 2020-03-25 VITALS
BODY MASS INDEX: 25.03 KG/M2 | HEART RATE: 58 BPM | WEIGHT: 169 LBS | DIASTOLIC BLOOD PRESSURE: 62 MMHG | RESPIRATION RATE: 14 BRPM | HEIGHT: 69 IN | SYSTOLIC BLOOD PRESSURE: 122 MMHG

## 2020-03-25 DIAGNOSIS — R73.01 IMPAIRED FASTING GLUCOSE: ICD-10-CM

## 2020-03-25 DIAGNOSIS — K21.9 CHRONIC GERD: ICD-10-CM

## 2020-03-25 DIAGNOSIS — N52.9 ERECTILE DYSFUNCTION, UNSPECIFIED ERECTILE DYSFUNCTION TYPE: ICD-10-CM

## 2020-03-25 DIAGNOSIS — E78.2 MIXED HYPERLIPIDEMIA: ICD-10-CM

## 2020-03-25 DIAGNOSIS — Z00.00 MEDICARE ANNUAL WELLNESS VISIT, SUBSEQUENT: Primary | ICD-10-CM

## 2020-03-25 DIAGNOSIS — R73.01 IMPAIRED FASTING GLUCOSE: Primary | ICD-10-CM

## 2020-03-25 DIAGNOSIS — I10 HTN (HYPERTENSION), BENIGN: ICD-10-CM

## 2020-03-25 PROCEDURE — G0439 PPPS, SUBSEQ VISIT: HCPCS | Performed by: INTERNAL MEDICINE

## 2020-03-25 NOTE — ASSESSMENT & PLAN NOTE
Fasting glucose slightly elevated at 109, but hemoglobin A1c better than previous, most recent A1c was 5 6, continue with healthy diet and exercise

## 2020-03-25 NOTE — ASSESSMENT & PLAN NOTE
Since patient had problems when he tried decreasing to every other day dosing of the proton pump inhibitor, continue daily dosing with attempts to titrate down if tolerated

## 2020-03-25 NOTE — TELEPHONE ENCOUNTER
Done, let pt know  Labs ordered including testosterone level, which he can get done 1-2 weeks before his next visit

## 2020-03-25 NOTE — PROGRESS NOTES
Assessment/Plan:  Annual wellness visit and follow-up chronic conditions visit done as 2 separate notes today  Medicare annual wellness visit, subsequent  Discussed preventative health, cancer screening, immunizations, and safety issues  Patient is up-to-date with all immunizations  Patient is up-to-date with colonoscopy  Patient is up-to-date with lab testing including hepatitis C testing  Chronic GERD  Since patient had problems when he tried decreasing to every other day dosing of the proton pump inhibitor, continue daily dosing with attempts to titrate down if tolerated    HTN (hypertension), benign  Well controlled, continue current regimen along with healthy diet and exercise    Mixed hyperlipidemia  Continue medication along with healthy diet and exercise    Erectile dysfunction  Patient doing well, continue medication and follow-up Urology  Testosterone level was normal at 600 8 February 2018, patient requests to have this checked again in the future with next labs    Impaired fasting glucose  Fasting glucose slightly elevated at 109, but hemoglobin A1c better than previous, most recent A1c was 5 6, continue with healthy diet and exercise  Diagnoses and all orders for this visit:    Medicare annual wellness visit, subsequent    Chronic GERD    HTN (hypertension), benign    Mixed hyperlipidemia    Erectile dysfunction, unspecified erectile dysfunction type    Impaired fasting glucose          Subjective:      Patient ID: Courtenay Heimlich is a 68 y o  male  Hypertension:  Patient reports compliance with meds, he was taken off the hydrochlorothiazide by Cardiology due to some symptoms of erectile dysfunction  His blood pressures been well controlled at home  GERD:  Patient did crease the proton pump inhibitor from 40 mg daily to 20 mg daily and he is doing well at that dose, no significant GERD symptoms, no problems with food getting stuck    He did have return of symptoms when he tried taking every other day    Hypercholesterolemia:  Patient tolerating atorvastatin, and he is very active and is eating healthy diet  Erectile dysfunction:  Patient has been dealing with Urology for this and has been doing well, he also uses Cialis 5 mg daily      The following portions of the patient's history were reviewed and updated as appropriate: allergies, current medications, past family history, past medical history, past social history, past surgical history and problem list     Review of Systems   Constitutional: Negative for chills, fatigue and fever  HENT: Negative for congestion, nosebleeds, postnasal drip, sore throat and trouble swallowing  Eyes: Negative for pain  Respiratory: Negative for cough, chest tightness, shortness of breath and wheezing  Cardiovascular: Negative for chest pain, palpitations and leg swelling  Gastrointestinal: Negative for abdominal pain, constipation, diarrhea, nausea and vomiting  Endocrine: Negative for polydipsia and polyuria  Genitourinary: Negative for dysuria, flank pain and hematuria  Musculoskeletal: Negative for arthralgias and back pain  Skin: Negative for rash  Neurological: Negative for dizziness, tremors, light-headedness and headaches  Hematological: Does not bruise/bleed easily  Psychiatric/Behavioral: Negative for confusion and dysphoric mood  The patient is not nervous/anxious  Objective:      /62   Pulse 58   Resp 14   Ht 5' 8 5" (1 74 m)   Wt 76 7 kg (169 lb)   BMI 25 32 kg/m²          Physical Exam   Constitutional: He is oriented to person, place, and time  He appears well-developed and well-nourished  HENT:   Head: Normocephalic and atraumatic  Right Ear: External ear normal    Left Ear: External ear normal    Nose: Nose normal    Mouth/Throat: Oropharynx is clear and moist    Pulmonary/Chest: Effort normal    Neurological: He is alert and oriented to person, place, and time     Psychiatric: He has a normal mood and affect   His behavior is normal  Judgment and thought content normal

## 2020-03-25 NOTE — PROGRESS NOTES
Assessment and Plan:     Problem List Items Addressed This Visit        Other    Medicare annual wellness visit, subsequent - Primary     Discussed preventative health, cancer screening, immunizations, and safety issues  Patient is up-to-date with all immunizations  Patient is up-to-date with colonoscopy  Patient is up-to-date with lab testing including hepatitis C testing  BMI Counseling: There is no height or weight on file to calculate BMI  The BMI is above normal  Nutrition recommendations include encouraging healthy choices of fruits and vegetables and moderation in carbohydrate intake  Exercise recommendations include exercising 3-5 times per week  Preventive health issues were discussed with patient, and age appropriate screening tests were ordered as noted in patient's After Visit Summary  Personalized health advice and appropriate referrals for health education or preventive services given if needed, as noted in patient's After Visit Summary       History of Present Illness:     Patient presents for Medicare Annual Wellness visit    Patient Care Team:  Kim Adair MD as PCP - General     Problem List:     Patient Active Problem List   Diagnosis    Bilateral carpal tunnel syndrome    Pulmonary nodule    SOB (shortness of breath) on exertion    Elevated bilirubin    Impaired fasting glucose    Mixed hyperlipidemia    Chronic GERD    Erectile dysfunction    HTN (hypertension), benign    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    Trigger little finger of right hand    Dupuytren contracture    Medicare annual wellness visit, subsequent    Anxiety    Preop exam for internal medicine    Radiculopathy, cervical region    Dupuytren's contracture of right hand      Past Medical and Surgical History:     Past Medical History:   Diagnosis Date    Cancer ([address] )     scc back    Carpal tunnel syndrome     ED (erectile dysfunction)     GERD (gastroesophageal reflux disease)     [place] (hard of hearing)     wears hearing aids    Hypercholesteremia     Hypertension     Numbness in both hands      Past Surgical History:   Procedure Laterality Date    APPENDECTOMY      As a child    COLONOSCOPY      INGUINAL HERNIA REPAIR Left 2015    MASS EXCISION N/A 8/14/2018    Procedure: BACK/TRUNK MULTIPLE SEBBORHEIC KERATOSIS SHAVE/EXCISION; COMPLEX CLOSURE VERSUS FLAP RECONSTRUCTION;  Surgeon: Rebekah Juarez MD;  Location: [facility];  Service: Plastics    SQUAMOUS CELL CARCINOMA EXCISION N/A 8/14/2018    Procedure: BACK SCC IN SITU EXCISION;  Surgeon: Rebekah Juarez MD;  Location: [facility];  Service: Plastics      Family History:     Family History   Problem Relation Age of Onset    Colon cancer Father     Heart attack Mother     Breast cancer Mother       Social History:        Social History     Socioeconomic History    Marital status: /Civil Union     Spouse name: Not on file    Number of children: Not on file    Years of education: Not on file    Highest education level: Not on file   Occupational History    Occupation:    Social Needs    Financial resource strain: Not on file    Food insecurity:     Worry: Not on file     Inability: Not on file   Ditto needs:     Medical: Not on file     Non-medical: Not on file   Tobacco Use    Smoking status: Never Smoker    Smokeless tobacco: Never Used   Substance and Sexual Activity    Alcohol use:  Yes     Alcohol/week: 14 0 standard drinks     Types: 14 Standard drinks or equivalent per week     Comment: 2 Drinks a night    Drug use: No    Sexual activity: Yes   Lifestyle    Physical activity:     Days per week: Not on file     Minutes per session: Not on file    Stress: Not on file   Relationships    Social connections:     Talks on phone: Not on file     Gets together: Not on file     Attends Sikhism service: Not on file     Active member of club or organization: Not on file     Attends meetings of clubs or organizations: Not on file     Relationship status: Not on file    Intimate partner violence:     Fear of current or ex partner: Not on file     Emotionally abused: Not on file     Physically abused: Not on file     Forced sexual activity: Not on file   Other Topics Concern    Not on file   Social History Narrative    Not on file      Medications and Allergies:     Current Outpatient Medications   Medication Sig Dispense Refill    amLODIPine (NORVASC) 5 mg tablet Take 1 tablet (5 mg total) by mouth daily 90 tablet 3    atorvastatin (LIPITOR) 20 mg tablet Take 1 tablet (20 mg total) by mouth daily 90 tablet 3    lisinopril (ZESTRIL) 20 mg tablet Take 1 tablet (20 mg total) by mouth daily 90 tablet 2    omeprazole (PriLOSEC) 20 mg delayed release capsule Take by mouth      Probiotic Product (PROBIOTIC DAILY PO) Take by mouth      psyllium (METAMUCIL SMOOTH TEXTURE) 58 6 % powder Take by mouth      tadalafil (CIALIS) 5 MG tablet Take 1 tablet (5 mg total) by mouth daily 84 tablet 3     No current facility-administered medications for this visit  No Known Allergies   Immunizations:     Immunization History   Administered Date(s) Administered    INFLUENZA 11/02/2015, 01/30/2017, 02/06/2018, 09/05/2018    Influenza Split High Dose Preservative Free IM 11/02/2015, 01/30/2017, 02/06/2018    Influenza, high dose seasonal 0 5 mL 09/05/2018, 09/11/2019    Pneumococcal Conjugate 13-Valent 01/30/2017, 09/10/2018    Pneumococcal Polysaccharide PPV23 09/03/2014, 12/06/2019    Tdap 09/10/2018    Zoster 10/07/2014    Zoster Vaccine Recombinant 05/07/2019, 07/11/2019      Health Maintenance:         Topic Date Due    CRC Screening: Colonoscopy  11/21/2021    Hepatitis C Screening  Completed     There are no preventive care reminders to display for this patient  Medicare Health Risk Assessment:     There were no vitals taken for this visit       Lisa Duffy is here for his Subsequent Wellness visit  Health Risk Assessment:   Patient rates overall health as excellent  Patient feels that their physical health rating is slightly better  Eyesight was rated as same  Hearing was rated as same  Patient feels that their emotional and mental health rating is same  Pain experienced in the last 7 days has been none  Patient states that he has experienced no weight loss or gain in last 6 months  Depression Screening:   PHQ-2 Score: 0      Fall Risk Screening: In the past year, patient has experienced: no history of falling in past year      Home Safety:  Patient does not have trouble with stairs inside or outside of their home  Patient has working smoke alarms Home safety hazards include: none  Nutrition:   Current diet is Regular  Medications:   Patient is currently taking over-the-counter supplements  OTC medications include: see medication list  Patient is able to manage medications  Activities of Daily Living (ADLs)/Instrumental Activities of Daily Living (IADLs):   Walk and transfer into and out of bed and chair?: Yes  Dress and groom yourself?: Yes    Bathe or shower yourself?: Yes    Feed yourself? Yes  Do your laundry/housekeeping?: Yes  Manage your money, pay your bills and track your expenses?: Yes  Make your own meals?: Yes    Do your own shopping?: Yes    Previous Hospitalizations:   Any hospitalizations or ED visits within the last 12 months?: No      Advance Care Planning:   Living will: Yes    Durable POA for healthcare:  Yes    Advanced directive: Yes      Cognitive Screening:   Provider or family/friend/caregiver concerned regarding cognition?: No    PREVENTIVE SCREENINGS      Cardiovascular Screening:    General: Screening Not Indicated and History Lipid Disorder      Diabetes Screening:     General: Screening Current      Colorectal Cancer Screening:     General: Screening Current      Prostate Cancer Screening:    General: Screening Current      Osteoporosis Screening:    General: Screening Not Indicated      Abdominal Aortic Aneurysm (AAA) Screening:    Risk factors include: age between 73-67 yo        Lung Cancer Screening:     General: Screening Not Indicated      Hepatitis C Screening:    General: Screening Current    Other Counseling Topics:   Car/seat belt/driving safety, skin self-exam and sunscreen  Uriah Trent MD      Virtual AWV Consent    Reason for visit is     Encounter provider Uriah Trent MD    Provider located at [address] [place] [id number]      Recent Visits  No visits were found meeting these conditions  Showing recent visits within past 7 days and meeting all other requirements     Today's Visits  Date Type Provider Dept   03/25/20 Telemedicine Reggie Velásquez today's visits and meeting all other requirements     Future Appointments  No visits were found meeting these conditions  Showing future appointments within next 150 days and meeting all other requirements        After connecting through [facility], the patient was identified by name and date of birth  Rosanne Kimball was informed that this is a telemedicine visit and that the visit is being conducted through [facility] and patient was informed that this is not a secure, HIPAA-complaint platform  he agrees to proceed  which may not be secure and therefore, might not be HIPAA-compliant  My office door was closed  No one else was in the room  He acknowledged consent and understanding of privacy and security of the video platform   The patient has agreed to participate and understands they can discontinue the visit at any time

## 2020-03-25 NOTE — PATIENT INSTRUCTIONS
Problem List Items Addressed This Visit        Digestive    Chronic GERD     Since patient had problems when he tried decreasing to every other day dosing of the proton pump inhibitor, continue daily dosing with attempts to titrate down if tolerated            Endocrine    Impaired fasting glucose     Fasting glucose slightly elevated at 109, but hemoglobin A1c better than previous, most recent A1c was 5 6, continue with healthy diet and exercise  Cardiovascular and Mediastinum    HTN (hypertension), benign     Well controlled, continue current regimen along with healthy diet and exercise            Other    Mixed hyperlipidemia     Continue medication along with healthy diet and exercise         Erectile dysfunction     Patient doing well, continue medication and follow-up Urology         Medicare annual wellness visit, subsequent - Primary     Discussed preventative health, cancer screening, immunizations, and safety issues  Patient is up-to-date with all immunizations  Patient is up-to-date with colonoscopy  Patient is up-to-date with lab testing including hepatitis C testing  Medicare Preventive Visit Patient Instructions  Thank you for completing your Welcome to Medicare Visit or Medicare Annual Wellness Visit today  Your next wellness visit will be due in one year (3/25/2021)  The screening/preventive services that you may require over the next 5-10 years are detailed below  Some tests may not apply to you based off risk factors and/or age  Screening tests ordered at today's visit but not completed yet may show as past due  Also, please note that scanned in results may not display below    Preventive Screenings:  Service Recommendations Previous Testing/Comments   Colorectal Cancer Screening  · Colonoscopy    · Fecal Occult Blood Test (FOBT)/Fecal Immunochemical Test (FIT)  · Fecal DNA/Cologuard Test  · Flexible Sigmoidoscopy Age: 54-65 years old   Colonoscopy: every 10 years (May be performed more frequently if at higher risk)  OR  FOBT/FIT: every 1 year  OR  Cologuard: every 3 years  OR  Sigmoidoscopy: every 5 years  Screening may be recommended earlier than age 48 if at higher risk for colorectal cancer  Also, an individualized decision between you and your healthcare provider will decide whether screening between the ages of 74-80 would be appropriate  Colonoscopy: 11/21/2018  FOBT/FIT: 02/21/2019  Cologuard: Not on file  Sigmoidoscopy: Not on file         Prostate Cancer Screening Individualized decision between patient and health care provider in men between ages of 53-78   Medicare will cover every 12 months beginning on the day after your 50th birthday PSA: 2 7 ng/mL          Hepatitis C Screening Once for adults born between 1945 and 1965  More frequently in patients at high risk for Hepatitis C Hep C Antibody: 01/31/2018       Diabetes Screening 1-2 times per year if you're at risk for diabetes or have pre-diabetes Fasting glucose: 107 mg/dL   A1C: 5 6 % of total Hgb       Cholesterol Screening Once every 5 years if you don't have a lipid disorder  May order more often based on risk factors  Lipid panel: 03/17/2020          Other Preventive Screenings Covered by Medicare:  1  Abdominal Aortic Aneurysm (AAA) Screening: covered once if your at risk  You're considered to be at risk if you have a family history of AAA or a male between the age of 73-68 who smoking at least 100 cigarettes in your lifetime  2  Lung Cancer Screening: covers low dose CT scan once per year if you meet all of the following conditions: (1) Age 50-69; (2) No signs or symptoms of lung cancer; (3) Current smoker or have quit smoking within the last 15 years; (4) You have a tobacco smoking history of at least 30 pack years (packs per day x number of years you smoked); (5) You get a written order from a healthcare provider    3  Glaucoma Screening: covered annually if you're considered high risk: (1) You have diabetes OR (2) Family history of glaucoma OR (3)  aged 48 and older OR (4)  American aged 72 and older  3  Osteoporosis Screening: covered every 2 years if you meet one of the following conditions: (1) Have a vertebral abnormality; (2) On glucocorticoid therapy for more than 3 months; (3) Have primary hyperparathyroidism; (4) On osteoporosis medications and need to assess response to drug therapy  5  HIV Screening: covered annually if you're between the age of 12-76  Also covered annually if you are younger than 13 and older than 72 with risk factors for HIV infection  For pregnant patients, it is covered up to 3 times per pregnancy  Immunizations:  Immunization Recommendations   Influenza Vaccine Annual influenza vaccination during flu season is recommended for all persons aged >= 6 months who do not have contraindications   Pneumococcal Vaccine (Prevnar and Pneumovax)  * Prevnar = PCV13  * Pneumovax = PPSV23 Adults 25-60 years old: 1-3 doses may be recommended based on certain risk factors  Adults 72 years old: Prevnar (PCV13) vaccine recommended followed by Pneumovax (PPSV23) vaccine  If already received PPSV23 since turning 65, then PCV13 recommended at least one year after PPSV23 dose  Hepatitis B Vaccine 3 dose series if at intermediate or high risk (ex: diabetes, end stage renal disease, liver disease)   Tetanus (Td) Vaccine - COST NOT COVERED BY MEDICARE PART B Following completion of primary series, a booster dose should be given every 10 years to maintain immunity against tetanus  Td may also be given as tetanus wound prophylaxis  Tdap Vaccine - COST NOT COVERED BY MEDICARE PART B Recommended at least once for all adults  For pregnant patients, recommended with each pregnancy     Shingles Vaccine (Shingrix) - COST NOT COVERED BY MEDICARE PART B  2 shot series recommended in those aged 48 and above     Health Maintenance Due:      Topic Date Due    CRC Screening: Colonoscopy  11/21/2021    Hepatitis C Screening  Completed     Immunizations Due:  There are no preventive care reminders to display for this patient  Advance Directives   What are advance directives? Advance directives are legal documents that state your wishes and plans for medical care  These plans are made ahead of time in case you lose your ability to make decisions for yourself  Advance directives can apply to any medical decision, such as the treatments you want, and if you want to donate organs  What are the types of advance directives? There are many types of advance directives, and each state has rules about how to use them  You may choose a combination of any of the following:  · Living will: This is a written record of the treatment you want  You can also choose which treatments you do not want, which to limit, and which to stop at a certain time  This includes surgery, medicine, IV fluid, and tube feedings  · Durable power of  for healthcare [facility]): This is a written record that states who you want to make healthcare choices for you when you are unable to make them for yourself  This person, called a proxy, is usually a family member or a friend  You may choose more than 1 proxy  · Do not resuscitate (DNR) order:  A DNR order is used in case your heart stops beating or you stop breathing  It is a request not to have certain forms of treatment, such as CPR  A DNR order may be included in other types of advance directives  · Medical directive: This covers the care that you want if you are in a coma, near death, or unable to make decisions for yourself  You can list the treatments you want for each condition  Treatment may include pain medicine, surgery, blood transfusions, dialysis, IV or tube feedings, and a ventilator (breathing machine)  · Values history: This document has questions about your views, beliefs, and how you feel and think about life   This information can help others choose the care that you would choose  Why are advance directives important? An advance directive helps you control your care  Although spoken wishes may be used, it is better to have your wishes written down  Spoken wishes can be misunderstood, or not followed  Treatments may be given even if you do not want them  An advance directive may make it easier for your family to make difficult choices about your care  Weight Management   Why it is important to manage your weight:  Being overweight increases your risk of health conditions such as heart disease, high blood pressure, type 2 diabetes, and certain types of cancer  It can also increase your risk for osteoarthritis, sleep apnea, and other respiratory problems  Aim for a slow, steady weight loss  Even a small amount of weight loss can lower your risk of health problems  How to lose weight safely:  A safe and healthy way to lose weight is to eat fewer calories and get regular exercise  You can lose up about 1 pound a week by decreasing the number of calories you eat by 500 calories each day  Healthy meal plan for weight management:  A healthy meal plan includes a variety of foods, contains fewer calories, and helps you stay healthy  A healthy meal plan includes the following:  · Eat whole-grain foods more often  A healthy meal plan should contain fiber  Fiber is the part of grains, fruits, and vegetables that is not broken down by your body  Whole-grain foods are healthy and provide extra fiber in your diet  Some examples of whole-grain foods are whole-wheat breads and pastas, oatmeal, brown rice, and bulgur  · Eat a variety of vegetables every day  Include dark, leafy greens such as spinach, kale, gerhard greens, and mustard greens  Eat yellow and orange vegetables such as carrots, sweet potatoes, and winter squash  · Eat a variety of fruits every day  Choose fresh or canned fruit (canned in its own juice or light syrup) instead of juice  Fruit juice has very little or no fiber  · Eat low-fat dairy foods  Drink fat-free (skim) milk or 1% milk  Eat fat-free yogurt and low-fat cottage cheese  Try low-fat cheeses such as mozzarella and other reduced-fat cheeses  · Choose meat and other protein foods that are low in fat  Choose beans or other legumes such as split peas or lentils  Choose fish, skinless poultry (chicken or turkey), or lean cuts of red meat (beef or pork)  Before you cook meat or poultry, cut off any visible fat  · Use less fat and oil  Try baking foods instead of frying them  Add less fat, such as margarine, sour cream, regular salad dressing and mayonnaise to foods  Eat fewer high-fat foods  Some examples of high-fat foods include french fries, doughnuts, ice cream, and cakes  · Eat fewer sweets  Limit foods and drinks that are high in sugar  This includes candy, cookies, regular soda, and sweetened drinks  Exercise:  Exercise at least 30 minutes per day on most days of the week  Some examples of exercise include walking, biking, dancing, and swimming  You can also fit in more physical activity by taking the stairs instead of the elevator or parking farther away from stores  Ask your healthcare provider about the best exercise plan for you  © Copyright [employer] 2018 Information is for End User's use only and may not be sold, redistributed or otherwise used for commercial purposes  All illustrations and images included in CareNotes® are the copyrighted property of [employer]  or [facility] Preventive Visit Patient Instructions  Thank you for completing your Welcome to Medicare Visit or Medicare Annual Wellness Visit today  Your next wellness visit will be due in one year (3/25/2021)  The screening/preventive services that you may require over the next 5-10 years are detailed below  Some tests may not apply to you based off risk factors and/or age   Screening tests ordered at today's visit but not completed yet may show as past due  Also, please note that scanned in results may not display below  Preventive Screenings:  Service Recommendations Previous Testing/Comments   Colorectal Cancer Screening  · Colonoscopy    · Fecal Occult Blood Test (FOBT)/Fecal Immunochemical Test (FIT)  · Fecal DNA/Cologuard Test  · Flexible Sigmoidoscopy Age: 54-65 years old   Colonoscopy: every 10 years (May be performed more frequently if at higher risk)  OR  FOBT/FIT: every 1 year  OR  Cologuard: every 3 years  OR  Sigmoidoscopy: every 5 years  Screening may be recommended earlier than age 48 if at higher risk for colorectal cancer  Also, an individualized decision between you and your healthcare provider will decide whether screening between the ages of 74-80 would be appropriate  Colonoscopy: 11/21/2018  FOBT/FIT: 02/21/2019  Cologuard: Not on file  Sigmoidoscopy: Not on file    Screening Current     Prostate Cancer Screening Individualized decision between patient and health care provider in men between ages of 53-78   Medicare will cover every 12 months beginning on the day after your 50th birthday PSA: 2 7 ng/mL     Screening Current     Hepatitis C Screening Once for adults born between 1945 and 1965  More frequently in patients at high risk for Hepatitis C Hep C Antibody: 01/31/2018    Screening Current   Diabetes Screening 1-2 times per year if you're at risk for diabetes or have pre-diabetes Fasting glucose: 107 mg/dL   A1C: 5 6 % of total Hgb    Screening Current   Cholesterol Screening Once every 5 years if you don't have a lipid disorder  May order more often based on risk factors  Lipid panel: 03/17/2020    Screening Not Indicated  History Lipid Disorder      Other Preventive Screenings Covered by Medicare:  6  Abdominal Aortic Aneurysm (AAA) Screening: covered once if your at risk   You're considered to be at risk if you have a family history of AAA or a male between the age of 73-68 who smoking at least 100 cigarettes in your lifetime  7  Lung Cancer Screening: covers low dose CT scan once per year if you meet all of the following conditions: (1) Age 50-69; (2) No signs or symptoms of lung cancer; (3) Current smoker or have quit smoking within the last 15 years; (4) You have a tobacco smoking history of at least 30 pack years (packs per day x number of years you smoked); (5) You get a written order from a healthcare provider  8  Glaucoma Screening: covered annually if you're considered high risk: (1) You have diabetes OR (2) Family history of glaucoma OR (3)  aged 48 and older OR (3)  American aged 72 and older  5  Osteoporosis Screening: covered every 2 years if you meet one of the following conditions: (1) Have a vertebral abnormality; (2) On glucocorticoid therapy for more than 3 months; (3) Have primary hyperparathyroidism; (4) On osteoporosis medications and need to assess response to drug therapy  10  HIV Screening: covered annually if you're between the age of 12-76  Also covered annually if you are younger than 13 and older than 72 with risk factors for HIV infection  For pregnant patients, it is covered up to 3 times per pregnancy  Immunizations:  Immunization Recommendations   Influenza Vaccine Annual influenza vaccination during flu season is recommended for all persons aged >= 6 months who do not have contraindications   Pneumococcal Vaccine (Prevnar and Pneumovax)  * Prevnar = PCV13  * Pneumovax = PPSV23 Adults 25-60 years old: 1-3 doses may be recommended based on certain risk factors  Adults 72 years old: Prevnar (PCV13) vaccine recommended followed by Pneumovax (PPSV23) vaccine  If already received PPSV23 since turning 65, then PCV13 recommended at least one year after PPSV23 dose     Hepatitis B Vaccine 3 dose series if at intermediate or high risk (ex: diabetes, end stage renal disease, liver disease)   Tetanus (Td) Vaccine - COST NOT COVERED BY MEDICARE PART B Following completion of primary series, a booster dose should be given every 10 years to maintain immunity against tetanus  Td may also be given as tetanus wound prophylaxis  Tdap Vaccine - COST NOT COVERED BY MEDICARE PART B Recommended at least once for all adults  For pregnant patients, recommended with each pregnancy  Shingles Vaccine (Shingrix) - COST NOT COVERED BY MEDICARE PART B  2 shot series recommended in those aged 48 and above     Health Maintenance Due:      Topic Date Due    CRC Screening: Colonoscopy  11/21/2021    Hepatitis C Screening  Completed     Immunizations Due:  There are no preventive care reminders to display for this patient  Advance Directives   What are advance directives? Advance directives are legal documents that state your wishes and plans for medical care  These plans are made ahead of time in case you lose your ability to make decisions for yourself  Advance directives can apply to any medical decision, such as the treatments you want, and if you want to donate organs  What are the types of advance directives? There are many types of advance directives, and each state has rules about how to use them  You may choose a combination of any of the following:  · Living will: This is a written record of the treatment you want  You can also choose which treatments you do not want, which to limit, and which to stop at a certain time  This includes surgery, medicine, IV fluid, and tube feedings  · Durable power of  for healthcare [place] [facility]): This is a written record that states who you want to make healthcare choices for you when you are unable to make them for yourself  This person, called a proxy, is usually a family member or a friend  You may choose more than 1 proxy  · Do not resuscitate (DNR) order:  A DNR order is used in case your heart stops beating or you stop breathing  It is a request not to have certain forms of treatment, such as CPR   A DNR order may be included in other types of advance directives  · Medical directive: This covers the care that you want if you are in a coma, near death, or unable to make decisions for yourself  You can list the treatments you want for each condition  Treatment may include pain medicine, surgery, blood transfusions, dialysis, IV or tube feedings, and a ventilator (breathing machine)  · Values history: This document has questions about your views, beliefs, and how you feel and think about life  This information can help others choose the care that you would choose  Why are advance directives important? An advance directive helps you control your care  Although spoken wishes may be used, it is better to have your wishes written down  Spoken wishes can be misunderstood, or not followed  Treatments may be given even if you do not want them  An advance directive may make it easier for your family to make difficult choices about your care  Weight Management   Why it is important to manage your weight:  Being overweight increases your risk of health conditions such as heart disease, high blood pressure, type 2 diabetes, and certain types of cancer  It can also increase your risk for osteoarthritis, sleep apnea, and other respiratory problems  Aim for a slow, steady weight loss  Even a small amount of weight loss can lower your risk of health problems  How to lose weight safely:  A safe and healthy way to lose weight is to eat fewer calories and get regular exercise  You can lose up about 1 pound a week by decreasing the number of calories you eat by 500 calories each day  Healthy meal plan for weight management:  A healthy meal plan includes a variety of foods, contains fewer calories, and helps you stay healthy  A healthy meal plan includes the following:  · Eat whole-grain foods more often  A healthy meal plan should contain fiber  Fiber is the part of grains, fruits, and vegetables that is not broken down by your body   Whole-grain foods are healthy and provide extra fiber in your diet  Some examples of whole-grain foods are whole-wheat breads and pastas, oatmeal, brown rice, and bulgur  · Eat a variety of vegetables every day  Include dark, leafy greens such as spinach, kale, gerhard greens, and mustard greens  Eat yellow and orange vegetables such as carrots, sweet potatoes, and winter squash  · Eat a variety of fruits every day  Choose fresh or canned fruit (canned in its own juice or light syrup) instead of juice  Fruit juice has very little or no fiber  · Eat low-fat dairy foods  Drink fat-free (skim) milk or 1% milk  Eat fat-free yogurt and low-fat cottage cheese  Try low-fat cheeses such as mozzarella and other reduced-fat cheeses  · Choose meat and other protein foods that are low in fat  Choose beans or other legumes such as split peas or lentils  Choose fish, skinless poultry (chicken or turkey), or lean cuts of red meat (beef or pork)  Before you cook meat or poultry, cut off any visible fat  · Use less fat and oil  Try baking foods instead of frying them  Add less fat, such as margarine, sour cream, regular salad dressing and mayonnaise to foods  Eat fewer high-fat foods  Some examples of high-fat foods include french fries, doughnuts, ice cream, and cakes  · Eat fewer sweets  Limit foods and drinks that are high in sugar  This includes candy, cookies, regular soda, and sweetened drinks  Exercise:  Exercise at least 30 minutes per day on most days of the week  Some examples of exercise include walking, biking, dancing, and swimming  You can also fit in more physical activity by taking the stairs instead of the elevator or parking farther away from stores  Ask your healthcare provider about the best exercise plan for you  © Copyright [employer] 2018 Information is for End User's use only and may not be sold, redistributed or otherwise used for commercial purposes   All illustrations and images included in CareNotes® are the copyrighted property of [employer]  or [address] [place], [place] [address]warren andrea

## 2020-03-25 NOTE — ASSESSMENT & PLAN NOTE
Discussed preventative health, cancer screening, immunizations, and safety issues  Patient is up-to-date with all immunizations  Patient is up-to-date with colonoscopy  Patient is up-to-date with lab testing including hepatitis C testing

## 2020-03-25 NOTE — TELEPHONE ENCOUNTER
Pt would like to know if he has to do any blood work before his next appt at the end of September  He said is he needs routine blood work again, please add the testosterone test  If he does not need anything until next year, he will wait to get that done then  Please advise

## 2020-03-25 NOTE — ASSESSMENT & PLAN NOTE
Patient doing well, continue medication and follow-up Urology    Testosterone level was normal at 600 8 February 2018, patient requests to have this checked again in the future with next labs

## 2020-03-27 ENCOUNTER — APPOINTMENT (OUTPATIENT)
Dept: PHYSICAL THERAPY | Facility: MEDICAL CENTER | Age: 70
End: 2020-03-27
Payer: MEDICARE

## 2020-03-31 ENCOUNTER — APPOINTMENT (OUTPATIENT)
Dept: PHYSICAL THERAPY | Facility: MEDICAL CENTER | Age: 70
End: 2020-03-31
Payer: MEDICARE

## 2020-04-08 ENCOUNTER — TELEPHONE (OUTPATIENT)
Dept: OBGYN CLINIC | Facility: HOSPITAL | Age: 70
End: 2020-04-08

## 2020-04-17 ENCOUNTER — OFFICE VISIT (OUTPATIENT)
Dept: PLASTIC SURGERY | Facility: CLINIC | Age: 70
End: 2020-04-17
Payer: MEDICARE

## 2020-04-17 DIAGNOSIS — L98.9 SKIN LESION OF RIGHT LEG: Primary | ICD-10-CM

## 2020-04-17 PROCEDURE — 99214 OFFICE O/P EST MOD 30 MIN: CPT | Performed by: SURGERY

## 2020-04-17 PROCEDURE — 1036F TOBACCO NON-USER: CPT | Performed by: SURGERY

## 2020-04-17 PROCEDURE — 3078F DIAST BP <80 MM HG: CPT | Performed by: SURGERY

## 2020-04-17 PROCEDURE — 88305 TISSUE EXAM BY PATHOLOGIST: CPT | Performed by: PATHOLOGY

## 2020-04-17 PROCEDURE — 1160F RVW MEDS BY RX/DR IN RCRD: CPT | Performed by: SURGERY

## 2020-04-17 PROCEDURE — 1170F FXNL STATUS ASSESSED: CPT | Performed by: SURGERY

## 2020-04-17 PROCEDURE — 4040F PNEUMOC VAC/ADMIN/RCVD: CPT | Performed by: SURGERY

## 2020-04-17 PROCEDURE — 3074F SYST BP LT 130 MM HG: CPT | Performed by: SURGERY

## 2020-04-23 ENCOUNTER — TELEPHONE (OUTPATIENT)
Dept: PLASTIC SURGERY | Facility: CLINIC | Age: 70
End: 2020-04-23

## 2020-05-27 ENCOUNTER — TELEMEDICINE (OUTPATIENT)
Dept: UROLOGY | Facility: CLINIC | Age: 70
End: 2020-05-27
Payer: MEDICARE

## 2020-05-27 DIAGNOSIS — N40.0 BENIGN PROSTATIC HYPERPLASIA WITHOUT LOWER URINARY TRACT SYMPTOMS: Primary | ICD-10-CM

## 2020-05-27 PROCEDURE — 99213 OFFICE O/P EST LOW 20 MIN: CPT | Performed by: PHYSICIAN ASSISTANT

## 2020-06-10 ENCOUNTER — TELEPHONE (OUTPATIENT)
Dept: OBGYN CLINIC | Facility: HOSPITAL | Age: 70
End: 2020-06-10

## 2020-06-10 DIAGNOSIS — M54.12 RADICULOPATHY, CERVICAL REGION: Primary | ICD-10-CM

## 2020-06-15 ENCOUNTER — EVALUATION (OUTPATIENT)
Dept: PHYSICAL THERAPY | Facility: MEDICAL CENTER | Age: 70
End: 2020-06-15
Payer: MEDICARE

## 2020-06-15 DIAGNOSIS — G89.29 CHRONIC NECK PAIN: ICD-10-CM

## 2020-06-15 DIAGNOSIS — M54.12 CERVICAL RADICULOPATHY: Primary | ICD-10-CM

## 2020-06-15 DIAGNOSIS — M54.2 CHRONIC NECK PAIN: ICD-10-CM

## 2020-06-15 PROCEDURE — 97162 PT EVAL MOD COMPLEX 30 MIN: CPT | Performed by: PHYSICAL THERAPIST

## 2020-06-15 PROCEDURE — 97012 MECHANICAL TRACTION THERAPY: CPT | Performed by: PHYSICAL THERAPIST

## 2020-06-15 PROCEDURE — 97140 MANUAL THERAPY 1/> REGIONS: CPT | Performed by: PHYSICAL THERAPIST

## 2020-06-22 ENCOUNTER — OFFICE VISIT (OUTPATIENT)
Dept: PHYSICAL THERAPY | Facility: MEDICAL CENTER | Age: 70
End: 2020-06-22
Payer: MEDICARE

## 2020-06-22 DIAGNOSIS — M54.2 CHRONIC NECK PAIN: ICD-10-CM

## 2020-06-22 DIAGNOSIS — M54.12 CERVICAL RADICULOPATHY: Primary | ICD-10-CM

## 2020-06-22 DIAGNOSIS — G89.29 CHRONIC NECK PAIN: ICD-10-CM

## 2020-06-22 PROCEDURE — 97140 MANUAL THERAPY 1/> REGIONS: CPT | Performed by: PHYSICAL THERAPIST

## 2020-06-22 PROCEDURE — 97012 MECHANICAL TRACTION THERAPY: CPT | Performed by: PHYSICAL THERAPIST

## 2020-06-22 PROCEDURE — 97110 THERAPEUTIC EXERCISES: CPT | Performed by: PHYSICAL THERAPIST

## 2020-06-25 ENCOUNTER — OFFICE VISIT (OUTPATIENT)
Dept: PHYSICAL THERAPY | Facility: MEDICAL CENTER | Age: 70
End: 2020-06-25
Payer: MEDICARE

## 2020-06-25 DIAGNOSIS — M54.2 CHRONIC NECK PAIN: ICD-10-CM

## 2020-06-25 DIAGNOSIS — G89.29 CHRONIC NECK PAIN: ICD-10-CM

## 2020-06-25 DIAGNOSIS — M54.12 CERVICAL RADICULOPATHY: Primary | ICD-10-CM

## 2020-06-25 PROCEDURE — 97012 MECHANICAL TRACTION THERAPY: CPT | Performed by: PHYSICAL THERAPIST

## 2020-06-25 PROCEDURE — 97140 MANUAL THERAPY 1/> REGIONS: CPT | Performed by: PHYSICAL THERAPIST

## 2020-06-25 PROCEDURE — 97110 THERAPEUTIC EXERCISES: CPT | Performed by: PHYSICAL THERAPIST

## 2020-06-29 ENCOUNTER — APPOINTMENT (OUTPATIENT)
Dept: PHYSICAL THERAPY | Facility: MEDICAL CENTER | Age: 70
End: 2020-06-29
Payer: MEDICARE

## 2020-06-30 ENCOUNTER — OFFICE VISIT (OUTPATIENT)
Dept: PHYSICAL THERAPY | Facility: MEDICAL CENTER | Age: 70
End: 2020-06-30
Payer: MEDICARE

## 2020-06-30 DIAGNOSIS — G89.29 CHRONIC NECK PAIN: ICD-10-CM

## 2020-06-30 DIAGNOSIS — M54.2 CHRONIC NECK PAIN: ICD-10-CM

## 2020-06-30 DIAGNOSIS — M54.12 CERVICAL RADICULOPATHY: Primary | ICD-10-CM

## 2020-06-30 PROCEDURE — 97012 MECHANICAL TRACTION THERAPY: CPT | Performed by: PHYSICAL THERAPIST

## 2020-06-30 PROCEDURE — 97140 MANUAL THERAPY 1/> REGIONS: CPT | Performed by: PHYSICAL THERAPIST

## 2020-06-30 PROCEDURE — 97110 THERAPEUTIC EXERCISES: CPT | Performed by: PHYSICAL THERAPIST

## 2020-07-01 ENCOUNTER — OFFICE VISIT (OUTPATIENT)
Dept: PHYSICAL THERAPY | Facility: MEDICAL CENTER | Age: 70
End: 2020-07-01
Payer: MEDICARE

## 2020-07-01 DIAGNOSIS — G89.29 CHRONIC NECK PAIN: ICD-10-CM

## 2020-07-01 DIAGNOSIS — M54.12 CERVICAL RADICULOPATHY: Primary | ICD-10-CM

## 2020-07-01 DIAGNOSIS — M54.2 CHRONIC NECK PAIN: ICD-10-CM

## 2020-07-01 PROCEDURE — 97012 MECHANICAL TRACTION THERAPY: CPT | Performed by: PHYSICAL THERAPIST

## 2020-07-01 PROCEDURE — 97140 MANUAL THERAPY 1/> REGIONS: CPT | Performed by: PHYSICAL THERAPIST

## 2020-07-01 PROCEDURE — 97110 THERAPEUTIC EXERCISES: CPT | Performed by: PHYSICAL THERAPIST

## 2020-07-01 NOTE — PROGRESS NOTES
Daily Note     Today's date: 2020  Patient name: Laurine Canavan  : 1950  MRN: [id number]  Referring provider: Kory Lockhart MD  Dx:   Encounter Diagnosis     ICD-10-CM    1  Cervical radiculopathy M54 12    2  Chronic neck pain M54 2     G89 29                   Subjective: patient states that was able to sleep through the night last night and for the first time notes that his symptoms were nearly gone  He is very happy with his progress  Objective: See treatment diary below      Assessment: Tolerated treatment well  Patient demonstrated fatigue post treatment, exhibited good technique with therapeutic exercises and would benefit from continued PT  Progressed today with more intense manual therapy and progressed with traction as tolerated  HEP was gone over again and patient was again reminded of form and reps  Plan: Continue per plan of care  Precautions: advanced cervical degenerative changes      Manual  2020                C4-7 UPA R Gr  Iv+ 30x3             Thoracic pistol Gr  v            C6-7 rotational mob Gr  Iv-  10x3            C5-7 transverse glide prone  Right  Gr  Iv  10x5            Seated left lateral flexion with right glide of C6-7 Gr  Iv [id number]                Exercise Diary              Self snags cervical 10x2            pec stretch supine in 3 positions overhead [id number]            Chin tucks  15                                                                                                                                                                                                                                             Modalities              Mechanical traction  8min 33/10   25lbs

## 2020-07-06 ENCOUNTER — APPOINTMENT (OUTPATIENT)
Dept: PHYSICAL THERAPY | Facility: MEDICAL CENTER | Age: 70
End: 2020-07-06
Payer: MEDICARE

## 2020-07-07 ENCOUNTER — OFFICE VISIT (OUTPATIENT)
Dept: PHYSICAL THERAPY | Facility: MEDICAL CENTER | Age: 70
End: 2020-07-07
Payer: MEDICARE

## 2020-07-07 DIAGNOSIS — M54.12 CERVICAL RADICULOPATHY: Primary | ICD-10-CM

## 2020-07-07 DIAGNOSIS — M54.2 CHRONIC NECK PAIN: ICD-10-CM

## 2020-07-07 DIAGNOSIS — G89.29 CHRONIC NECK PAIN: ICD-10-CM

## 2020-07-07 PROCEDURE — 97110 THERAPEUTIC EXERCISES: CPT | Performed by: PHYSICAL THERAPIST

## 2020-07-07 PROCEDURE — 97012 MECHANICAL TRACTION THERAPY: CPT | Performed by: PHYSICAL THERAPIST

## 2020-07-07 PROCEDURE — 97140 MANUAL THERAPY 1/> REGIONS: CPT | Performed by: PHYSICAL THERAPIST

## 2020-07-07 NOTE — PROGRESS NOTES
Daily Note     Today's date: 2020  Patient name: Ketty Arteaga  : 1950  MRN: [id number]  Referring provider: Attila Zambrano MD  Dx:   Encounter Diagnosis     ICD-10-CM    1  Cervical radiculopathy M54 12    2  Chronic neck pain M54 2     G89 29                   Subjective: patient states that was able to sleep through the night last night and for the first time notes that his symptoms were nearly gone  Slight discomfort in his hands last night with sleep but overall improved  Objective: See treatment diary below      Assessment: Tolerated treatment well  Patient demonstrated fatigue post treatment, exhibited good technique with therapeutic exercises and would benefit from continued PT  Progressed today with more intense manual therapy and progressed with traction as tolerated  HEP was gone over again and patient was again reminded of form and reps  Plan: Continue per plan of care  Precautions: advanced cervical degenerative changes      Manual  2020                C4-7 UPA R Gr  Iv+ 30x3             Thoracic pistol Gr  v            C6-7 rotational mob Gr  Iv-  10x3            C5-7 transverse glide prone  Right  Gr  Iv  10x5            Seated left lateral flexion with right glide of C6-7 Gr  Iv [id number]                Exercise Diary              Self snags cervical 10x2            pec stretch supine in 3 positions overhead [id number]            Chin tucks  15                                                                                                                                                                                                                                             Modalities              Mechanical traction  8min 33/10   25lbs

## 2020-07-08 ENCOUNTER — OFFICE VISIT (OUTPATIENT)
Dept: PHYSICAL THERAPY | Facility: MEDICAL CENTER | Age: 70
End: 2020-07-08
Payer: MEDICARE

## 2020-07-08 DIAGNOSIS — G89.29 CHRONIC NECK PAIN: ICD-10-CM

## 2020-07-08 DIAGNOSIS — M54.12 CERVICAL RADICULOPATHY: Primary | ICD-10-CM

## 2020-07-08 DIAGNOSIS — M54.2 CHRONIC NECK PAIN: ICD-10-CM

## 2020-07-08 PROCEDURE — 97110 THERAPEUTIC EXERCISES: CPT | Performed by: PHYSICAL THERAPIST

## 2020-07-08 PROCEDURE — 97012 MECHANICAL TRACTION THERAPY: CPT | Performed by: PHYSICAL THERAPIST

## 2020-07-08 PROCEDURE — 97140 MANUAL THERAPY 1/> REGIONS: CPT | Performed by: PHYSICAL THERAPIST

## 2020-07-08 NOTE — PROGRESS NOTES
Daily Note     Today's date: 2020  Patient name: Amy Rogel  : 1950  MRN: [id number]  Referring provider: David Matta MD  Dx:   Encounter Diagnosis     ICD-10-CM    1  Cervical radiculopathy M54 12    2  Chronic neck pain M54 2     G89 29                   Subjective: patient states that he has been putting together more nights of better sleep  He notes that he is happy with his progress and working on self mobilization as well as his HEP  Objective: See treatment diary below      Assessment: Tolerated treatment well  Patient demonstrated fatigue post treatment, exhibited good technique with therapeutic exercises and would benefit from continued PT  Progressed today with more intense manual therapy and progressed with traction as tolerated  HEP was gone over again and patient was again reminded of form and reps  Plan: Continue per plan of care  Precautions: advanced cervical degenerative changes      Manual  2020                C4-7 UPA R Gr  Iv+ 30x3             Thoracic pistol Gr  v            C6-7 rotational mob Gr  Iv-  10x3            C5-7 transverse glide prone  Right  Gr  Iv  10x5            Seated left lateral flexion with right glide of C6-7 Gr  Iv [id number]                Exercise Diary              Self snags cervical 10x2            pec stretch supine in 3 positions overhead [id number]            Chin tucks  15                                                                                                                                                                                                                                             Modalities              Mechanical traction  8min 33/10   25lbs

## 2020-07-13 ENCOUNTER — OFFICE VISIT (OUTPATIENT)
Dept: PHYSICAL THERAPY | Facility: MEDICAL CENTER | Age: 70
End: 2020-07-13
Payer: MEDICARE

## 2020-07-13 DIAGNOSIS — G89.29 CHRONIC NECK PAIN: ICD-10-CM

## 2020-07-13 DIAGNOSIS — M54.2 CHRONIC NECK PAIN: ICD-10-CM

## 2020-07-13 DIAGNOSIS — M54.12 CERVICAL RADICULOPATHY: Primary | ICD-10-CM

## 2020-07-13 PROCEDURE — 97110 THERAPEUTIC EXERCISES: CPT | Performed by: PHYSICAL THERAPIST

## 2020-07-13 PROCEDURE — 97012 MECHANICAL TRACTION THERAPY: CPT | Performed by: PHYSICAL THERAPIST

## 2020-07-13 PROCEDURE — 97140 MANUAL THERAPY 1/> REGIONS: CPT | Performed by: PHYSICAL THERAPIST

## 2020-07-13 NOTE — PROGRESS NOTES
Daily Note     Today's date: 2020  Patient name: Seldon Barthel  : 1950  MRN: [id number]  Referring provider: Colton Sanders MD  Dx:   Encounter Diagnosis     ICD-10-CM    1  Cervical radiculopathy M54 12    2  Chronic neck pain M54 2     G89 29                   Subjective: patient states that he is feeling better overall and happy with his progress  Objective: See treatment diary below      Assessment: Tolerated treatment well  Patient demonstrated fatigue post treatment, exhibited good technique with therapeutic exercises and would benefit from continued PT  Progressed today with more intense manual therapy and progressed with traction as tolerated  HEP was gone over again and patient was again reminded of form and reps  Plan: Continue per plan of care  Precautions: advanced cervical degenerative changes      Manual  2020                C4-7 UPA R Gr  Iv+ 30x3             Thoracic pistol Gr  v            C6-7 rotational mob Gr  Iv-  10x3            C5-7 transverse glide prone  Right  Gr  Iv  10x5            Seated left lateral flexion with right glide of C6-7 Gr  Iv [id number]                Exercise Diary              Self snags cervical 10x2            pec stretch supine in 3 positions overhead [id number]            Chin tucks  15                                                                                                                                                                                                                                             Modalities              Mechanical traction  8min 33/10   25lbs

## 2020-07-16 ENCOUNTER — OFFICE VISIT (OUTPATIENT)
Dept: PHYSICAL THERAPY | Facility: MEDICAL CENTER | Age: 70
End: 2020-07-16
Payer: MEDICARE

## 2020-07-16 DIAGNOSIS — M54.12 CERVICAL RADICULOPATHY: Primary | ICD-10-CM

## 2020-07-16 DIAGNOSIS — M54.2 CHRONIC NECK PAIN: ICD-10-CM

## 2020-07-16 DIAGNOSIS — G89.29 CHRONIC NECK PAIN: ICD-10-CM

## 2020-07-16 PROCEDURE — 97140 MANUAL THERAPY 1/> REGIONS: CPT | Performed by: PHYSICAL THERAPIST

## 2020-07-16 PROCEDURE — 97012 MECHANICAL TRACTION THERAPY: CPT | Performed by: PHYSICAL THERAPIST

## 2020-07-16 NOTE — PROGRESS NOTES
Daily Note     Today's date: 2020  Patient name: Bri Castro  : 1950  MRN: [id number]  Referring provider: Lacey Pham MD  Dx:   Encounter Diagnosis     ICD-10-CM    1  Cervical radiculopathy M54 12    2  Chronic neck pain M54 2     G89 29                   Subjective: patient states that he is feeling better overall and happy with his progress  Getting better sleep  Objective: See treatment diary below      Assessment: Tolerated treatment well  Patient demonstrated fatigue post treatment, exhibited good technique with therapeutic exercises and would benefit from continued PT  Progressed today with more intense manual therapy and progressed with traction as tolerated  HEP was gone over again and patient was again reminded of form and reps  Plan: Continue per plan of care  Precautions: advanced cervical degenerative changes      Manual  2020                C4-7 UPA R Gr  Iv+ 30x3             Thoracic pistol Gr  v            C6-7 rotational mob Gr  Iv-  10x3            C5-7 transverse glide prone  Right  Gr  Iv  10x5            Seated left lateral flexion with right glide of C6-7 Gr  Iv [id number]                Exercise Diary              Self snags cervical 10x2            pec stretch supine in 3 positions overhead [id number]            Chin tucks  15                                                                                                                                                                                                                                             Modalities              Mechanical traction  8min 33/10   25lbs

## 2020-07-20 ENCOUNTER — PREP FOR PROCEDURE (OUTPATIENT)
Dept: OBGYN CLINIC | Facility: HOSPITAL | Age: 70
End: 2020-07-20

## 2020-07-20 DIAGNOSIS — Z11.59 SCREENING FOR VIRAL DISEASE: Primary | ICD-10-CM

## 2020-07-21 ENCOUNTER — OFFICE VISIT (OUTPATIENT)
Dept: PHYSICAL THERAPY | Facility: MEDICAL CENTER | Age: 70
End: 2020-07-21
Payer: MEDICARE

## 2020-07-21 DIAGNOSIS — M54.12 CERVICAL RADICULOPATHY: Primary | ICD-10-CM

## 2020-07-21 DIAGNOSIS — G89.29 CHRONIC NECK PAIN: ICD-10-CM

## 2020-07-21 DIAGNOSIS — M54.2 CHRONIC NECK PAIN: ICD-10-CM

## 2020-07-21 PROCEDURE — 97110 THERAPEUTIC EXERCISES: CPT | Performed by: PHYSICAL THERAPIST

## 2020-07-21 PROCEDURE — 97140 MANUAL THERAPY 1/> REGIONS: CPT | Performed by: PHYSICAL THERAPIST

## 2020-07-21 PROCEDURE — 97012 MECHANICAL TRACTION THERAPY: CPT | Performed by: PHYSICAL THERAPIST

## 2020-07-21 NOTE — PROGRESS NOTES
Daily Note     Today's date: 2020  Patient name: Bri Castro  : 1950  MRN: [id number]  Referring provider: Lacey Pham MD  Dx:   Encounter Diagnosis     ICD-10-CM    1  Cervical radiculopathy M54 12    2  Chronic neck pain M54 2     G89 29                   Subjective: patient states that he is feeling better overall and happy with his progress  Getting better sleep  Notes that if he places his hands behind his head his symptoms seem to lessen  Objective: See treatment diary below      Assessment: Tolerated treatment well  Patient demonstrated fatigue post treatment, exhibited good technique with therapeutic exercises and would benefit from continued PT  Progressed today with more intense manual therapy and progressed with traction as tolerated  HEP was gone over again and patient was again reminded of form and reps  Mobility of cervical spine seems to be improved with longer lasting effects  Plan: Continue per plan of care  Precautions: advanced cervical degenerative changes      Manual  2020                C4-7 UPA R Gr  Iv+ 30x3             Thoracic pistol Gr  v            C6-7 rotational mob Gr  Iv-  10x3            C5-7 transverse glide prone  Right  Gr  Iv  10x5            Seated left lateral flexion with right glide of C6-7 Gr  Iv [id number]                Exercise Diary              Self snags cervical 10x2            pec stretch supine in 3 positions overhead [id number]            Chin tucks  15                                                                                                                                                                                                                                             Modalities              Mechanical traction  8min 33/10   25lbs

## 2020-07-23 ENCOUNTER — OFFICE VISIT (OUTPATIENT)
Dept: PHYSICAL THERAPY | Facility: MEDICAL CENTER | Age: 70
End: 2020-07-23
Payer: MEDICARE

## 2020-07-23 DIAGNOSIS — M54.12 CERVICAL RADICULOPATHY: Primary | ICD-10-CM

## 2020-07-23 DIAGNOSIS — M54.2 CHRONIC NECK PAIN: ICD-10-CM

## 2020-07-23 DIAGNOSIS — G89.29 CHRONIC NECK PAIN: ICD-10-CM

## 2020-07-23 PROCEDURE — 97110 THERAPEUTIC EXERCISES: CPT | Performed by: PHYSICAL THERAPIST

## 2020-07-23 PROCEDURE — 97012 MECHANICAL TRACTION THERAPY: CPT | Performed by: PHYSICAL THERAPIST

## 2020-07-23 PROCEDURE — 97140 MANUAL THERAPY 1/> REGIONS: CPT | Performed by: PHYSICAL THERAPIST

## 2020-07-23 NOTE — PROGRESS NOTES
Daily Note     Today's date: 2020  Patient name: Jasmin Ojeda  : 1950  MRN: [id number]  Referring provider: Jeanie Cortez MD  Dx:   Encounter Diagnosis     ICD-10-CM    1  Cervical radiculopathy M54 12    2  Chronic neck pain M54 2     G89 29                   Subjective: patient states that he is feeling better overall and happy with his progress  Getting better sleep  feels as though his progression has slowed a bit but is dramatically improved from the start of treatment  Objective: See treatment diary below      Assessment: Tolerated treatment well  Patient demonstrated fatigue post treatment, exhibited good technique with therapeutic exercises and would benefit from continued PT  Progressed today with more intense manual therapy and progressed with traction as tolerated  HEP was gone over again and patient was again reminded of form and reps  Mobility of cervical spine seems to be improved with longer lasting effects  Plan: Continue per plan of care  Precautions: advanced cervical degenerative changes      Manual  2020                C4-7 UPA R Gr  Iv+ 30x3             Thoracic pistol Gr  v            C6-7 rotational mob Gr  Iv-  10x3            C5-7 transverse glide prone  Right  Gr  Iv  10x5            Seated left lateral flexion with right glide of C6-7 Gr  Iv [id number]                Exercise Diary              Self snags cervical 10x2            pec stretch supine in 3 positions overhead [id number]            Chin tucks  15                                                                                                                                                                                                                                             Modalities              Mechanical traction  8min 33/10   25lbs

## 2020-07-28 ENCOUNTER — TELEPHONE (OUTPATIENT)
Dept: INTERNAL MEDICINE CLINIC | Facility: CLINIC | Age: 70
End: 2020-07-28

## 2020-07-28 ENCOUNTER — TELEPHONE (OUTPATIENT)
Dept: OBGYN CLINIC | Facility: CLINIC | Age: 70
End: 2020-07-28

## 2020-07-28 ENCOUNTER — APPOINTMENT (OUTPATIENT)
Dept: PHYSICAL THERAPY | Facility: MEDICAL CENTER | Age: 70
End: 2020-07-28
Payer: MEDICARE

## 2020-07-28 ENCOUNTER — TELEPHONE (OUTPATIENT)
Dept: RHEUMATOLOGY | Facility: CLINIC | Age: 70
End: 2020-07-28

## 2020-07-28 DIAGNOSIS — Z20.822 EXPOSURE TO COVID-19 VIRUS: Primary | ICD-10-CM

## 2020-07-28 DIAGNOSIS — Z20.822 EXPOSURE TO COVID-19 VIRUS: ICD-10-CM

## 2020-07-28 PROCEDURE — U0003 INFECTIOUS AGENT DETECTION BY NUCLEIC ACID (DNA OR RNA); SEVERE ACUTE RESPIRATORY SYNDROME CORONAVIRUS 2 (SARS-COV-2) (CORONAVIRUS DISEASE [COVID-19]), AMPLIFIED PROBE TECHNIQUE, MAKING USE OF HIGH THROUGHPUT TECHNOLOGIES AS DESCRIBED BY CMS-2020-01-R: HCPCS

## 2020-07-28 NOTE — TELEPHONE ENCOUNTER
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

## 2020-07-28 NOTE — TELEPHONE ENCOUNTER
Spoke with Mariela kiser: Dr. Klein's response. She states her sister said that Parris was getting some low blood sugars but hasn't kept track of them. She is going to start keeping track and will call us in a week or two with the results to see if any medications need to be adjusted.    entered

## 2020-07-28 NOTE — TELEPHONE ENCOUNTER
Pt cld has PT script for neck therapy   Doesn't sleep at night due to numbness & tingling  Went to PT today and has gone to restraunts at [facility] where he has a home and frequents these places where the life guards frequent that have been exposed  He is concerned now that he has been exposed to Covid  He has no symptoms and now PT won't work on him because he might have been exposed  He is sitting in the Covid line waiting for a script to have a covid test     Pt is looking for script for covid testing and to continue PT  His family doctor is not availble at this time to write a script       Please call him at [phone]

## 2020-07-28 NOTE — TELEPHONE ENCOUNTER
Pt  Stated he had spoken Sandra Wells and explained the circumstances and she was going to speak directly to Dr Wayne Ac about the situation   Pt  Will await a call back from Sandra Ac MA

## 2020-07-28 NOTE — TELEPHONE ENCOUNTER
Per dr rai, COVID testing script has to come from PCP  I tried calling pt to inform  I left message stating this and if he wants I will call his family doctor for him  If pt calls back, please relay message

## 2020-07-29 ENCOUNTER — OFFICE VISIT (OUTPATIENT)
Dept: PHYSICAL THERAPY | Facility: MEDICAL CENTER | Age: 70
End: 2020-07-29
Payer: MEDICARE

## 2020-07-29 DIAGNOSIS — G89.29 CHRONIC NECK PAIN: ICD-10-CM

## 2020-07-29 DIAGNOSIS — M54.2 CHRONIC NECK PAIN: ICD-10-CM

## 2020-07-29 DIAGNOSIS — M54.12 CERVICAL RADICULOPATHY: Primary | ICD-10-CM

## 2020-07-29 LAB — SARS-COV-2 RNA SPEC QL NAA+PROBE: NOT DETECTED

## 2020-07-29 PROCEDURE — 97140 MANUAL THERAPY 1/> REGIONS: CPT | Performed by: PHYSICAL THERAPIST

## 2020-07-29 PROCEDURE — 97110 THERAPEUTIC EXERCISES: CPT | Performed by: PHYSICAL THERAPIST

## 2020-07-29 PROCEDURE — 97012 MECHANICAL TRACTION THERAPY: CPT | Performed by: PHYSICAL THERAPIST

## 2020-07-29 NOTE — PROGRESS NOTES
Re-Evaluation     Today's date: 2020  Patient name: Robson Shah  : 1950  MRN: [id number]  Referring provider: Rachel Claros MD  Dx:   Encounter Diagnosis     ICD-10-CM    1  Cervical radiculopathy M54 12    2  Chronic neck pain M54 2     G89 29                   Subjective: patient states that he is feeling better overall and happy with his progress  Getting better sleep  Notes that numbing is better during the day but continues to have waking at least one time a night  Would like to continue with therapy as his symptoms  Continue to improve  Happy with being able to get some sleep at night  Objective: See treatment diary below  Pain levels 2/10 currently  Numbing rated at 5/10 in the morning   Cervical arom limited in rotation by 20% with discomfort and limited ability to drive at times  Sleeping 75% of the night continues to wake several times a week with numbing and pain in hands  Assessment: Tolerated treatment well  Patient demonstrated fatigue post treatment, exhibited good technique with therapeutic exercises and would benefit from continued PT  Progressed today with more intense manual therapy and progressed with traction as tolerated  HEP was gone over again and patient was again reminded of form and reps  Mobility of cervical spine seems to be improved with longer lasting effects  Patient should continue with treatment at this time as he continues to improve and make gains  Patient has not reached plateau yet and should continue being seen 1-2x a week for 4 weeks  Goals  - Pt I with initial HEP in 1-2 visits GOAL MET  - Improve ROM to [facility] GOAL NOT MET  - Improved Longus Coli and Longus Capitus activation and return of feed forward mechanism  GOAL MET  - Increase Functional Status Measure measured by FOTO by MDIC GOAL NOT MET  - decreased tingling in hands at night by 80% GOAL NOT MET    Plan: Continue per plan of care        Precautions: advanced cervical degenerative changes      Manual  7/29/2020                C4-7 UPA R Gr  Iv+ 30x3             Thoracic pistol Gr  v            C6-7 rotational mob Gr  Iv-  10x3            C5-7 transverse glide prone  Right  Gr  Iv  10x5            Seated left lateral flexion with right glide of C6-7 Gr  Iv [id number]                Exercise Diary              Self snags cervical 10x2            pec stretch supine in 3 positions overhead [id number]            Chin tucks  15                                                                                                                                                                                                                                             Modalities              Mechanical traction  8min 33/10   25lbs

## 2020-07-30 ENCOUNTER — APPOINTMENT (OUTPATIENT)
Dept: PHYSICAL THERAPY | Facility: MEDICAL CENTER | Age: 70
End: 2020-07-30
Payer: MEDICARE

## 2020-08-04 ENCOUNTER — OFFICE VISIT (OUTPATIENT)
Dept: PHYSICAL THERAPY | Facility: MEDICAL CENTER | Age: 70
End: 2020-08-04
Payer: MEDICARE

## 2020-08-04 DIAGNOSIS — M54.12 CERVICAL RADICULOPATHY: Primary | ICD-10-CM

## 2020-08-04 DIAGNOSIS — M54.2 CHRONIC NECK PAIN: ICD-10-CM

## 2020-08-04 DIAGNOSIS — G89.29 CHRONIC NECK PAIN: ICD-10-CM

## 2020-08-04 PROCEDURE — 97012 MECHANICAL TRACTION THERAPY: CPT | Performed by: PHYSICAL THERAPIST

## 2020-08-04 PROCEDURE — 97140 MANUAL THERAPY 1/> REGIONS: CPT | Performed by: PHYSICAL THERAPIST

## 2020-08-04 NOTE — PROGRESS NOTES
Daily Note     Today's date: 2020  Patient name: Carlos A Corona  : 1950  MRN: [id number]  Referring provider: Xander Clarke MD  Dx:   Encounter Diagnosis     ICD-10-CM    1  Cervical radiculopathy  M54 12    2  Chronic neck pain  M54 2     G89 29                   Subjective: patient states that he is feeling better overall and has been able to sleep through the night at times without waking  Happy with his progress  Objective: See treatment diary below      Assessment: Tolerated treatment well  Patient demonstrated fatigue post treatment, exhibited good technique with therapeutic exercises and would benefit from continued PT  Progressed today with more intense manual therapy and progressed with traction as tolerated  HEP was gone over again and patient was again reminded of form and reps  Mobility of cervical spine seems to be improved with longer lasting effects  Plan: Continue per plan of care  Precautions: advanced cervical degenerative changes      Manual  2020                C4-7 UPA R Gr  Iv+ 30x3             Thoracic pistol Gr  v            C6-7 rotational mob Gr  Iv-  10x3            C5-7 transverse glide prone  Right  Gr  Iv  10x5            Seated left lateral flexion with right glide of C6-7 Gr  Iv [id number]                Exercise Diary              Self snags cervical 10x2            pec stretch supine in 3 positions overhead [id number]            Chin tucks  15                                                                                                                                                                                                                                             Modalities              Mechanical traction  8min 33/10   25lbs

## 2020-08-06 ENCOUNTER — OFFICE VISIT (OUTPATIENT)
Dept: PHYSICAL THERAPY | Facility: MEDICAL CENTER | Age: 70
End: 2020-08-06
Payer: MEDICARE

## 2020-08-06 DIAGNOSIS — M54.2 CHRONIC NECK PAIN: ICD-10-CM

## 2020-08-06 DIAGNOSIS — M54.12 CERVICAL RADICULOPATHY: Primary | ICD-10-CM

## 2020-08-06 DIAGNOSIS — G89.29 CHRONIC NECK PAIN: ICD-10-CM

## 2020-08-06 PROCEDURE — 97110 THERAPEUTIC EXERCISES: CPT | Performed by: PHYSICAL THERAPIST

## 2020-08-06 PROCEDURE — 97012 MECHANICAL TRACTION THERAPY: CPT | Performed by: PHYSICAL THERAPIST

## 2020-08-06 PROCEDURE — 97140 MANUAL THERAPY 1/> REGIONS: CPT | Performed by: PHYSICAL THERAPIST

## 2020-08-06 NOTE — PROGRESS NOTES
Daily Note     Today's date: 2020  Patient name: Trina Loaiza  : 1950  MRN: [id number]  Referring provider: Trina Recio MD  Dx:   Encounter Diagnosis     ICD-10-CM    1  Cervical radiculopathy  M54 12    2  Chronic neck pain  M54 2     G89 29                   Subjective: patient states that he is feeling better and is able to sleep through the night  He notes that his symptoms of numbing in the hands is improved in the morning and wakes him very infrequently  Objective: See treatment diary below      Assessment: Tolerated treatment well  Patient demonstrated fatigue post treatment, exhibited good technique with therapeutic exercises and would benefit from continued PT  Progressed today with more intense manual therapy and progressed with traction as tolerated  HEP was gone over again and patient was again reminded of form and reps  Mobility of cervical spine seems to be improved with longer lasting effects  Tightness of cervical spine parom seems to be improved however lags with rotation actively and extension passively  Plan: Continue per plan of care  Precautions: advanced cervical degenerative changes      Manual  2020                C4-7 UPA R Gr  Iv+ 30x3             Thoracic pistol Gr  v            C6-7 rotational mob Gr  Iv-  10x3            C5-7 transverse glide prone  Right  Gr  Iv  10x5            Seated left lateral flexion with right glide of C6-7 Gr  Iv [id number]                Exercise Diary              Self snags cervical 10x2            pec stretch supine in 3 positions overhead [id number]            Chin tucks  15                                                                                                                                                                                                                                             Modalities              Mechanical traction  8min 33/10   25lbs  Amari Black(Attending)

## 2020-08-11 ENCOUNTER — OFFICE VISIT (OUTPATIENT)
Dept: PHYSICAL THERAPY | Facility: MEDICAL CENTER | Age: 70
End: 2020-08-11
Payer: MEDICARE

## 2020-08-11 DIAGNOSIS — M54.2 CHRONIC NECK PAIN: ICD-10-CM

## 2020-08-11 DIAGNOSIS — M54.12 CERVICAL RADICULOPATHY: Primary | ICD-10-CM

## 2020-08-11 DIAGNOSIS — G89.29 CHRONIC NECK PAIN: ICD-10-CM

## 2020-08-11 PROCEDURE — 97110 THERAPEUTIC EXERCISES: CPT | Performed by: PHYSICAL THERAPIST

## 2020-08-11 PROCEDURE — 97140 MANUAL THERAPY 1/> REGIONS: CPT | Performed by: PHYSICAL THERAPIST

## 2020-08-11 PROCEDURE — 97012 MECHANICAL TRACTION THERAPY: CPT | Performed by: PHYSICAL THERAPIST

## 2020-08-11 NOTE — PROGRESS NOTES
Daily Note     Today's date: 2020  Patient name: Ruthellen Bernheim  : 1950  MRN: [id number]  Referring provider: Beatrice Perez MD  Dx:   Encounter Diagnosis     ICD-10-CM    1  Cervical radiculopathy  M54 12    2  Chronic neck pain  M54 2     G89 29                   Subjective: patient states that he is feeling better and is able to sleep through the night  He notes that his symptoms of numbing in the hands is improved in the morning and wakes him very infrequently  Happy with his progression and will be moving to once a week to see if his symptoms will maintain current state  Objective: See treatment diary below      Assessment: Tolerated treatment well  Patient demonstrated fatigue post treatment, exhibited good technique with therapeutic exercises and would benefit from continued PT  Cervical mobility continues to be fair  Mobilization helps however patient does not seem to be maintaining what he has gained with continued stretching or postural exercises  Plan: Continue per plan of care  Precautions: advanced cervical degenerative changes      Manual  2020                C4-7 UPA R Gr  Iv+ 30x3             Thoracic pistol Gr  v            C6-7 rotational mob Gr  Iv-  10x3            C5-7 transverse glide prone  Right  Gr  Iv  10x5            Seated left lateral flexion with right glide of C6-7 Gr  Iv [id number]                Exercise Diary              Self snags cervical 10x2            pec stretch supine in 3 positions overhead [id number]            Chin tucks  15                                                                                                                                                                                                                                             Modalities              Mechanical traction  8min 33/10   25lbs

## 2020-08-13 ENCOUNTER — OFFICE VISIT (OUTPATIENT)
Dept: PHYSICAL THERAPY | Facility: MEDICAL CENTER | Age: 70
End: 2020-08-13
Payer: MEDICARE

## 2020-08-13 DIAGNOSIS — M54.12 CERVICAL RADICULOPATHY: Primary | ICD-10-CM

## 2020-08-13 DIAGNOSIS — G89.29 CHRONIC NECK PAIN: ICD-10-CM

## 2020-08-13 DIAGNOSIS — M54.2 CHRONIC NECK PAIN: ICD-10-CM

## 2020-08-13 PROCEDURE — 97140 MANUAL THERAPY 1/> REGIONS: CPT | Performed by: PHYSICAL THERAPIST

## 2020-08-13 PROCEDURE — 97012 MECHANICAL TRACTION THERAPY: CPT | Performed by: PHYSICAL THERAPIST

## 2020-08-13 PROCEDURE — 97110 THERAPEUTIC EXERCISES: CPT | Performed by: PHYSICAL THERAPIST

## 2020-08-13 NOTE — PROGRESS NOTES
Daily Note     Today's date: 2020  Patient name: Efrain Reinoso  : 1950  MRN: [id number]  Referring provider: Penny Mendoza MD  Dx:   Encounter Diagnosis     ICD-10-CM    1  Cervical radiculopathy  M54 12    2  Chronic neck pain  M54 2     G89 29                   Subjective: patient states that he is feeling numbing in his right hand today after playing golf yesterday  Patient notes that he woke twice last night from the hand  Objective: See treatment diary below      Assessment: Tolerated treatment well  Patient demonstrated fatigue post treatment, exhibited good technique with therapeutic exercises and would benefit from continued PT  Cervical mobility continues to be fair  Focus today on nerve glides and refreshed patient on home program      Plan: Continue per plan of care  Precautions: advanced cervical degenerative changes      Manual  2020                C4-7 UPA R Gr  Iv+ 30x3             Thoracic pistol Gr  v            C6-7 rotational mob Gr  Iv-  10x3            C5-7 transverse glide prone  Right  Gr  Iv  10x5            Seated left lateral flexion with right glide of C6-7 Gr  Iv [id number]                Exercise Diary              Self snags cervical 10x2            pec stretch supine in 3 positions overhead [id number]            Chin tucks  15                                                                                                                                                                                                                                             Modalities              Mechanical traction  8min 33/10   25lbs

## 2020-08-18 ENCOUNTER — OFFICE VISIT (OUTPATIENT)
Dept: PHYSICAL THERAPY | Facility: MEDICAL CENTER | Age: 70
End: 2020-08-18
Payer: MEDICARE

## 2020-08-18 DIAGNOSIS — M54.2 CHRONIC NECK PAIN: ICD-10-CM

## 2020-08-18 DIAGNOSIS — M54.12 CERVICAL RADICULOPATHY: Primary | ICD-10-CM

## 2020-08-18 DIAGNOSIS — G89.29 CHRONIC NECK PAIN: ICD-10-CM

## 2020-08-18 PROCEDURE — 97012 MECHANICAL TRACTION THERAPY: CPT | Performed by: PHYSICAL THERAPIST

## 2020-08-18 PROCEDURE — 97110 THERAPEUTIC EXERCISES: CPT | Performed by: PHYSICAL THERAPIST

## 2020-08-18 PROCEDURE — 97140 MANUAL THERAPY 1/> REGIONS: CPT | Performed by: PHYSICAL THERAPIST

## 2020-08-20 ENCOUNTER — TELEPHONE (OUTPATIENT)
Dept: OBGYN CLINIC | Facility: HOSPITAL | Age: 70
End: 2020-08-20

## 2020-08-20 ENCOUNTER — OFFICE VISIT (OUTPATIENT)
Dept: PHYSICAL THERAPY | Facility: MEDICAL CENTER | Age: 70
End: 2020-08-20
Payer: MEDICARE

## 2020-08-20 DIAGNOSIS — M54.2 CHRONIC NECK PAIN: ICD-10-CM

## 2020-08-20 DIAGNOSIS — G89.29 CHRONIC NECK PAIN: ICD-10-CM

## 2020-08-20 DIAGNOSIS — N52.9 ERECTILE DYSFUNCTION, UNSPECIFIED ERECTILE DYSFUNCTION TYPE: ICD-10-CM

## 2020-08-20 DIAGNOSIS — M54.12 CERVICAL RADICULOPATHY: Primary | ICD-10-CM

## 2020-08-20 PROCEDURE — 97012 MECHANICAL TRACTION THERAPY: CPT | Performed by: PHYSICAL THERAPIST

## 2020-08-20 PROCEDURE — 97110 THERAPEUTIC EXERCISES: CPT | Performed by: PHYSICAL THERAPIST

## 2020-08-20 PROCEDURE — 97140 MANUAL THERAPY 1/> REGIONS: CPT | Performed by: PHYSICAL THERAPIST

## 2020-08-20 RX ORDER — TADALAFIL 5 MG/1
5 TABLET ORAL DAILY
Qty: 90 TABLET | Refills: 3 | Status: SHIPPED | OUTPATIENT
Start: 2020-08-20 | End: 2021-07-21 | Stop reason: SDUPTHER

## 2020-08-20 NOTE — TELEPHONE ENCOUNTER
Patient of Dr Raymond Mills seen in the [facility] office  Patient called for a refill on tadalafil 5 mg faxed to him at [phone] as he like to get his medication from [facility]) at a cheaper price  Patient requesting 90 day supply with 3 refills  Patient was last seen on 05/27/2020  Please advise

## 2020-08-20 NOTE — TELEPHONE ENCOUNTER
I spoke with the patient  We discussed cost-effective pharmacy vendors and discount plan for Tadalafil 5mg  Patient was in agreement with NOT using the previous [facility] vendor  He is requesting Tadalafil 5mg #90 day supply with 3 refills to [facility] in [facility]  GoodRx coupon was texted to the patient as requested  The patient was last evaluated via telemedicine or video visit on 5/27/20 with Sharonda Ramachandran PA-C out of the [facility] location; continuation of the medication was authorized at that time    Request for same, 90 day supply with 3 refills was queued and forwarded to the Advanced Practitioner covering the [facility] location for approval

## 2020-08-20 NOTE — TELEPHONE ENCOUNTER
Syed Leoncio is calling to ask about moving his procedure up  He is currently scheduled for October, but doesn't feel it can wait that long  Syed Zhensheyla is in PT for numbness in his hands  He is unable to swing a golf club  Please advise

## 2020-08-20 NOTE — PROGRESS NOTES
Daily Note     Today's date: 2020  Patient name: Nancy Paul  : 1950  MRN: [id number]  Referring provider: Valerie Chang MD  Dx:   Encounter Diagnosis     ICD-10-CM    1  Cervical radiculopathy  M54 12    2  Chronic neck pain  M54 2     G89 29                   Subjective: patient states that his numbing in his hands seems to be getting worse again at night and was not able to get a full night sleep at all this week  Patient would like to have a follow up with Dr Dariel Oviedo and possibly move up his surgery  Objective: See treatment diary below      Assessment: Patient has reached a plateau with therapy at this point  His pain and numbing at night has returned and in spite of his report of 50% improvement overall PT services are no longer progressing as they should  Dr Dariel Oviedo and Dr Sona Velasquez were both made aware of the situation  The patient was instructed to contact Dr Kenyetta Ray office to discuss his up coming surgery and possible further work up  Plan: Continue per plan of care  Precautions: advanced cervical degenerative changes      Manual  2020                C4-7 UPA R Gr  Iv+ 30x3             Thoracic pistol Gr  v            C6-7 rotational mob Gr  Iv-  10x3            C5-7 transverse glide prone  Right  Gr  Iv  10x5            Seated left lateral flexion with right glide of C6-7 Gr  Iv [id number]                Exercise Diary              Self snags cervical 10x2            pec stretch supine in 3 positions overhead [id number]            Chin tucks  15            UE nerve glides 15                                                                                                                                                                                                                                Modalities              Mechanical traction  8min 33/10   25lbs

## 2020-08-24 ENCOUNTER — OFFICE VISIT (OUTPATIENT)
Dept: OBGYN CLINIC | Facility: CLINIC | Age: 70
End: 2020-08-24
Payer: MEDICARE

## 2020-08-24 VITALS
BODY MASS INDEX: 25.68 KG/M2 | HEIGHT: 69 IN | SYSTOLIC BLOOD PRESSURE: 144 MMHG | DIASTOLIC BLOOD PRESSURE: 82 MMHG | WEIGHT: 173.4 LBS

## 2020-08-24 DIAGNOSIS — M72.0 DUPUYTREN CONTRACTURE: Primary | ICD-10-CM

## 2020-08-24 PROCEDURE — 3008F BODY MASS INDEX DOCD: CPT | Performed by: ORTHOPAEDIC SURGERY

## 2020-08-24 PROCEDURE — 3077F SYST BP >= 140 MM HG: CPT | Performed by: ORTHOPAEDIC SURGERY

## 2020-08-24 PROCEDURE — 4040F PNEUMOC VAC/ADMIN/RCVD: CPT | Performed by: ORTHOPAEDIC SURGERY

## 2020-08-24 PROCEDURE — 1160F RVW MEDS BY RX/DR IN RCRD: CPT | Performed by: ORTHOPAEDIC SURGERY

## 2020-08-24 PROCEDURE — 1036F TOBACCO NON-USER: CPT | Performed by: ORTHOPAEDIC SURGERY

## 2020-08-24 PROCEDURE — 99214 OFFICE O/P EST MOD 30 MIN: CPT | Performed by: ORTHOPAEDIC SURGERY

## 2020-08-24 PROCEDURE — 3079F DIAST BP 80-89 MM HG: CPT | Performed by: ORTHOPAEDIC SURGERY

## 2020-08-24 NOTE — H&P (VIEW-ONLY)
ASSESSMENT/PLAN:    Assessment:   Trigger Finger  right  long finger  Dupuytrens Disease of the  right long finger, ring finger, and small finger    Plan:   66-year-old male presenting with right long finger trigger finger and right long, ring, and small finger Dupuytren's disease  He was previously scheduled for Dupuytren's excision to the right ring and small fingers, and presents today for updated H&P and to answer some of his questions  Follow Up: After Surgery    To Do Next Visit:  Sutures out    General Discussions:  Dupuytren's Disease: The anatomy and physiology of Dupuytren's disease were discussed with the patient today in the office  Increased scar formation within the interval between the skin volarly and the flexor tendons dorsally can result in pit formation, nodular formation, or eventual cord formation  These pathologic cords can cause contracture at either the metacarpophalangeal joint, proximal interphalangeal joint, or both  As the cords progress towards the proximal interphalangeal joint, the neurovascular structures of the finger may become involved within the disease process  While this is a genetic condition, variable penetrance occurs within family trees  Conservative treatment options including therapy to maintain joint mobility and a tabletop test were discussed  Other treatments include Xiaflex and possible surgical intervention  Operative Discussions:  Trigger Finger Release: The anatomy and physiology of trigger finger was discussed with the patient today in the office  Edema and increased contact pressure within the flexor tendons at the A1 pulley can cause pain, crepitation, and limitation of function  Treatment options include resting MP blocking splints to decrease edema, oral anti-inflammatory medications, home or formal therapy exercises, up to 2 steroid injections or surgical release    While majority of patients do respond to conservative treatment, up to 20% may require surgical release  The patient has elected release of the trigger finger  The patient has elected to undergo a release of the A1 pulley (trigger finger)  A small incision will be made over the palmar aspect of the hand, the tendon sheath holding the flexor tendons will be released  In the postoperative period, light activities are allowed immediately, driving is allowed when narcotic medication has stopped, and the incision may get wet after 2 days  Heavy activities (lifting more than approximately 10 pounds) will be allowed after the follow up appointment in 1-2 weeks  While the pain and discomfort within the wrist typically improves rapidly, some residual discomfort may be present for up to 6 weeks  The nodule that is typically palpable in the palmar aspect of the hand will not be removed, as this would necessitate removal of a portion of the flexor tendon, however the catching, clicking, and locking should resolve  Approximate success rate is 98%  The risks and benefits of the procedure were explained to the patient, which include, but are not limited to: Bleeding, infection, recurrence, pain, scar, damage to tendons, damage to nerves, and damage to blood vessels, need for future surgery and complications related to anesthesia  If bony work is done, risks also include malunion and nonunion  These risks, along with alternative conservative treatment options, and postoperative protocols were voiced back and understood by the patient  All questions were answered to the patient's satisfaction  The patient agrees to comply with a standard postoperative protocol, and is willing to proceed  Education was provided via written and auditory forms  There were no barriers to learning  Written handouts regarding wound care, incision and scar care, and general preoperative information, as well as risks and benefits were provided to the patient    Standard Consent: The risks and benefits of the procedure were explained to the patient, which include, but are not limited to: Bleeding, infection, recurrence, pain, scar, damage to tendons, damage to nerves, and damage to blood vessels, failure to give desired results and complications related to anesthesia  These risks, along with alternative conservative treatment options, and postoperative protocols were voiced back and understood by the patient  All questions were answered to the patient's satisfaction  The patient agrees to comply with a standard postoperative protocol, and is willing to proceed  Education was provided via written and auditory forms  There were no barriers to learning  Written handouts regarding wound care, incision and scar care, and general preoperative information was provided to the patient  Prior to surgery, the patient may be requested to stop all anti-inflammatory medications  Prophylactic aspirin, Plavix, and Coumadin may be allowed to be continued  Medications including vitamin E , ginkgo, and fish oil are requested to be stopped approximately one week prior to surgery  Hypertensive medications and beta blockers, if taken, should be continued  _____________________________________________________  CHIEF COMPLAINT:  Chief Complaint   Patient presents with    Right Hand - Follow-up         SUBJECTIVE:  Star Coleman is a 65 y o  male who presents with right hand contracture  Patient was previously evaluated for Dupuytren's disease, and tried/failed xiaflex injections  He was previously scheduled for right ring and small finger Dupuytren's excision  He is now beginning to experience symptoms in the long finger  He reports pain long finger, as well as catching and locking  Pain is worse with palpation or stretching, better with rest   Patient does report some numbness in the C7 distribution        PAST MEDICAL HISTORY:  Past Medical History:   Diagnosis Date    Cancer ([facility] [address] )     scc back    Carpal tunnel syndrome     ED (erectile dysfunction)     GERD (gastroesophageal reflux disease)     [place] (hard of hearing)     wears hearing aids    Hypercholesteremia     Hypertension     Numbness in both hands        PAST SURGICAL HISTORY:  Past Surgical History:   Procedure Laterality Date    APPENDECTOMY      As a child    COLONOSCOPY      INGUINAL HERNIA REPAIR Left 2015    MASS EXCISION N/A 8/14/2018    Procedure: BACK/TRUNK MULTIPLE SEBBORHEIC KERATOSIS SHAVE/EXCISION; COMPLEX CLOSURE VERSUS FLAP RECONSTRUCTION;  Surgeon: Arben Castaneda MD;  [facility];  Service: Plastics    SQUAMOUS CELL CARCINOMA EXCISION N/A 8/14/2018    Procedure: BACK SCC IN SITU EXCISION;  Surgeon: Arben Castaneda MD;  [facility];  Service: Plastics       FAMILY HISTORY:  Family History   Problem Relation Age of Onset    Colon cancer Father     Heart attack Mother     Breast cancer Mother        SOCIAL HISTORY:  Social History     Tobacco Use    Smoking status: Never Smoker    Smokeless tobacco: Never Used   Substance Use Topics    Alcohol use:  Yes     Alcohol/week: 14 0 standard drinks     Types: 14 Standard drinks or equivalent per week     Comment: 2 Drinks a night    Drug use: No       MEDICATIONS:    Current Outpatient Medications:     amLODIPine (NORVASC) 5 mg tablet, Take 1 tablet (5 mg total) by mouth daily, Disp: 90 tablet, Rfl: 3    atorvastatin (LIPITOR) 20 mg tablet, Take 1 tablet (20 mg total) by mouth daily, Disp: 90 tablet, Rfl: 3    lisinopril (ZESTRIL) 20 mg tablet, Take 1 tablet (20 mg total) by mouth daily, Disp: 90 tablet, Rfl: 2    omeprazole (PriLOSEC) 20 mg delayed release capsule, Take by mouth, Disp: , Rfl:     Probiotic Product (PROBIOTIC DAILY PO), Take by mouth, Disp: , Rfl:     psyllium (METAMUCIL SMOOTH TEXTURE) 58 6 % powder, Take by mouth, Disp: , Rfl:     tadalafil (Cialis) 5 MG tablet, Take 1 tablet (5 mg total) by mouth daily, Disp: 90 tablet, Rfl: 3    ALLERGIES:  No Known Allergies    REVIEW OF SYSTEMS:  Pertinent items are noted in HPI  A comprehensive review of systems was negative  LABS:  HgA1c:   Lab Results   Component Value Date    HGBA1C 5 6 03/17/2020     BMP:   Lab Results   Component Value Date    GLUCOSE 122 06/03/2015    CALCIUM 9 4 03/17/2020     12/20/2016    K 4 3 03/17/2020    CO2 28 03/17/2020     03/17/2020    BUN 12 03/17/2020    CREATININE 0 86 03/17/2020         _____________________________________________________  PHYSICAL EXAMINATION:  Vital signs: /82   Ht 5' 8 5" (1 74 m)   Wt 78 7 kg (173 lb 6 4 oz)   BMI 25 98 kg/m²   General: well developed and well nourished, alert, oriented times 3 and appears comfortable  Psychiatric: Normal  HEENT: Trachea Midline, No torticollis  Cardiovascular: No discernable arrhythmia  Pulmonary: No wheezing or stridor  Skin: No masses, erythema, lacerations, fluctation, ulcerations  Neurovascular: Sensation Intact to the Median, Ulnar, Radial Nerve, Motor Intact to the Median, Ulnar, Radial Nerve and Pulses Intact    MUSCULOSKELETAL EXAMINATION:  MSK right upper extremity  -skin intact over the hand without erythema or ecchymosis  -palpable cords to the long, ring, and small finger   -palpable nodule in the flexor tendon of the long finger with catching and triggering   -40 degree flexion contracture at the PIP joint of the small finger, 20 degree flexion contracture at the PIP joint of the ring finger  No contractures at the MP or DIP joints  No contracture to the  -SILT m/r/u  -motor intact FDI, FDP2, FDP5, EDC, APB  -2 sec cap refill    _____________________________________________________  STUDIES REVIEWED:  No Studies to review      PROCEDURES PERFORMED:  No Procedures performed today      I interviewed, took the history and examined the patient  I discuss the case with the resident and reviewed the resident's note    I supervised the resident and I agree with the resident management plan as it was presented to me  I was present in the clinic and examined the patient

## 2020-08-24 NOTE — PROGRESS NOTES
ASSESSMENT/PLAN:    Assessment:   Trigger Finger  right  long finger  Dupuytrens Disease of the  right long finger, ring finger, and small finger    Plan:   49-year-old male presenting with right long finger trigger finger and right long, ring, and small finger Dupuytren's disease  He was previously scheduled for Dupuytren's excision to the right ring and small fingers, and presents today for updated H&P and to answer some of his questions  Follow Up: After Surgery    To Do Next Visit:  Sutures out    General Discussions:  Dupuytren's Disease: The anatomy and physiology of Dupuytren's disease were discussed with the patient today in the office  Increased scar formation within the interval between the skin volarly and the flexor tendons dorsally can result in pit formation, nodular formation, or eventual cord formation  These pathologic cords can cause contracture at either the metacarpophalangeal joint, proximal interphalangeal joint, or both  As the cords progress towards the proximal interphalangeal joint, the neurovascular structures of the finger may become involved within the disease process  While this is a genetic condition, variable penetrance occurs within family trees  Conservative treatment options including therapy to maintain joint mobility and a tabletop test were discussed  Other treatments include Xiaflex and possible surgical intervention  Operative Discussions:  Trigger Finger Release: The anatomy and physiology of trigger finger was discussed with the patient today in the office  Edema and increased contact pressure within the flexor tendons at the A1 pulley can cause pain, crepitation, and limitation of function  Treatment options include resting MP blocking splints to decrease edema, oral anti-inflammatory medications, home or formal therapy exercises, up to 2 steroid injections or surgical release    While majority of patients do respond to conservative treatment, up to 20% may require surgical release  The patient has elected release of the trigger finger  The patient has elected to undergo a release of the A1 pulley (trigger finger)  A small incision will be made over the palmar aspect of the hand, the tendon sheath holding the flexor tendons will be released  In the postoperative period, light activities are allowed immediately, driving is allowed when narcotic medication has stopped, and the incision may get wet after 2 days  Heavy activities (lifting more than approximately 10 pounds) will be allowed after the follow up appointment in 1-2 weeks  While the pain and discomfort within the wrist typically improves rapidly, some residual discomfort may be present for up to 6 weeks  The nodule that is typically palpable in the palmar aspect of the hand will not be removed, as this would necessitate removal of a portion of the flexor tendon, however the catching, clicking, and locking should resolve  Approximate success rate is 98%  The risks and benefits of the procedure were explained to the patient, which include, but are not limited to: Bleeding, infection, recurrence, pain, scar, damage to tendons, damage to nerves, and damage to blood vessels, need for future surgery and complications related to anesthesia  If bony work is done, risks also include malunion and nonunion  These risks, along with alternative conservative treatment options, and postoperative protocols were voiced back and understood by the patient  All questions were answered to the patient's satisfaction  The patient agrees to comply with a standard postoperative protocol, and is willing to proceed  Education was provided via written and auditory forms  There were no barriers to learning  Written handouts regarding wound care, incision and scar care, and general preoperative information, as well as risks and benefits were provided to the patient    Standard Consent: The risks and benefits of the procedure were explained to the patient, which include, but are not limited to: Bleeding, infection, recurrence, pain, scar, damage to tendons, damage to nerves, and damage to blood vessels, failure to give desired results and complications related to anesthesia  These risks, along with alternative conservative treatment options, and postoperative protocols were voiced back and understood by the patient  All questions were answered to the patient's satisfaction  The patient agrees to comply with a standard postoperative protocol, and is willing to proceed  Education was provided via written and auditory forms  There were no barriers to learning  Written handouts regarding wound care, incision and scar care, and general preoperative information was provided to the patient  Prior to surgery, the patient may be requested to stop all anti-inflammatory medications  Prophylactic aspirin, Plavix, and Coumadin may be allowed to be continued  Medications including vitamin E , ginkgo, and fish oil are requested to be stopped approximately one week prior to surgery  Hypertensive medications and beta blockers, if taken, should be continued  _____________________________________________________  CHIEF COMPLAINT:  Chief Complaint   Patient presents with    Right Hand - Follow-up         SUBJECTIVE:  Sai Hines is a 65 y o  male who presents with right hand contracture  Patient was previously evaluated for Dupuytren's disease, and tried/failed xiaflex injections  He was previously scheduled for right ring and small finger Dupuytren's excision  He is now beginning to experience symptoms in the long finger  He reports pain long finger, as well as catching and locking  Pain is worse with palpation or stretching, better with rest   Patient does report some numbness in the C7 distribution        PAST MEDICAL HISTORY:  Past Medical History:   Diagnosis Date    Cancer ([facility] [address] )     scc back    Carpal tunnel syndrome     ED (erectile dysfunction)     GERD (gastroesophageal reflux disease)     [place] (hard of hearing)     wears hearing aids    Hypercholesteremia     Hypertension     Numbness in both hands        PAST SURGICAL HISTORY:  Past Surgical History:   Procedure Laterality Date    APPENDECTOMY      As a child    COLONOSCOPY      INGUINAL HERNIA REPAIR Left 2015    MASS EXCISION N/A 8/14/2018    Procedure: BACK/TRUNK MULTIPLE SEBBORHEIC KERATOSIS SHAVE/EXCISION; COMPLEX CLOSURE VERSUS FLAP RECONSTRUCTION;  Surgeon: Arben Castaneda MD;  [facility];  Service: Plastics    SQUAMOUS CELL CARCINOMA EXCISION N/A 8/14/2018    Procedure: BACK SCC IN SITU EXCISION;  Surgeon: Arben Castaneda MD;  [facility];  Service: Plastics       FAMILY HISTORY:  Family History   Problem Relation Age of Onset    Colon cancer Father     Heart attack Mother     Breast cancer Mother        SOCIAL HISTORY:  Social History     Tobacco Use    Smoking status: Never Smoker    Smokeless tobacco: Never Used   Substance Use Topics    Alcohol use:  Yes     Alcohol/week: 14 0 standard drinks     Types: 14 Standard drinks or equivalent per week     Comment: 2 Drinks a night    Drug use: No       MEDICATIONS:    Current Outpatient Medications:     amLODIPine (NORVASC) 5 mg tablet, Take 1 tablet (5 mg total) by mouth daily, Disp: 90 tablet, Rfl: 3    atorvastatin (LIPITOR) 20 mg tablet, Take 1 tablet (20 mg total) by mouth daily, Disp: 90 tablet, Rfl: 3    lisinopril (ZESTRIL) 20 mg tablet, Take 1 tablet (20 mg total) by mouth daily, Disp: 90 tablet, Rfl: 2    omeprazole (PriLOSEC) 20 mg delayed release capsule, Take by mouth, Disp: , Rfl:     Probiotic Product (PROBIOTIC DAILY PO), Take by mouth, Disp: , Rfl:     psyllium (METAMUCIL SMOOTH TEXTURE) 58 6 % powder, Take by mouth, Disp: , Rfl:     tadalafil (Cialis) 5 MG tablet, Take 1 tablet (5 mg total) by mouth daily, Disp: 90 tablet, Rfl: 3    ALLERGIES:  No Known Allergies    REVIEW OF SYSTEMS:  Pertinent items are noted in HPI  A comprehensive review of systems was negative  LABS:  HgA1c:   Lab Results   Component Value Date    HGBA1C 5 6 03/17/2020     BMP:   Lab Results   Component Value Date    GLUCOSE 122 06/03/2015    CALCIUM 9 4 03/17/2020     12/20/2016    K 4 3 03/17/2020    CO2 28 03/17/2020     03/17/2020    BUN 12 03/17/2020    CREATININE 0 86 03/17/2020         _____________________________________________________  PHYSICAL EXAMINATION:  Vital signs: /82   Ht 5' 8 5" (1 74 m)   Wt 78 7 kg (173 lb 6 4 oz)   BMI 25 98 kg/m²   General: well developed and well nourished, alert, oriented times 3 and appears comfortable  Psychiatric: Normal  HEENT: Trachea Midline, No torticollis  Cardiovascular: No discernable arrhythmia  Pulmonary: No wheezing or stridor  Skin: No masses, erythema, lacerations, fluctation, ulcerations  Neurovascular: Sensation Intact to the Median, Ulnar, Radial Nerve, Motor Intact to the Median, Ulnar, Radial Nerve and Pulses Intact    MUSCULOSKELETAL EXAMINATION:  MSK right upper extremity  -skin intact over the hand without erythema or ecchymosis  -palpable cords to the long, ring, and small finger   -palpable nodule in the flexor tendon of the long finger with catching and triggering   -40 degree flexion contracture at the PIP joint of the small finger, 20 degree flexion contracture at the PIP joint of the ring finger  No contractures at the MP or DIP joints  No contracture to the  -SILT m/r/u  -motor intact FDI, FDP2, FDP5, EDC, APB  -2 sec cap refill    _____________________________________________________  STUDIES REVIEWED:  No Studies to review      PROCEDURES PERFORMED:  No Procedures performed today      I interviewed, took the history and examined the patient  I discuss the case with the resident and reviewed the resident's note    I supervised the resident and I agree with the resident management plan as it was presented to me  I was present in the clinic and examined the patient

## 2020-08-24 NOTE — H&P
ASSESSMENT/PLAN:    Assessment:   Trigger Finger  right  long finger  Dupuytrens Disease of the  right long finger, ring finger, and small finger    Plan:   68-year-old male presenting with right long finger trigger finger and right long, ring, and small finger Dupuytren's disease  He was previously scheduled for Dupuytren's excision to the right ring and small fingers, and presents today for updated H&P and to answer some of his questions  Follow Up: After Surgery    To Do Next Visit:  Sutures out    General Discussions:  Dupuytren's Disease: The anatomy and physiology of Dupuytren's disease were discussed with the patient today in the office  Increased scar formation within the interval between the skin volarly and the flexor tendons dorsally can result in pit formation, nodular formation, or eventual cord formation  These pathologic cords can cause contracture at either the metacarpophalangeal joint, proximal interphalangeal joint, or both  As the cords progress towards the proximal interphalangeal joint, the neurovascular structures of the finger may become involved within the disease process  While this is a genetic condition, variable penetrance occurs within family trees  Conservative treatment options including therapy to maintain joint mobility and a tabletop test were discussed  Other treatments include Xiaflex and possible surgical intervention  Operative Discussions:  Trigger Finger Release: The anatomy and physiology of trigger finger was discussed with the patient today in the office  Edema and increased contact pressure within the flexor tendons at the A1 pulley can cause pain, crepitation, and limitation of function  Treatment options include resting MP blocking splints to decrease edema, oral anti-inflammatory medications, home or formal therapy exercises, up to 2 steroid injections or surgical release    While majority of patients do respond to conservative treatment, up to 20% may require surgical release  The patient has elected release of the trigger finger  The patient has elected to undergo a release of the A1 pulley (trigger finger)  A small incision will be made over the palmar aspect of the hand, the tendon sheath holding the flexor tendons will be released  In the postoperative period, light activities are allowed immediately, driving is allowed when narcotic medication has stopped, and the incision may get wet after 2 days  Heavy activities (lifting more than approximately 10 pounds) will be allowed after the follow up appointment in 1-2 weeks  While the pain and discomfort within the wrist typically improves rapidly, some residual discomfort may be present for up to 6 weeks  The nodule that is typically palpable in the palmar aspect of the hand will not be removed, as this would necessitate removal of a portion of the flexor tendon, however the catching, clicking, and locking should resolve  Approximate success rate is 98%  The risks and benefits of the procedure were explained to the patient, which include, but are not limited to: Bleeding, infection, recurrence, pain, scar, damage to tendons, damage to nerves, and damage to blood vessels, need for future surgery and complications related to anesthesia  If bony work is done, risks also include malunion and nonunion  These risks, along with alternative conservative treatment options, and postoperative protocols were voiced back and understood by the patient  All questions were answered to the patient's satisfaction  The patient agrees to comply with a standard postoperative protocol, and is willing to proceed  Education was provided via written and auditory forms  There were no barriers to learning  Written handouts regarding wound care, incision and scar care, and general preoperative information, as well as risks and benefits were provided to the patient    Standard Consent: The risks and benefits of the procedure were explained to the patient, which include, but are not limited to: Bleeding, infection, recurrence, pain, scar, damage to tendons, damage to nerves, and damage to blood vessels, failure to give desired results and complications related to anesthesia  These risks, along with alternative conservative treatment options, and postoperative protocols were voiced back and understood by the patient  All questions were answered to the patient's satisfaction  The patient agrees to comply with a standard postoperative protocol, and is willing to proceed  Education was provided via written and auditory forms  There were no barriers to learning  Written handouts regarding wound care, incision and scar care, and general preoperative information was provided to the patient  Prior to surgery, the patient may be requested to stop all anti-inflammatory medications  Prophylactic aspirin, Plavix, and Coumadin may be allowed to be continued  Medications including vitamin E , ginkgo, and fish oil are requested to be stopped approximately one week prior to surgery  Hypertensive medications and beta blockers, if taken, should be continued  _____________________________________________________  CHIEF COMPLAINT:  Chief Complaint   Patient presents with    Right Hand - Follow-up         SUBJECTIVE:  Amy Rogel is a 65 y o  male who presents with right hand contracture  Patient was previously evaluated for Dupuytren's disease, and tried/failed xiaflex injections  He was previously scheduled for right ring and small finger Dupuytren's excision  He is now beginning to experience symptoms in the long finger  He reports pain long finger, as well as catching and locking  Pain is worse with palpation or stretching, better with rest   Patient does report some numbness in the C7 distribution        PAST MEDICAL HISTORY:  Past Medical History:   Diagnosis Date    Cancer ([facility] [address] )     scc back    Carpal tunnel syndrome     ED (erectile dysfunction)     GERD (gastroesophageal reflux disease)     [place] (hard of hearing)     wears hearing aids    Hypercholesteremia     Hypertension     Numbness in both hands        PAST SURGICAL HISTORY:  Past Surgical History:   Procedure Laterality Date    APPENDECTOMY      As a child    COLONOSCOPY      INGUINAL HERNIA REPAIR Left 2015    MASS EXCISION N/A 8/14/2018    Procedure: BACK/TRUNK MULTIPLE SEBBORHEIC KERATOSIS SHAVE/EXCISION; COMPLEX CLOSURE VERSUS FLAP RECONSTRUCTION;  Surgeon: Clifford Sexton MD;  Location: [facility];  Service: Plastics    SQUAMOUS CELL CARCINOMA EXCISION N/A 8/14/2018    Procedure: BACK SCC IN SITU EXCISION;  Surgeon: Clifford Sexton MD;  Location: [facility];  Service: Plastics       FAMILY HISTORY:  Family History   Problem Relation Age of Onset    Colon cancer Father     Heart attack Mother     Breast cancer Mother        SOCIAL HISTORY:  Social History     Tobacco Use    Smoking status: Never Smoker    Smokeless tobacco: Never Used   Substance Use Topics    Alcohol use:  Yes     Alcohol/week: 14 0 standard drinks     Types: 14 Standard drinks or equivalent per week     Comment: 2 Drinks a night    Drug use: No       MEDICATIONS:    Current Outpatient Medications:     amLODIPine (NORVASC) 5 mg tablet, Take 1 tablet (5 mg total) by mouth daily, Disp: 90 tablet, Rfl: 3    atorvastatin (LIPITOR) 20 mg tablet, Take 1 tablet (20 mg total) by mouth daily, Disp: 90 tablet, Rfl: 3    lisinopril (ZESTRIL) 20 mg tablet, Take 1 tablet (20 mg total) by mouth daily, Disp: 90 tablet, Rfl: 2    omeprazole (PriLOSEC) 20 mg delayed release capsule, Take by mouth, Disp: , Rfl:     Probiotic Product (PROBIOTIC DAILY PO), Take by mouth, Disp: , Rfl:     psyllium (METAMUCIL SMOOTH TEXTURE) 58 6 % powder, Take by mouth, Disp: , Rfl:     tadalafil (Cialis) 5 MG tablet, Take 1 tablet (5 mg total) by mouth daily, Disp: 90 tablet, Rfl: 3    ALLERGIES:  No Known Allergies    REVIEW OF SYSTEMS:  Pertinent items are noted in HPI  A comprehensive review of systems was negative  LABS:  HgA1c:   Lab Results   Component Value Date    HGBA1C 5 6 03/17/2020     BMP:   Lab Results   Component Value Date    GLUCOSE 122 06/03/2015    CALCIUM 9 4 03/17/2020     12/20/2016    K 4 3 03/17/2020    CO2 28 03/17/2020     03/17/2020    BUN 12 03/17/2020    CREATININE 0 86 03/17/2020         _____________________________________________________  PHYSICAL EXAMINATION:  Vital signs: /82   Ht 5' 8 5" (1 74 m)   Wt 78 7 kg (173 lb 6 4 oz)   BMI 25 98 kg/m²   General: well developed and well nourished, alert, oriented times 3 and appears comfortable  Psychiatric: Normal  HEENT: Trachea Midline, No torticollis  Cardiovascular: No discernable arrhythmia  Pulmonary: No wheezing or stridor  Skin: No masses, erythema, lacerations, fluctation, ulcerations  Neurovascular: Sensation Intact to the Median, Ulnar, Radial Nerve, Motor Intact to the Median, Ulnar, Radial Nerve and Pulses Intact    MUSCULOSKELETAL EXAMINATION:  MSK right upper extremity  -skin intact over the hand without erythema or ecchymosis  -palpable cords to the long, ring, and small finger   -palpable nodule in the flexor tendon of the long finger with catching and triggering   -40 degree flexion contracture at the PIP joint of the small finger, 20 degree flexion contracture at the PIP joint of the ring finger  No contractures at the MP or DIP joints  No contracture to the  -SILT m/r/u  -motor intact FDI, FDP2, FDP5, EDC, APB  -2 sec cap refill    _____________________________________________________  STUDIES REVIEWED:  No Studies to review      PROCEDURES PERFORMED:  No Procedures performed today      I interviewed, took the history and examined the patient  I discuss the case with the resident and reviewed the resident's note    I supervised the resident and I agree with the resident management plan as it was presented to me  I was present in the clinic and examined the patient

## 2020-08-25 ENCOUNTER — ANESTHESIA EVENT (OUTPATIENT)
Dept: PERIOP | Facility: HOSPITAL | Age: 70
End: 2020-08-25
Payer: MEDICARE

## 2020-08-25 NOTE — PRE-PROCEDURE INSTRUCTIONS
Pre-Surgery Instructions:   Medication Instructions    amLODIPine (NORVASC) 5 mg tablet Instructed patient per Anesthesia Guidelines   atorvastatin (LIPITOR) 20 mg tablet Instructed patient per Anesthesia Guidelines   lisinopril (ZESTRIL) 20 mg tablet Instructed patient per Anesthesia Guidelines   omeprazole (PriLOSEC) 20 mg delayed release capsule Instructed patient per Anesthesia Guidelines   Probiotic Product (PROBIOTIC DAILY PO) Instructed patient per Anesthesia Guidelines   psyllium (METAMUCIL SMOOTH TEXTURE) 58 6 % powder Instructed patient per Anesthesia Guidelines   tadalafil (Cialis) 5 MG tablet Instructed patient per Anesthesia Guidelines  Pre-op Showering Instructions for Surgery Patients    Before your operation, you play an important role in decreasing your risk for infection by washing with special antiseptic soap  This is an effective way to reduce bacteria on the skin which may help to prevent infections at the surgical site  Please read the following directions in advance  1  In the week before your operation, purchase a 4 ounce bottle of antiseptic soap containing chlorhexidine gluconate (CHG)  4%  Some brand names include: Aplicare®, Endure, and Hibiclens®  The cost is usually less than $5 00   For your convenience, the [employer] carries the soap   It may also be available at your doctors office or pre-admission testing center, and at most retail pharmacies   If you are allergic or sensitive to soaps containing CHG, please let your doctor know so another antiseptic can be suggested   CHG antiseptic soap is for external use only  2   The day before your operation, follow these instructions carefully to get ready   Please clean linens (sheets) on your bed; you should sleep on clean sheets after your evening shower   Get clean towels and washcloth ready  you need enough for 2 showers   Set aside clean underwear, pajamas, and clothing to wear after the showers     Reminders:   DO NOT use any other soap or body rinse on your skin during or after the antiseptic showers   DO NOT use lotion, powder, deodorant, or perfume/aftershave of any kind on your skin after your antiseptic shower   DO NOT shave any body parts in the 24 hours/day before your operation   DO NOT get the antiseptic soap in your eyes, ears, nose, mouth, or vaginal area    3  You will need to shower the night before AND the morning of your surgery  Shower 1:   The first evening before the operation, take the first shower   First, shampoo your hair with regular shampoo and rinse it completely before you use the antiseptic soap  After washing and rinsing your hair, rinse your body   Next, use a clean washcloth to apply the antiseptic soap and wash your body from the neck down to your toes using ½ bottle of the antiseptic soap  You will use the other ½ bottle for the second shower   Clean the area where your incision will be; lather this area well for about 2 minutes   If you are having head or neck surgery, wash areas with the antiseptic soap   Rinse yourself completely with running water   Use a clean towel to dry off   Wear clean underwear and clothing/pajamas  Shower 2   The morning of your operation, take the second shower following the same steps as Shower 1 using the second ½ of the bottle of antiseptic soap   Use clean cloths and towels to wash and dry yourself   Wear clean underwear and clothing  You will receive a phone call from hospital for arrival time  Please call surgeons office if any changes in your condition  Wear easy on/off clothing; consider type of surgery;  valuables and jewelry please keep at home  **COVID-19  education done  Please: No contacts or eye make up or artificial eyelashes    Please bring special ordered sling or braces if needed for  Your particular surgery   n/a    Please secure transportation     Follow pre surgery showering or cleaning instructions as  Reviewed by nurse or surgeons office      Questions answered and concerns addressed

## 2020-08-27 ENCOUNTER — ANESTHESIA (OUTPATIENT)
Dept: PERIOP | Facility: HOSPITAL | Age: 70
End: 2020-08-27
Payer: MEDICARE

## 2020-08-27 ENCOUNTER — HOSPITAL ENCOUNTER (OUTPATIENT)
Facility: HOSPITAL | Age: 70
Setting detail: OUTPATIENT SURGERY
Discharge: HOME | End: 2020-08-27
Attending: ORTHOPAEDIC SURGERY | Admitting: ORTHOPAEDIC SURGERY
Payer: MEDICARE

## 2020-08-27 VITALS
OXYGEN SATURATION: 93 % | HEART RATE: 68 BPM | DIASTOLIC BLOOD PRESSURE: 66 MMHG | WEIGHT: 174.4 LBS | BODY MASS INDEX: 26.43 KG/M2 | RESPIRATION RATE: 25 BRPM | SYSTOLIC BLOOD PRESSURE: 131 MMHG | TEMPERATURE: 98.1 F | HEIGHT: 68 IN

## 2020-08-27 DIAGNOSIS — M72.0 DUPUYTREN'S CONTRACTURE OF RIGHT HAND: ICD-10-CM

## 2020-08-27 DIAGNOSIS — G56.03 BILATERAL CARPAL TUNNEL SYNDROME: Primary | ICD-10-CM

## 2020-08-27 PROCEDURE — 26123 RELEASE PALM CONTRACTURE: CPT | Performed by: ORTHOPAEDIC SURGERY

## 2020-08-27 PROCEDURE — 88304 TISSUE EXAM BY PATHOLOGIST: CPT | Performed by: PATHOLOGY

## 2020-08-27 PROCEDURE — 26125 RELEASE PALM CONTRACTURE: CPT | Performed by: ORTHOPAEDIC SURGERY

## 2020-08-27 RX ORDER — MEPERIDINE HYDROCHLORIDE 25 MG/ML
12.5 INJECTION INTRAMUSCULAR; INTRAVENOUS; SUBCUTANEOUS
Status: DISCONTINUED | OUTPATIENT
Start: 2020-08-27 | End: 2020-08-27 | Stop reason: HOSPADM

## 2020-08-27 RX ORDER — LIDOCAINE HYDROCHLORIDE AND EPINEPHRINE 10; 10 MG/ML; UG/ML
INJECTION, SOLUTION INFILTRATION; PERINEURAL AS NEEDED
Status: DISCONTINUED | OUTPATIENT
Start: 2020-08-27 | End: 2020-08-27 | Stop reason: HOSPADM

## 2020-08-27 RX ORDER — NAPROXEN 500 MG/1
500 TABLET ORAL ONCE
Status: DISCONTINUED | OUTPATIENT
Start: 2020-08-27 | End: 2020-08-27 | Stop reason: HOSPADM

## 2020-08-27 RX ORDER — SENNOSIDES 8.6 MG
650 CAPSULE ORAL EVERY 8 HOURS
Qty: 15 TABLET | Refills: 0 | Status: SHIPPED | OUTPATIENT
Start: 2020-08-27 | End: 2020-09-30 | Stop reason: ALTCHOICE

## 2020-08-27 RX ORDER — ROPIVACAINE HYDROCHLORIDE 5 MG/ML
INJECTION, SOLUTION EPIDURAL; INFILTRATION; PERINEURAL
Status: COMPLETED | OUTPATIENT
Start: 2020-08-27 | End: 2020-08-27

## 2020-08-27 RX ORDER — FENTANYL CITRATE/PF 50 MCG/ML
25 SYRINGE (ML) INJECTION
Status: DISCONTINUED | OUTPATIENT
Start: 2020-08-27 | End: 2020-08-27 | Stop reason: HOSPADM

## 2020-08-27 RX ORDER — HYDROMORPHONE HCL/PF 1 MG/ML
0.5 SYRINGE (ML) INJECTION
Status: DISCONTINUED | OUTPATIENT
Start: 2020-08-27 | End: 2020-08-27 | Stop reason: HOSPADM

## 2020-08-27 RX ORDER — CEFADROXIL 500 MG/1
500 CAPSULE ORAL EVERY 12 HOURS SCHEDULED
Qty: 10 CAPSULE | Refills: 0 | Status: SHIPPED | OUTPATIENT
Start: 2020-08-27 | End: 2020-09-01

## 2020-08-27 RX ORDER — EPHEDRINE SULFATE 50 MG/ML
INJECTION INTRAVENOUS AS NEEDED
Status: DISCONTINUED | OUTPATIENT
Start: 2020-08-27 | End: 2020-08-27

## 2020-08-27 RX ORDER — METOCLOPRAMIDE HYDROCHLORIDE 5 MG/ML
10 INJECTION INTRAMUSCULAR; INTRAVENOUS ONCE AS NEEDED
Status: DISCONTINUED | OUTPATIENT
Start: 2020-08-27 | End: 2020-08-27 | Stop reason: HOSPADM

## 2020-08-27 RX ORDER — CEFAZOLIN SODIUM 2 G/50ML
2000 SOLUTION INTRAVENOUS ONCE
Status: DISCONTINUED | OUTPATIENT
Start: 2020-08-27 | End: 2020-08-27 | Stop reason: HOSPADM

## 2020-08-27 RX ORDER — ACETAMINOPHEN 325 MG/1
650 TABLET ORAL ONCE
Status: DISCONTINUED | OUTPATIENT
Start: 2020-08-27 | End: 2020-08-27 | Stop reason: HOSPADM

## 2020-08-27 RX ORDER — HYDRALAZINE HYDROCHLORIDE 20 MG/ML
5 INJECTION INTRAMUSCULAR; INTRAVENOUS
Status: DISCONTINUED | OUTPATIENT
Start: 2020-08-27 | End: 2020-08-27 | Stop reason: HOSPADM

## 2020-08-27 RX ORDER — LABETALOL 20 MG/4 ML (5 MG/ML) INTRAVENOUS SYRINGE
5
Status: DISCONTINUED | OUTPATIENT
Start: 2020-08-27 | End: 2020-08-27 | Stop reason: HOSPADM

## 2020-08-27 RX ORDER — NAPROXEN SODIUM 220 MG
220 TABLET ORAL 2 TIMES DAILY WITH MEALS
Qty: 10 TABLET | Refills: 0 | Status: SHIPPED | OUTPATIENT
Start: 2020-08-27 | End: 2020-09-30 | Stop reason: ALTCHOICE

## 2020-08-27 RX ORDER — HYDROMORPHONE HCL/PF 1 MG/ML
0.2 SYRINGE (ML) INJECTION
Status: DISCONTINUED | OUTPATIENT
Start: 2020-08-27 | End: 2020-08-27 | Stop reason: HOSPADM

## 2020-08-27 RX ORDER — PROPOFOL 10 MG/ML
INJECTION, EMULSION INTRAVENOUS CONTINUOUS PRN
Status: DISCONTINUED | OUTPATIENT
Start: 2020-08-27 | End: 2020-08-27

## 2020-08-27 RX ORDER — HYDROCODONE BITARTRATE AND ACETAMINOPHEN 5; 325 MG/1; MG/1
1 TABLET ORAL EVERY 6 HOURS PRN
Status: DISCONTINUED | OUTPATIENT
Start: 2020-08-27 | End: 2020-08-27 | Stop reason: HOSPADM

## 2020-08-27 RX ORDER — ONDANSETRON 2 MG/ML
4 INJECTION INTRAMUSCULAR; INTRAVENOUS ONCE AS NEEDED
Status: DISCONTINUED | OUTPATIENT
Start: 2020-08-27 | End: 2020-08-27 | Stop reason: HOSPADM

## 2020-08-27 RX ORDER — MIDAZOLAM HYDROCHLORIDE 2 MG/2ML
INJECTION, SOLUTION INTRAMUSCULAR; INTRAVENOUS
Status: COMPLETED | OUTPATIENT
Start: 2020-08-27 | End: 2020-08-27

## 2020-08-27 RX ORDER — SODIUM CHLORIDE, SODIUM LACTATE, POTASSIUM CHLORIDE, CALCIUM CHLORIDE 600; 310; 30; 20 MG/100ML; MG/100ML; MG/100ML; MG/100ML
75 INJECTION, SOLUTION INTRAVENOUS CONTINUOUS
Status: DISCONTINUED | OUTPATIENT
Start: 2020-08-27 | End: 2020-08-27 | Stop reason: HOSPADM

## 2020-08-27 RX ORDER — HYDROCODONE BITARTRATE AND ACETAMINOPHEN 5; 325 MG/1; MG/1
1 TABLET ORAL EVERY 6 HOURS PRN
Qty: 10 TABLET | Refills: 0 | Status: SHIPPED | OUTPATIENT
Start: 2020-08-27 | End: 2020-09-06

## 2020-08-27 RX ORDER — DOCUSATE SODIUM 100 MG/1
100 CAPSULE, LIQUID FILLED ORAL 2 TIMES DAILY
Qty: 10 CAPSULE | Refills: 0 | Status: SHIPPED | OUTPATIENT
Start: 2020-08-27 | End: 2020-09-09

## 2020-08-27 RX ADMIN — EPHEDRINE SULFATE 10 MG: 50 INJECTION, SOLUTION INTRAVENOUS at 13:41

## 2020-08-27 RX ADMIN — MIDAZOLAM 2 MG: 1 INJECTION INTRAMUSCULAR; INTRAVENOUS at 11:23

## 2020-08-27 RX ADMIN — MIDAZOLAM 1 MG: 1 INJECTION INTRAMUSCULAR; INTRAVENOUS at 12:58

## 2020-08-27 RX ADMIN — ROPIVACAINE HYDROCHLORIDE 30 ML: 5 INJECTION, SOLUTION EPIDURAL; INFILTRATION; PERINEURAL at 11:23

## 2020-08-27 RX ADMIN — PROPOFOL 75 MCG/KG/MIN: 10 INJECTION, EMULSION INTRAVENOUS at 12:44

## 2020-08-27 RX ADMIN — MIDAZOLAM 1 MG: 1 INJECTION INTRAMUSCULAR; INTRAVENOUS at 12:47

## 2020-08-27 RX ADMIN — SODIUM CHLORIDE, SODIUM LACTATE, POTASSIUM CHLORIDE, AND CALCIUM CHLORIDE 75 ML/HR: .6; .31; .03; .02 INJECTION, SOLUTION INTRAVENOUS at 10:40

## 2020-08-27 RX ADMIN — SODIUM CHLORIDE, SODIUM LACTATE, POTASSIUM CHLORIDE, AND CALCIUM CHLORIDE: .6; .31; .03; .02 INJECTION, SOLUTION INTRAVENOUS at 14:10

## 2020-08-27 NOTE — INTERVAL H&P NOTE
H&P reviewed  After examining the patient I find no changes in the patients condition since the H&P had been written      Vitals:    08/27/20 1041   BP: 152/70   Pulse:    Resp:    Temp:    SpO2:

## 2020-08-27 NOTE — ANESTHESIA PREPROCEDURE EVALUATION
Procedure:  RELEASE CONTRACTURE DUPYTREN RIGHT RING LONG  AND SMALL FINGERS (Right Hand)  RELEASE TRIGGER LONG FINGER (Right Hand)    Relevant Problems   ANESTHESIA  Pt reports h/o constipation with GA  CARDIO   (+) HTN (hypertension), benign   (+) Mixed hyperlipidemia   (+) SOB (shortness of breath) on exertion      ENDO (within normal limits)      GI/HEPATIC   (+) Chronic GERD      /RENAL (within normal limits)      GYN (within normal limits)      HEMATOLOGY (within normal limits)      MUSCULOSKELETAL (within normal limits)      NEURO/PSYCH   (+) Anxiety      PULMONARY   (+) SOB (shortness of breath) on exertion   (-) Smoking      Other   (+) Radiculopathy, cervical region (bilateral, intermittent, especially at night)        Physical Exam    Airway    Mallampati score: II  TM Distance: >3 FB  Neck ROM: full     Dental   No notable dental hx     Cardiovascular  Rhythm: regular, Rate: normal, Cardiovascular exam normal    Pulmonary  Pulmonary exam normal Breath sounds clear to auscultation,     Other Findings        Anesthesia Plan  ASA Score- 2     Anesthesia Type- regional and IV sedation with anesthesia with ASA Monitors  Additional Monitors:   Airway Plan:           Plan Factors-Exercise tolerance (METS): >4 METS  Chart reviewed  Patient summary reviewed  Patient is not a current smoker  Induction- intravenous  Postoperative Plan- Plan for postoperative opioid use  Informed Consent- Anesthetic plan and risks discussed with patient  I personally reviewed this patient with the CRNA  Discussed and agreed on the Anesthesia Plan with the CRNA  Ellis Perdomo

## 2020-08-27 NOTE — OP NOTE
OPERATIVE REPORT  PATIENT NAME: Ruthellen Bernheim  :  1950  MRN: [id number]  Pt Location: [facility]    SURGERY DATE: 20    Surgeon(s) and Role:     * Mary Mitchell MD - Primary     * Betina Tee MD - Assisting     * Sayra Juarez PA-C - Assisting    Pre-Op Diagnosis:  Dupuytren's contracture of right hand [M72 0]  Trigger finger right long finger [M65 331]    Post-Op Diagnosis:    Dupuytren's contracture of right hand [M72 0]  Trigger finger right long finger [M65 331]    Procedure(s) (LRB):  1  Excision Dupuytren's central cord right long finger with release of metacarpophalangeal joint  2  Excision Dupuytren's right ring finger spiral cord ulnar aspect with release of the proximal interphalangeal joint  3  Excision Dupuytren's right small finger central cord and spiral cord to both radial and ulnar side, as well as ulnar-sided cord of the digital sheath and release of the proximal interphalangeal joint  (Right)  4  Right long finger trigger finger release (Right)  5  Application short-arm splint (Right)    Specimen(s):  Order Name Source Comment Collection Info Order Time   TISSUE EXAM Hand, Right  Collected By: Mary Mitchell MD 2020  2:10 PM       Estimated Blood Loss:   Minimal      Anesthesia Type:   General    Operative Indications: The patient has a history of Dupuytren's disease with contractures of the metacarpophalangeal joint of the right long finger, proximal interphalangeal joint of the right ring finger and small finger  As well as trigger finger to the right long finger that was recalcitrant to conservative management  The decision was made to bring the patient to the operating room for Dupuytren's release of the right hand long finger, ring finger, and small finger with long finger trigger finger release    Risks of the procedure were explained which include, but are not limited to bleeding; infection; damage to nerves, arteries,veins, tendons; scar; pain; need for reoperation; failure to give desired result; and risks of anaesthesia  All questions were answered to satisfaction and they were willing to proceed  Operative Findings:  1  Right long finger trigger finger  2  Right long finger central cord  3  Right ring finger spiral cord on the ulnar side  4  Right small finger central cord  5  Right small finger radial spiral cord  6  Right small finger ulnar spiral cord  7  Right small finger ulnar cord of the digital sheath  8  Preoperative contractures right long finger metacarpophalangeal joint, ring finger and small finger proximal interphalangeal joint corrected to 0    Complications:   None    Procedure and Technique:  After the patient, site, and procedure were identified, the patient was brought into the operating room in a supine position  Regional anaesthesia and sedation were provided  A well padded tourniquet was applied to the extremity, set at 250 mmHg  The  right upper extremity was then prepped and drapped in a normal, sterile, orthopedic fashion  After the patient, site, and procedure identified attention was turned towards the right hand  An Esmarch bandage was used to exsanguinate the limb the tourniquet was inflated to 250 mmHg  A Regan incision was made in the palmar aspect of the hand just proximal to the A1 pulley  We dissected down through the skin and subcutaneous tissues elevating skin flaps  The radial and ulnar digital arteries and nerves were identified and a large central cord was also identified  This was dissected from the radial and ulnar digital arteries and nerves and off the flexor tendon sheath  This was excised and measured approximately 2 cm in length and sent for routine pathologic evaluation    Once this was done, we identified the A1 pulley in its entirety and released this under direct visualization from distal to proximal   At this point, trigger finger had been released the tendons were noted to have minimal fraying, and these were able to glide freely without any catching, clicking, or locking  Attention was then turned towards the ring finger  A Brunner incision was made centered over the proximal interphalangeal joint  We dissected down through the skin subcutaneous tissues elevating the skin flap at this point we identified both the radial and ulnar digital arteries and nerves and Shira's from proximal to distal and identified the flexor tendons  A spiral cord of the ring finger was then identified with the proximal interphalangeal joint contraction of approximately 30°  While we protected the digital arteries and nerves as well as flexor tendons, the spiral cord which extended into the digital sheath was then completely dissected free and was removed and sent for pathologic evaluation  This measured approximately 3 cm in length  At the completion of this, we were able to extend the proximal interphalangeal joint to complete extension  Attention was then turned towards the right hand small finger  A Regan incision was then made starting proximal to the palmar flexion crease and extending to the level of the distal interphalangeal joint  We elevated skin flaps both radially and ulnarly  We identified the radial common digital artery and nerve that was traced distally to the level of the distal interphalangeal joint as it became the proper digital artery and nerve on the radial side of the small finger  We then turned our attention towards the ulnar aspect and identified the ulnar digital artery and nerve and traced this to the level of the distal interphalangeal joint  We then identified the flexor tendon and traced this to the level of the distal interphalangeal joint  What was remaining was a large central cord extending to both radial and ulnar spiral cords and eventually a ulnar-sided cord of the para digital sheath  These were removed in their entirety    The Dupuytren's extended from the level of the palmar flexion crease to the distal interphalangeal joint  At the completion what was a preoperative flexion contracture of the proximal interphalangeal joint which measured approximately 60° and metacarpophalangeal joint of approximately 20° was able to be completely extended in the operating room  The wound was copiously irrigated with sterile saline solution and the tourniquet was deflated demonstrated rapid return of blood supply into the hand and fingers  No evidence of arterial injury was identified  We traced the digital nerves in their entirety as well confirming no significant injury  At this point we were satisfied with our Dupuytren's excision  At the completion of the procedure, hemostasis was obtained with cautery and direct pressure  The wounds were copiously irrigated with sterile solution  The wounds were closed with Prolene  Sterile dressings were applied, including Xeroform, gauze, tweeners, webril, ACE and Volar Splint  Please note, all sponge, needle, and instrument counts were correct prior to closure  Loupe magnification was utilized  The patient tolerated the procedure well       I was present for all critical portions of the procedure    Patient Disposition:  PACU  and hemodynamically stable    SIGNATURE: Sanjuanita Salcedo MD  DATE: 08/27/20  TIME: 2:23 PM

## 2020-08-27 NOTE — DISCHARGE INSTRUCTIONS
Post Operative Instructions    You have had surgery on your arm today, please read and follow the information below:  · Elevate your hand above your elbow during the next 24-48 hours to help with swelling  · Place your hand and arm over your head with motion at your shoulder three times a day  · Do not apply any cream/ointment/oil to your incisions including antibiotics  · Do not soak your hands in standing water (dishwater, tubs, Jacuzzi's, pools, etc ) until given permission (typically 2-3 weeks after injury)    Call the office if you notice any:  · Increased numbness or tingling of your hand or fingers that is not relieved with elevation  · Increasing pain that is not controlled with medication  · Difficulty chewing, breathing, swallowing  · Chest pains or shortness of breath  · Fever over 101 4 degrees  Bandage: Your therapist will remove your bandage at your first therapy appointment  Motion: DO NOT move your fingers until given permission in follow-up  Weight bearing status: The operated extremity should be non-weight bearing until further notice  Ice: Ice for 10 minutes every hour as needed for swelling x 24 hours  Sling: Sling for comfort for 2-3 days  Medications:   Naproxen 220 mg two times a day   Tylenol Extended Release 650 mg every 8 hours  Norco/Hydrocodone one tab every 6 hours AS NEEDED for pain  Duricef 500 mg every 12 hours     Follow-up Appointment: 7-10 days  Please call the office if you have any questions or concerns regarding your post-operative care

## 2020-08-27 NOTE — ANESTHESIA PROCEDURE NOTES
Peripheral Block    Patient location during procedure: pre-op  Start time: 8/27/2020 11:23 AM  Reason for block: at surgeon's request and post-op pain management  Staffing  Anesthesiologist: Kassandra Aparicio MD  Performed: anesthesiologist   Preanesthetic Checklist  Completed: patient identified, site marked, surgical consent, pre-op evaluation, timeout performed, IV checked, risks and benefits discussed and monitors and equipment checked  Peripheral Block  Patient position: sitting  Prep: Betadine  Patient monitoring: heart rate, cardiac monitor, continuous pulse ox and frequent blood pressure checks  Block type: supraclavicular  Laterality: right  Injection technique: single-shot  Procedures: ultrasound guided, Ultrasound guidance required for the procedure to increase accuracy and safety of medication placement and decrease risk of complications    Ultrasound permanent image savedropivacaine (NAROPIN) 0 5 % perineural infiltration, 30 mL  midazolam (VERSED) 2 mg/2 mL IV, 2 mg  Needle  Needle type: Stimuplex   Needle gauge: 21 G  Needle length: 10 cm  Needle localization: anatomical landmarks and ultrasound guidance  Test dose: negative  Assessment  Injection assessment: incremental injection, local visualized surrounding nerve on ultrasound, negative aspiration for CSF, negative aspiration for heme and no paresthesia on injection  Paresthesia pain: none  Heart rate change: no  Slow fractionated injection: yes  Post-procedure:  site cleaned  patient tolerated the procedure well with no immediate complications

## 2020-08-27 NOTE — ANESTHESIA POSTPROCEDURE EVALUATION
Post-Op Assessment Note    CV Status:  Stable  Pain Score: 0    Pain management: adequate     Mental Status:  Alert and arousable   Hydration Status:  Euvolemic   PONV Controlled:  Controlled   Airway Patency:  Patent      Post Op Vitals Reviewed: Yes      Staff: CRNA         No complications documented      BP      Temp      Pulse     Resp      SpO2

## 2020-08-29 ENCOUNTER — TELEPHONE (OUTPATIENT)
Dept: OTHER | Facility: OTHER | Age: 70
End: 2020-08-29

## 2020-08-29 NOTE — TELEPHONE ENCOUNTER
He would like to move his upcoming appointment schedule on 9/9/2020 at 9:15 AM with Tim Shields for his post up  He wanted to see if it can be moved up to either Tuesday or Wednesday or Thursday next week as he is in town

## 2020-08-31 ENCOUNTER — TELEPHONE (OUTPATIENT)
Dept: OBGYN CLINIC | Facility: HOSPITAL | Age: 70
End: 2020-08-31

## 2020-08-31 NOTE — TELEPHONE ENCOUNTER
Doug didn't realize these were sent as different messages - pt had surgery 8/27  The very earliest we'd want to take his sutures out is 7 days after surgery, but with him having as extensive incisions as he does, we do typically prefer to have these in longer as there is a chance that his incisions may not be healed and he'd just have to come back in the next week

## 2020-08-31 NOTE — TELEPHONE ENCOUNTER
Yes for this standard for the type of surgery he had  Therapy will take his surgical dressing down and give him a new splint to wear  They'll work on ROM of his fingers and will be a second eye for us to make sure his wounds are doing OK

## 2020-08-31 NOTE — TELEPHONE ENCOUNTER
Pt  Was made aware and verbalized understanding  Pt  Will keep his appt  For next week , pt  Also stated he has his fist PT scheduled for 09/16/2020

## 2020-08-31 NOTE — TELEPHONE ENCOUNTER
I called and tried to move the appointment per patients message but the patient had surgery on 8/27/20 and insisted on speaking to Angelina to find out if moving his appointment this week would be too soon? He would like to know what the protocol is  Please call patient at # [phone]      Thank you

## 2020-08-31 NOTE — TELEPHONE ENCOUNTER
Spoke to pt  Who stated the soonest appt  He can get with the therapist is 09/16/2020  Can you please clarify when the pt  Should have his first post op appt  Would you like to see pt  Next week as schedule or before then please advise   Pt  Stated someone called him this evening stating he has to be seen at his post op visit before his PT, I didn't see any notes except what was placed by PA

## 2020-08-31 NOTE — TELEPHONE ENCOUNTER
Patient sees Dr Rowena Beyer     Patient called in stating that PT is calling him to set up PT for his hand and he's questioning if  this correct or not since he hasnt had his Post -Op appmt yet   Thank you    cb [phone]

## 2020-08-31 NOTE — TELEPHONE ENCOUNTER
Spoke with patient personally and went over typical protocol for his surgery  We already spoke with Beatris Bates to help the patient get into therapy sooner within the next few days for his first therapy appointment  He will keep his postop on 9/9 to get sutures removed  He has another appt scheduled for 9/23 which we will cancel b/c it's not necessary  Explained that the therapist will give him a removable nocturnal splint at his 1st therapy appointment

## 2020-08-31 NOTE — TELEPHONE ENCOUNTER
Pt's therapy should be started within 5-7 days after his surgery  I believe he may have moved his appointment to after his 1st post op not realizing we want him to see therapy first  Can we please reschedule? Should be 9/1-9/3   Thanks!!!

## 2020-09-01 ENCOUNTER — EVALUATION (OUTPATIENT)
Dept: PHYSICAL THERAPY | Facility: MEDICAL CENTER | Age: 70
End: 2020-09-01
Payer: MEDICARE

## 2020-09-01 ENCOUNTER — OFFICE VISIT (OUTPATIENT)
Dept: PHYSICAL THERAPY | Facility: MEDICAL CENTER | Age: 70
End: 2020-09-01
Payer: MEDICARE

## 2020-09-01 DIAGNOSIS — M54.12 CERVICAL RADICULOPATHY: Primary | ICD-10-CM

## 2020-09-01 DIAGNOSIS — Z47.89 AFTERCARE FOLLOWING SURGERY OF THE MUSCULOSKELETAL SYSTEM: ICD-10-CM

## 2020-09-01 DIAGNOSIS — M72.0 DUPUYTREN'S CONTRACTURE OF RIGHT HAND: Primary | ICD-10-CM

## 2020-09-01 DIAGNOSIS — G89.29 CHRONIC NECK PAIN: ICD-10-CM

## 2020-09-01 DIAGNOSIS — M54.2 CHRONIC NECK PAIN: ICD-10-CM

## 2020-09-01 PROCEDURE — 97760 ORTHOTIC MGMT&TRAING 1ST ENC: CPT

## 2020-09-01 PROCEDURE — 97161 PT EVAL LOW COMPLEX 20 MIN: CPT

## 2020-09-01 PROCEDURE — 97012 MECHANICAL TRACTION THERAPY: CPT | Performed by: PHYSICAL THERAPIST

## 2020-09-01 PROCEDURE — 97140 MANUAL THERAPY 1/> REGIONS: CPT

## 2020-09-01 PROCEDURE — 97140 MANUAL THERAPY 1/> REGIONS: CPT | Performed by: PHYSICAL THERAPIST

## 2020-09-01 PROCEDURE — 97110 THERAPEUTIC EXERCISES: CPT | Performed by: PHYSICAL THERAPIST

## 2020-09-01 NOTE — PROGRESS NOTES
PT Evaluation     Today's date: 2020  Patient name: Vira Tam  : 1950  MRN: [id number]  Referring provider: Alma Rosa Lehman MD  Dx:   Encounter Diagnosis     ICD-10-CM    1  Dupuytren's contracture of right hand  M72 0 Ambulatory referral to PT/OT hand therapy                  Assessment  Assessment details: Pt is a 65year old RHD male who presents to PT following R MF-SF dupuytren's release on 20  Today we undressed pt's surgical dressings; pt had minimal drainage to his palmar incisions, NEI  Pt had mild LOM actively and passively moving into comp flexion but he had impressive extension to his involved digits, only a slight 5-10 degree lag  Fabricated custom palmar hand based extension splint for pt to wear at rest periods and night  Pt instructed on splint wearing schedule, able to don/doff independently  Pt should benefit from skilled PT to help promote scar healing, improve and maintain gains in motion, improve strength and better his functioning  Thank you kindly for your referral    Impairments: abnormal or restricted ROM, activity intolerance, lacks appropriate home exercise program and pain with function  Understanding of Dx/Px/POC: good   Prognosis: good    Goals  STG (2-3 weeks)  1: Pt compliant with splint  2: Pt independent in HEP    LTG (4-6 weeks)  1: Improve FOTO 10 pts  2: full comp flexion  3: Pt able to maintain his current extension 5-10 degrees at PIP  4: Pt has good scar healing, no significant adhesions  5: pt able to tolerate grasping activities       Plan  Plan details: Initiate PT as per POC  Patient would benefit from: skilled physical therapy  Planned modality interventions: thermotherapy: hydrocollator packs and cryotherapy  Planned therapy interventions: manual therapy, massage, patient education, orthotic fitting/training, orthotic management and training, strengthening, stretching, therapeutic activities, therapeutic exercise, flexibility, functional ROM exercises and home exercise program  Frequency: 2x week  Duration in visits: 8  Duration in weeks: 4  Plan of Care beginning date: 2020  Plan of Care expiration date: 10/1/2020  Treatment plan discussed with: patient        Subjective Evaluation    History of Present Illness  Date of surgery: 2020  Mechanism of injury: surgery  Mechanism of injury: Dupuytren's release on 20  Had xiaflex injection years ago but it returned 6 months later            Recurrent probem    Quality of life: good    Pain  Current pain ratin  At best pain ratin  At worst pain rating: 3  Quality: throbbing and tight  Progression: improved    Social Support  Lives with: spouse    Employment status: not working  Hand dominance: right  Exercise history: skiing, golfing, tennis    Patient Goals  Patient goals for therapy: decreased pain, increased motion, increased strength, independence with ADLs/IADLs, return to sport/leisure activities and decreased edema          Objective     Observations     Additional Observation Details  Undressed surgical dressings-  Incisions intact- NEI, minimal bloody drainage from palmar incisions- small superficial opening  Redressed with xeroform and cling gauze wrap  Pt instructed to keep area dry- provided Pt with extra dressings since he will be away for a week       Neurological Testing     Sensation     Wrist/Hand     Right   Diminished: light touch    Comments   Right light touch: west monofilament IF 2g; MF-SF 0 2 g    Active Range of Motion     Right Digits   Flexion   Index     MCP: 50    PIP: 60    DIP: 60  Middle     MCP: 45    PIP: 65    DIP: 55  Ring     MCP: 40    PIP: 60    DIP: 40  Little     MCP: 45    PIP: 65    DIP: 30  Extension   Ring     MCP: 0    PIP: 5    DIP: 0  Little     MCP: 0    PIP: 10    DIP: 0    Additional Active Range of Motion Details  Comp fist 3 cm from [facility]    Passive Range of Motion     Right Digits   Flexion   Index     MCP: 60    PIP: 70    DIP: 70  Middle     MCP: 65    PIP: 75    DIP: 60  Ring     MCP: 65    PIP: 75    DIP: 50  Little     MCP: 50    PIP: 70    DIP: 40  Extension   Ring     PIP: 0  Little     PIP: 5      Flowsheet Rows      Most Recent Value   PT/OT G-Codes   Current Score  57   Projected Score  71             Precautions: DOS: 7/27/20  HEP: TGE's, night extension splint      Manuals             Scar management- wound redressings             Gentle PROM digits                                       Neuro Re-Ed                                                                                                        Ther Ex             Towel bunches             Pegs grasping             Grooved pegs             Nuts/bolts                                                                 Ther Activity                                       Gait Training                                       Modalities              once closure

## 2020-09-01 NOTE — PROGRESS NOTES
Daily Note     Today's date: 2020  Patient name: Efrain Reinoso  : 1950  MRN: [id number]  Referring provider: Penny Mendoza MD  Dx:   Encounter Diagnosis     ICD-10-CM    1  Cervical radiculopathy  M54 12    2  Chronic neck pain  M54 2     G89 29                   Subjective: patient states that he had his surgery on this right hand for which he will be seeing hand tonight  He is feeling better with the numbing in his left hand and sleep as been going well  Objective: See treatment diary below      Assessment: Patient will be stopping therapy for his neck at this time and will continue with treatment for his hand  Patient should follow up with Dr Dennis Nino to discuss the possibility of his symptoms of carpal tunnel  Continue with hand therapy    Plan: continue with hand therapy      Precautions: advanced cervical degenerative changes      Manual  2020                C4-7 UPA R Gr  Iv+ 30x3             Thoracic pistol Gr  v            C6-7 rotational mob Gr  Iv-  10x3            C5-7 transverse glide prone  Right  Gr  Iv  10x5            Seated left lateral flexion with right glide of C6-7 Gr  Iv [id number]                Exercise Diary              Self snags cervical 10x2            pec stretch supine in 3 positions overhead [id number]            Chin tucks  15            UE nerve glides 15                                                                                                                                                                                                                                Modalities              Mechanical traction  8min 33/10   25lbs

## 2020-09-02 ENCOUNTER — TELEPHONE (OUTPATIENT)
Dept: INTERNAL MEDICINE CLINIC | Facility: CLINIC | Age: 70
End: 2020-09-02

## 2020-09-02 DIAGNOSIS — R93.89 ABNORMAL MRI: ICD-10-CM

## 2020-09-02 DIAGNOSIS — M54.2 NECK PAIN: Primary | ICD-10-CM

## 2020-09-02 NOTE — TELEPHONE ENCOUNTER
Patient said he has been having pain in his neck and back and has been seeing ortho  He also had an MRI done which showed an enlarged vertebrae  He's been going to PT and feels it is not helping  He is asking if he should see a neurologist?  Any other recommendations? Please advise

## 2020-09-08 ENCOUNTER — OFFICE VISIT (OUTPATIENT)
Dept: PHYSICAL THERAPY | Facility: MEDICAL CENTER | Age: 70
End: 2020-09-08
Payer: MEDICARE

## 2020-09-08 DIAGNOSIS — M72.0 DUPUYTREN'S CONTRACTURE OF RIGHT HAND: Primary | ICD-10-CM

## 2020-09-08 PROCEDURE — 97140 MANUAL THERAPY 1/> REGIONS: CPT | Performed by: PHYSICAL THERAPIST

## 2020-09-08 PROCEDURE — 97110 THERAPEUTIC EXERCISES: CPT | Performed by: PHYSICAL THERAPIST

## 2020-09-08 NOTE — PROGRESS NOTES
Daily Note     Today's date: 2020  Patient name: Maru Ballard  : 1950  MRN: [id number]  Referring provider: Aileen Thompson MD  Dx:   Encounter Diagnosis     ICD-10-CM    1  Dupuytren's contracture of right hand  M72 0                   Subjective: patient states he has been redressing his incisions  He states his pain is well controlled  He is using his extension splint at night and during the day as needed for comfort  He states numbness persists in his fingers but is also following up with neurosurgery regarding his cervical spine  Follow up with ortho tomorrow regarding his right hand  Objective: See treatment diary below      Assessment: Incisions redressed  Incisions healing well with no drainage observed  Initiated PROM with good tolerance  Patient has good finger flexion, mild stiffness in PIP joints of small finger and long finger  Reviewed home exercises with minimal cueing needed  Patient will benefit from continued PT  Plan: Continue per plan of care        Precautions: DOS: 20  HEP: TGE's, night extension splint      Manuals             Scar management- wound redressings x5'            Gentle PROM digits x10'                                      Neuro Re-Ed                                                                                                        Ther Ex             Towel bunches x2'            Pegs grasping             Grooved pegs             Nuts/bolts                                       HEP review x6'                         Ther Activity                                       Gait Training                                       Modalities             MH once closure

## 2020-09-08 NOTE — PROGRESS NOTES
Assessment:   S/P Excision Dupuytren's central cord right long finger with release of metacarpophalangeal joint  Excision Dupuytren's right ring finger spiral cord ulnar aspect with release of the proximal interphalangeal joint  Excision Dupuytren's right small finger central cord and spiral cord to both radial and ulnar side, as well as ulnar-sided cord of the digital sheath and release of the proximal interphalangeal joint  - Right, Right long finger trigger finger release - Right, and Application short-arm splint - Right on 8/27/2020    Plan:   Continue with nocturnal splinting  Continue therapy to work on ROM with gradual progression to strengthening    Follow Up:  6  week(s)    To Do Next Visit:  Reevaluate symptoms and PE    CHIEF COMPLAINT:  No chief complaint on file  SUBJECTIVE:  Efrain Reinoso is a 65 y o  male who presents for follow up after Excision Dupuytren's central cord right long finger with release of metacarpophalangeal joint  Excision Dupuytren's right ring finger spiral cord ulnar aspect with release of the proximal interphalangeal joint  Excision Dupuytren's right small finger central cord and spiral cord to both radial and ulnar side, as well as ulnar-sided cord of the digital sheath and release of the proximal interphalangeal joint  - Right, Right long finger trigger finger release - Right, and Application short-arm splint - Right on 8/27/2020  Today patient ***      PHYSICAL EXAMINATION:  Vital signs: There were no vitals taken for this visit    General: well developed and well nourished, alert, oriented times 3 and appears comfortable  Psychiatric: Normal    MUSCULOSKELETAL EXAMINATION:  Incision: {post op incision:33034}  Range of Motion: {post op rom:72098::"As expected"}  Neurovascular status: {post op neuro exam:67223::"Neuro intact, good cap refill"}  Done today: Sutures out and Steri strips applied      STUDIES REVIEWED:  No Studies to review      PROCEDURES PERFORMED:  Procedures  No Procedures performed today

## 2020-09-09 ENCOUNTER — OFFICE VISIT (OUTPATIENT)
Dept: OBGYN CLINIC | Facility: HOSPITAL | Age: 70
End: 2020-09-09

## 2020-09-09 VITALS
HEART RATE: 85 BPM | SYSTOLIC BLOOD PRESSURE: 165 MMHG | WEIGHT: 175 LBS | DIASTOLIC BLOOD PRESSURE: 76 MMHG | BODY MASS INDEX: 26.52 KG/M2 | HEIGHT: 68 IN

## 2020-09-09 DIAGNOSIS — G56.23 CUBITAL TUNNEL SYNDROME, BILATERAL: ICD-10-CM

## 2020-09-09 DIAGNOSIS — G56.03 CARPAL TUNNEL SYNDROME, BILATERAL: ICD-10-CM

## 2020-09-09 DIAGNOSIS — Z47.89 AFTERCARE FOLLOWING SURGERY OF THE MUSCULOSKELETAL SYSTEM: Primary | ICD-10-CM

## 2020-09-09 PROCEDURE — 99024 POSTOP FOLLOW-UP VISIT: CPT | Performed by: PHYSICIAN ASSISTANT

## 2020-09-10 ENCOUNTER — OFFICE VISIT (OUTPATIENT)
Dept: PHYSICAL THERAPY | Facility: MEDICAL CENTER | Age: 70
End: 2020-09-10
Payer: MEDICARE

## 2020-09-10 DIAGNOSIS — Z47.89 AFTERCARE FOLLOWING SURGERY OF THE MUSCULOSKELETAL SYSTEM: ICD-10-CM

## 2020-09-10 DIAGNOSIS — M72.0 DUPUYTREN'S CONTRACTURE OF RIGHT HAND: Primary | ICD-10-CM

## 2020-09-10 PROCEDURE — 97110 THERAPEUTIC EXERCISES: CPT

## 2020-09-10 PROCEDURE — 97010 HOT OR COLD PACKS THERAPY: CPT

## 2020-09-10 PROCEDURE — 97140 MANUAL THERAPY 1/> REGIONS: CPT

## 2020-09-10 NOTE — PROGRESS NOTES
Daily Note     Today's date: 9/10/2020  Patient name: Jone Zambrano  : 1950  MRN: [id number]  Referring provider: Bianca Blanc MD  Dx:   Encounter Diagnosis     ICD-10-CM    1  Dupuytren's contracture of right hand  M72 0    2  Aftercare following surgery of the musculoskeletal system  Z47 89                   Subjective: Pt reports he got his sutures out yesterday  Fingers moving well  Objective: See treatment diary below      Assessment: Tolerated treatment well  Patient exhibited good technique with therapeutic exercises and would benefit from continued PT Initiated scar massage, able to remove some dead tissues, good new skin layer underneath  Pt tolerated exercises well, had some neuropathy towards the end of each, especially with grasping activities  Adjusted splint for better extension of digits, pt happy with fit  Plan: Continue per plan of care        Precautions: DOS: 20  HEP: TGE's, night extension splint      Manuals 9/8 9/10           Scar management- wound redressings x5' 5 scar massge           Gentle PROM digits x10' 10                          15           Neuro Re-Ed                                                                                                        Ther Ex             Towel bunches x2' 2 min           Pegs grasping   3 min           Grooved pegs   1 board           Nuts/bolts  3 min                                     HEP review x6'              15           Ther Activity                                       Gait Training                                       Modalities             MH once closure  10 min

## 2020-09-16 ENCOUNTER — APPOINTMENT (OUTPATIENT)
Dept: PHYSICAL THERAPY | Facility: MEDICAL CENTER | Age: 70
End: 2020-09-16
Payer: MEDICARE

## 2020-09-16 ENCOUNTER — OFFICE VISIT (OUTPATIENT)
Dept: PHYSICAL THERAPY | Facility: MEDICAL CENTER | Age: 70
End: 2020-09-16
Payer: MEDICARE

## 2020-09-16 DIAGNOSIS — M72.0 DUPUYTREN'S CONTRACTURE OF RIGHT HAND: Primary | ICD-10-CM

## 2020-09-16 DIAGNOSIS — Z47.89 AFTERCARE FOLLOWING SURGERY OF THE MUSCULOSKELETAL SYSTEM: ICD-10-CM

## 2020-09-16 PROCEDURE — 97110 THERAPEUTIC EXERCISES: CPT

## 2020-09-16 PROCEDURE — 97140 MANUAL THERAPY 1/> REGIONS: CPT

## 2020-09-16 NOTE — PROGRESS NOTES
Daily Note     Today's date: 2020  Patient name: Raoul Byers  : 1950  MRN: [id number]  Referring provider: Wei Salinas MD  Dx:   Encounter Diagnosis     ICD-10-CM    1  Dupuytren's contracture of right hand  M72 0    2  Aftercare following surgery of the musculoskeletal system  Z47 89                   Subjective: Pt reports he is getting increased numbness and tingling into his R hand  Causing signficant sleep disturbance  Has neurologist appt on Oct 8th  Dr Nellie Swartz is performing an [address] on his nerves in wrist and forearm to determine possible CTS prior to neurologist appt on   Objective: See treatment diary below      Assessment: Tolerated treatment well  Patient exhibited good technique with therapeutic exercises and would benefit from continued PT advised pt to wear splint when he can during the day to provide adequate stretch without provoking symptoms  Plan: Continue per plan of care        Precautions: DOS: 20  HEP: TGE's, night extension splint      Manuals 9/8 9/10 9/16          Scar management- wound redressings x5' 5 scar massge 10          Gentle PROM digits x10' 10 5                         15 15          Neuro Re-Ed                                                                                                        Ther Ex             Towel bunches x2' 2 min 2 min          Pegs grasping   3 min  3 min          Grooved pegs   1 board  1board          Nuts/bolts  3 min  3 min          Wrist PRE's   2# 2x10 each                       HEP review x6'              15 15          Ther Activity                                       Gait Training                                       Modalities             MH once closure  10 min 10 min

## 2020-09-18 ENCOUNTER — APPOINTMENT (OUTPATIENT)
Dept: PHYSICAL THERAPY | Facility: MEDICAL CENTER | Age: 70
End: 2020-09-18
Payer: MEDICARE

## 2020-09-22 ENCOUNTER — OFFICE VISIT (OUTPATIENT)
Dept: OCCUPATIONAL THERAPY | Facility: MEDICAL CENTER | Age: 70
End: 2020-09-22
Payer: MEDICARE

## 2020-09-22 DIAGNOSIS — M72.0 DUPUYTREN'S CONTRACTURE OF RIGHT HAND: Primary | ICD-10-CM

## 2020-09-22 PROCEDURE — 97140 MANUAL THERAPY 1/> REGIONS: CPT

## 2020-09-22 PROCEDURE — 97110 THERAPEUTIC EXERCISES: CPT

## 2020-09-22 PROCEDURE — 97168 OT RE-EVAL EST PLAN CARE: CPT

## 2020-09-22 NOTE — PROGRESS NOTES
OT Re-Evaluation     Today's date: 2020  Patient name: Robson Shah  : 1950  MRN: [id number]  Referring provider: Bere Justice MD  Dx:   Encounter Diagnosis     ICD-10-CM    1  Dupuytren's contracture of right hand  M72 0                   Assessment  Assessment details: Pt is a 65year old RHD male who presents to OT  following R MF-SF dupuytren's release on 20  Pt seen today for re-evaluation and treatment  Pt presents with adherent scarring, palpable tenderness and complains of constant numbness and tingling through the median and ulnar nerve distribution in his R hand  Pt is scheduled to f/u with Dr Hiwot Ascencio regarding this on 20  Pt has made significant gains with digital AROM  Pt would benefit from continued skilled OT to help promote scar healing, improve and maintain gains in motion, improve strength and better his functioning  Thank you kindly for your referral    Impairments: abnormal or restricted ROM, activity intolerance, lacks appropriate home exercise program and pain with function  Understanding of Dx/Px/POC: good   Prognosis: good    Goals  STG (2-3 weeks)  1: Pt compliant with splint - PARTIALLY MET  2: Pt independent in HEP - MET    LTG (4-6 weeks)  1: Improve FOTO 10 pts - PROGRESSING  2: full comp flexion - PROGRESSING  3: Pt able to maintain his current extension 5-10 degrees at PIP - PROGRESSING  4: Pt has good scar healing, no significant adhesions   - PROGRESSING  5: pt able to tolerate grasping activities   - [id number]  Patient would benefit from: skilled occupational therapy  Planned modality interventions: thermotherapy: hydrocollator packs and cryotherapy  Planned therapy interventions: manual therapy, massage, patient education, orthotic fitting/training, orthotic management and training, strengthening, stretching, therapeutic activities, therapeutic exercise, flexibility, functional ROM exercises and home exercise program  Frequency: 2x week  Duration in visits: 8  Duration in weeks: 4  Plan of Care beginning date: 2020  Plan of Care expiration date: 10/23/2020  Treatment plan discussed with: patient        Subjective Evaluation    History of Present Illness  Date of surgery: 2020  Mechanism of injury: surgery  Mechanism of injury: Dupuytren's release on 20  Had xiaflex injection years ago but it returned 6 months later            Recurrent probem    Quality of life: good    Pain  Current pain ratin  At best pain ratin  At worst pain rating: 3  Quality: tight and needle-like  Progression: improved    Social Support  Lives with: spouse    Employment status: not working  Hand dominance: right  Exercise history: skiing, golfing, tennis    Patient Goals  Patient goals for therapy: decreased pain, increased motion, increased strength, independence with ADLs/IADLs, return to sport/leisure activities and decreased edema          Objective     Neurological Testing     Sensation     Wrist/Hand     Right   Diminished: light touch    Comments   Right light touch: west monofilament IF 2g; MF-SF 0 2 g    Active Range of Motion     Right Digits   Flexion   Index     MCP: 65    PIP: 90    DIP: 74  Middle     MCP: 74    PIP: 90    DIP: 86  Ring     MCP: 70    PIP: 90    DIP: 76  Little     MCP: 74    PIP: 82    DIP: 64  Extension   Ring     MCP: 0    PIP: 5    DIP: 0  Little     MCP: 0    PIP: 10    DIP: 0    Passive Range of Motion     Right Digits   Flexion   Index     MCP: 60    PIP: 70    DIP: 70  Middle     MCP: 65    PIP: 75    DIP: 60  Ring     MCP: 65    PIP: 75    DIP: 50  Little     MCP: 50    PIP: 70    DIP: 40  Extension   Ring     PIP: 0  Little     PIP: 5    Swelling     Left Wrist/Hand   Ring     Proximal: 6 4 cm  Little     Proximal: 5 6 cm  Circumference MCP: 20 2 cm    Right Wrist/Hand   Ring     Proximal: 7 cm  Little     Proximal: 7 cm  Circumference MCP: 21 2 cm             Precautions: DOS: 20  HEP: TGE's, night extension splint      Manuals 9/8 9/10 9/16 9/22         Scar management- wound redressings x5' 5 scar massge 10 10         Gentle PROM digits x10' 10 5 5                        15 15 15         Neuro Re-Ed                                                                                                        Ther Ex             Towel bunches x2' 2 min 2 min          Pegs grasping   3 min  3 min 3min         Grooved pegs   1 board  1board translation 1 board         Nuts/bolts  3 min  3 min 3min         Wrist PRE's   2# 2x10 each 2# 2x10 each         Digit ext from tt    15x         HEP review x6'              15 15 15         Ther Activity                                       Gait Training                                       Modalities             MH once closure  10 min 10 min 10min

## 2020-09-24 ENCOUNTER — OFFICE VISIT (OUTPATIENT)
Dept: OCCUPATIONAL THERAPY | Facility: MEDICAL CENTER | Age: 70
End: 2020-09-24
Payer: MEDICARE

## 2020-09-24 ENCOUNTER — TRANSCRIBE ORDERS (OUTPATIENT)
Dept: RADIOLOGY | Facility: HOSPITAL | Age: 70
End: 2020-09-24

## 2020-09-24 ENCOUNTER — HOSPITAL ENCOUNTER (OUTPATIENT)
Dept: RADIOLOGY | Facility: HOSPITAL | Age: 70
Discharge: HOME | End: 2020-09-24
Payer: MEDICARE

## 2020-09-24 DIAGNOSIS — G56.23 CUBITAL TUNNEL SYNDROME, BILATERAL: ICD-10-CM

## 2020-09-24 DIAGNOSIS — G56.03 CARPAL TUNNEL SYNDROME, BILATERAL: ICD-10-CM

## 2020-09-24 DIAGNOSIS — M72.0 DUPUYTREN'S CONTRACTURE OF RIGHT HAND: Primary | ICD-10-CM

## 2020-09-24 PROCEDURE — 76882 US LMTD JT/FCL EVL NVASC XTR: CPT

## 2020-09-24 PROCEDURE — 97140 MANUAL THERAPY 1/> REGIONS: CPT

## 2020-09-24 PROCEDURE — 97110 THERAPEUTIC EXERCISES: CPT

## 2020-09-24 NOTE — PROGRESS NOTES
Daily Note     Today's date: 2020  Patient name: Ruthellen Bernheim  : 1950  MRN: [id number]  Referring provider: Manny Mendoza MD  Dx:   Encounter Diagnosis     ICD-10-CM    1  Dupuytren's contracture of right hand  M72 0                   Subjective: "Everything makes the numbness worse "      Objective: See treatment diary below      Assessment: Tolerated treatment fair  Pt c/o increased numbness and tingling throughout the R hand with all hand exercises  Symptoms do not improve with change in position of R elbow or wrist  Pt is able to tolerate full passive composite fist and is able to actively make a functional fist without difficulty  Scar mobility is improving  C/o paresthesia limits progression with therapeutic exercise  Patient would benefit from continued OT per MD recommendations        Plan: Continue per MD recommendations     Precautions: DOS: 20  HEP: TGE's, night extension splint      Manuals 9/8 9/10 9/16 9/22 9/24        Scar management- wound redressings x5' 5 scar massge 10 10 Scar massage 10m        Gentle PROM digits x10' 10 5 5 5m                       15 15 15 15        Neuro Re-Ed                                                                                                        Ther Ex             Towel bunches x2' 2 min 2 min  2min        Pegs grasping   3 min  3 min 3min         Grooved pegs   1 board  1board translation 1 board         Nuts/bolts  3 min  3 min 3min         Blocking exer     5min        Wrist PRE's   2# 2x10 each 2# 2x10 each         Digit ext from tt    15x 15x        HEP review x6'              15 15 15 10        Ther Activity                                       Gait Training                                       Modalities             MH once closure  10 min 10 min 10min 10min

## 2020-09-25 ENCOUNTER — TELEPHONE (OUTPATIENT)
Dept: OBGYN CLINIC | Facility: HOSPITAL | Age: 70
End: 2020-09-25

## 2020-09-25 NOTE — TELEPHONE ENCOUNTER
Tried to call patient  No answer  Left VM and will try to get in touch with him next time I have a break

## 2020-09-25 NOTE — TELEPHONE ENCOUNTER
Patient sees Dr Duke Artist  Patient is calling requesting a call to go over his ultrasound results  He stated he thinks they found the reason for the pain and needs to act on it quickly          CB: [phone]

## 2020-09-25 NOTE — TELEPHONE ENCOUNTER
Patient u/s suspicious for CTS on Right and CuTS bilaterally  Please call patient and discuss  Will benefit from potential surgical decompression of carpal and cubital tunnel on right (which I believe is his problem side)  Please call to confirm signs and symptoms as well as side that is bothering him  He also has a history of cervical issues as well  If he desires surgery, we can get him into clinic to set this up

## 2020-09-25 NOTE — TELEPHONE ENCOUNTER
Also I apologize, I had to reenter the orders for his surgeries so you may see some duplicate stuff  I tried to clear out the orders for the older ones, but it would not let me cancel the actual surgery itself

## 2020-09-25 NOTE — TELEPHONE ENCOUNTER
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 He would like to start with the right side  Would wait minimum of 2 weeks between surgeries  And then depending on when his surgery hold dates are, we may have to move up his follow up  He currently is scheduled for 10/12 with [facility]  Could you please call patient to set up hold dates and then I can put in case requests  He will need to be seen in the office prior to his surgery  Thanks!

## 2020-09-26 LAB
ALBUMIN SERPL-MCNC: 4.3 G/DL (ref 3.6–5.1)
ALBUMIN/GLOB SERPL: 1.9 (CALC) (ref 1–2.5)
ALP SERPL-CCNC: 62 U/L (ref 35–144)
ALT SERPL-CCNC: 23 U/L (ref 9–46)
AST SERPL-CCNC: 15 U/L (ref 10–35)
BASOPHILS # BLD AUTO: 47 CELLS/UL (ref 0–200)
BASOPHILS NFR BLD AUTO: 0.7 %
BILIRUB SERPL-MCNC: 1.3 MG/DL (ref 0.2–1.2)
BUN SERPL-MCNC: 19 MG/DL (ref 7–25)
BUN/CREAT SERPL: ABNORMAL (CALC) (ref 6–22)
CALCIUM SERPL-MCNC: 9.5 MG/DL (ref 8.6–10.3)
CHLORIDE SERPL-SCNC: 103 MMOL/L (ref 98–110)
CHOLEST SERPL-MCNC: 167 MG/DL
CHOLEST/HDLC SERPL: 2.4 (CALC)
CO2 SERPL-SCNC: 28 MMOL/L (ref 20–32)
CREAT SERPL-MCNC: 1.09 MG/DL (ref 0.7–1.18)
EOSINOPHIL # BLD AUTO: 181 CELLS/UL (ref 15–500)
EOSINOPHIL NFR BLD AUTO: 2.7 %
ERYTHROCYTE [DISTWIDTH] IN BLOOD BY AUTOMATED COUNT: 13 % (ref 11–15)
GLOBULIN SER CALC-MCNC: 2.3 G/DL (CALC) (ref 1.9–3.7)
GLUCOSE SERPL-MCNC: 115 MG/DL (ref 65–99)
HBA1C MFR BLD: 5.5 % OF TOTAL HGB
HCT VFR BLD AUTO: 41.6 % (ref 38.5–50)
HDLC SERPL-MCNC: 70 MG/DL
HGB BLD-MCNC: 13.8 G/DL (ref 13.2–17.1)
LDLC SERPL CALC-MCNC: 81 MG/DL (CALC)
LYMPHOCYTES # BLD AUTO: 1675 CELLS/UL (ref 850–3900)
LYMPHOCYTES NFR BLD AUTO: 25 %
MCH RBC QN AUTO: 29.9 PG (ref 27–33)
MCHC RBC AUTO-ENTMCNC: 33.2 G/DL (ref 32–36)
MCV RBC AUTO: 90 FL (ref 80–100)
MONOCYTES # BLD AUTO: 516 CELLS/UL (ref 200–950)
MONOCYTES NFR BLD AUTO: 7.7 %
NEUTROPHILS # BLD AUTO: 4281 CELLS/UL (ref 1500–7800)
NEUTROPHILS NFR BLD AUTO: 63.9 %
NONHDLC SERPL-MCNC: 97 MG/DL (CALC)
PLATELET # BLD AUTO: 192 THOUSAND/UL (ref 140–400)
PMV BLD REES-ECKER: 10.4 FL (ref 7.5–12.5)
POTASSIUM SERPL-SCNC: 4.3 MMOL/L (ref 3.5–5.3)
PROT SERPL-MCNC: 6.6 G/DL (ref 6.1–8.1)
RBC # BLD AUTO: 4.62 MILLION/UL (ref 4.2–5.8)
SL AMB EGFR AFRICAN AMERICAN: 78 ML/MIN/1.73M2
SL AMB EGFR NON AFRICAN AMERICAN: 67 ML/MIN/1.73M2
SODIUM SERPL-SCNC: 139 MMOL/L (ref 135–146)
TESTOST FREE SERPL-MCNC: 85.5 PG/ML (ref 30–135)
TESTOST SERPL-MCNC: 559 NG/DL (ref 250–1100)
TRIGL SERPL-MCNC: 75 MG/DL
WBC # BLD AUTO: 6.7 THOUSAND/UL (ref 3.8–10.8)

## 2020-09-29 ENCOUNTER — OFFICE VISIT (OUTPATIENT)
Dept: OCCUPATIONAL THERAPY | Facility: MEDICAL CENTER | Age: 70
End: 2020-09-29
Payer: MEDICARE

## 2020-09-29 DIAGNOSIS — M72.0 DUPUYTREN'S CONTRACTURE OF RIGHT HAND: Primary | ICD-10-CM

## 2020-09-29 PROCEDURE — 97140 MANUAL THERAPY 1/> REGIONS: CPT

## 2020-09-29 PROCEDURE — 97110 THERAPEUTIC EXERCISES: CPT

## 2020-09-29 NOTE — PROGRESS NOTES
Daily Note     Today's date: 2020  Patient name: Pedro Simms  : 1950  MRN: [id number]  Referring provider: Breana Joseph MD  Dx:   Encounter Diagnosis     ICD-10-CM    1  Dupuytren's contracture of right hand  M72 0                   Subjective: "I have cubital tunnel and carpal tunnel "      Objective: See treatment diary below      Assessment: Tolerated treatment well  Patient would benefit from continued OT  Pt states he will be scheduling surgery for R carpal and cubital tunnel release soon  Scar mobility is improving and pt is able to make a composite fist after treatment  Minimal scabbing still present over scars; plan to issue elastomer mold for scar remodeling next session        Plan: Continue per MD recommendations     Precautions: DOS: 20  HEP: TGE's, night extension splint      Manuals 9/8 9/10 9/16 9/22 9/24 9/29       Scar management- wound redressings x5' 5 scar massge 10 10 Scar massage 10m Scar massage 10m       Gentle PROM digits x10' 10 5 5 5m 5m                      15 15 15 15 15m       Neuro Re-Ed                                                                                                        Ther Ex             Towel bunches x2' 2 min 2 min  2min        Pegs grasping   3 min  3 min 3min  Hook 3min       Grooved pegs   1 board  1board translation 1 board  translation 1 board       Nuts/bolts  3 min  3 min 3min         Blocking exer     5min 5m       Wrist PRE's   2# 2x10 each 2# 2x10 each         Digit ext from tt    15x 15x 2x10       RFB      tt rolls 3min       TGE      15x each       HEP review x6'              15 15 15 10 20       Ther Activity                                       Gait Training                                       Modalities             MH once closure  10 min 10 min 10min 10min 10m

## 2020-09-30 ENCOUNTER — OFFICE VISIT (OUTPATIENT)
Dept: INTERNAL MEDICINE CLINIC | Facility: CLINIC | Age: 70
End: 2020-09-30
Payer: MEDICARE

## 2020-09-30 ENCOUNTER — OFFICE VISIT (OUTPATIENT)
Dept: CARDIOLOGY CLINIC | Facility: CLINIC | Age: 70
End: 2020-09-30
Payer: MEDICARE

## 2020-09-30 VITALS
BODY MASS INDEX: 26.43 KG/M2 | DIASTOLIC BLOOD PRESSURE: 84 MMHG | HEART RATE: 64 BPM | TEMPERATURE: 97.5 F | RESPIRATION RATE: 16 BRPM | SYSTOLIC BLOOD PRESSURE: 148 MMHG | OXYGEN SATURATION: 95 % | HEIGHT: 68 IN | WEIGHT: 174.4 LBS

## 2020-09-30 VITALS — HEART RATE: 83 BPM | SYSTOLIC BLOOD PRESSURE: 164 MMHG | DIASTOLIC BLOOD PRESSURE: 73 MMHG

## 2020-09-30 DIAGNOSIS — I25.10 CORONARY ARTERY DISEASE INVOLVING NATIVE HEART WITHOUT ANGINA PECTORIS, UNSPECIFIED VESSEL OR LESION TYPE: ICD-10-CM

## 2020-09-30 DIAGNOSIS — I10 ESSENTIAL HYPERTENSION: ICD-10-CM

## 2020-09-30 DIAGNOSIS — I10 HTN (HYPERTENSION), BENIGN: ICD-10-CM

## 2020-09-30 DIAGNOSIS — E78.2 MIXED HYPERLIPIDEMIA: ICD-10-CM

## 2020-09-30 DIAGNOSIS — Z23 NEEDS FLU SHOT: ICD-10-CM

## 2020-09-30 DIAGNOSIS — E78.2 MIXED HYPERLIPIDEMIA: Primary | ICD-10-CM

## 2020-09-30 DIAGNOSIS — K21.9 CHRONIC GERD: ICD-10-CM

## 2020-09-30 DIAGNOSIS — I10 HTN (HYPERTENSION), BENIGN: Primary | ICD-10-CM

## 2020-09-30 DIAGNOSIS — R17 ELEVATED BILIRUBIN: ICD-10-CM

## 2020-09-30 DIAGNOSIS — R73.01 IMPAIRED FASTING GLUCOSE: ICD-10-CM

## 2020-09-30 DIAGNOSIS — R91.1 PULMONARY NODULE: ICD-10-CM

## 2020-09-30 DIAGNOSIS — N52.9 ERECTILE DYSFUNCTION, UNSPECIFIED ERECTILE DYSFUNCTION TYPE: ICD-10-CM

## 2020-09-30 PROCEDURE — 99214 OFFICE O/P EST MOD 30 MIN: CPT | Performed by: INTERNAL MEDICINE

## 2020-09-30 PROCEDURE — G0008 ADMIN INFLUENZA VIRUS VAC: HCPCS

## 2020-09-30 PROCEDURE — 93000 ELECTROCARDIOGRAM COMPLETE: CPT | Performed by: INTERNAL MEDICINE

## 2020-09-30 PROCEDURE — 90662 IIV NO PRSV INCREASED AG IM: CPT

## 2020-09-30 RX ORDER — LISINOPRIL 40 MG/1
40 TABLET ORAL DAILY
Qty: 90 TABLET | Refills: 3 | Status: SHIPPED | OUTPATIENT
Start: 2020-09-30 | End: 2022-02-12

## 2020-09-30 NOTE — ASSESSMENT & PLAN NOTE
Recent fasting glucose little elevated at 115, but A1c is better than previous, currently 5 5    Continue with healthy diet and exercise

## 2020-09-30 NOTE — PATIENT INSTRUCTIONS
Problem List Items Addressed This Visit        Digestive    Chronic GERD     Continue daily since patient had problems we tried titrating down  Endocrine    Impaired fasting glucose     Recent fasting glucose little elevated at 115, but A1c is better than previous, currently 5 5  Continue with healthy diet and exercise            Cardiovascular and Mediastinum    HTN (hypertension), benign - Primary     I recommend increasing lisinopril from 20 mg daily to 40 mg daily and continue monitoring blood pressures at home  Continue amlodipine 5 mg daily also           Relevant Medications    lisinopril (ZESTRIL) 40 mg tablet       Other    Pulmonary nodule     This is been stable, patient is for recheck of this November 2020         Elevated bilirubin     Most likely Gilbert's syndrome, liver function tests normal, 3 out of his last 4 blood tests have shown bilirubin to be just slightly above normal         Mixed hyperlipidemia     Continue atorvastatin along with healthy diet and exercise         Erectile dysfunction     Reason testosterone level normal, continue Cialis daily           Other Visit Diagnoses     Essential hypertension        Relevant Medications    lisinopril (ZESTRIL) 40 mg tablet

## 2020-09-30 NOTE — ASSESSMENT & PLAN NOTE
Most likely Gilbert's syndrome, liver function tests normal, 3 out of his last 4 blood tests have shown bilirubin to be just slightly above normal

## 2020-09-30 NOTE — TELEPHONE ENCOUNTER
Patient will be scheduled 10/20 and 11/4  We did offer patient 10/8 but he declined due to scheduling conflicts

## 2020-09-30 NOTE — PROGRESS NOTES
Assessment/Plan:    HTN (hypertension), benign  I recommend increasing lisinopril from 20 mg daily to 40 mg daily and continue monitoring blood pressures at home  Continue amlodipine 5 mg daily also  Mixed hyperlipidemia  Continue atorvastatin along with healthy diet and exercise    Impaired fasting glucose  Recent fasting glucose little elevated at 115, but A1c is better than previous, currently 5 5  Continue with healthy diet and exercise    Pulmonary nodule  This is been stable, patient is for recheck of this November 2020    Elevated bilirubin  Most likely Gilbert's syndrome, liver function tests normal, 3 out of his last 4 blood tests have shown bilirubin to be just slightly above normal    Erectile dysfunction  Reason testosterone level normal, continue Cialis daily    Chronic GERD  Continue daily since patient had problems we tried titrating down  Diagnoses and all orders for this visit:    HTN (hypertension), benign    Mixed hyperlipidemia    Impaired fasting glucose    Pulmonary nodule    Elevated bilirubin    Erectile dysfunction, unspecified erectile dysfunction type    Chronic GERD    Essential hypertension  -     lisinopril (ZESTRIL) 40 mg tablet; Take 1 tablet (40 mg total) by mouth daily    Other orders  -     Discontinue: ciclopirox (LOPROX) 0 77 % cream; APPLY DAILY TO THE FEET IN PULSES OF 2 TO 3 WEEKS AS NEEDED          Subjective:      Patient ID: Ketty Arteaga is a 65 y o  male  Interval history:  Patient had Dupuytren contracture surgery on his right hand and is going through physical therapy for this  He also was found to have nerve compression in the elbow and the wrist and is going to undergo procedures for this in the near future  He is then going to pursue possible procedures for his cervical spine with Neurosurgery  Hypertension:  At home patient is getting average blood pressure is 120/70, blood pressure readings at doctor's offices have been slightly higher  Patient reports compliance with lisinopril 20 mg daily and amlodipine 5 mg daily    Hyperlipidemia:  Patient tolerating torsemide was no significant muscle aches and leg, recent LDL was okay at 81    Impaired fasting glucose:  Patient watches his diet for this, he is active    GERD: no significant GERD symptoms, no significant problems with food getting stuck      The following portions of the patient's history were reviewed and updated as appropriate: allergies, current medications, past family history, past medical history, past social history, past surgical history and problem list     Review of Systems   Constitutional: Negative for chills, fatigue and fever  HENT: Negative for congestion, nosebleeds, postnasal drip, sore throat and trouble swallowing  Eyes: Negative for pain  Respiratory: Negative for cough, chest tightness, shortness of breath and wheezing  Cardiovascular: Negative for chest pain, palpitations and leg swelling  Gastrointestinal: Negative for abdominal pain, constipation, diarrhea, nausea and vomiting  Endocrine: Negative for polydipsia and polyuria  Genitourinary: Negative for dysuria, flank pain and hematuria  Musculoskeletal: Positive for arthralgias and neck pain  Skin: Negative for rash  Neurological: Negative for dizziness, tremors, light-headedness and headaches  Hematological: Does not bruise/bleed easily  Psychiatric/Behavioral: Negative for confusion and dysphoric mood  The patient is not nervous/anxious  Objective:      /84   Pulse 64   Temp 97 5 °F (36 4 °C) (Temporal)   Resp 16   Ht 5' 8" (1 727 m)   Wt 79 1 kg (174 lb 6 4 oz)   SpO2 95%   BMI 26 52 kg/m²          Physical Exam  Vitals signs reviewed  Constitutional:       General: He is not in acute distress  Appearance: He is well-developed  HENT:      Head: Normocephalic and atraumatic        Right Ear: External ear normal       Left Ear: External ear normal    Eyes: General: No scleral icterus  Conjunctiva/sclera: Conjunctivae normal    Neck:      Musculoskeletal: Normal range of motion and neck supple  Thyroid: No thyromegaly  Trachea: No tracheal deviation  Cardiovascular:      Rate and Rhythm: Normal rate and regular rhythm  Heart sounds: Normal heart sounds  Pulmonary:      Effort: Pulmonary effort is normal  No respiratory distress  Breath sounds: Normal breath sounds  No wheezing or rales  Abdominal:      General: Bowel sounds are normal       Palpations: Abdomen is soft  Tenderness: There is no abdominal tenderness  There is no guarding or rebound  Lymphadenopathy:      Cervical: No cervical adenopathy  Neurological:      Mental Status: He is alert and oriented to person, place, and time  Psychiatric:         Behavior: Behavior normal          Thought Content:  Thought content normal          Judgment: Judgment normal

## 2020-09-30 NOTE — TELEPHONE ENCOUNTER
Spoke with pt  He said nobody called him to schedule surgery  Can we please get him on the schedule asap for the two surgeries (L CuTR and R CuTR/ECTR)  He already has a follow up with Dr Thedora Spatz on 10/12 for preop assessment  If his surgery is before this, let me know and we'll have to get him into the office beforehand

## 2020-09-30 NOTE — ASSESSMENT & PLAN NOTE
I recommend increasing lisinopril from 20 mg daily to 40 mg daily and continue monitoring blood pressures at home  Continue amlodipine 5 mg daily also

## 2020-10-01 ENCOUNTER — APPOINTMENT (OUTPATIENT)
Dept: OCCUPATIONAL THERAPY | Facility: MEDICAL CENTER | Age: 70
End: 2020-10-01
Payer: MEDICARE

## 2020-10-06 ENCOUNTER — OFFICE VISIT (OUTPATIENT)
Dept: PHYSICAL THERAPY | Facility: MEDICAL CENTER | Age: 70
End: 2020-10-06
Payer: MEDICARE

## 2020-10-06 DIAGNOSIS — M72.0 DUPUYTREN'S CONTRACTURE OF RIGHT HAND: Primary | ICD-10-CM

## 2020-10-06 DIAGNOSIS — Z47.89 AFTERCARE FOLLOWING SURGERY OF THE MUSCULOSKELETAL SYSTEM: ICD-10-CM

## 2020-10-06 PROCEDURE — 97140 MANUAL THERAPY 1/> REGIONS: CPT

## 2020-10-06 PROCEDURE — 97110 THERAPEUTIC EXERCISES: CPT

## 2020-10-08 ENCOUNTER — OFFICE VISIT (OUTPATIENT)
Dept: NEUROSURGERY | Facility: CLINIC | Age: 70
End: 2020-10-08
Payer: MEDICARE

## 2020-10-08 VITALS
BODY MASS INDEX: 26.52 KG/M2 | DIASTOLIC BLOOD PRESSURE: 82 MMHG | TEMPERATURE: 96.6 F | WEIGHT: 175 LBS | HEART RATE: 75 BPM | HEIGHT: 68 IN | SYSTOLIC BLOOD PRESSURE: 154 MMHG | RESPIRATION RATE: 16 BRPM

## 2020-10-08 DIAGNOSIS — G56.03 BILATERAL CARPAL TUNNEL SYNDROME: ICD-10-CM

## 2020-10-08 DIAGNOSIS — M54.2 NECK PAIN: ICD-10-CM

## 2020-10-08 DIAGNOSIS — R93.89 ABNORMAL MRI: ICD-10-CM

## 2020-10-08 DIAGNOSIS — M54.12 RADICULOPATHY, CERVICAL REGION: Primary | ICD-10-CM

## 2020-10-08 PROCEDURE — 99203 OFFICE O/P NEW LOW 30 MIN: CPT | Performed by: NEUROLOGICAL SURGERY

## 2020-10-09 ENCOUNTER — TELEPHONE (OUTPATIENT)
Dept: NEUROSURGERY | Facility: CLINIC | Age: 70
End: 2020-10-09

## 2020-10-12 ENCOUNTER — OFFICE VISIT (OUTPATIENT)
Dept: OBGYN CLINIC | Facility: CLINIC | Age: 70
End: 2020-10-12
Payer: MEDICARE

## 2020-10-12 VITALS
BODY MASS INDEX: 26.52 KG/M2 | WEIGHT: 175 LBS | SYSTOLIC BLOOD PRESSURE: 138 MMHG | DIASTOLIC BLOOD PRESSURE: 72 MMHG | HEIGHT: 68 IN

## 2020-10-12 DIAGNOSIS — G56.03 CARPAL TUNNEL SYNDROME, BILATERAL: Primary | ICD-10-CM

## 2020-10-12 DIAGNOSIS — G56.23 CUBITAL TUNNEL SYNDROME, BILATERAL: ICD-10-CM

## 2020-10-12 PROCEDURE — 99214 OFFICE O/P EST MOD 30 MIN: CPT | Performed by: ORTHOPAEDIC SURGERY

## 2020-10-13 ENCOUNTER — OFFICE VISIT (OUTPATIENT)
Dept: OCCUPATIONAL THERAPY | Facility: MEDICAL CENTER | Age: 70
End: 2020-10-13
Payer: MEDICARE

## 2020-10-13 ENCOUNTER — HOSPITAL ENCOUNTER (OUTPATIENT)
Dept: NEUROLOGY | Facility: CLINIC | Age: 70
Discharge: HOME | End: 2020-10-13
Payer: MEDICARE

## 2020-10-13 DIAGNOSIS — R93.89 ABNORMAL MRI: ICD-10-CM

## 2020-10-13 DIAGNOSIS — M54.12 RADICULOPATHY, CERVICAL REGION: ICD-10-CM

## 2020-10-13 DIAGNOSIS — M72.0 DUPUYTREN'S CONTRACTURE OF RIGHT HAND: Primary | ICD-10-CM

## 2020-10-13 DIAGNOSIS — M54.2 NECK PAIN: ICD-10-CM

## 2020-10-13 DIAGNOSIS — G56.03 BILATERAL CARPAL TUNNEL SYNDROME: ICD-10-CM

## 2020-10-13 PROCEDURE — 97140 MANUAL THERAPY 1/> REGIONS: CPT

## 2020-10-13 PROCEDURE — 97110 THERAPEUTIC EXERCISES: CPT

## 2020-10-13 PROCEDURE — 95913 NRV CNDJ TEST 13/> STUDIES: CPT | Performed by: PSYCHIATRY & NEUROLOGY

## 2020-10-13 PROCEDURE — 95886 MUSC TEST DONE W/N TEST COMP: CPT | Performed by: PSYCHIATRY & NEUROLOGY

## 2020-10-15 ENCOUNTER — TELEPHONE (OUTPATIENT)
Dept: NEUROSURGERY | Facility: CLINIC | Age: 70
End: 2020-10-15

## 2020-10-15 DIAGNOSIS — G56.21 CUBITAL TUNNEL SYNDROME ON RIGHT: ICD-10-CM

## 2020-10-15 DIAGNOSIS — G56.03 BILATERAL CARPAL TUNNEL SYNDROME: Primary | ICD-10-CM

## 2020-10-16 ENCOUNTER — TELEPHONE (OUTPATIENT)
Dept: OBGYN CLINIC | Facility: HOSPITAL | Age: 70
End: 2020-10-16

## 2020-10-16 ENCOUNTER — TELEPHONE (OUTPATIENT)
Dept: NEUROSURGERY | Facility: CLINIC | Age: 70
End: 2020-10-16

## 2020-10-20 ENCOUNTER — ANESTHESIA (OUTPATIENT)
Dept: PERIOP | Facility: HOSPITAL | Age: 70
End: 2020-10-20
Payer: MEDICARE

## 2020-10-20 ENCOUNTER — TELEPHONE (OUTPATIENT)
Dept: OBGYN CLINIC | Facility: HOSPITAL | Age: 70
End: 2020-10-20

## 2020-10-20 ENCOUNTER — HOSPITAL ENCOUNTER (OUTPATIENT)
Facility: HOSPITAL | Age: 70
Setting detail: OUTPATIENT SURGERY
Discharge: HOME | End: 2020-10-20
Attending: ORTHOPAEDIC SURGERY | Admitting: ORTHOPAEDIC SURGERY
Payer: MEDICARE

## 2020-10-20 ENCOUNTER — ANESTHESIA EVENT (OUTPATIENT)
Dept: PERIOP | Facility: HOSPITAL | Age: 70
End: 2020-10-20
Payer: MEDICARE

## 2020-10-20 VITALS
TEMPERATURE: 97.6 F | WEIGHT: 171 LBS | HEART RATE: 72 BPM | SYSTOLIC BLOOD PRESSURE: 131 MMHG | HEIGHT: 68 IN | OXYGEN SATURATION: 95 % | RESPIRATION RATE: 16 BRPM | BODY MASS INDEX: 25.91 KG/M2 | DIASTOLIC BLOOD PRESSURE: 61 MMHG

## 2020-10-20 VITALS — HEART RATE: 76 BPM

## 2020-10-20 DIAGNOSIS — G56.21 CUBITAL TUNNEL SYNDROME ON RIGHT: ICD-10-CM

## 2020-10-20 DIAGNOSIS — G56.03 BILATERAL CARPAL TUNNEL SYNDROME: Primary | ICD-10-CM

## 2020-10-20 PROBLEM — Z87.898 HISTORY OF ANESTHESIA COMPLICATIONS: Status: ACTIVE | Noted: 2020-10-20

## 2020-10-20 PROCEDURE — 29848 WRIST ENDOSCOPY/SURGERY: CPT | Performed by: ORTHOPAEDIC SURGERY

## 2020-10-20 PROCEDURE — 64718 REVISE ULNAR NERVE AT ELBOW: CPT | Performed by: ORTHOPAEDIC SURGERY

## 2020-10-20 RX ORDER — PROPOFOL 10 MG/ML
INJECTION, EMULSION INTRAVENOUS CONTINUOUS PRN
Status: DISCONTINUED | OUTPATIENT
Start: 2020-10-20 | End: 2020-10-20

## 2020-10-20 RX ORDER — HYDROCODONE BITARTRATE AND ACETAMINOPHEN 5; 325 MG/1; MG/1
1 TABLET ORAL ONCE AS NEEDED
Status: DISCONTINUED | OUTPATIENT
Start: 2020-10-20 | End: 2020-10-20 | Stop reason: HOSPADM

## 2020-10-20 RX ORDER — NAPROXEN SODIUM 220 MG
220 TABLET ORAL 2 TIMES DAILY WITH MEALS
Qty: 10 TABLET | Refills: 0 | Status: ON HOLD | OUTPATIENT
Start: 2020-10-20 | End: 2020-11-04

## 2020-10-20 RX ORDER — ONDANSETRON 2 MG/ML
INJECTION INTRAMUSCULAR; INTRAVENOUS AS NEEDED
Status: DISCONTINUED | OUTPATIENT
Start: 2020-10-20 | End: 2020-10-20

## 2020-10-20 RX ORDER — KETOROLAC TROMETHAMINE 30 MG/ML
INJECTION, SOLUTION INTRAMUSCULAR; INTRAVENOUS AS NEEDED
Status: DISCONTINUED | OUTPATIENT
Start: 2020-10-20 | End: 2020-10-20

## 2020-10-20 RX ORDER — CEFAZOLIN SODIUM 2 G/50ML
SOLUTION INTRAVENOUS AS NEEDED
Status: DISCONTINUED | OUTPATIENT
Start: 2020-10-20 | End: 2020-10-20

## 2020-10-20 RX ORDER — LIDOCAINE HYDROCHLORIDE AND EPINEPHRINE 10; 10 MG/ML; UG/ML
INJECTION, SOLUTION INFILTRATION; PERINEURAL AS NEEDED
Status: DISCONTINUED | OUTPATIENT
Start: 2020-10-20 | End: 2020-10-20 | Stop reason: HOSPADM

## 2020-10-20 RX ORDER — MAGNESIUM HYDROXIDE 1200 MG/15ML
LIQUID ORAL AS NEEDED
Status: DISCONTINUED | OUTPATIENT
Start: 2020-10-20 | End: 2020-10-20 | Stop reason: HOSPADM

## 2020-10-20 RX ORDER — SODIUM CHLORIDE, SODIUM LACTATE, POTASSIUM CHLORIDE, CALCIUM CHLORIDE 600; 310; 30; 20 MG/100ML; MG/100ML; MG/100ML; MG/100ML
20 INJECTION, SOLUTION INTRAVENOUS CONTINUOUS
Status: DISCONTINUED | OUTPATIENT
Start: 2020-10-20 | End: 2020-10-20 | Stop reason: HOSPADM

## 2020-10-20 RX ORDER — PROPOFOL 10 MG/ML
INJECTION, EMULSION INTRAVENOUS AS NEEDED
Status: DISCONTINUED | OUTPATIENT
Start: 2020-10-20 | End: 2020-10-20

## 2020-10-20 RX ORDER — HYDROCODONE BITARTRATE AND ACETAMINOPHEN 5; 325 MG/1; MG/1
1 TABLET ORAL EVERY 6 HOURS PRN
Qty: 10 TABLET | Refills: 0 | Status: SHIPPED | OUTPATIENT
Start: 2020-10-20 | End: 2020-10-28

## 2020-10-20 RX ORDER — ONDANSETRON 2 MG/ML
4 INJECTION INTRAMUSCULAR; INTRAVENOUS ONCE AS NEEDED
Status: DISCONTINUED | OUTPATIENT
Start: 2020-10-20 | End: 2020-10-20 | Stop reason: HOSPADM

## 2020-10-20 RX ORDER — SODIUM CHLORIDE, SODIUM LACTATE, POTASSIUM CHLORIDE, CALCIUM CHLORIDE 600; 310; 30; 20 MG/100ML; MG/100ML; MG/100ML; MG/100ML
125 INJECTION, SOLUTION INTRAVENOUS CONTINUOUS
Status: DISCONTINUED | OUTPATIENT
Start: 2020-10-20 | End: 2020-10-20 | Stop reason: HOSPADM

## 2020-10-20 RX ORDER — DEXMEDETOMIDINE HYDROCHLORIDE 100 UG/ML
INJECTION, SOLUTION INTRAVENOUS AS NEEDED
Status: DISCONTINUED | OUTPATIENT
Start: 2020-10-20 | End: 2020-10-20

## 2020-10-20 RX ORDER — SENNOSIDES 8.6 MG
650 CAPSULE ORAL EVERY 8 HOURS
Qty: 15 TABLET | Refills: 0 | Status: SHIPPED | OUTPATIENT
Start: 2020-10-20 | End: 2020-10-25

## 2020-10-20 RX ORDER — EPHEDRINE SULFATE 50 MG/ML
INJECTION INTRAVENOUS AS NEEDED
Status: DISCONTINUED | OUTPATIENT
Start: 2020-10-20 | End: 2020-10-20

## 2020-10-20 RX ORDER — FENTANYL CITRATE/PF 50 MCG/ML
25 SYRINGE (ML) INJECTION
Status: DISCONTINUED | OUTPATIENT
Start: 2020-10-20 | End: 2020-10-20 | Stop reason: HOSPADM

## 2020-10-20 RX ORDER — DEXAMETHASONE SODIUM PHOSPHATE 10 MG/ML
INJECTION, SOLUTION INTRAMUSCULAR; INTRAVENOUS AS NEEDED
Status: DISCONTINUED | OUTPATIENT
Start: 2020-10-20 | End: 2020-10-20

## 2020-10-20 RX ORDER — LIDOCAINE HYDROCHLORIDE 10 MG/ML
INJECTION, SOLUTION EPIDURAL; INFILTRATION; INTRACAUDAL; PERINEURAL AS NEEDED
Status: DISCONTINUED | OUTPATIENT
Start: 2020-10-20 | End: 2020-10-20

## 2020-10-20 RX ADMIN — KETOROLAC TROMETHAMINE 15 MG: 30 INJECTION, SOLUTION INTRAMUSCULAR at 12:41

## 2020-10-20 RX ADMIN — ONDANSETRON 4 MG: 2 INJECTION INTRAMUSCULAR; INTRAVENOUS at 12:12

## 2020-10-20 RX ADMIN — DEXAMETHASONE SODIUM PHOSPHATE 10 MG: 10 INJECTION, SOLUTION INTRAMUSCULAR; INTRAVENOUS at 12:12

## 2020-10-20 RX ADMIN — PROPOFOL 140 MCG/KG/MIN: 10 INJECTION, EMULSION INTRAVENOUS at 12:08

## 2020-10-20 RX ADMIN — PHENYLEPHRINE HYDROCHLORIDE 30 MCG/MIN: 10 INJECTION INTRAVENOUS at 12:11

## 2020-10-20 RX ADMIN — SODIUM CHLORIDE, SODIUM LACTATE, POTASSIUM CHLORIDE, AND CALCIUM CHLORIDE 125 ML/HR: .6; .31; .03; .02 INJECTION, SOLUTION INTRAVENOUS at 10:35

## 2020-10-20 RX ADMIN — LIDOCAINE HYDROCHLORIDE 100 MG: 10 INJECTION, SOLUTION EPIDURAL; INFILTRATION; INTRACAUDAL; PERINEURAL at 12:05

## 2020-10-20 RX ADMIN — PROPOFOL 150 MG: 10 INJECTION, EMULSION INTRAVENOUS at 12:08

## 2020-10-20 RX ADMIN — PROPOFOL 200 MG: 10 INJECTION, EMULSION INTRAVENOUS at 12:05

## 2020-10-20 RX ADMIN — PHENYLEPHRINE HYDROCHLORIDE 100 MCG: 10 INJECTION INTRAVENOUS at 12:53

## 2020-10-20 RX ADMIN — DEXMEDETOMIDINE 8 MCG: 100 INJECTION, SOLUTION, CONCENTRATE INTRAVENOUS at 12:28

## 2020-10-20 RX ADMIN — CEFAZOLIN SODIUM 2000 MG: 2 SOLUTION INTRAVENOUS at 12:21

## 2020-10-20 RX ADMIN — EPHEDRINE SULFATE 15 MG: 50 INJECTION, SOLUTION INTRAVENOUS at 12:54

## 2020-10-22 ENCOUNTER — TELEPHONE (OUTPATIENT)
Dept: OBGYN CLINIC | Facility: HOSPITAL | Age: 70
End: 2020-10-22

## 2020-10-28 ENCOUNTER — OFFICE VISIT (OUTPATIENT)
Dept: OBGYN CLINIC | Facility: HOSPITAL | Age: 70
End: 2020-10-28

## 2020-10-28 VITALS
SYSTOLIC BLOOD PRESSURE: 132 MMHG | DIASTOLIC BLOOD PRESSURE: 69 MMHG | HEIGHT: 68 IN | HEART RATE: 65 BPM | WEIGHT: 173.2 LBS | BODY MASS INDEX: 26.25 KG/M2

## 2020-10-28 DIAGNOSIS — Z98.890 S/P CARPAL TUNNEL RELEASE: Primary | ICD-10-CM

## 2020-10-28 DIAGNOSIS — N52.9 ERECTILE DYSFUNCTION, UNSPECIFIED ERECTILE DYSFUNCTION TYPE: Primary | ICD-10-CM

## 2020-10-28 DIAGNOSIS — Z98.890 S/P CUBITAL TUNNEL RELEASE: ICD-10-CM

## 2020-10-28 PROCEDURE — 99024 POSTOP FOLLOW-UP VISIT: CPT | Performed by: PHYSICIAN ASSISTANT

## 2020-10-29 DIAGNOSIS — N52.9 ERECTILE DYSFUNCTION, UNSPECIFIED ERECTILE DYSFUNCTION TYPE: Primary | ICD-10-CM

## 2020-10-29 RX ORDER — SILDENAFIL 100 MG/1
100 TABLET, FILM COATED ORAL DAILY PRN
Qty: 10 TABLET | Refills: 3 | Status: SHIPPED | OUTPATIENT
Start: 2020-10-29 | End: 2020-11-02 | Stop reason: CLARIF

## 2020-11-02 RX ORDER — SILDENAFIL 100 MG/1
TABLET, FILM COATED ORAL
Qty: 30 TABLET | Refills: 3 | Status: SHIPPED | OUTPATIENT
Start: 2020-11-02 | End: 2020-12-16

## 2020-11-03 ENCOUNTER — ANESTHESIA EVENT (OUTPATIENT)
Dept: PERIOP | Facility: HOSPITAL | Age: 70
End: 2020-11-03
Payer: MEDICARE

## 2020-11-03 RX ORDER — SILDENAFIL 100 MG/1
100 TABLET, FILM COATED ORAL DAILY PRN
Qty: 10 TABLET | Refills: 3 | Status: SHIPPED | OUTPATIENT
Start: 2020-11-03 | End: 2020-12-16

## 2020-11-04 ENCOUNTER — TELEPHONE (OUTPATIENT)
Dept: OBGYN CLINIC | Facility: MEDICAL CENTER | Age: 70
End: 2020-11-04

## 2020-11-04 ENCOUNTER — ANESTHESIA (OUTPATIENT)
Dept: PERIOP | Facility: HOSPITAL | Age: 70
End: 2020-11-04
Payer: MEDICARE

## 2020-11-04 ENCOUNTER — HOSPITAL ENCOUNTER (OUTPATIENT)
Facility: HOSPITAL | Age: 70
Setting detail: OUTPATIENT SURGERY
Discharge: HOME | End: 2020-11-04
Attending: ORTHOPAEDIC SURGERY | Admitting: ORTHOPAEDIC SURGERY
Payer: MEDICARE

## 2020-11-04 VITALS
SYSTOLIC BLOOD PRESSURE: 92 MMHG | OXYGEN SATURATION: 98 % | BODY MASS INDEX: 25.76 KG/M2 | TEMPERATURE: 97.6 F | DIASTOLIC BLOOD PRESSURE: 52 MMHG | RESPIRATION RATE: 16 BRPM | HEIGHT: 68 IN | HEART RATE: 83 BPM | WEIGHT: 170 LBS

## 2020-11-04 VITALS — HEART RATE: 81 BPM

## 2020-11-04 DIAGNOSIS — G56.03 BILATERAL CARPAL TUNNEL SYNDROME: ICD-10-CM

## 2020-11-04 DIAGNOSIS — G56.21 CUBITAL TUNNEL SYNDROME ON RIGHT: ICD-10-CM

## 2020-11-04 DIAGNOSIS — G56.23 CUBITAL TUNNEL SYNDROME OF BOTH UPPER EXTREMITIES: Primary | ICD-10-CM

## 2020-11-04 PROCEDURE — 64718 REVISE ULNAR NERVE AT ELBOW: CPT | Performed by: ORTHOPAEDIC SURGERY

## 2020-11-04 PROCEDURE — 29848 WRIST ENDOSCOPY/SURGERY: CPT | Performed by: ORTHOPAEDIC SURGERY

## 2020-11-04 RX ORDER — ACETAMINOPHEN 325 MG/1
650 TABLET ORAL ONCE
Status: DISCONTINUED | OUTPATIENT
Start: 2020-11-04 | End: 2020-11-04 | Stop reason: HOSPADM

## 2020-11-04 RX ORDER — EPHEDRINE SULFATE 50 MG/ML
INJECTION INTRAVENOUS AS NEEDED
Status: DISCONTINUED | OUTPATIENT
Start: 2020-11-04 | End: 2020-11-04

## 2020-11-04 RX ORDER — SODIUM CHLORIDE, SODIUM LACTATE, POTASSIUM CHLORIDE, CALCIUM CHLORIDE 600; 310; 30; 20 MG/100ML; MG/100ML; MG/100ML; MG/100ML
50 INJECTION, SOLUTION INTRAVENOUS CONTINUOUS
Status: DISCONTINUED | OUTPATIENT
Start: 2020-11-04 | End: 2020-11-04 | Stop reason: HOSPADM

## 2020-11-04 RX ORDER — ONDANSETRON 2 MG/ML
4 INJECTION INTRAMUSCULAR; INTRAVENOUS ONCE AS NEEDED
Status: DISCONTINUED | OUTPATIENT
Start: 2020-11-04 | End: 2020-11-04 | Stop reason: HOSPADM

## 2020-11-04 RX ORDER — MAGNESIUM HYDROXIDE 1200 MG/15ML
LIQUID ORAL AS NEEDED
Status: DISCONTINUED | OUTPATIENT
Start: 2020-11-04 | End: 2020-11-04 | Stop reason: HOSPADM

## 2020-11-04 RX ORDER — MIDAZOLAM HYDROCHLORIDE 2 MG/2ML
INJECTION, SOLUTION INTRAMUSCULAR; INTRAVENOUS AS NEEDED
Status: DISCONTINUED | OUTPATIENT
Start: 2020-11-04 | End: 2020-11-04

## 2020-11-04 RX ORDER — LIDOCAINE HYDROCHLORIDE 10 MG/ML
INJECTION, SOLUTION EPIDURAL; INFILTRATION; INTRACAUDAL; PERINEURAL AS NEEDED
Status: DISCONTINUED | OUTPATIENT
Start: 2020-11-04 | End: 2020-11-04

## 2020-11-04 RX ORDER — PROPOFOL 10 MG/ML
INJECTION, EMULSION INTRAVENOUS CONTINUOUS PRN
Status: DISCONTINUED | OUTPATIENT
Start: 2020-11-04 | End: 2020-11-04

## 2020-11-04 RX ORDER — NAPROXEN 500 MG/1
500 TABLET ORAL ONCE
Status: DISCONTINUED | OUTPATIENT
Start: 2020-11-04 | End: 2020-11-04 | Stop reason: HOSPADM

## 2020-11-04 RX ORDER — NAPROXEN SODIUM 220 MG
220 TABLET ORAL 2 TIMES DAILY WITH MEALS
Qty: 10 TABLET | Refills: 0 | Status: SHIPPED | OUTPATIENT
Start: 2020-11-04 | End: 2020-12-16

## 2020-11-04 RX ORDER — FENTANYL CITRATE/PF 50 MCG/ML
25 SYRINGE (ML) INJECTION
Status: DISCONTINUED | OUTPATIENT
Start: 2020-11-04 | End: 2020-11-04 | Stop reason: HOSPADM

## 2020-11-04 RX ORDER — PROPOFOL 10 MG/ML
INJECTION, EMULSION INTRAVENOUS AS NEEDED
Status: DISCONTINUED | OUTPATIENT
Start: 2020-11-04 | End: 2020-11-04

## 2020-11-04 RX ORDER — HYDROCODONE BITARTRATE AND ACETAMINOPHEN 5; 325 MG/1; MG/1
1 TABLET ORAL EVERY 6 HOURS PRN
Qty: 10 TABLET | Refills: 0 | Status: SHIPPED | OUTPATIENT
Start: 2020-11-04 | End: 2020-11-14

## 2020-11-04 RX ORDER — FENTANYL CITRATE 50 UG/ML
INJECTION, SOLUTION INTRAMUSCULAR; INTRAVENOUS AS NEEDED
Status: DISCONTINUED | OUTPATIENT
Start: 2020-11-04 | End: 2020-11-04

## 2020-11-04 RX ORDER — SENNOSIDES 8.6 MG
650 CAPSULE ORAL EVERY 8 HOURS
Qty: 15 TABLET | Refills: 0 | Status: SHIPPED | OUTPATIENT
Start: 2020-11-04 | End: 2020-12-16

## 2020-11-04 RX ORDER — LIDOCAINE HYDROCHLORIDE AND EPINEPHRINE 10; 10 MG/ML; UG/ML
INJECTION, SOLUTION INFILTRATION; PERINEURAL AS NEEDED
Status: DISCONTINUED | OUTPATIENT
Start: 2020-11-04 | End: 2020-11-04 | Stop reason: HOSPADM

## 2020-11-04 RX ORDER — HYDROCODONE BITARTRATE AND ACETAMINOPHEN 5; 325 MG/1; MG/1
1 TABLET ORAL EVERY 6 HOURS PRN
Status: DISCONTINUED | OUTPATIENT
Start: 2020-11-04 | End: 2020-11-04 | Stop reason: HOSPADM

## 2020-11-04 RX ORDER — SODIUM CHLORIDE, SODIUM LACTATE, POTASSIUM CHLORIDE, CALCIUM CHLORIDE 600; 310; 30; 20 MG/100ML; MG/100ML; MG/100ML; MG/100ML
125 INJECTION, SOLUTION INTRAVENOUS CONTINUOUS
Status: DISCONTINUED | OUTPATIENT
Start: 2020-11-04 | End: 2020-11-04 | Stop reason: HOSPADM

## 2020-11-04 RX ADMIN — FENTANYL CITRATE 50 MCG: 50 INJECTION, SOLUTION INTRAMUSCULAR; INTRAVENOUS at 11:34

## 2020-11-04 RX ADMIN — PROPOFOL 200 MG: 10 INJECTION, EMULSION INTRAVENOUS at 11:32

## 2020-11-04 RX ADMIN — PROPOFOL 40 MG: 10 INJECTION, EMULSION INTRAVENOUS at 11:50

## 2020-11-04 RX ADMIN — EPHEDRINE SULFATE 10 MG: 50 INJECTION, SOLUTION INTRAVENOUS at 12:09

## 2020-11-04 RX ADMIN — Medication 2000 MG: at 11:20

## 2020-11-04 RX ADMIN — MIDAZOLAM 2 MG: 1 INJECTION INTRAMUSCULAR; INTRAVENOUS at 11:22

## 2020-11-04 RX ADMIN — FENTANYL CITRATE 50 MCG: 50 INJECTION, SOLUTION INTRAMUSCULAR; INTRAVENOUS at 11:39

## 2020-11-04 RX ADMIN — EPHEDRINE SULFATE 10 MG: 50 INJECTION, SOLUTION INTRAVENOUS at 11:36

## 2020-11-04 RX ADMIN — SODIUM CHLORIDE, SODIUM LACTATE, POTASSIUM CHLORIDE, AND CALCIUM CHLORIDE: .6; .31; .03; .02 INJECTION, SOLUTION INTRAVENOUS at 12:12

## 2020-11-04 RX ADMIN — SODIUM CHLORIDE, SODIUM LACTATE, POTASSIUM CHLORIDE, AND CALCIUM CHLORIDE 125 ML/HR: .6; .31; .03; .02 INJECTION, SOLUTION INTRAVENOUS at 10:35

## 2020-11-04 RX ADMIN — EPHEDRINE SULFATE 10 MG: 50 INJECTION, SOLUTION INTRAVENOUS at 11:30

## 2020-11-04 RX ADMIN — PROPOFOL 100 MCG/KG/MIN: 10 INJECTION, EMULSION INTRAVENOUS at 11:34

## 2020-11-04 RX ADMIN — LIDOCAINE HYDROCHLORIDE 100 MG: 10 INJECTION, SOLUTION EPIDURAL; INFILTRATION; INTRACAUDAL; PERINEURAL at 11:32

## 2020-11-13 ENCOUNTER — OFFICE VISIT (OUTPATIENT)
Dept: OBGYN CLINIC | Facility: HOSPITAL | Age: 70
End: 2020-11-13

## 2020-11-13 VITALS
HEART RATE: 76 BPM | WEIGHT: 174.4 LBS | BODY MASS INDEX: 26.52 KG/M2 | DIASTOLIC BLOOD PRESSURE: 72 MMHG | SYSTOLIC BLOOD PRESSURE: 141 MMHG

## 2020-11-13 DIAGNOSIS — Z98.890 S/P CARPAL TUNNEL RELEASE: Primary | ICD-10-CM

## 2020-11-13 DIAGNOSIS — Z98.890 S/P CUBITAL TUNNEL RELEASE: ICD-10-CM

## 2020-11-13 PROCEDURE — 99024 POSTOP FOLLOW-UP VISIT: CPT | Performed by: ORTHOPAEDIC SURGERY

## 2020-11-16 ENCOUNTER — TELEPHONE (OUTPATIENT)
Dept: NEUROSURGERY | Facility: CLINIC | Age: 70
End: 2020-11-16

## 2020-11-20 DIAGNOSIS — M54.12 CERVICAL RADICULOPATHY: Primary | ICD-10-CM

## 2020-11-20 DIAGNOSIS — M54.2 NECK PAIN: ICD-10-CM

## 2020-11-24 ENCOUNTER — HOSPITAL ENCOUNTER (OUTPATIENT)
Dept: MRI IMAGING | Facility: HOSPITAL | Age: 70
Discharge: HOME | End: 2020-11-24
Attending: NEUROLOGICAL SURGERY
Payer: MEDICARE

## 2020-11-24 DIAGNOSIS — M54.12 CERVICAL RADICULOPATHY: ICD-10-CM

## 2020-11-24 DIAGNOSIS — M54.2 NECK PAIN: ICD-10-CM

## 2020-11-24 PROCEDURE — 72141 MRI NECK SPINE W/O DYE: CPT

## 2020-11-24 PROCEDURE — G1004 CDSM NDSC: HCPCS

## 2020-12-05 ENCOUNTER — TELEPHONE (OUTPATIENT)
Dept: OTHER | Facility: OTHER | Age: 70
End: 2020-12-05

## 2020-12-05 ENCOUNTER — DOCUMENTATION (OUTPATIENT)
Dept: FAMILY MEDICINE CLINIC | Facility: CLINIC | Age: 70
End: 2020-12-05

## 2020-12-05 DIAGNOSIS — U07.1 COVID-19: Primary | ICD-10-CM

## 2020-12-07 DIAGNOSIS — U07.1 COVID-19: ICD-10-CM

## 2020-12-07 DIAGNOSIS — I10 ESSENTIAL HYPERTENSION: ICD-10-CM

## 2020-12-07 PROCEDURE — U0003 INFECTIOUS AGENT DETECTION BY NUCLEIC ACID (DNA OR RNA); SEVERE ACUTE RESPIRATORY SYNDROME CORONAVIRUS 2 (SARS-COV-2) (CORONAVIRUS DISEASE [COVID-19]), AMPLIFIED PROBE TECHNIQUE, MAKING USE OF HIGH THROUGHPUT TECHNOLOGIES AS DESCRIBED BY CMS-2020-01-R: HCPCS | Performed by: INTERNAL MEDICINE

## 2020-12-08 ENCOUNTER — TELEPHONE (OUTPATIENT)
Dept: INTERNAL MEDICINE CLINIC | Facility: CLINIC | Age: 70
End: 2020-12-08

## 2020-12-08 LAB — SARS-COV-2 RNA SPEC QL NAA+PROBE: NOT DETECTED

## 2020-12-08 RX ORDER — AMLODIPINE BESYLATE 5 MG/1
5 TABLET ORAL DAILY
Qty: 90 TABLET | Refills: 3 | Status: SHIPPED | OUTPATIENT
Start: 2020-12-08 | End: 2021-12-07 | Stop reason: SDUPTHER

## 2020-12-16 RX ORDER — VITAMIN B COMPLEX
1 CAPSULE ORAL DAILY
COMMUNITY

## 2020-12-16 RX ORDER — MULTIVIT WITH MINERALS/LUTEIN
500 TABLET ORAL DAILY
COMMUNITY
End: 2022-06-15

## 2020-12-17 ENCOUNTER — TELEMEDICINE (OUTPATIENT)
Dept: NEUROSURGERY | Facility: CLINIC | Age: 70
End: 2020-12-17
Payer: MEDICARE

## 2020-12-17 DIAGNOSIS — M43.6 NECK STIFFNESS: Primary | ICD-10-CM

## 2020-12-17 PROCEDURE — 99442 PR PHYS/QHP TELEPHONE EVALUATION 11-20 MIN: CPT | Performed by: NEUROLOGICAL SURGERY

## 2020-12-23 ENCOUNTER — TELEPHONE (OUTPATIENT)
Dept: NEUROSURGERY | Facility: CLINIC | Age: 70
End: 2020-12-23

## 2020-12-23 DIAGNOSIS — M54.2 NECK PAIN: ICD-10-CM

## 2020-12-23 DIAGNOSIS — M43.6 NECK STIFFNESS: Primary | ICD-10-CM

## 2020-12-23 DIAGNOSIS — M54.12 CERVICAL RADICULOPATHY: ICD-10-CM

## 2021-01-11 DIAGNOSIS — E78.00 HYPERCHOLESTEREMIA: ICD-10-CM

## 2021-01-11 RX ORDER — ATORVASTATIN CALCIUM 20 MG/1
20 TABLET, FILM COATED ORAL DAILY
Qty: 90 TABLET | Refills: 3 | Status: SHIPPED | OUTPATIENT
Start: 2021-01-11 | End: 2021-12-23

## 2021-01-12 ENCOUNTER — EVALUATION (OUTPATIENT)
Dept: PHYSICAL THERAPY | Facility: MEDICAL CENTER | Age: 71
End: 2021-01-12
Payer: MEDICARE

## 2021-01-12 DIAGNOSIS — M43.6 NECK STIFFNESS: ICD-10-CM

## 2021-01-12 PROCEDURE — 97161 PT EVAL LOW COMPLEX 20 MIN: CPT | Performed by: PHYSICAL THERAPIST

## 2021-01-12 PROCEDURE — 97140 MANUAL THERAPY 1/> REGIONS: CPT | Performed by: PHYSICAL THERAPIST

## 2021-01-12 NOTE — PROGRESS NOTES
PT Evaluation     Today's date: 2021  Patient name: Neva Lott  : 1950  MRN: [id number]  Referring provider: Sean Keith MD  Dx:   Encounter Diagnosis     ICD-10-CM    1  Neck stiffness  M43 6 Ambulatory referral to Physical Therapy                  Assessment/Plan  Patient is a very pleasant 64 yo male who presents with symptoms of neck stiffness  Symptoms are consistent with C4-7 ERSR somatic dysfunction with concurrent poor activation of Longus Capitus and Longus Coli with loss of feed forward mechanism  No myotomal loss at this time or signs of neurological impingement  Patient will benefit from skilled PT services to address issues of stiffness with manual therapy, cervical stability training, and postural control  Subjective  Patient states that he is having issues of numbing in his hands B worse in right during the night and at times during the day  Patient is concerned also with neck pain and difficulty with cervical rotation  Patient has seen Dr Bailee Roberts who ordered MRI  Surgical intervention is being considered however conservative measures are being considered first       Objective  Red Flags: none  Pain: 2/10 in neck EOR  Reflexes: 2+ B UE  Posture: forward head increased thoracic kyphosis  AROM: limited cervical lateral flexion and rotation by 40% B  No recreation of symptoms with AROM or with OP  PROM: deferred  PAROM: limited in nearly all testing most dramatic with comparable sign with UPA of C4-7 right  Palpation: ttp of suboccipital musculature  Special Tests: - compression test, - ULTTA right, - hoffmans, - babinski  Coordination of Movement/strengthe: Patient demonstrates poor activation of Longus Capitus and Longus Coli with loss of feed forward mechanism with reaching tasks  Patient was able to perform activation with cueing    Goals  - Pt I with initial HEP in 1-2 visits  - Improve cervical ROM to [facility]  - Improved Longus Coli and Longus Capitus activation and return of feed forward mechanism   - Increase Functional Status Measure measured by FOTO by [facility]       Precautions: advanced cervical degenerative changes      Manual  1/12/2021              C4-7 UPA R Gr  Iv+ 30x3             Thoracic pistol Gr  v            C6-7 rotational mob Gr   Iv-  10x3                                          Exercise Diary                                                                                                                                                                                                                                                                                      Modalities              Mechanical traction

## 2021-01-21 ENCOUNTER — OFFICE VISIT (OUTPATIENT)
Dept: PHYSICAL THERAPY | Facility: MEDICAL CENTER | Age: 71
End: 2021-01-21
Payer: MEDICARE

## 2021-01-21 DIAGNOSIS — M43.6 NECK STIFFNESS: Primary | ICD-10-CM

## 2021-01-21 PROCEDURE — 97140 MANUAL THERAPY 1/> REGIONS: CPT | Performed by: PHYSICAL THERAPIST

## 2021-01-21 PROCEDURE — 97112 NEUROMUSCULAR REEDUCATION: CPT | Performed by: PHYSICAL THERAPIST

## 2021-01-21 NOTE — PROGRESS NOTES
Daily Note     Today's date: 2021  Patient name: Bulah Gilford  : 1950  MRN: [id number]  Referring provider: Dariana Mckee MD  Dx:   Encounter Diagnosis     ICD-10-CM    1  Neck stiffness  M43 6                   Subjective: patient states that he is feeling good  The neck is moving well and not having much pain since last visit  Objective: See treatment diary below      Assessment: Tolerated treatment well  Patient demonstrated fatigue post treatment, exhibited good technique with therapeutic exercises and would benefit from continued PT  Progressed today with more intense manual therapy  HEP was gone over again and patient was again reminded of form and reps  Plan: Continue per plan of care  Precautions: advanced cervical degenerative changes      Manual                C4-7 UPA R Gr  Iv+ 30x3             Thoracic pistol Gr  v            C6-7 rotational mob Gr  Iv-  10x3            C5-7 transverse glide prone  Right  Gr  Iv  10x5            Seated left lateral flexion with right glide of C6-7 Gr   Iv [id number]                Exercise Diary              Self snags cervical 10x2            pec stretch supine in 3 positions overhead [id number]            Chin tucks  15            scap 4 30                                                                                                                                                                                                                                Modalities              Mechanical traction

## 2021-01-27 ENCOUNTER — APPOINTMENT (OUTPATIENT)
Dept: PHYSICAL THERAPY | Facility: MEDICAL CENTER | Age: 71
End: 2021-01-27
Payer: MEDICARE

## 2021-01-29 ENCOUNTER — OFFICE VISIT (OUTPATIENT)
Dept: PHYSICAL THERAPY | Facility: MEDICAL CENTER | Age: 71
End: 2021-01-29
Payer: MEDICARE

## 2021-01-29 DIAGNOSIS — M43.6 NECK STIFFNESS: Primary | ICD-10-CM

## 2021-01-29 PROCEDURE — 97110 THERAPEUTIC EXERCISES: CPT | Performed by: PHYSICAL THERAPIST

## 2021-01-29 PROCEDURE — 97140 MANUAL THERAPY 1/> REGIONS: CPT | Performed by: PHYSICAL THERAPIST

## 2021-01-29 NOTE — PROGRESS NOTES
Daily Note     Today's date: 2021  Patient name: Brian Mascorro  : 1950  MRN: [id number]  Referring provider: Marah Hernandez MD  Dx:   Encounter Diagnosis     ICD-10-CM    1  Neck stiffness  M43 6                   Subjective: patient states that he is feeling good  The neck is moving well and not having much pain  Was able to elliott without issues  Objective: See treatment diary below      Assessment: Tolerated treatment well  Patient demonstrated fatigue post treatment, exhibited good technique with therapeutic exercises and would benefit from continued PT  Progressed today with more intense manual therapy  HEP was gone over again and patient was again reminded of form and reps  Plan: Continue per plan of care  Precautions: advanced cervical degenerative changes      Manual                C4-7 UPA R Gr  Iv+ 30x3             Thoracic pistol Gr  v            C6-7 rotational mob Gr  Iv-  10x3            C5-7 transverse glide prone  Right  Gr  Iv  10x5            Seated left lateral flexion with right glide of C6-7 Gr   Iv [id number]                Exercise Diary              Self snags cervical 10x2            pec stretch supine in 3 positions overhead [id number]            Chin tucks  15            scap 4 30                                                                                                                                                                                                                                Modalities              Mechanical traction

## 2021-02-03 ENCOUNTER — OFFICE VISIT (OUTPATIENT)
Dept: PHYSICAL THERAPY | Facility: MEDICAL CENTER | Age: 71
End: 2021-02-03
Payer: MEDICARE

## 2021-02-03 DIAGNOSIS — M43.6 NECK STIFFNESS: Primary | ICD-10-CM

## 2021-02-03 PROCEDURE — 97110 THERAPEUTIC EXERCISES: CPT | Performed by: PHYSICAL THERAPIST

## 2021-02-03 PROCEDURE — 97140 MANUAL THERAPY 1/> REGIONS: CPT | Performed by: PHYSICAL THERAPIST

## 2021-02-03 NOTE — PROGRESS NOTES
Daily Note     Today's date: 2/3/2021  Patient name: Jada Schuster  : 1950  MRN: [id number]  Referring provider: Rome Vargas MD  Dx:   Encounter Diagnosis     ICD-10-CM    1  Neck stiffness  M43 6                   Subjective: patient states that he is feeling good  Some noted stiffness but otherwise good  Objective: See treatment diary below      Assessment: Tolerated treatment well  Patient demonstrated fatigue post treatment, exhibited good technique with therapeutic exercises and would benefit from continued PT  Progressed today with more intense manual therapy  HEP was gone over again and patient was again reminded of form and reps  Plan: Continue per plan of care  Precautions: advanced cervical degenerative changes      Manual                C4-7 UPA R Gr  Iv+ 30x3             Thoracic pistol Gr  v            C6-7 rotational mob Gr  Iv-  10x3            C5-7 transverse glide prone  Right  Gr  Iv  10x5            Seated left lateral flexion with right glide of C6-7 Gr   Iv [id number]                Exercise Diary              Self snags cervical 10x2            pec stretch supine in 3 positions overhead [id number]            Chin tucks  15            scap 4 30                                                                                                                                                                                                                                Modalities              Mechanical traction

## 2021-02-15 ENCOUNTER — OFFICE VISIT (OUTPATIENT)
Dept: PHYSICAL THERAPY | Facility: MEDICAL CENTER | Age: 71
End: 2021-02-15
Payer: MEDICARE

## 2021-02-15 DIAGNOSIS — M43.6 NECK STIFFNESS: Primary | ICD-10-CM

## 2021-02-15 PROCEDURE — 97140 MANUAL THERAPY 1/> REGIONS: CPT | Performed by: PHYSICAL THERAPIST

## 2021-02-15 NOTE — PROGRESS NOTES
Daily Note     Today's date: 2/15/2021  Patient name: Nishi Vergara  : 1950  MRN: [id number]  Referring provider: Eriberto Vizcaino MD  Dx:   Encounter Diagnosis     ICD-10-CM    1  Neck stiffness  M43 6                   Subjective: patient states that he is feeling good  Not having much pain at all   Objective: See treatment diary below      Assessment: Tolerated treatment well  Patient demonstrated fatigue post treatment, exhibited good technique with therapeutic exercises and would benefit from continued PT  Progressed today with more intense manual therapy  HEP was gone over again and patient was again reminded of form and reps  Plan: Continue per plan of care  Precautions: advanced cervical degenerative changes      Manual                C4-7 UPA R Gr  Iv+ 30x3             Thoracic pistol Gr  v            C6-7 rotational mob Gr  Iv-  10x3            C5-7 transverse glide prone  Right  Gr  Iv  10x5            Seated left lateral flexion with right glide of C6-7 Gr   Iv [id number]                Exercise Diary              Self snags cervical 10x2            pec stretch supine in 3 positions overhead [id number]            Chin tucks  15            scap 4 30                                                                                                                                                                                                                                Modalities              Mechanical traction

## 2021-03-02 ENCOUNTER — OFFICE VISIT (OUTPATIENT)
Dept: PHYSICAL THERAPY | Facility: MEDICAL CENTER | Age: 71
End: 2021-03-02
Payer: MEDICARE

## 2021-03-02 DIAGNOSIS — M43.6 NECK STIFFNESS: Primary | ICD-10-CM

## 2021-03-02 DIAGNOSIS — Z23 ENCOUNTER FOR IMMUNIZATION: ICD-10-CM

## 2021-03-02 PROCEDURE — 97140 MANUAL THERAPY 1/> REGIONS: CPT | Performed by: PHYSICAL THERAPIST

## 2021-03-02 NOTE — PROGRESS NOTES
Daily Note     Today's date: 3/2/2021  Patient name: Nikki Carias  : 1950  MRN: [id number]  Referring provider: Jude River MD  Dx:   Encounter Diagnosis     ICD-10-CM    1  Neck stiffness  M43 6                   Subjective: patient states that he is feeling good  Not having much pain at all   Objective: See treatment diary below      Assessment: Tolerated treatment well  Patient demonstrated fatigue post treatment, exhibited good technique with therapeutic exercises and would benefit from continued PT  Progressed today with more intense manual therapy  HEP was gone over again and patient was again reminded of form and reps  Plan: Continue per plan of care  Precautions: advanced cervical degenerative changes      Manual                C4-7 UPA R Gr  Iv+ 30x3             Thoracic pistol Gr  v            C6-7 rotational mob Gr  Iv-  10x3            C5-7 transverse glide prone  Right  Gr  Iv  10x5            Seated left lateral flexion with right glide of C6-7 Gr   Iv [id number]                Exercise Diary              Self snags cervical 10x2            pec stretch supine in 3 positions overhead [id number]            Chin tucks  15            scap 4 30                                                                                                                                                                                                                                Modalities              Mechanical traction

## 2021-03-09 ENCOUNTER — APPOINTMENT (OUTPATIENT)
Dept: PHYSICAL THERAPY | Facility: MEDICAL CENTER | Age: 71
End: 2021-03-09
Payer: MEDICARE

## 2021-03-12 DIAGNOSIS — I10 ESSENTIAL HYPERTENSION: ICD-10-CM

## 2021-03-13 RX ORDER — LISINOPRIL 20 MG/1
TABLET ORAL
Qty: 90 TABLET | Refills: 2 | Status: SHIPPED | OUTPATIENT
Start: 2021-03-13

## 2021-03-17 ENCOUNTER — OFFICE VISIT (OUTPATIENT)
Dept: PHYSICAL THERAPY | Facility: MEDICAL CENTER | Age: 71
End: 2021-03-17
Payer: MEDICARE

## 2021-03-17 DIAGNOSIS — M43.6 NECK STIFFNESS: Primary | ICD-10-CM

## 2021-03-17 PROCEDURE — 97110 THERAPEUTIC EXERCISES: CPT | Performed by: PHYSICAL THERAPIST

## 2021-03-17 PROCEDURE — 97140 MANUAL THERAPY 1/> REGIONS: CPT | Performed by: PHYSICAL THERAPIST

## 2021-03-17 NOTE — PROGRESS NOTES
Daily Note     Today's date: 3/17/2021  Patient name: Nikki Carias  : 1950  MRN: [id number]  Referring provider: Jude River MD  Dx:   Encounter Diagnosis     ICD-10-CM    1  Neck stiffness  M43 6                   Subjective: patient states that he is feeling good  Not having much pain at all and ready to be done in with therapy  Objective: See treatment diary below      Assessment: Nikki Carias has been compliant with attending PT and home exercise program since initial eval   Cecilia Sales  has made improvements in objective data since initial evalulation and has achieved all goals  Patient reports having returned to their prior level or function  It was mutually agreed to Discharge to home exercise program at this time  Precautions: advanced cervical degenerative changes      Manual                C4-7 UPA R Gr  Iv+ 30x3             Thoracic pistol Gr  v            C6-7 rotational mob Gr  Iv-  10x3            C5-7 transverse glide prone  Right  Gr  Iv  10x5            Seated left lateral flexion with right glide of C6-7 Gr   Iv [id number]                Exercise Diary              Self snags cervical 10x2            pec stretch supine in 3 positions overhead [id number]            Chin tucks  15            scap 4 30                                                                                                                                                                                                                                Modalities              Mechanical traction

## 2021-03-24 ENCOUNTER — APPOINTMENT (OUTPATIENT)
Dept: PHYSICAL THERAPY | Facility: MEDICAL CENTER | Age: 71
End: 2021-03-24
Payer: MEDICARE

## 2021-03-31 ENCOUNTER — APPOINTMENT (OUTPATIENT)
Dept: PHYSICAL THERAPY | Facility: MEDICAL CENTER | Age: 71
End: 2021-03-31
Payer: MEDICARE

## 2021-04-07 ENCOUNTER — OFFICE VISIT (OUTPATIENT)
Dept: INTERNAL MEDICINE CLINIC | Facility: CLINIC | Age: 71
End: 2021-04-07
Payer: MEDICARE

## 2021-04-07 VITALS
SYSTOLIC BLOOD PRESSURE: 138 MMHG | WEIGHT: 173 LBS | DIASTOLIC BLOOD PRESSURE: 66 MMHG | HEIGHT: 68 IN | OXYGEN SATURATION: 98 % | HEART RATE: 65 BPM | TEMPERATURE: 98.2 F | BODY MASS INDEX: 26.22 KG/M2

## 2021-04-07 DIAGNOSIS — E78.2 MIXED HYPERLIPIDEMIA: ICD-10-CM

## 2021-04-07 DIAGNOSIS — N52.9 ERECTILE DYSFUNCTION, UNSPECIFIED ERECTILE DYSFUNCTION TYPE: ICD-10-CM

## 2021-04-07 DIAGNOSIS — E55.9 VITAMIN D DEFICIENCY: ICD-10-CM

## 2021-04-07 DIAGNOSIS — Z00.00 MEDICARE ANNUAL WELLNESS VISIT, SUBSEQUENT: ICD-10-CM

## 2021-04-07 DIAGNOSIS — K21.9 CHRONIC GERD: ICD-10-CM

## 2021-04-07 DIAGNOSIS — Z12.5 SCREENING FOR PROSTATE CANCER: ICD-10-CM

## 2021-04-07 DIAGNOSIS — I10 HTN (HYPERTENSION), BENIGN: Primary | ICD-10-CM

## 2021-04-07 DIAGNOSIS — M72.0 DUPUYTREN CONTRACTURE: ICD-10-CM

## 2021-04-07 DIAGNOSIS — R73.01 IMPAIRED FASTING GLUCOSE: ICD-10-CM

## 2021-04-07 PROCEDURE — 1123F ACP DISCUSS/DSCN MKR DOCD: CPT | Performed by: INTERNAL MEDICINE

## 2021-04-07 PROCEDURE — 99214 OFFICE O/P EST MOD 30 MIN: CPT | Performed by: INTERNAL MEDICINE

## 2021-04-07 PROCEDURE — G0439 PPPS, SUBSEQ VISIT: HCPCS | Performed by: INTERNAL MEDICINE

## 2021-04-07 NOTE — PATIENT INSTRUCTIONS
Problem List Items Addressed This Visit        Digestive    Chronic GERD      Continue PPI since patient had problems when he tried titrating down PPI  Endocrine    Impaired fasting glucose      Last hemoglobin A1c great at 5 5, continue with healthy diet and exercise         Relevant Orders    Hemoglobin A1C       Cardiovascular and Mediastinum    HTN (hypertension), benign - Primary      Well controlled on current regimen of 20 mg alternating with 40 mg of lisinopril  Discussed the possibility of some dry cough with lisinopril, and we can always adjust this, but patient would rather stay on the lisinopril as he had some erectile dysfunction issues with losartan in the past   Continue with healthy diet and exercise            Other    Mixed hyperlipidemia      Continue statin along with healthy diet and exercise  Relevant Orders    CBC and differential    Comprehensive metabolic panel    Lipid Panel with Direct LDL reflex    TSH, 3rd generation with Free T4 reflex    Erectile dysfunction     Continue cialis, discussed   Seen if better results would not ingesting alcohol         Dupuytren contracture     Pt doing well after surgery         Medicare annual wellness visit, subsequent     Discussed preventative health, cancer screening, immunizations, and safety issues  Patient up-to-date with colonoscopy done November 21, 2018, patient reminded that he would be due for follow-up colonoscopy this fall  Patient up-to-date with Prevnar 13, Pneumovax 23, Shingrix vaccines, flu shot, COVID vaccines, and his Tdap vaccination which was done September 10, 2018           Other Visit Diagnoses     Vitamin D deficiency        Relevant Orders    Vitamin D 25 hydroxy    Screening for prostate cancer        Relevant Orders    PSA, Total Screen          Medicare Preventive Visit Patient Instructions  Thank you for completing your Welcome to Medicare Visit or Medicare Annual Wellness Visit today   Your next wellness visit will be due in one year (4/8/2022)  The screening/preventive services that you may require over the next 5-10 years are detailed below  Some tests may not apply to you based off risk factors and/or age  Screening tests ordered at today's visit but not completed yet may show as past due  Also, please note that scanned in results may not display below  Preventive Screenings:  Service Recommendations Previous Testing/Comments   Colorectal Cancer Screening  · Colonoscopy    · Fecal Occult Blood Test (FOBT)/Fecal Immunochemical Test (FIT)  · Fecal DNA/Cologuard Test  · Flexible Sigmoidoscopy Age: 54-65 years old   Colonoscopy: every 10 years (May be performed more frequently if at higher risk)  OR  FOBT/FIT: every 1 year  OR  Cologuard: every 3 years  OR  Sigmoidoscopy: every 5 years  Screening may be recommended earlier than age 48 if at higher risk for colorectal cancer  Also, an individualized decision between you and your healthcare provider will decide whether screening between the ages of 74-80 would be appropriate  Colonoscopy: 11/21/2018  FOBT/FIT: 02/21/2019  Cologuard: Not on file  Sigmoidoscopy: Not on file    Screening Current     Prostate Cancer Screening Individualized decision between patient and health care provider in men between ages of 53-78   Medicare will cover every 12 months beginning on the day after your 50th birthday PSA: 2 7 ng/mL           Hepatitis C Screening Once for adults born between 1945 and 1965  More frequently in patients at high risk for Hepatitis C Hep C Antibody: 01/31/2018    Screening Current   Diabetes Screening 1-2 times per year if you're at risk for diabetes or have pre-diabetes Fasting glucose: 107 mg/dL   A1C: 5 5 % of total Hgb    Screening Current   Cholesterol Screening Once every 5 years if you don't have a lipid disorder  May order more often based on risk factors   Lipid panel: 09/22/2020    Screening Not Indicated  History Lipid Disorder      Other Preventive Screenings Covered by Medicare:  1  Abdominal Aortic Aneurysm (AAA) Screening: covered once if your at risk  You're considered to be at risk if you have a family history of AAA or a male between the age of 73-68 who smoking at least 100 cigarettes in your lifetime  2  Lung Cancer Screening: covers low dose CT scan once per year if you meet all of the following conditions: (1) Age 50-69; (2) No signs or symptoms of lung cancer; (3) Current smoker or have quit smoking within the last 15 years; (4) You have a tobacco smoking history of at least 30 pack years (packs per day x number of years you smoked); (5) You get a written order from a healthcare provider  3  Glaucoma Screening: covered annually if you're considered high risk: (1) You have diabetes OR (2) Family history of glaucoma OR (3)  aged 48 and older OR (3)  American aged 72 and older  3  Osteoporosis Screening: covered every 2 years if you meet one of the following conditions: (1) Have a vertebral abnormality; (2) On glucocorticoid therapy for more than 3 months; (3) Have primary hyperparathyroidism; (4) On osteoporosis medications and need to assess response to drug therapy  5  HIV Screening: covered annually if you're between the age of 12-76  Also covered annually if you are younger than 13 and older than 72 with risk factors for HIV infection  For pregnant patients, it is covered up to 3 times per pregnancy  Immunizations:  Immunization Recommendations   Influenza Vaccine Annual influenza vaccination during flu season is recommended for all persons aged >= 6 months who do not have contraindications   Pneumococcal Vaccine (Prevnar and Pneumovax)  * Prevnar = PCV13  * Pneumovax = PPSV23 Adults 25-60 years old: 1-3 doses may be recommended based on certain risk factors  Adults 72 years old: Prevnar (PCV13) vaccine recommended followed by Pneumovax (PPSV23) vaccine   If already received PPSV23 since turning 65, then PCV13 recommended at least one year after PPSV23 dose  Hepatitis B Vaccine 3 dose series if at intermediate or high risk (ex: diabetes, end stage renal disease, liver disease)   Tetanus (Td) Vaccine - COST NOT COVERED BY MEDICARE PART B Following completion of primary series, a booster dose should be given every 10 years to maintain immunity against tetanus  Td may also be given as tetanus wound prophylaxis  Tdap Vaccine - COST NOT COVERED BY MEDICARE PART B Recommended at least once for all adults  For pregnant patients, recommended with each pregnancy  Shingles Vaccine (Shingrix) - COST NOT COVERED BY MEDICARE PART B  2 shot series recommended in those aged 48 and above     Health Maintenance Due:      Topic Date Due    Colonoscopy Surveillance  11/21/2021    Colorectal Cancer Screening  11/21/2028    Hepatitis C Screening  Completed     Immunizations Due:  There are no preventive care reminders to display for this patient  Advance Directives   What are advance directives? Advance directives are legal documents that state your wishes and plans for medical care  These plans are made ahead of time in case you lose your ability to make decisions for yourself  Advance directives can apply to any medical decision, such as the treatments you want, and if you want to donate organs  What are the types of advance directives? There are many types of advance directives, and each state has rules about how to use them  You may choose a combination of any of the following:  · Living will: This is a written record of the treatment you want  You can also choose which treatments you do not want, which to limit, and which to stop at a certain time  This includes surgery, medicine, IV fluid, and tube feedings  · Durable power of  for healthcare [facility] [place]): This is a written record that states who you want to make healthcare choices for you when you are unable to make them for yourself   This person, gustavo maurice proxy, is usually a family member or a friend  You may choose more than 1 proxy  · Do not resuscitate (DNR) order:  A DNR order is used in case your heart stops beating or you stop breathing  It is a request not to have certain forms of treatment, such as CPR  A DNR order may be included in other types of advance directives  · Medical directive: This covers the care that you want if you are in a coma, near death, or unable to make decisions for yourself  You can list the treatments you want for each condition  Treatment may include pain medicine, surgery, blood transfusions, dialysis, IV or tube feedings, and a ventilator (breathing machine)  · Values history: This document has questions about your views, beliefs, and how you feel and think about life  This information can help others choose the care that you would choose  Why are advance directives important? An advance directive helps you control your care  Although spoken wishes may be used, it is better to have your wishes written down  Spoken wishes can be misunderstood, or not followed  Treatments may be given even if you do not want them  An advance directive may make it easier for your family to make difficult choices about your care  Weight Management   Why it is important to manage your weight:  Being overweight increases your risk of health conditions such as heart disease, high blood pressure, type 2 diabetes, and certain types of cancer  It can also increase your risk for osteoarthritis, sleep apnea, and other respiratory problems  Aim for a slow, steady weight loss  Even a small amount of weight loss can lower your risk of health problems  How to lose weight safely:  A safe and healthy way to lose weight is to eat fewer calories and get regular exercise  You can lose up about 1 pound a week by decreasing the number of calories you eat by 500 calories each day     Healthy meal plan for weight management:  A healthy meal plan includes a variety of foods, contains fewer calories, and helps you stay healthy  A healthy meal plan includes the following:  · Eat whole-grain foods more often  A healthy meal plan should contain fiber  Fiber is the part of grains, fruits, and vegetables that is not broken down by your body  Whole-grain foods are healthy and provide extra fiber in your diet  Some examples of whole-grain foods are whole-wheat breads and pastas, oatmeal, brown rice, and bulgur  · Eat a variety of vegetables every day  Include dark, leafy greens such as spinach, kale, gerhard greens, and mustard greens  Eat yellow and orange vegetables such as carrots, sweet potatoes, and winter squash  · Eat a variety of fruits every day  Choose fresh or canned fruit (canned in its own juice or light syrup) instead of juice  Fruit juice has very little or no fiber  · Eat low-fat dairy foods  Drink fat-free (skim) milk or 1% milk  Eat fat-free yogurt and low-fat cottage cheese  Try low-fat cheeses such as mozzarella and other reduced-fat cheeses  · Choose meat and other protein foods that are low in fat  Choose beans or other legumes such as split peas or lentils  Choose fish, skinless poultry (chicken or turkey), or lean cuts of red meat (beef or pork)  Before you cook meat or poultry, cut off any visible fat  · Use less fat and oil  Try baking foods instead of frying them  Add less fat, such as margarine, sour cream, regular salad dressing and mayonnaise to foods  Eat fewer high-fat foods  Some examples of high-fat foods include french fries, doughnuts, ice cream, and cakes  · Eat fewer sweets  Limit foods and drinks that are high in sugar  This includes candy, cookies, regular soda, and sweetened drinks  Exercise:  Exercise at least 30 minutes per day on most days of the week  Some examples of exercise include walking, biking, dancing, and swimming   You can also fit in more physical activity by taking the stairs instead of the elevator or parking farther away from stores  Ask your healthcare provider about the best exercise plan for you  © Copyright [employer] 2018 Information is for End User's use only and may not be sold, redistributed or otherwise used for commercial purposes   All illustrations and images included in CareNotes® are the copyrighted property of A D A M , Inc  or [address] [place] [address]

## 2021-04-07 NOTE — PROGRESS NOTES
Assessment/Plan:    Medicare annual wellness visit, subsequent  Discussed preventative health, cancer screening, immunizations, and safety issues  Patient up-to-date with colonoscopy done November 21, 2018, patient reminded that he would be due for follow-up colonoscopy this fall  Patient up-to-date with Prevnar 13, Pneumovax 23, Shingrix vaccines, flu shot, COVID vaccines, and his Tdap vaccination which was done September 10, 2018    HTN (hypertension), benign   Well controlled on current regimen of 20 mg alternating with 40 mg of lisinopril  Discussed the possibility of some dry cough with lisinopril, and we can always adjust this, but patient would rather stay on the lisinopril as he had some erectile dysfunction issues with losartan in the past   Continue with healthy diet and exercise    Impaired fasting glucose   Last hemoglobin A1c great at 5 5, continue with healthy diet and exercise    Chronic GERD   Continue PPI since patient had problems when he tried titrating down PPI  Mixed hyperlipidemia   Continue statin along with healthy diet and exercise  Erectile dysfunction  Continue cialis, discussed   Seen if better results would not ingesting alcohol    Dupuytren contracture  Pt doing well after surgery       Diagnoses and all orders for this visit:    HTN (hypertension), benign    Medicare annual wellness visit, subsequent    Impaired fasting glucose  -     Hemoglobin A1C; Future    Chronic GERD    Mixed hyperlipidemia  -     CBC and differential; Future  -     Comprehensive metabolic panel; Future  -     Lipid Panel with Direct LDL reflex; Future  -     TSH, 3rd generation with Free T4 reflex; Future    Erectile dysfunction, unspecified erectile dysfunction type    Dupuytren contracture    Vitamin D deficiency  -     Vitamin D 25 hydroxy; Future    Screening for prostate cancer  -     PSA, Total Screen; Future          Subjective:      Patient ID: Yogi Sue is a 65 y o  male      HTN: pt admits to some afternoon cough, no lightheadedness, pt alternates 20 mg and 40 mg daily  GERD: No problems with food getting stuck, no significant heartburn, no black tarry stool  Patient takes a proton pump inhibitor daily because when he tried titrating down he had return of his symptoms    Hyperlipidemia:  Pt reports compliance with statin, no significant muscle aches    ED: pt using daily Cialis, his erections don't last that long  The following portions of the patient's history were reviewed and updated as appropriate: allergies, current medications, past family history, past medical history, past social history, past surgical history and problem list     Review of Systems   Constitutional: Negative for chills, fatigue and fever  HENT: Negative for congestion, nosebleeds, postnasal drip, sore throat and trouble swallowing  Eyes: Negative for pain  Respiratory: Negative for cough, chest tightness, shortness of breath and wheezing  Cardiovascular: Negative for chest pain, palpitations and leg swelling  Gastrointestinal: Negative for abdominal pain, constipation, diarrhea, nausea and vomiting  Endocrine: Negative for cold intolerance, polydipsia and polyuria  Genitourinary: Negative for dysuria, flank pain and hematuria  Musculoskeletal: Negative for arthralgias  Skin: Negative for rash  Neurological: Negative for dizziness, tremors, light-headedness and headaches  Hematological: Does not bruise/bleed easily  Psychiatric/Behavioral: Negative for confusion and dysphoric mood  The patient is not nervous/anxious  Objective:      /66   Pulse 65   Temp 98 2 °F (36 8 °C) (Temporal)   Ht 5' 8" (1 727 m)   Wt 78 5 kg (173 lb)   SpO2 98%   BMI 26 30 kg/m²          Physical Exam  Vitals signs reviewed  Constitutional:       General: He is not in acute distress  Appearance: Normal appearance  He is well-developed  HENT:      Head: Normocephalic and atraumatic  Right Ear: External ear normal       Left Ear: External ear normal    Eyes:      General: No scleral icterus  Conjunctiva/sclera: Conjunctivae normal    Neck:      Musculoskeletal: Normal range of motion and neck supple  Thyroid: No thyromegaly  Trachea: No tracheal deviation  Cardiovascular:      Rate and Rhythm: Normal rate and regular rhythm  Heart sounds: Normal heart sounds  No murmur  Pulmonary:      Effort: Pulmonary effort is normal  No respiratory distress  Breath sounds: Normal breath sounds  No wheezing or rales  Abdominal:      General: Bowel sounds are normal       Palpations: Abdomen is soft  Tenderness: There is no abdominal tenderness  There is no guarding or rebound  Hernia: There is no hernia in the left inguinal area or right inguinal area  Genitourinary:     Scrotum/Testes: Normal    Musculoskeletal:      Right lower leg: No edema  Left lower leg: No edema  Lymphadenopathy:      Cervical: No cervical adenopathy  Neurological:      General: No focal deficit present  Mental Status: He is alert and oriented to person, place, and time  Psychiatric:         Mood and Affect: Mood normal          Behavior: Behavior normal          Thought Content:  Thought content normal          Judgment: Judgment normal

## 2021-04-07 NOTE — PROGRESS NOTES
Assessment and Plan:     Problem List Items Addressed This Visit        Other    Medicare annual wellness visit, subsequent - Primary     Discussed preventative health, cancer screening, immunizations, and safety issues  Patient up-to-date with colonoscopy done November 21, 2018, patient reminded that he would be due for follow-up colonoscopy this fall  Patient up-to-date with Prevnar 13, Pneumovax 23, Shingrix vaccines, flu shot, COVID vaccines, and his Tdap vaccination which was done September 10, 2018             BMI Counseling: Body mass index is 26 3 kg/m²  The BMI is above normal  Nutrition recommendations include encouraging healthy choices of fruits and vegetables and moderation in carbohydrate intake  Exercise recommendations include exercising 3-5 times per week  Preventive health issues were discussed with patient, and age appropriate screening tests were ordered as noted in patient's After Visit Summary  Personalized health advice and appropriate referrals for health education or preventive services given if needed, as noted in patient's After Visit Summary       History of Present Illness:     Patient presents for Medicare Annual Wellness visit    Patient Care Team:  Nickie Phoenix, MD as PCP - General     Problem List:     Patient Active Problem List   Diagnosis    Bilateral carpal tunnel syndrome    Pulmonary nodule    SOB (shortness of breath) on exertion    Elevated bilirubin    Impaired fasting glucose    Mixed hyperlipidemia    Chronic GERD    Erectile dysfunction    HTN (hypertension), benign    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    Trigger little finger of right hand    Dupuytren contracture    Medicare annual wellness visit, subsequent    Anxiety    Preop exam for internal medicine    Radiculopathy, cervical region    Dupuytren's contracture of right hand    Neck pain    Abnormal MRI    Cubital tunnel syndrome of both upper extremities    History of anesthesia complications    Neck stiffness      Past Medical and Surgical History:     Past Medical History:   Diagnosis Date    Cancer ([address] )     scc back    Carpal tunnel syndrome     Constipation     with general anesthesia    ED (erectile dysfunction)     GERD (gastroesophageal reflux disease)     [place] (hard of hearing)     wears hearing aids    Hypercholesteremia     Hypertension     Numbness in both hands      Past Surgical History:   Procedure Laterality Date    APPENDECTOMY      As a child    CAST APPLICATION Right 1/65/9689    Procedure: Application short-arm splint;  Surgeon: Kaushik Abad MD;  [facility];  Service: Orthopedics    COLONOSCOPY      INGUINAL HERNIA REPAIR Left 2015    MASS EXCISION N/A 8/14/2018    Procedure: BACK/TRUNK MULTIPLE [facility] [address]; COMPLEX CLOSURE VERSUS FLAP RECONSTRUCTION;  Surgeon: Cosmo Mora MD;  [facility];  Service: Plastics    WI INCISE FINGER TENDON SHEATH Right 8/27/2020    Procedure: Right long finger trigger finger release;  Surgeon: Kaushik Abad MD;  [facility]: [facility];  Service: Orthopedics    WI RELEASE PALM Loman Bolk Right 8/27/2020    Procedure: Excision Dupuytren's central cord right long finger with release of metacarpophalangeal joint  Excision Dupuytren's right ring finger spiral cord ulnar aspect with release of the proximal interphalangeal joint    Excision Dupuytren's right small finger central cord and spiral cord to both radial and ulnar side, as well as ulnar-sided cord of the digital sheath and release of the proximal    WI REVISE ULNAR NERVE AT ELBOW Right 10/20/2020    Procedure: RELEASE CUBITAL TUNNEL;  Surgeon: Kaushik Abad MD;  [facility];  Service: Orthopedics    WI REVISE ULNAR NERVE AT ELBOW Left 11/4/2020    Procedure: Denise Clark;  Surgeon: Kaushik Abad MD;  [facility];  Service: Orthopedics    UT WRIST Jen Bud LIG Right 10/20/2020    Procedure: RELEASE CARPAL TUNNEL ENDOSCOPIC;  Surgeon: Zechariah Hernandez MD;  [facility];  Service: Orthopedics    UT WRIST Jen Bud LIG Left 11/4/2020    Procedure: RELEASE CARPAL TUNNEL ENDOSCOPIC;  Surgeon: Zechariah Hernandez MD;  [facility];  Service: Orthopedics    SQUAMOUS CELL CARCINOMA EXCISION N/A 8/14/2018    Procedure: BACK SCC IN SITU EXCISION;  Surgeon: Bonita Gonzalez MD;  [facility];  Service: Plastics      Family History:     Family History   Problem Relation Age of Onset    Colon cancer Father     Heart attack Mother     Breast cancer Mother       Social History:     E-Cigarette/Vaping    E-Cigarette Use Never User      E-Cigarette/Vaping Substances    Nicotine No     THC No     CBD No     Flavoring No     Other No     Unknown No      Social History     Socioeconomic History    Marital status: /Civil Union     Spouse name: None    Number of children: None    Years of education: None    Highest education level: None   Occupational History    Occupation:    Social Needs    Financial resource strain: None    Food insecurity     Worry: None     Inability: None    Transportation needs     Medical: None     Non-medical: None   Tobacco Use    Smoking status: Never Smoker    Smokeless tobacco: Never Used   Substance and Sexual Activity    Alcohol use:  Yes     Alcohol/week: 15 0 standard drinks     Types: 15 Standard drinks or equivalent per week     Comment: 2 Drinks a night    Drug use: Not Currently     Types: Marijuana     Comment: medical 6 mos ago    Sexual activity: Yes   Lifestyle    Physical activity     Days per week: None     Minutes per session: None    Stress: None   Relationships    Social connections     Talks on phone: None     Gets together: None     Attends Restoration service: None     Active member of club or organization: None Attends meetings of clubs or organizations: None     Relationship status: None    Intimate partner violence     Fear of current or ex partner: None     Emotionally abused: None     Physically abused: None     Forced sexual activity: None   Other Topics Concern    None   Social History Narrative    None      Medications and Allergies:     Current Outpatient Medications   Medication Sig Dispense Refill    amLODIPine (NORVASC) 5 mg tablet Take 1 tablet (5 mg total) by mouth daily 90 tablet 3    Ascorbic Acid (vitamin C) 1000 MG tablet Take 1,000 mg by mouth daily      atorvastatin (LIPITOR) 20 mg tablet Take 1 tablet (20 mg total) by mouth daily 90 tablet 3    b complex vitamins capsule Take 1 capsule by mouth daily      Cholecalciferol (Vitamin D3) 125 MCG (5000 UT) TABS Take 5,000 Units by mouth daily      lisinopril (ZESTRIL) 20 mg tablet take 1 tablet by mouth once daily (Patient taking differently: Alternating with 40mg every other day) 90 tablet 2    lisinopril (ZESTRIL) 40 mg tablet Take 1 tablet (40 mg total) by mouth daily 90 tablet 3    omeprazole (PriLOSEC) 20 mg delayed release capsule Take by mouth daily as needed      Probiotic Product (PROBIOTIC DAILY PO) Take by mouth daily      psyllium (METAMUCIL SMOOTH TEXTURE) 58 6 % powder Take by mouth daily      QUERCETIN PO Take 500 mg by mouth daily      tadalafil (Cialis) 5 MG tablet Take 1 tablet (5 mg total) by mouth daily (Patient taking differently: Take 5 mg by mouth every evening ) 90 tablet 3    Zinc 50 MG CAPS Take by mouth daily       No current facility-administered medications for this visit        No Known Allergies   Immunizations:     Immunization History   Administered Date(s) Administered    INFLUENZA 11/02/2015, 01/30/2017, 02/06/2018, 09/05/2018    Influenza Split High Dose Preservative Free IM 11/02/2015, 01/30/2017, 02/06/2018    Influenza, high dose seasonal 0 7 mL 09/05/2018, 09/11/2019, 09/30/2020    Influenza, seasonal, injectable 11/17/2011    Pneumococcal Conjugate 13-Valent 01/30/2017, 09/10/2018    Pneumococcal Polysaccharide PPV23 09/03/2014, 12/06/2019    SARS-CoV-2 / COVID-19 mRNA IM (Moderna) 01/19/2021, 02/16/2021    Tdap 09/10/2018    Zoster 10/07/2014    Zoster Vaccine Recombinant 05/07/2019, 07/11/2019      Health Maintenance:         Topic Date Due    Colonoscopy Surveillance  11/21/2021    Colorectal Cancer Screening  11/21/2028    Hepatitis C Screening  Completed     There are no preventive care reminders to display for this patient  Medicare Health Risk Assessment:     /66   Pulse 65   Temp 98 2 °F (36 8 °C) (Temporal)   Ht 5' 8" (1 727 m)   Wt 78 5 kg (173 lb)   SpO2 98%   BMI 26 30 kg/m²      Long [place] is here for his Subsequent Wellness visit  Health Risk Assessment:   Patient rates overall health as very good  Patient feels that their physical health rating is slightly better  Patient is very satisfied with their life  Eyesight was rated as same  Hearing was rated as same  Patient feels that their emotional and mental health rating is slightly better  Patients states they are never, rarely angry  Patient states they are never, rarely unusually tired/fatigued  Pain experienced in the last 7 days has been none  Patient states that he has experienced no weight loss or gain in last 6 months  Depression Screening:   PHQ-2 Score: 1      Fall Risk Screening: In the past year, patient has experienced: no history of falling in past year      Home Safety:  Patient does not have trouble with stairs inside or outside of their home  Patient has working smoke alarms and has working carbon monoxide detector  Home safety hazards include: none  Nutrition:   Current diet is Regular  Medications:   Patient is currently taking over-the-counter supplements  OTC medications include: see medication list  Patient is able to manage medications       Activities of Daily Living (ADLs)/Instrumental Activities of Daily Living (IADLs):   Walk and transfer into and out of bed and chair?: Yes  Dress and groom yourself?: Yes    Bathe or shower yourself?: Yes    Feed yourself? Yes  Do your laundry/housekeeping?: Yes  Manage your money, pay your bills and track your expenses?: Yes  Make your own meals?: Yes    Do your own shopping?: Yes    Previous Hospitalizations:   Any hospitalizations or ED visits within the last 12 months?: No      Advance Care Planning:   Living will: Yes    Durable POA for healthcare: Yes    Advanced directive: Yes      Cognitive Screening:   Provider or family/friend/caregiver concerned regarding cognition?: No    PREVENTIVE SCREENINGS      Cardiovascular Screening:    General: Screening Not Indicated, History Lipid Disorder, Risks and Benefits Discussed and Screening Current      Diabetes Screening:     General: Screening Current and Risks and Benefits Discussed      Colorectal Cancer Screening:     General: Screening Current      Prostate Cancer Screening:    General: Risks and Benefits Discussed and Screening Current      Osteoporosis Screening:    General: Screening Not Indicated      Abdominal Aortic Aneurysm (AAA) Screening:    Risk factors include: age between 73-69 yo        General: Screening Not Indicated      Lung Cancer Screening:     General: Screening Not Indicated      Hepatitis C Screening:    General: Screening Current    Screening, Brief Intervention, and Referral to Treatment (SBIRT)    Screening  Typical number of drinks in a day: 2  Typical number of drinks in a week: 14  Interpretation: Low risk drinking behavior  Brief Intervention  Alcohol & drug use screenings were reviewed  No concerns regarding substance use disorder identified  Other Counseling Topics:   Car/seat belt/driving safety, skin self-exam and sunscreen         Abdiel Trevizo MD

## 2021-04-07 NOTE — ASSESSMENT & PLAN NOTE
Well controlled on current regimen of 20 mg alternating with 40 mg of lisinopril    Discussed the possibility of some dry cough with lisinopril, and we can always adjust this, but patient would rather stay on the lisinopril as he had some erectile dysfunction issues with losartan in the past   Continue with healthy diet and exercise

## 2021-04-07 NOTE — ASSESSMENT & PLAN NOTE
Discussed preventative health, cancer screening, immunizations, and safety issues  Patient up-to-date with colonoscopy done November 21, 2018, patient reminded that he would be due for follow-up colonoscopy this fall    Patient up-to-date with Prevnar 13, Pneumovax 23, Shingrix vaccines, flu shot, COVID vaccines, and his Tdap vaccination which was done September 10, 2018

## 2021-06-22 DIAGNOSIS — K21.9 CHRONIC GERD: Primary | ICD-10-CM

## 2021-06-22 RX ORDER — OMEPRAZOLE 20 MG/1
20 CAPSULE, DELAYED RELEASE ORAL DAILY
Qty: 90 CAPSULE | Refills: 3 | Status: SHIPPED | OUTPATIENT
Start: 2021-06-22

## 2021-07-20 DIAGNOSIS — N52.9 ERECTILE DYSFUNCTION, UNSPECIFIED ERECTILE DYSFUNCTION TYPE: ICD-10-CM

## 2021-07-20 NOTE — TELEPHONE ENCOUNTER
Patient left a message on the Medication Refill voice mail line requesting a new prescription for Tadalafil 5mg, 90 day supply to [facility]  He uses [employer] for best pricing  Please call to confirm receipt of this message

## 2021-07-21 RX ORDER — TADALAFIL 5 MG/1
5 TABLET ORAL DAILY
Qty: 90 TABLET | Refills: 0 | Status: SHIPPED | OUTPATIENT
Start: 2021-07-21 | End: 2021-08-17 | Stop reason: SDUPTHER

## 2021-07-21 NOTE — TELEPHONE ENCOUNTER
Patient calling to get medication refill  Left message see previous note  He is at pharmacy now and would like to have it called in for 90 day 3 refills

## 2021-07-21 NOTE — TELEPHONE ENCOUNTER
The patient was last evaluated via telemedicine or video visit on 5/27/20 with Kirk Page PA-C out of the [address] [place] [address] location; continuation of the medication was authorized at that time  The patient is overdue for an office visit  However, in light of the COVID-19 pandemic, this prescription is being refilled  Request for same, 90 day supply with NO refills were queued and forwarded to the Advanced Practitioner covering the [facility] location for approval     Message will be forwarded to Myesha Puga (R) so they can schedule an office visit as soon as possible

## 2021-08-17 ENCOUNTER — OFFICE VISIT (OUTPATIENT)
Dept: UROLOGY | Facility: CLINIC | Age: 71
End: 2021-08-17
Payer: MEDICARE

## 2021-08-17 VITALS
WEIGHT: 165.6 LBS | HEIGHT: 68 IN | DIASTOLIC BLOOD PRESSURE: 70 MMHG | BODY MASS INDEX: 25.1 KG/M2 | SYSTOLIC BLOOD PRESSURE: 130 MMHG

## 2021-08-17 DIAGNOSIS — N52.9 ERECTILE DYSFUNCTION, UNSPECIFIED ERECTILE DYSFUNCTION TYPE: Primary | ICD-10-CM

## 2021-08-17 PROCEDURE — 99213 OFFICE O/P EST LOW 20 MIN: CPT | Performed by: PHYSICIAN ASSISTANT

## 2021-08-17 RX ORDER — SILDENAFIL 100 MG/1
100 TABLET, FILM COATED ORAL DAILY PRN
Qty: 30 TABLET | Refills: 3 | Status: SHIPPED | OUTPATIENT
Start: 2021-08-17

## 2021-08-17 RX ORDER — TADALAFIL 5 MG/1
5 TABLET ORAL DAILY
Qty: 90 TABLET | Refills: 3 | Status: SHIPPED | OUTPATIENT
Start: 2021-08-17

## 2021-08-17 RX ORDER — SILDENAFIL 100 MG/1
100 TABLET, FILM COATED ORAL DAILY PRN
COMMUNITY
End: 2021-08-17 | Stop reason: SDUPTHER

## 2021-08-17 NOTE — PROGRESS NOTES
UROLOGY PROGRESS NOTE   Patient Identifiers: Madonna Stanley (MRN [id number])  Date of Service: 8/17/2021    Subjective:     15-year-old man history of BPH and erectile dysfunction  His last PSA was 2 7  He uses daily Cialis which he is satisfied with  Occasionally he will supplement this with sildenafil  He is otherwise healthy and active and denies any significant lower tract complaints  Reason for visit:  BPH and ED follow-up     Objective:     VITALS:    There were no vitals filed for this visit  LABS:  Lab Results   Component Value Date    HGB 13 8 09/22/2020    HCT 41 6 09/22/2020    WBC 6 7 09/22/2020     09/22/2020   ]    Lab Results   Component Value Date     12/20/2016    K 4 3 09/22/2020     09/22/2020    CO2 28 09/22/2020    BUN 19 09/22/2020    CREATININE 1 09 09/22/2020    CALCIUM 9 5 09/22/2020    GLUCOSE 122 06/03/2015   ]        INPATIENT MEDS:    Current Outpatient Medications:     amLODIPine (NORVASC) 5 mg tablet, Take 1 tablet (5 mg total) by mouth daily, Disp: 90 tablet, Rfl: 3    Ascorbic Acid (vitamin C) 1000 MG tablet, Take 1,000 mg by mouth daily, Disp: , Rfl:     atorvastatin (LIPITOR) 20 mg tablet, Take 1 tablet (20 mg total) by mouth daily, Disp: 90 tablet, Rfl: 3    b complex vitamins capsule, Take 1 capsule by mouth daily, Disp: , Rfl:     Cholecalciferol (Vitamin D3) 125 MCG (5000 UT) TABS, Take 5,000 Units by mouth daily, Disp: , Rfl:     lisinopril (ZESTRIL) 20 mg tablet, take 1 tablet by mouth once daily (Patient taking differently:  Alternating with 40mg every other day), Disp: 90 tablet, Rfl: 2    lisinopril (ZESTRIL) 40 mg tablet, Take 1 tablet (40 mg total) by mouth daily, Disp: 90 tablet, Rfl: 3    omeprazole (PriLOSEC) 20 mg delayed release capsule, Take 1 capsule (20 mg total) by mouth daily, Disp: 90 capsule, Rfl: 3    Probiotic Product (PROBIOTIC DAILY PO), Take by mouth daily, Disp: , Rfl:     psyllium (METAMUCIL SMOOTH TEXTURE) 58 6 % powder, Take by mouth daily, Disp: , Rfl:     QUERCETIN PO, Take 500 mg by mouth daily, Disp: , Rfl:     tadalafil (Cialis) 5 MG tablet, Take 1 tablet (5 mg total) by mouth daily, Disp: 90 tablet, Rfl: 0    Zinc 50 MG CAPS, Take by mouth daily, Disp: , Rfl:       Physical Exam:   There were no vitals taken for this visit  GEN: no acute distress    RESP: breathing comfortably with no accessory muscle use    ABD: soft, non-tender, non-distended   INCISION:    EXT: no significant peripheral edema   (Male): Penis circumcised, phallus normal, meatus patent  Testicles descended into scrotum bilaterally without masses nor tenderness  No inguinal hernias bilaterally  LOWELL: Prostate is enlarged at 35 grams  The prostate is not boggy  The prostate is not tender  No nodules noted      RADIOLOGY:    none     Assessment:     1  BPH   2   Erectile dysfunction     Plan:   - follow-up in 1 year with PSA prior to visit  -  -  -

## 2021-09-16 LAB
25(OH)D3 SERPL-MCNC: 26 NG/ML (ref 30–100)
ALBUMIN SERPL-MCNC: 4 G/DL (ref 3.6–5.1)
ALBUMIN/GLOB SERPL: 1.8 (CALC) (ref 1–2.5)
ALP SERPL-CCNC: 63 U/L (ref 35–144)
ALT SERPL-CCNC: 28 U/L (ref 9–46)
AST SERPL-CCNC: 20 U/L (ref 10–35)
BASOPHILS # BLD AUTO: 69 CELLS/UL (ref 0–200)
BASOPHILS NFR BLD AUTO: 1 %
BILIRUB SERPL-MCNC: 0.8 MG/DL (ref 0.2–1.2)
BUN SERPL-MCNC: 18 MG/DL (ref 7–25)
BUN/CREAT SERPL: ABNORMAL (CALC) (ref 6–22)
CALCIUM SERPL-MCNC: 9 MG/DL (ref 8.6–10.3)
CHLORIDE SERPL-SCNC: 106 MMOL/L (ref 98–110)
CHOLEST SERPL-MCNC: 156 MG/DL
CHOLEST/HDLC SERPL: 2.2 (CALC)
CO2 SERPL-SCNC: 27 MMOL/L (ref 20–32)
CREAT SERPL-MCNC: 0.86 MG/DL (ref 0.7–1.18)
EOSINOPHIL # BLD AUTO: 179 CELLS/UL (ref 15–500)
EOSINOPHIL NFR BLD AUTO: 2.6 %
ERYTHROCYTE [DISTWIDTH] IN BLOOD BY AUTOMATED COUNT: 12.4 % (ref 11–15)
GLOBULIN SER CALC-MCNC: 2.2 G/DL (CALC) (ref 1.9–3.7)
GLUCOSE SERPL-MCNC: 111 MG/DL (ref 65–99)
HBA1C MFR BLD: 5.4 % OF TOTAL HGB
HCT VFR BLD AUTO: 38.8 % (ref 38.5–50)
HDLC SERPL-MCNC: 71 MG/DL
HGB BLD-MCNC: 12.9 G/DL (ref 13.2–17.1)
LDLC SERPL CALC-MCNC: 71 MG/DL (CALC)
LYMPHOCYTES # BLD AUTO: 1490 CELLS/UL (ref 850–3900)
LYMPHOCYTES NFR BLD AUTO: 21.6 %
MCH RBC QN AUTO: 30.1 PG (ref 27–33)
MCHC RBC AUTO-ENTMCNC: 33.2 G/DL (ref 32–36)
MCV RBC AUTO: 90.4 FL (ref 80–100)
MONOCYTES # BLD AUTO: 552 CELLS/UL (ref 200–950)
MONOCYTES NFR BLD AUTO: 8 %
NEUTROPHILS # BLD AUTO: 4609 CELLS/UL (ref 1500–7800)
NEUTROPHILS NFR BLD AUTO: 66.8 %
NONHDLC SERPL-MCNC: 85 MG/DL (CALC)
PLATELET # BLD AUTO: 189 THOUSAND/UL (ref 140–400)
PMV BLD REES-ECKER: 10.4 FL (ref 7.5–12.5)
POTASSIUM SERPL-SCNC: 4.3 MMOL/L (ref 3.5–5.3)
PROT SERPL-MCNC: 6.2 G/DL (ref 6.1–8.1)
PSA SERPL-MCNC: 2.67 NG/ML
RBC # BLD AUTO: 4.29 MILLION/UL (ref 4.2–5.8)
SL AMB EGFR AFRICAN AMERICAN: 100 ML/MIN/1.73M2
SL AMB EGFR NON AFRICAN AMERICAN: 86 ML/MIN/1.73M2
SODIUM SERPL-SCNC: 139 MMOL/L (ref 135–146)
TRIGL SERPL-MCNC: 61 MG/DL
TSH SERPL-ACNC: 2.12 MIU/L (ref 0.4–4.5)
WBC # BLD AUTO: 6.9 THOUSAND/UL (ref 3.8–10.8)

## 2021-09-17 ENCOUNTER — TELEPHONE (OUTPATIENT)
Dept: OTHER | Facility: OTHER | Age: 71
End: 2021-09-17

## 2021-09-17 NOTE — RESULT ENCOUNTER NOTE
Labs ok,  hemoglobin just below normal, vitamin-D little low, glucose looks a little better, will review in more detail at upcoming appt

## 2021-09-20 NOTE — TELEPHONE ENCOUNTER
LM for patient, I was unsure if he had rcvd the message about his labs  He is to call back if he has not

## 2021-10-05 ENCOUNTER — OFFICE VISIT (OUTPATIENT)
Dept: INTERNAL MEDICINE CLINIC | Facility: CLINIC | Age: 71
End: 2021-10-05
Payer: MEDICARE

## 2021-10-05 VITALS
BODY MASS INDEX: 25.34 KG/M2 | SYSTOLIC BLOOD PRESSURE: 138 MMHG | OXYGEN SATURATION: 96 % | WEIGHT: 167.2 LBS | HEIGHT: 68 IN | HEART RATE: 66 BPM | DIASTOLIC BLOOD PRESSURE: 74 MMHG

## 2021-10-05 DIAGNOSIS — E55.9 VITAMIN D DEFICIENCY: ICD-10-CM

## 2021-10-05 DIAGNOSIS — I10 HTN (HYPERTENSION), BENIGN: Primary | ICD-10-CM

## 2021-10-05 DIAGNOSIS — M72.0 DUPUYTREN CONTRACTURE: ICD-10-CM

## 2021-10-05 DIAGNOSIS — Z23 NEEDS FLU SHOT: ICD-10-CM

## 2021-10-05 DIAGNOSIS — N52.9 ERECTILE DYSFUNCTION, UNSPECIFIED ERECTILE DYSFUNCTION TYPE: ICD-10-CM

## 2021-10-05 DIAGNOSIS — E78.2 MIXED HYPERLIPIDEMIA: ICD-10-CM

## 2021-10-05 DIAGNOSIS — R73.01 IMPAIRED FASTING GLUCOSE: ICD-10-CM

## 2021-10-05 DIAGNOSIS — K21.9 CHRONIC GERD: ICD-10-CM

## 2021-10-05 PROCEDURE — G0008 ADMIN INFLUENZA VIRUS VAC: HCPCS

## 2021-10-05 PROCEDURE — 99214 OFFICE O/P EST MOD 30 MIN: CPT | Performed by: INTERNAL MEDICINE

## 2021-10-05 PROCEDURE — 90662 IIV NO PRSV INCREASED AG IM: CPT

## 2021-10-06 ENCOUNTER — OFFICE VISIT (OUTPATIENT)
Dept: CARDIOLOGY CLINIC | Facility: CLINIC | Age: 71
End: 2021-10-06
Payer: MEDICARE

## 2021-10-06 VITALS
WEIGHT: 169 LBS | DIASTOLIC BLOOD PRESSURE: 54 MMHG | BODY MASS INDEX: 25.61 KG/M2 | HEIGHT: 68 IN | HEART RATE: 59 BPM | SYSTOLIC BLOOD PRESSURE: 117 MMHG

## 2021-10-06 DIAGNOSIS — E78.2 MIXED HYPERLIPIDEMIA: ICD-10-CM

## 2021-10-06 DIAGNOSIS — I25.10 CORONARY ARTERY DISEASE INVOLVING NATIVE HEART WITHOUT ANGINA PECTORIS, UNSPECIFIED VESSEL OR LESION TYPE: Primary | ICD-10-CM

## 2021-10-06 DIAGNOSIS — I10 HTN (HYPERTENSION), BENIGN: ICD-10-CM

## 2021-10-06 PROCEDURE — 93000 ELECTROCARDIOGRAM COMPLETE: CPT | Performed by: INTERNAL MEDICINE

## 2021-10-06 PROCEDURE — 99214 OFFICE O/P EST MOD 30 MIN: CPT | Performed by: INTERNAL MEDICINE

## 2021-10-06 RX ORDER — MELATONIN
1000 2 TIMES DAILY
COMMUNITY

## 2021-11-23 ENCOUNTER — TELEPHONE (OUTPATIENT)
Dept: INTERNAL MEDICINE CLINIC | Facility: CLINIC | Age: 71
End: 2021-11-23

## 2021-11-23 DIAGNOSIS — J02.9 SORE THROAT: Primary | ICD-10-CM

## 2021-11-23 RX ORDER — AMOXICILLIN 500 MG/1
500 CAPSULE ORAL EVERY 8 HOURS SCHEDULED
Qty: 21 CAPSULE | Refills: 0 | Status: SHIPPED | OUTPATIENT
Start: 2021-11-23 | End: 2021-11-30

## 2021-12-02 ENCOUNTER — TELEPHONE (OUTPATIENT)
Dept: ADMINISTRATIVE | Facility: OTHER | Age: 71
End: 2021-12-02

## 2021-12-07 DIAGNOSIS — I10 ESSENTIAL HYPERTENSION: ICD-10-CM

## 2021-12-07 RX ORDER — AMLODIPINE BESYLATE 5 MG/1
5 TABLET ORAL DAILY
Qty: 90 TABLET | Refills: 0 | Status: SHIPPED | OUTPATIENT
Start: 2021-12-07 | End: 2022-03-01

## 2021-12-16 NOTE — TELEPHONE ENCOUNTER
Patient called asking to speak to Lise Joseph or surgery coordinator in order to schedule his surgery     # [phone] [facility]  [address] [place] [address]  Phone: [phone]             December 16, 2021    Patient: Stephany Levin   YOB: 2007   Date of Visit: 12/16/2021       To Whom It May Concern:    Stephany Levin was seen and treated in our emergency department on 12/16/2021. He may be off work until released by orthopedic doctor.       DR ANALIA WILL          Signature:__________________________________

## 2021-12-23 DIAGNOSIS — E78.00 HYPERCHOLESTEREMIA: ICD-10-CM

## 2021-12-23 RX ORDER — ATORVASTATIN CALCIUM 20 MG/1
TABLET, FILM COATED ORAL
Qty: 90 TABLET | Refills: 3 | Status: SHIPPED | OUTPATIENT
Start: 2021-12-23 | End: 2021-12-23 | Stop reason: SDUPTHER

## 2021-12-23 RX ORDER — ATORVASTATIN CALCIUM 20 MG/1
20 TABLET, FILM COATED ORAL DAILY
Qty: 90 TABLET | Refills: 0 | Status: SHIPPED | OUTPATIENT
Start: 2021-12-23 | End: 2022-04-02

## 2022-02-12 DIAGNOSIS — I10 ESSENTIAL HYPERTENSION: ICD-10-CM

## 2022-02-12 RX ORDER — LISINOPRIL 40 MG/1
TABLET ORAL
Qty: 90 TABLET | Refills: 3 | Status: SHIPPED | OUTPATIENT
Start: 2022-02-12

## 2022-03-01 DIAGNOSIS — I10 ESSENTIAL HYPERTENSION: ICD-10-CM

## 2022-03-01 RX ORDER — AMLODIPINE BESYLATE 5 MG/1
TABLET ORAL
Qty: 90 TABLET | Refills: 0 | Status: SHIPPED | OUTPATIENT
Start: 2022-03-01 | End: 2022-05-31

## 2022-04-02 DIAGNOSIS — E78.00 HYPERCHOLESTEREMIA: ICD-10-CM

## 2022-04-02 RX ORDER — ATORVASTATIN CALCIUM 20 MG/1
TABLET, FILM COATED ORAL
Qty: 90 TABLET | Refills: 0 | Status: SHIPPED | OUTPATIENT
Start: 2022-04-02 | End: 2022-07-05

## 2022-04-15 ENCOUNTER — RA CDI HCC (OUTPATIENT)
Dept: OTHER | Facility: HOSPITAL | Age: 72
End: 2022-04-15

## 2022-04-15 NOTE — PROGRESS NOTES
[address]  coding opportunities       Chart reviewed, no opportunity found: CHART REVIEWED, NO OPPORTUNITY FOUND        Patients Insurance     Medicare Insurance: Medicare

## 2022-05-10 ENCOUNTER — OFFICE VISIT (OUTPATIENT)
Dept: INTERNAL MEDICINE CLINIC | Facility: CLINIC | Age: 72
End: 2022-05-10
Payer: MEDICARE

## 2022-05-10 VITALS
DIASTOLIC BLOOD PRESSURE: 72 MMHG | HEIGHT: 69 IN | HEART RATE: 64 BPM | BODY MASS INDEX: 25.33 KG/M2 | OXYGEN SATURATION: 97 % | WEIGHT: 171 LBS | SYSTOLIC BLOOD PRESSURE: 124 MMHG

## 2022-05-10 DIAGNOSIS — L98.9 SKIN LESIONS: ICD-10-CM

## 2022-05-10 DIAGNOSIS — E78.2 MIXED HYPERLIPIDEMIA: Primary | ICD-10-CM

## 2022-05-10 DIAGNOSIS — N52.9 ERECTILE DYSFUNCTION, UNSPECIFIED ERECTILE DYSFUNCTION TYPE: ICD-10-CM

## 2022-05-10 DIAGNOSIS — Z00.00 MEDICARE ANNUAL WELLNESS VISIT, SUBSEQUENT: ICD-10-CM

## 2022-05-10 DIAGNOSIS — K21.9 CHRONIC GERD: ICD-10-CM

## 2022-05-10 DIAGNOSIS — E55.9 VITAMIN D DEFICIENCY: ICD-10-CM

## 2022-05-10 DIAGNOSIS — I10 HTN (HYPERTENSION), BENIGN: ICD-10-CM

## 2022-05-10 DIAGNOSIS — R91.1 PULMONARY NODULE: ICD-10-CM

## 2022-05-10 DIAGNOSIS — Z12.5 SCREENING FOR PROSTATE CANCER: ICD-10-CM

## 2022-05-10 DIAGNOSIS — R73.01 IMPAIRED FASTING GLUCOSE: ICD-10-CM

## 2022-05-10 PROCEDURE — G0439 PPPS, SUBSEQ VISIT: HCPCS | Performed by: INTERNAL MEDICINE

## 2022-05-10 PROCEDURE — 99214 OFFICE O/P EST MOD 30 MIN: CPT | Performed by: INTERNAL MEDICINE

## 2022-05-10 PROCEDURE — 1123F ACP DISCUSS/DSCN MKR DOCD: CPT | Performed by: INTERNAL MEDICINE

## 2022-05-10 NOTE — PATIENT INSTRUCTIONS
Problem List Items Addressed This Visit        Digestive    Chronic GERD     Pt has successfully decreased omeprazole to 20 mg daily  He still does get some burping  No problems with food getting stuck  He was unable to decrease omeprazole to every other day  Patient asking about EGD again with his long-standing GERD         Relevant Orders    Ambulatory referral to Gastroenterology       Endocrine    Impaired fasting glucose     Continue with healthy diet and exercise  Will recheck with next labs            Cardiovascular and Mediastinum    HTN (hypertension), benign     Controlled, continue meds along with healthy diet and exercise            Other    Pulmonary nodule     I recommend rechecking         Relevant Orders    CT chest wo contrast    Mixed hyperlipidemia - Primary     Continue statin along with healthy diet and exercise, will recheck with next labs         Erectile dysfunction     Continue med as needed         Medicare annual wellness visit, subsequent     Discussed preventative health, cancer screening, immunizations, and safety issues  Patient is up-to-date with colorectal cancer screening with colonoscopy done December 1, 2021 with recommendations to recheck again in 5 years  Patient up-to-date with immunizations  Vitamin D deficiency     Continue vitamin-D, will check with next labs           Other Visit Diagnoses     Skin lesions        Relevant Orders    Ambulatory Referral to Plastic Surgery          Medicare Preventive Visit Patient Instructions  Thank you for completing your Welcome to Medicare Visit or Medicare Annual Wellness Visit today  Your next wellness visit will be due in one year (5/11/2023)  The screening/preventive services that you may require over the next 5-10 years are detailed below  Some tests may not apply to you based off risk factors and/or age  Screening tests ordered at today's visit but not completed yet may show as past due   Also, please note that scanned in results may not display below  Preventive Screenings:  Service Recommendations Previous Testing/Comments   Colorectal Cancer Screening  · Colonoscopy    · Fecal Occult Blood Test (FOBT)/Fecal Immunochemical Test (FIT)  · Fecal DNA/Cologuard Test  · Flexible Sigmoidoscopy Age: 54-65 years old   Colonoscopy: every 10 years (May be performed more frequently if at higher risk)  OR  FOBT/FIT: every 1 year  OR  Cologuard: every 3 years  OR  Sigmoidoscopy: every 5 years  Screening may be recommended earlier than age 48 if at higher risk for colorectal cancer  Also, an individualized decision between you and your healthcare provider will decide whether screening between the ages of 74-80 would be appropriate  Colonoscopy: 12/01/2021  FOBT/FIT: Not on file  Cologuard: Not on file  Sigmoidoscopy: Not on file          Prostate Cancer Screening Individualized decision between patient and health care provider in men between ages of 53-78   Medicare will cover every 12 months beginning on the day after your 50th birthday PSA: 2 67 ng/mL           Hepatitis C Screening Once for adults born between 1945 and 1965  More frequently in patients at high risk for Hepatitis C Hep C Antibody: 01/31/2018        Diabetes Screening 1-2 times per year if you're at risk for diabetes or have pre-diabetes Fasting glucose: 107 mg/dL   A1C: 5 4 % of total Hgb        Cholesterol Screening Once every 5 years if you don't have a lipid disorder  May order more often based on risk factors  Lipid panel: 09/16/2021           Other Preventive Screenings Covered by Medicare:  1  Abdominal Aortic Aneurysm (AAA) Screening: covered once if your at risk  You're considered to be at risk if you have a family history of AAA or a male between the age of 73-68 who smoking at least 100 cigarettes in your lifetime    2  Lung Cancer Screening: covers low dose CT scan once per year if you meet all of the following conditions: (1) Age 50-69; (2) No signs or symptoms of lung cancer; (3) Current smoker or have quit smoking within the last 15 years; (4) You have a tobacco smoking history of at least 30 pack years (packs per day x number of years you smoked); (5) You get a written order from a healthcare provider  3  Glaucoma Screening: covered annually if you're considered high risk: (1) You have diabetes OR (2) Family history of glaucoma OR (3)  aged 48 and older OR (3)  American aged 72 and older  3  Osteoporosis Screening: covered every 2 years if you meet one of the following conditions: (1) Have a vertebral abnormality; (2) On glucocorticoid therapy for more than 3 months; (3) Have primary hyperparathyroidism; (4) On osteoporosis medications and need to assess response to drug therapy  5  HIV Screening: covered annually if you're between the age of 12-76  Also covered annually if you are younger than 13 and older than 72 with risk factors for HIV infection  For pregnant patients, it is covered up to 3 times per pregnancy  Immunizations:  Immunization Recommendations   Influenza Vaccine Annual influenza vaccination during flu season is recommended for all persons aged >= 6 months who do not have contraindications   Pneumococcal Vaccine (Prevnar and Pneumovax)  * Prevnar = PCV13  * Pneumovax = PPSV23 Adults 25-60 years old: 1-3 doses may be recommended based on certain risk factors  Adults 72 years old: Prevnar (PCV13) vaccine recommended followed by Pneumovax (PPSV23) vaccine  If already received PPSV23 since turning 65, then PCV13 recommended at least one year after PPSV23 dose  Hepatitis B Vaccine 3 dose series if at intermediate or high risk (ex: diabetes, end stage renal disease, liver disease)   Tetanus (Td) Vaccine - COST NOT COVERED BY MEDICARE PART B Following completion of primary series, a booster dose should be given every 10 years to maintain immunity against tetanus  Td may also be given as tetanus wound prophylaxis     Tdap Vaccine - COST NOT COVERED BY MEDICARE PART B Recommended at least once for all adults  For pregnant patients, recommended with each pregnancy  Shingles Vaccine (Shingrix) - COST NOT COVERED BY MEDICARE PART B  2 shot series recommended in those aged 48 and above     Health Maintenance Due:      Topic Date Due    Colorectal Cancer Screening  12/01/2026    Hepatitis C Screening  Completed     Immunizations Due:      Topic Date Due    COVID-19 Vaccine (3 - Booster for [place] series) 07/16/2021     Advance Directives   What are advance directives? Advance directives are legal documents that state your wishes and plans for medical care  These plans are made ahead of time in case you lose your ability to make decisions for yourself  Advance directives can apply to any medical decision, such as the treatments you want, and if you want to donate organs  What are the types of advance directives? There are many types of advance directives, and each state has rules about how to use them  You may choose a combination of any of the following:  · Living will: This is a written record of the treatment you want  You can also choose which treatments you do not want, which to limit, and which to stop at a certain time  This includes surgery, medicine, IV fluid, and tube feedings  · Durable power of  for healthcare [facility]): This is a written record that states who you want to make healthcare choices for you when you are unable to make them for yourself  This person, called a proxy, is usually a family member or a friend  You may choose more than 1 proxy  · Do not resuscitate (DNR) order:  A DNR order is used in case your heart stops beating or you stop breathing  It is a request not to have certain forms of treatment, such as CPR  A DNR order may be included in other types of advance directives  · Medical directive:   This covers the care that you want if you are in a coma, near death, or unable to make decisions for yourself  You can list the treatments you want for each condition  Treatment may include pain medicine, surgery, blood transfusions, dialysis, IV or tube feedings, and a ventilator (breathing machine)  · Values history: This document has questions about your views, beliefs, and how you feel and think about life  This information can help others choose the care that you would choose  Why are advance directives important? An advance directive helps you control your care  Although spoken wishes may be used, it is better to have your wishes written down  Spoken wishes can be misunderstood, or not followed  Treatments may be given even if you do not want them  An advance directive may make it easier for your family to make difficult choices about your care  Weight Management   Why it is important to manage your weight:  Being overweight increases your risk of health conditions such as heart disease, high blood pressure, type 2 diabetes, and certain types of cancer  It can also increase your risk for osteoarthritis, sleep apnea, and other respiratory problems  Aim for a slow, steady weight loss  Even a small amount of weight loss can lower your risk of health problems  How to lose weight safely:  A safe and healthy way to lose weight is to eat fewer calories and get regular exercise  You can lose up about 1 pound a week by decreasing the number of calories you eat by 500 calories each day  Healthy meal plan for weight management:  A healthy meal plan includes a variety of foods, contains fewer calories, and helps you stay healthy  A healthy meal plan includes the following:  · Eat whole-grain foods more often  A healthy meal plan should contain fiber  Fiber is the part of grains, fruits, and vegetables that is not broken down by your body  Whole-grain foods are healthy and provide extra fiber in your diet   Some examples of whole-grain foods are whole-wheat breads and pastas, oatmeal, brown rice, and bulgur  · Eat a variety of vegetables every day  Include dark, leafy greens such as spinach, kale, gerhard greens, and mustard greens  Eat yellow and orange vegetables such as carrots, sweet potatoes, and winter squash  · Eat a variety of fruits every day  Choose fresh or canned fruit (canned in its own juice or light syrup) instead of juice  Fruit juice has very little or no fiber  · Eat low-fat dairy foods  Drink fat-free (skim) milk or 1% milk  Eat fat-free yogurt and low-fat cottage cheese  Try low-fat cheeses such as mozzarella and other reduced-fat cheeses  · Choose meat and other protein foods that are low in fat  Choose beans or other legumes such as split peas or lentils  Choose fish, skinless poultry (chicken or turkey), or lean cuts of red meat (beef or pork)  Before you cook meat or poultry, cut off any visible fat  · Use less fat and oil  Try baking foods instead of frying them  Add less fat, such as margarine, sour cream, regular salad dressing and mayonnaise to foods  Eat fewer high-fat foods  Some examples of high-fat foods include french fries, doughnuts, ice cream, and cakes  · Eat fewer sweets  Limit foods and drinks that are high in sugar  This includes candy, cookies, regular soda, and sweetened drinks  Exercise:  Exercise at least 30 minutes per day on most days of the week  Some examples of exercise include walking, biking, dancing, and swimming  You can also fit in more physical activity by taking the stairs instead of the elevator or parking farther away from stores  Ask your healthcare provider about the best exercise plan for you  © Copyright [employer] 2018 Information is for End User's use only and may not be sold, redistributed or otherwise used for commercial purposes   All illustrations and images included in CareNotes® are the copyrighted property of [employer]  or [facility] [place][note 2] [employer]

## 2022-05-10 NOTE — PROGRESS NOTES
Assessment and Plan:     Problem List Items Addressed This Visit        Digestive    Chronic GERD     Pt has successfully decreased omeprazole to 20 mg daily  He still does get some burping  No problems with food getting stuck  He was unable to decrease omeprazole to every other day  Patient asking about EGD again with his long-standing GERD         Relevant Orders    Ambulatory referral to Gastroenterology       Endocrine    Impaired fasting glucose     Continue with healthy diet and exercise  Will recheck with next labs         Relevant Orders    Hemoglobin A1C       Cardiovascular and Mediastinum    HTN (hypertension), benign     Controlled, continue meds along with healthy diet and exercise            Other    Pulmonary nodule     I recommend rechecking         Relevant Orders    CT chest wo contrast    Mixed hyperlipidemia - Primary     Continue statin along with healthy diet and exercise, will recheck with next labs         Relevant Orders    CBC and differential    Comprehensive metabolic panel    Lipid Panel with Direct LDL reflex    TSH, 3rd generation with Free T4 reflex    Erectile dysfunction     Continue med as needed         Medicare annual wellness visit, subsequent     Discussed preventative health, cancer screening, immunizations, and safety issues  Patient is up-to-date with colorectal cancer screening with colonoscopy done December 1, 2021 with recommendations to recheck again in 5 years  Patient up-to-date with immunizations           Vitamin D deficiency     Continue vitamin-D, will check with next labs         Relevant Orders    Vitamin D 25 hydroxy      Other Visit Diagnoses     Skin lesions        Relevant Orders    Ambulatory Referral to Plastic Surgery    Screening for prostate cancer        Relevant Orders    PSA, Total Screen           Preventive health issues were discussed with patient, and age appropriate screening tests were ordered as noted in patient's After Visit Summary  Personalized health advice and appropriate referrals for health education or preventive services given if needed, as noted in patient's After Visit Summary       History of Present Illness:     Patient presents for Medicare Annual Wellness visit    Patient Care Team:  Kim Adair MD as PCP - General     Problem List:     Patient Active Problem List   Diagnosis    Bilateral carpal tunnel syndrome    Pulmonary nodule    SOB (shortness of breath) on exertion    Elevated bilirubin    Impaired fasting glucose    Mixed hyperlipidemia    Chronic GERD    Erectile dysfunction    HTN (hypertension), benign    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    Trigger little finger of right hand    Dupuytren contracture    Medicare annual wellness visit, subsequent    Anxiety    Preop exam for internal medicine    Radiculopathy, cervical region    Dupuytren's contracture of right hand    Neck pain    Abnormal MRI    Cubital tunnel syndrome of both upper extremities    History of anesthesia complications    Neck stiffness    Vitamin D deficiency      Past Medical and Surgical History:     Past Medical History:   Diagnosis Date    Cancer ([address] )     scc back    Carpal tunnel syndrome     Constipation     with general anesthesia    ED (erectile dysfunction)     GERD (gastroesophageal reflux disease)     [place] (hard of hearing)     wears hearing aids    Hypercholesteremia     Hypertension     Numbness in both hands      Past Surgical History:   Procedure Laterality Date    APPENDECTOMY      As a child    CAST APPLICATION Right 3/66/1924    Procedure: Application short-arm splint;  Surgeon: Bony Elizondo MD;  Location:  MAIN OR;  Service: Orthopedics    COLONOSCOPY      INGUINAL HERNIA REPAIR Left 2015    MASS EXCISION N/A 8/14/2018    Procedure: BACK/TRUNK MULTIPLE [facility] [address]; COMPLEX CLOSURE VERSUS FLAP RECONSTRUCTION;  Surgeon: Cayla Hawkins Noble Contreras MD;  [facility];  Service: Plastics    OK INCISE FINGER TENDON SHEATH Right 8/27/2020    Procedure: Right long finger trigger finger release;  Surgeon: Cassy Gold MD;  Location: [facility];  Service: Orthopedics    OK RELEASE PALM Renee Gipson Right 8/27/2020    Procedure: Excision Dupuytren's central cord right long finger with release of metacarpophalangeal joint  Excision Dupuytren's right ring finger spiral cord ulnar aspect with release of the proximal interphalangeal joint    Excision Dupuytren's right small finger central cord and spiral cord to both radial and ulnar side, as well as ulnar-sided cord of the digital sheath and release of the proximal    OK REVISE ULNAR NERVE AT ELBOW Right 10/20/2020    Procedure: RELEASE CUBITAL TUNNEL;  Surgeon: Cassy Gold MD;  [facility];  Service: Orthopedics    OK REVISE ULNAR NERVE AT ELBOW Left 11/4/2020    Procedure: Reji Baker;  Surgeon: Cassy Gold MD;  [facility];  Service: Orthopedics    OK WRIST Zakia Geoffrey LIG Right 10/20/2020    Procedure: RELEASE CARPAL TUNNEL ENDOSCOPIC;  Surgeon: Cassy Gold MD;  [facility];  Service: Orthopedics    OK WRIST Zakia Geoffrey LIG Left 11/4/2020    Procedure: RELEASE CARPAL TUNNEL ENDOSCOPIC;  Surgeon: Cassy Gold MD;  [facility];  Service: Orthopedics    SQUAMOUS CELL CARCINOMA EXCISION N/A 8/14/2018    Procedure: BACK SCC IN SITU EXCISION;  Surgeon: Mary Roe MD;  [facility];  Service: Plastics      Family History:     Family History   Problem Relation Age of Onset    Colon cancer Father     Heart attack Mother     Breast cancer Mother       Social History:     Social History     Socioeconomic History    Marital status: /Civil Union     Spouse name: None    Number of children: None    Years of education: None    Highest education level: None Occupational History    Occupation:    Tobacco Use    Smoking status: Never Smoker    Smokeless tobacco: Never Used   Vaping Use    Vaping Use: Never used   Substance and Sexual Activity    Alcohol use: Yes     Alcohol/week: 15 0 standard drinks     Types: 15 Standard drinks or equivalent per week     Comment: 2 Drinks a night    Drug use: Not Currently     Types: Marijuana     Comment: medical 6 mos ago    Sexual activity: Yes   Other Topics Concern    None   Social History Narrative    None     Social Determinants of Health     Financial Resource Strain: Not on file   Food Insecurity: Not on file   Transportation Needs: Not on file   Physical Activity: Not on file   Stress: Not on file   Social Connections: Not on file   Intimate Partner Violence: Not on file   Housing Stability: Not on file      Medications and Allergies:     Current Outpatient Medications   Medication Sig Dispense Refill    amLODIPine (NORVASC) 5 mg tablet take 1 tablet by mouth once daily 90 tablet 0    Ascorbic Acid (vitamin C) 1000 MG tablet Take 500 mg by mouth daily       atorvastatin (LIPITOR) 20 mg tablet take 1 tablet by mouth once daily 90 tablet 0    b complex vitamins capsule Take 1 capsule by mouth daily      cholecalciferol (VITAMIN D3) 1,000 units tablet Take 1,000 Units by mouth 2 (two) times a day      lisinopril (ZESTRIL) 20 mg tablet take 1 tablet by mouth once daily (Patient taking differently:  Alternating with 40mg every other day) 90 tablet 2    lisinopril (ZESTRIL) 40 mg tablet take 1 tablet by mouth once daily 90 tablet 3    omeprazole (PriLOSEC) 20 mg delayed release capsule Take 1 capsule (20 mg total) by mouth daily 90 capsule 3    Probiotic Product (PROBIOTIC DAILY PO) Take by mouth daily      psyllium (METAMUCIL SMOOTH TEXTURE) 58 6 % powder Take by mouth daily      sildenafil (VIAGRA) 100 mg tablet Take 1 tablet (100 mg total) by mouth daily as needed for erectile dysfunction 30 tablet 3    tadalafil (Cialis) 5 MG tablet Take 1 tablet (5 mg total) by mouth daily 90 tablet 3     No current facility-administered medications for this visit  No Known Allergies   Immunizations:     Immunization History   Administered Date(s) Administered    COVID-19 MODERNA VACC 0 5 ML IM 01/19/2021, 02/16/2021    INFLUENZA 11/02/2015, 01/30/2017, 02/06/2018, 09/05/2018    Influenza Split High Dose Preservative Free IM 11/02/2015, 01/30/2017, 02/06/2018    Influenza, high dose seasonal 0 7 mL 09/05/2018, 09/11/2019, 09/30/2020, 10/12/2021    Influenza, seasonal, injectable 11/17/2011    Pneumococcal Conjugate 13-Valent 01/30/2017, 09/10/2018    Pneumococcal Polysaccharide PPV23 09/03/2014, 12/06/2019    Tdap 09/10/2018    Zoster 10/07/2014    Zoster Vaccine Recombinant 05/07/2019, 07/11/2019      Health Maintenance:         Topic Date Due    Colorectal Cancer Screening  12/01/2026    Hepatitis C Screening  Completed         Topic Date Due    COVID-19 Vaccine (3 - Booster for Moderna series) 07/16/2021      Medicare Health Risk Assessment:     /72 (BP Location: Left arm, Patient Position: Sitting, Cuff Size: Standard)   Pulse 64   Ht 5' 8 5" (1 74 m)   Wt 77 6 kg (171 lb)   SpO2 97%   BMI 25 62 kg/m²      Kiran Alexandra is here for his Subsequent Wellness visit  Health Risk Assessment:   Patient rates overall health as very good  Patient feels that their physical health rating is slightly better  Patient is very satisfied with their life  Eyesight was rated as same  Hearing was rated as slightly worse  Patient feels that their emotional and mental health rating is same  Patients states they are never, rarely angry  Patient states they are never, rarely unusually tired/fatigued  Pain experienced in the last 7 days has been none  Patient states that he has experienced no weight loss or gain in last 6 months  Depression Screening:   PHQ-2 Score: 0      Fall Risk Screening:    In the past year, patient has experienced: no history of falling in past year      Home Safety:  Patient does not have trouble with stairs inside or outside of their home  Patient has working smoke alarms Home safety hazards include: none  Nutrition:   Current diet is Regular  Medications:   Patient is currently taking over-the-counter supplements  OTC medications include: see medication list  Patient is able to manage medications  Activities of Daily Living (ADLs)/Instrumental Activities of Daily Living (IADLs):   Walk and transfer into and out of bed and chair?: Yes  Dress and groom yourself?: Yes    Bathe or shower yourself?: Yes    Feed yourself? Yes  Do your laundry/housekeeping?: Yes  Manage your money, pay your bills and track your expenses?: Yes  Make your own meals?: Yes    Do your own shopping?: Yes    Previous Hospitalizations:   Any hospitalizations or ED visits within the last 12 months?: No      Advance Care Planning:   Living will: Yes    Durable POA for healthcare:  Yes    Advanced directive: Yes    Advanced directive counseling given: Yes      Cognitive Screening:   Provider or family/friend/caregiver concerned regarding cognition?: No    PREVENTIVE SCREENINGS      Cardiovascular Screening:    General: Screening Not Indicated, History Lipid Disorder, Risks and Benefits Discussed and Screening Current      Diabetes Screening:     General: Screening Current and Risks and Benefits Discussed      Colorectal Cancer Screening:     General: Screening Current      Prostate Cancer Screening:    General: Screening Current and Risks and Benefits Discussed      Osteoporosis Screening:    General: Screening Not Indicated      Abdominal Aortic Aneurysm (AAA) Screening:    Risk factors include: age between 73-69 yo        General: Screening Not Indicated      Lung Cancer Screening:     General: Screening Not Indicated      Hepatitis C Screening:    General: Screening Current    Screening, Brief Intervention, and Referral to Treatment (SBIRT)    Screening    Typical number of drinks in a week: 10    Brief Intervention  Alcohol & drug use screenings were reviewed  No concerns regarding substance use disorder identified  Other Counseling Topics:   Car/seat belt/driving safety, skin self-exam and sunscreen         Corrie Torre MD

## 2022-05-10 NOTE — PROGRESS NOTES
Assessment/Plan:    Medicare annual wellness visit, subsequent  Discussed preventative health, cancer screening, immunizations, and safety issues  Patient is up-to-date with colorectal cancer screening with colonoscopy done December 1, 2021 with recommendations to recheck again in 5 years  Patient up-to-date with immunizations  Chronic GERD  Pt has successfully decreased omeprazole to 20 mg daily  He still does get some burping  No problems with food getting stuck  He was unable to decrease omeprazole to every other day  Patient asking about EGD again with his long-standing GERD    Impaired fasting glucose  Continue with healthy diet and exercise  Will recheck with next labs    Mixed hyperlipidemia  Continue statin along with healthy diet and exercise, will recheck with next labs    Vitamin D deficiency  Continue vitamin-D, will check with next labs    Pulmonary nodule  I recommend rechecking    HTN (hypertension), benign  Controlled, continue meds along with healthy diet and exercise    Erectile dysfunction  Continue med as needed         Diagnoses and all orders for this visit:    Mixed hyperlipidemia  -     CBC and differential; Future  -     Comprehensive metabolic panel; Future  -     Lipid Panel with Direct LDL reflex; Future  -     TSH, 3rd generation with Free T4 reflex; Future    Medicare annual wellness visit, subsequent    Chronic GERD  -     Ambulatory referral to Gastroenterology; Future    Impaired fasting glucose  -     Hemoglobin A1C; Future    Vitamin D deficiency  -     Vitamin D 25 hydroxy; Future    Pulmonary nodule  -     CT chest wo contrast; Future    Skin lesions  -     Ambulatory Referral to Plastic Surgery; Future    HTN (hypertension), benign    Erectile dysfunction, unspecified erectile dysfunction type    Screening for prostate cancer  -     PSA, Total Screen; Future        Subjective:      Patient ID: Dean Wilkins is a 66 y o  male      Pt here for follow up chronic conditions and AWV      The following portions of the patient's history were reviewed and updated as appropriate: allergies, current medications, past family history, past medical history, past social history, past surgical history and problem list     Review of Systems   Constitutional: Negative for chills, fatigue and fever  HENT: Negative for congestion, nosebleeds, postnasal drip, sore throat and trouble swallowing  Eyes: Negative for pain  Respiratory: Negative for cough, chest tightness, shortness of breath and wheezing  Cardiovascular: Negative for chest pain, palpitations and leg swelling  Gastrointestinal: Negative for abdominal pain, constipation, diarrhea, nausea and vomiting  Endocrine: Negative for polydipsia and polyuria  Genitourinary: Negative for dysuria, flank pain and hematuria  Musculoskeletal: Negative for arthralgias, back pain and myalgias  Skin: Negative for rash  Neurological: Negative for dizziness, tremors, light-headedness and headaches  Hematological: Does not bruise/bleed easily  Psychiatric/Behavioral: Negative for confusion and dysphoric mood  The patient is not nervous/anxious  Objective:      /72 (BP Location: Left arm, Patient Position: Sitting, Cuff Size: Standard)   Pulse 64   Ht 5' 8 5" (1 74 m)   Wt 77 6 kg (171 lb)   SpO2 97%   BMI 25 62 kg/m²          Physical Exam  Vitals reviewed  Constitutional:       General: He is not in acute distress  Appearance: Normal appearance  He is well-developed  HENT:      Head: Normocephalic and atraumatic  Right Ear: External ear normal       Left Ear: External ear normal       Nose: Nose normal    Eyes:      General: No scleral icterus  Conjunctiva/sclera: Conjunctivae normal    Neck:      Thyroid: No thyromegaly  Trachea: No tracheal deviation  Cardiovascular:      Rate and Rhythm: Normal rate and regular rhythm  Heart sounds: Normal heart sounds   No murmur heard       Pulmonary:      Effort: Pulmonary effort is normal  No respiratory distress  Breath sounds: Normal breath sounds  No wheezing or rales  Abdominal:      General: Bowel sounds are normal       Palpations: Abdomen is soft  Tenderness: There is no abdominal tenderness  There is no guarding or rebound  Hernia: There is no hernia in the left inguinal area or right inguinal area  Genitourinary:     Testes: Normal    Musculoskeletal:      Cervical back: Normal range of motion and neck supple  Right lower leg: No edema  Left lower leg: No edema  Lymphadenopathy:      Cervical: No cervical adenopathy  Skin:     Coloration: Skin is not jaundiced or pale  Neurological:      General: No focal deficit present  Mental Status: He is alert and oriented to person, place, and time  Psychiatric:         Behavior: Behavior normal          Thought Content:  Thought content normal          Judgment: Judgment normal

## 2022-05-10 NOTE — ASSESSMENT & PLAN NOTE
Pt has successfully decreased omeprazole to 20 mg daily  He still does get some burping  No problems with food getting stuck  He was unable to decrease omeprazole to every other day    Patient asking about EGD again with his long-standing GERD

## 2022-05-10 NOTE — ASSESSMENT & PLAN NOTE
Discussed preventative health, cancer screening, immunizations, and safety issues  Patient is up-to-date with colorectal cancer screening with colonoscopy done December 1, 2021 with recommendations to recheck again in 5 years  Patient up-to-date with immunizations

## 2022-05-12 ENCOUNTER — TELEPHONE (OUTPATIENT)
Dept: PLASTIC SURGERY | Facility: CLINIC | Age: 72
End: 2022-05-12

## 2022-05-19 ENCOUNTER — OFFICE VISIT (OUTPATIENT)
Dept: PLASTIC SURGERY | Facility: CLINIC | Age: 72
End: 2022-05-19
Payer: MEDICARE

## 2022-05-19 VITALS
SYSTOLIC BLOOD PRESSURE: 139 MMHG | WEIGHT: 170 LBS | DIASTOLIC BLOOD PRESSURE: 69 MMHG | RESPIRATION RATE: 16 BRPM | BODY MASS INDEX: 25.18 KG/M2 | HEIGHT: 69 IN | TEMPERATURE: 98.7 F | HEART RATE: 63 BPM

## 2022-05-19 DIAGNOSIS — L98.9 SKIN LESION: Primary | ICD-10-CM

## 2022-05-19 DIAGNOSIS — L98.9 SKIN LESIONS: ICD-10-CM

## 2022-05-19 DIAGNOSIS — L98.9 SKIN LESION OF HAND: ICD-10-CM

## 2022-05-19 PROCEDURE — 11104 PUNCH BX SKIN SINGLE LESION: CPT | Performed by: PHYSICIAN ASSISTANT

## 2022-05-19 PROCEDURE — 88305 TISSUE EXAM BY PATHOLOGIST: CPT | Performed by: PATHOLOGY

## 2022-05-19 PROCEDURE — 99214 OFFICE O/P EST MOD 30 MIN: CPT | Performed by: PHYSICIAN ASSISTANT

## 2022-05-19 NOTE — PROGRESS NOTES
Biopsy    Date/Time: 5/19/2022 10:37 AM  Performed by: Yumiko Ortiz PA-C  Authorized by: Yumiko Ortiz PA-C     Procedure Details - Skin Biopsy:     Biopsy tissue type: skin    Biopsy method: punch biopsy      Body area:  Upper extremity    Upper extremity location:  R hand    Initial size (mm):  10    Malignancy: malignancy unknown

## 2022-05-19 NOTE — PROGRESS NOTES
Biopsy    Date/Time: 5/19/2022 10:35 AM  Performed by: Bijan Hills PA-C  Authorized by: Bijan Hills PA-C     Procedure Details - Skin Biopsy:     Biopsy tissue type: skin    Biopsy method: punch biopsy      Body area:  Head/neck    Initial size (mm):  6    Malignancy: malignancy unknown

## 2022-05-19 NOTE — PROGRESS NOTES
Assessment/Plan:  Luh Felipe is a 29-year-old male who presents for evaluation of a right neck and right dorsal hand skin lesion  He is self-referred  Please see HPI  In the office today, I performed a punch biopsy of both lesions  He will return in 10-14 days for suture removal and pathology report  Diagnoses and all orders for this visit:    Skin lesion  -     Tissue Exam  -     Cancel: Tissue Exam    Skin lesion of hand  -     Tissue Exam          Subjective:      Patient ID: Eyal Sullivan is a 66 y o  male  HPI   He reports a lump on the right hand that is been present for approximately 6 months  He states in that time it has increased in size  It occasionally bleeds and scabs  He has also a skin lesion on the right neck that is there for approximately 6 months  He states that is gotten darker in color during that time  He denies any bleeding from this location  He does see a dermatologist   He denies any personal or family history for skin cancer  He does have a history for sun exposure  The following portions of the patient's history were reviewed and updated as appropriate: He  has a past medical history of Cancer ([facility] [address] ), Carpal tunnel syndrome, Constipation, ED (erectile dysfunction), GERD (gastroesophageal reflux disease), [place] (hard of hearing), Hypercholesteremia, Hypertension, and Numbness in both hands  He  has a past surgical history that includes Appendectomy; Inguinal hernia repair (Left, 2015); Colonoscopy; Squamous cell carcinoma excision (N/A, 8/14/2018); Mass excision (N/A, 8/14/2018); pr release palm contract,open,partial (Right, 8/27/2020); pr incise finger tendon sheath (Right, 8/27/2020); Cast application (Right, 6/96/7523); pr revise ulnar nerve at elbow (Right, 10/20/2020); pr wrist arthroscop,release xvers lig (Right, 10/20/2020); pr revise ulnar nerve at elbow (Left, 11/4/2020); and pr wrist arthroscop,release xvers lig (Left, 11/4/2020)    His family history includes Breast cancer in his mother; Colon cancer in his father; Heart attack in his mother  He  reports that he has never smoked  He has never used smokeless tobacco  He reports current alcohol use of about 15 0 standard drinks of alcohol per week  He reports previous drug use  Drug: Marijuana       Review of Systems   HENT: Negative for hearing loss  Eyes: Negative for visual disturbance  Wears eyeglasses  Respiratory: Negative for shortness of breath  Cardiovascular: Negative for chest pain  Gastrointestinal: Negative for abdominal pain, blood in stool, constipation, diarrhea, nausea and vomiting  Genitourinary: Negative for hematuria  Musculoskeletal: Negative for gait problem  Skin:        As per HPI  Neurological: Negative for seizures and headaches  Hematological: Does not bruise/bleed easily  Psychiatric/Behavioral: The patient is not nervous/anxious  Objective:      /69 (BP Location: Left arm, Patient Position: Sitting, Cuff Size: Standard)   Pulse 63   Temp 98 7 °F (37 1 °C) (Tympanic)   Resp 16   Ht 5' 8 5" (1 74 m)   Wt 77 1 kg (170 lb)   BMI 25 47 kg/m²          Physical Exam  Constitutional:       General: He is not in acute distress  Appearance: He is well-developed  HENT:      Head: Normocephalic and atraumatic  Eyes:      General: No scleral icterus  Pupils: Pupils are equal, round, and reactive to light  Neck:      Thyroid: No thyromegaly  Trachea: No tracheal deviation  Cardiovascular:      Rate and Rhythm: Normal rate and regular rhythm  Heart sounds: No murmur heard  No friction rub  No gallop  Pulmonary:      Effort: Pulmonary effort is normal       Breath sounds: Normal breath sounds  No wheezing or rales  Abdominal:      General: Bowel sounds are normal  There is no distension  Palpations: Abdomen is soft  Tenderness: There is no abdominal tenderness  There is no guarding or rebound     Musculoskeletal: General: Normal range of motion  Cervical back: Neck supple  Lymphadenopathy:      Cervical: No cervical adenopathy  Skin:     Comments: Right dorsal hand skin lesion noted  It is raised and scaled in appearance  It measures approximately 1 cm  On the right neck just above the clavicle is a darkly pigmented skin lesion  It is slightly raised and black in color  It has an irregular border and measures 6 mm  Please see photos in epic  Neurological:      Mental Status: He is alert and oriented to person, place, and time  Cranial Nerves: No cranial nerve deficit

## 2022-05-31 DIAGNOSIS — I10 ESSENTIAL HYPERTENSION: ICD-10-CM

## 2022-05-31 RX ORDER — AMLODIPINE BESYLATE 5 MG/1
TABLET ORAL
Qty: 90 TABLET | Refills: 0 | Status: SHIPPED | OUTPATIENT
Start: 2022-05-31

## 2022-06-01 ENCOUNTER — OFFICE VISIT (OUTPATIENT)
Dept: PLASTIC SURGERY | Facility: HOSPITAL | Age: 72
End: 2022-06-01
Payer: MEDICARE

## 2022-06-01 ENCOUNTER — HOSPITAL ENCOUNTER (OUTPATIENT)
Dept: CT IMAGING | Facility: HOSPITAL | Age: 72
Discharge: HOME | End: 2022-06-01
Payer: MEDICARE

## 2022-06-01 DIAGNOSIS — C44.622 SQUAMOUS CELL CARCINOMA OF RIGHT HAND: Primary | ICD-10-CM

## 2022-06-01 DIAGNOSIS — L98.9 SKIN LESION OF NECK: ICD-10-CM

## 2022-06-01 DIAGNOSIS — R91.1 PULMONARY NODULE: ICD-10-CM

## 2022-06-01 PROCEDURE — 71250 CT THORAX DX C-: CPT

## 2022-06-01 PROCEDURE — 99214 OFFICE O/P EST MOD 30 MIN: CPT | Performed by: PHYSICIAN ASSISTANT

## 2022-06-01 PROCEDURE — G1004 CDSM NDSC: HCPCS

## 2022-06-01 NOTE — PROGRESS NOTES
Assessment/Plan:  Luh Felipe is a 64-year-old male who presents in follow-up from a biopsy of the right dorsal hand and right neck  Please see HPI  Pathology of the right dorsal hand revealed severely atypical well-differentiated squamous proliferation in the right neck revealed seborrheic keratosis  This was reviewed with him and all questions answered  I discussed with the recommendation for Mohs of the right dorsal hand it excision of the lesion from the right neck  He understood and agreed  I discussed with him reconstruction of a Mohs defect of the right dorsal hand with likely full-thickness skin graft and splint application as well as excision of right neck skin lesion  He knows this will be done under anesthesia  We discussed with the patient the options, benefits, and risks of surgery such as anesthesia, bleeding, infection, scarring and the need for additional procedures  Consent was obtained and all questions answered to his satisfaction  We will plan for surgery at his earliest convenience  Diagnoses and all orders for this visit:    Squamous cell carcinoma of right hand    Skin lesion of neck          Subjective:      Patient ID: Eyal Sullivan is a 66 y o  male  HPI   He denies any complaints regarding his biopsy sites  The following portions of the patient's history were reviewed and updated as appropriate: He  has a past medical history of Cancer ([facility] [address] ), Carpal tunnel syndrome, Constipation, ED (erectile dysfunction), GERD (gastroesophageal reflux disease), [place] (hard of hearing), Hypercholesteremia, Hypertension, and Numbness in both hands  He  has a past surgical history that includes Appendectomy; Inguinal hernia repair (Left, 2015); Colonoscopy; Squamous cell carcinoma excision (N/A, 8/14/2018); Mass excision (N/A, 8/14/2018); pr release palm contract,open,partial (Right, 8/27/2020); pr incise finger tendon sheath (Right, 8/27/2020);  Cast application (Right, 6/81/4614); pr revise ulnar nerve at elbow (Right, 10/20/2020); pr wrist arthroscop,release xvers lig (Right, 10/20/2020); pr revise ulnar nerve at elbow (Left, 11/4/2020); and pr wrist arthroscop,release xvers lig (Left, 11/4/2020)  His family history includes Breast cancer in his mother; Colon cancer in his father; Heart attack in his mother  He  reports that he has never smoked  He has never used smokeless tobacco  He reports current alcohol use of about 15 0 standard drinks of alcohol per week  He reports previous drug use  Drug: Marijuana       Review of Systems   HENT: Negative for hearing loss  Eyes: Negative for visual disturbance  Wears eye glasses  Respiratory: Negative for shortness of breath  Cardiovascular: Negative for chest pain  Gastrointestinal: Negative for abdominal pain, blood in stool, constipation, diarrhea, nausea and vomiting  Genitourinary: Negative for hematuria  Musculoskeletal: Negative for gait problem  Skin:        As per HPI  Neurological: Negative for seizures and headaches  Hematological: Does not bruise/bleed easily  Psychiatric/Behavioral: The patient is not nervous/anxious  Objective: There were no vitals taken for this visit  Physical Exam  Constitutional:       General: He is not in acute distress  Appearance: Normal appearance  He is well-developed  HENT:      Head: Normocephalic and atraumatic  Nose: Nose normal    Eyes:      General: No scleral icterus  Extraocular Movements: Extraocular movements intact  Pupils: Pupils are equal, round, and reactive to light  Neck:      Thyroid: No thyromegaly  Trachea: No tracheal deviation  Cardiovascular:      Rate and Rhythm: Normal rate and regular rhythm  Heart sounds: No murmur heard  No friction rub  No gallop  Pulmonary:      Effort: Pulmonary effort is normal       Breath sounds: Normal breath sounds  No wheezing or rales     Abdominal:      General: Bowel sounds are normal  There is no distension  Palpations: Abdomen is soft  Tenderness: There is no abdominal tenderness  There is no guarding or rebound  Musculoskeletal:         General: Normal range of motion  Cervical back: Neck supple  Lymphadenopathy:      Cervical: No cervical adenopathy  Skin:     Comments: Right dorsal hand and right neck biopsy sites are clean, dry and intact  Sutures were removed  Neurological:      Mental Status: He is alert and oriented to person, place, and time  Cranial Nerves: No cranial nerve deficit     Psychiatric:         Mood and Affect: Mood normal          Behavior: Behavior normal

## 2022-06-01 NOTE — H&P (VIEW-ONLY)
Assessment/Plan:  Bruna Rinne is a 77-year-old male who presents in follow-up from a biopsy of the right dorsal hand and right neck  Please see HPI  Pathology of the right dorsal hand revealed severely atypical well-differentiated squamous proliferation in the right neck revealed seborrheic keratosis  This was reviewed with him and all questions answered  I discussed with the recommendation for Mohs of the right dorsal hand it excision of the lesion from the right neck  He understood and agreed  I discussed with him reconstruction of a Mohs defect of the right dorsal hand with likely full-thickness skin graft and splint application as well as excision of right neck skin lesion  He knows this will be done under anesthesia  We discussed with the patient the options, benefits, and risks of surgery such as anesthesia, bleeding, infection, scarring and the need for additional procedures  Consent was obtained and all questions answered to his satisfaction  We will plan for surgery at his earliest convenience  Diagnoses and all orders for this visit:    Squamous cell carcinoma of right hand    Skin lesion of neck          Subjective:      Patient ID: Chayo Starks is a 66 y o  male  HPI   He denies any complaints regarding his biopsy sites  The following portions of the patient's history were reviewed and updated as appropriate: He  has a past medical history of Cancer ([facility] [address] ), Carpal tunnel syndrome, Constipation, ED (erectile dysfunction), GERD (gastroesophageal reflux disease), [place] (hard of hearing), Hypercholesteremia, Hypertension, and Numbness in both hands  He  has a past surgical history that includes Appendectomy; Inguinal hernia repair (Left, 2015); Colonoscopy; Squamous cell carcinoma excision (N/A, 8/14/2018); Mass excision (N/A, 8/14/2018); pr release palm contract,open,partial (Right, 8/27/2020); pr incise finger tendon sheath (Right, 8/27/2020);  Cast application (Right, 6/84/1052); pr revise ulnar nerve at elbow (Right, 10/20/2020); pr wrist arthroscop,release xvers lig (Right, 10/20/2020); pr revise ulnar nerve at elbow (Left, 11/4/2020); and pr wrist arthroscop,release xvers lig (Left, 11/4/2020)  His family history includes Breast cancer in his mother; Colon cancer in his father; Heart attack in his mother  He  reports that he has never smoked  He has never used smokeless tobacco  He reports current alcohol use of about 15 0 standard drinks of alcohol per week  He reports previous drug use  Drug: Marijuana       Review of Systems   HENT: Negative for hearing loss  Eyes: Negative for visual disturbance  Wears eye glasses  Respiratory: Negative for shortness of breath  Cardiovascular: Negative for chest pain  Gastrointestinal: Negative for abdominal pain, blood in stool, constipation, diarrhea, nausea and vomiting  Genitourinary: Negative for hematuria  Musculoskeletal: Negative for gait problem  Skin:        As per HPI  Neurological: Negative for seizures and headaches  Hematological: Does not bruise/bleed easily  Psychiatric/Behavioral: The patient is not nervous/anxious  Objective: There were no vitals taken for this visit  Physical Exam  Constitutional:       General: He is not in acute distress  Appearance: Normal appearance  He is well-developed  HENT:      Head: Normocephalic and atraumatic  Nose: Nose normal    Eyes:      General: No scleral icterus  Extraocular Movements: Extraocular movements intact  Pupils: Pupils are equal, round, and reactive to light  Neck:      Thyroid: No thyromegaly  Trachea: No tracheal deviation  Cardiovascular:      Rate and Rhythm: Normal rate and regular rhythm  Heart sounds: No murmur heard  No friction rub  No gallop  Pulmonary:      Effort: Pulmonary effort is normal       Breath sounds: Normal breath sounds  No wheezing or rales     Abdominal:      General: Bowel sounds are normal  There is no distension  Palpations: Abdomen is soft  Tenderness: There is no abdominal tenderness  There is no guarding or rebound  Musculoskeletal:         General: Normal range of motion  Cervical back: Neck supple  Lymphadenopathy:      Cervical: No cervical adenopathy  Skin:     Comments: Right dorsal hand and right neck biopsy sites are clean, dry and intact  Sutures were removed  Neurological:      Mental Status: He is alert and oriented to person, place, and time  Cranial Nerves: No cranial nerve deficit     Psychiatric:         Mood and Affect: Mood normal          Behavior: Behavior normal

## 2022-06-03 ENCOUNTER — TELEPHONE (OUTPATIENT)
Dept: OTHER | Facility: OTHER | Age: 72
End: 2022-06-03

## 2022-06-03 NOTE — TELEPHONE ENCOUNTER
Patient is calling for his CT result; he wants to be called with it before office close for weekend

## 2022-06-03 NOTE — TELEPHONE ENCOUNTER
Let patient know that there are no results yet, he will likely see them before I do when they become available    If there are any significant bad findings, they will call the person on call with those findings

## 2022-06-06 DIAGNOSIS — R91.1 PULMONARY NODULE: Primary | ICD-10-CM

## 2022-06-08 ENCOUNTER — LAB REQUISITION (OUTPATIENT)
Dept: LAB | Facility: HOSPITAL | Age: 72
End: 2022-06-08
Payer: MEDICARE

## 2022-06-08 DIAGNOSIS — K22.89 OTHER SPECIFIED DISEASE OF ESOPHAGUS: ICD-10-CM

## 2022-06-08 DIAGNOSIS — R10.13 EPIGASTRIC PAIN: ICD-10-CM

## 2022-06-08 DIAGNOSIS — K31.89 OTHER DISEASES OF STOMACH AND DUODENUM: ICD-10-CM

## 2022-06-08 DIAGNOSIS — K21.9 GASTRO-ESOPHAGEAL REFLUX DISEASE WITHOUT ESOPHAGITIS: ICD-10-CM

## 2022-06-08 PROCEDURE — 88305 TISSUE EXAM BY PATHOLOGIST: CPT | Performed by: PATHOLOGY

## 2022-06-08 PROCEDURE — 88313 SPECIAL STAINS GROUP 2: CPT | Performed by: PATHOLOGY

## 2022-06-10 ENCOUNTER — PREP FOR PROCEDURE (OUTPATIENT)
Dept: PLASTIC SURGERY | Facility: CLINIC | Age: 72
End: 2022-06-10

## 2022-06-10 DIAGNOSIS — L98.9 SKIN LESION: ICD-10-CM

## 2022-06-10 DIAGNOSIS — D23.4 OTHER BENIGN NEOPLASM OF SKIN OF SCALP AND NECK: ICD-10-CM

## 2022-06-10 DIAGNOSIS — C44.622 SQUAMOUS CELL CARCINOMA OF DORSUM OF RIGHT HAND: Primary | ICD-10-CM

## 2022-06-13 ENCOUNTER — CLINICAL SUPPORT (OUTPATIENT)
Dept: URGENT CARE | Facility: CLINIC | Age: 72
End: 2022-06-13

## 2022-06-13 ENCOUNTER — APPOINTMENT (OUTPATIENT)
Dept: LAB | Facility: CLINIC | Age: 72
End: 2022-06-13
Payer: MEDICARE

## 2022-06-13 ENCOUNTER — TELEPHONE (OUTPATIENT)
Dept: OTHER | Facility: OTHER | Age: 72
End: 2022-06-13

## 2022-06-13 DIAGNOSIS — L98.9 SKIN LESION: ICD-10-CM

## 2022-06-13 DIAGNOSIS — C44.622 SQUAMOUS CELL CARCINOMA OF DORSUM OF RIGHT HAND: ICD-10-CM

## 2022-06-13 LAB
ANION GAP SERPL CALCULATED.3IONS-SCNC: 5 MMOL/L (ref 4–13)
BASOPHILS # BLD AUTO: 0.08 THOUSANDS/ΜL (ref 0–0.1)
BASOPHILS NFR BLD AUTO: 1 % (ref 0–1)
BUN SERPL-MCNC: 18 MG/DL (ref 5–25)
CALCIUM SERPL-MCNC: 8.9 MG/DL (ref 8.3–10.1)
CHLORIDE SERPL-SCNC: 107 MMOL/L (ref 100–108)
CO2 SERPL-SCNC: 26 MMOL/L (ref 21–32)
CREAT SERPL-MCNC: 0.82 MG/DL (ref 0.6–1.3)
EOSINOPHIL # BLD AUTO: 0.2 THOUSAND/ΜL (ref 0–0.61)
EOSINOPHIL NFR BLD AUTO: 3 % (ref 0–6)
ERYTHROCYTE [DISTWIDTH] IN BLOOD BY AUTOMATED COUNT: 13.2 % (ref 11.6–15.1)
GFR SERPL CREATININE-BSD FRML MDRD: 87 ML/MIN/1.73SQ M
GLUCOSE P FAST SERPL-MCNC: 116 MG/DL (ref 65–99)
HCT VFR BLD AUTO: 41 % (ref 36.5–49.3)
HGB BLD-MCNC: 13.8 G/DL (ref 12–17)
IMM GRANULOCYTES # BLD AUTO: 0.14 THOUSAND/UL (ref 0–0.2)
IMM GRANULOCYTES NFR BLD AUTO: 2 % (ref 0–2)
LYMPHOCYTES # BLD AUTO: 1.99 THOUSANDS/ΜL (ref 0.6–4.47)
LYMPHOCYTES NFR BLD AUTO: 32 % (ref 14–44)
MCH RBC QN AUTO: 30.7 PG (ref 26.8–34.3)
MCHC RBC AUTO-ENTMCNC: 33.7 G/DL (ref 31.4–37.4)
MCV RBC AUTO: 91 FL (ref 82–98)
MONOCYTES # BLD AUTO: 0.65 THOUSAND/ΜL (ref 0.17–1.22)
MONOCYTES NFR BLD AUTO: 11 % (ref 4–12)
NEUTROPHILS # BLD AUTO: 3.16 THOUSANDS/ΜL (ref 1.85–7.62)
NEUTS SEG NFR BLD AUTO: 51 % (ref 43–75)
NRBC BLD AUTO-RTO: 0 /100 WBCS
PLATELET # BLD AUTO: 189 THOUSANDS/UL (ref 149–390)
PMV BLD AUTO: 10.2 FL (ref 8.9–12.7)
POTASSIUM SERPL-SCNC: 4 MMOL/L (ref 3.5–5.3)
RBC # BLD AUTO: 4.49 MILLION/UL (ref 3.88–5.62)
SODIUM SERPL-SCNC: 138 MMOL/L (ref 136–145)
WBC # BLD AUTO: 6.22 THOUSAND/UL (ref 4.31–10.16)

## 2022-06-13 PROCEDURE — 36415 COLL VENOUS BLD VENIPUNCTURE: CPT

## 2022-06-13 PROCEDURE — 80048 BASIC METABOLIC PNL TOTAL CA: CPT

## 2022-06-13 PROCEDURE — 85025 COMPLETE CBC W/AUTO DIFF WBC: CPT

## 2022-06-13 NOTE — TELEPHONE ENCOUNTER
Patient calling back to schedule yearly f/u    Patient scheduled 9/28 with Amaury Seay in [facility] [place]

## 2022-06-13 NOTE — TELEPHONE ENCOUNTER
Patient is requesting a call back to make a yearly f/u appointment  Patient would like to schedule after 9/17/2022

## 2022-06-15 NOTE — PRE-PROCEDURE INSTRUCTIONS
Pre-Surgery Instructions:   Medication Instructions    amLODIPine (NORVASC) 5 mg tablet Take day of surgery   atorvastatin (LIPITOR) 20 mg tablet Hold day of surgery   b complex vitamins capsule Stop taking 7 days prior to surgery   cholecalciferol (VITAMIN D3) 1,000 units tablet Stop taking 7 days prior to surgery   lisinopril (ZESTRIL) 20 mg tablet Hold day of surgery   lisinopril (ZESTRIL) 40 mg tablet Hold day of surgery   omeprazole (PriLOSEC) 20 mg delayed release capsule Take day of surgery   Probiotic Product (PROBIOTIC DAILY PO) Stop taking 7 days prior to surgery   psyllium (METAMUCIL SMOOTH TEXTURE) 58 6 % powder Hold day of surgery   sildenafil (VIAGRA) 100 mg tablet Hold day of surgery   tadalafil (Cialis) 5 MG tablet Hold day of surgery  NPO after midnight, meds in am with sips of water  Understands when to expect preop phone call  Remove all jewelry  Advised of visitation policy  Before your operation, you play an important role in decreasing your risk for infection by washing with special antiseptic soap  This is an effective way to reduce bacteria on the skin which may help to prevent infections at the surgical site  Please read the following directions in advance  1  In the week before your operation purchase a 4 ounce bottle of antiseptic soap containing chlorhexidine gluconate 4%  Some brand names include: Aplicare, Endure, and Hibiclens  The cost is usually less than $5 00  · For your convenience, the [address] [place] [address] carries the soap  · It may also be available at your doctor's office or pre-admission testing center, and at most retail pharmacies  · If you are allergic or sensitive to soaps containing chlorhexidine gluconate (CHG), please let your doctor know so another antiseptic soap can be suggested  · CHG antiseptic soap is for external use only    2      The day before your operation follow these directions carefully to get ready   · Place clean lines (sheets) on your bed; you should sleep on clean sheets after your evening shower  · Get clean towels and washcloths ready - you need enough for 2 showers  · Set aside clean underwear, pajamas, and clothing to wear after the shower  Reminders:  · DO NOT use any other soap or body rinse on your skin during or after the antiseptic showers  · DO NOT use lotion , powder, deodorant, or perfume/aftershave of any kind on your skin after your antiseptic shower  · DO NOT shave any body parts in the 24 hours/the day before your operation  · DO NOT get the antiseptic soap in your eyes, ears, nose, mouth, or vaginal area  3      You will need to shower the night before AND the morning of your Surgery  Shower 1:  · The evening before your operation, take the fist shower  · First, shampoo your hair with regular shampoo and rinse it completely before you use the anitseptic soap  After washing and rinsing your hair, rinse your body  · Next, use a clean wash cloth to apply the antiseptic soap and wash your body from the neck down to your toes using 1/2 bottle of the antiseptic soap  You will use the other 1/2 bottle for the second shower  · Clean the area where your incision will be; later this area well for about 2 minutes  · If you ar having head or neck surgery, wash areas with the antiseptic soap  · Rinse yourself completely with running water  · Use a clean towel to dry off  · Wear clean underwear and clothing/pajamas  Shower 2:  · The Morning of your operation, take the second shower following the same steps as Shower 1 using the second 1/2 of the bottle of antiseptic soap  · Use clean cloths and towels to was and dry yourself off  · Wear clean underwear and clothing

## 2022-06-22 ENCOUNTER — ANESTHESIA EVENT (OUTPATIENT)
Dept: PERIOP | Facility: HOSPITAL | Age: 72
End: 2022-06-22
Payer: MEDICARE

## 2022-06-22 NOTE — PROGRESS NOTES
ASSESSMENT/PLAN:    Assessment:   S/P Excision Dupuytren's central cord right long finger with release of metacarpophalangeal joint  Excision Dupuytren's right ring finger spiral cord ulnar aspect with release of the proximal interphalangeal joint  Excision Dupuytren's right small finger central cord and spiral cord to both radial and ulnar side, as well as ulnar-sided cord of the digital sheath and release of the proximal interphalangeal joint  - Right, Right long finger trigger finger release - Right, and Application short-arm splint - Right on 8/27/2020    Plan:   Continue with nocturnal splinting  C/W therapy to work on ROM  Advised to minimize weight bearing for the next couple of weeks and then can gradually increase to WBAT    Dr Javier Hamlin also brought into the room 2/2 pt having continued complaints of n/t in bue  Pt does have h/o significant c-spine disease but there is concern that some of his n/t is also from his CTS/CuTS seen on EMG in 2017  Dr Javier Hamlin would like to get u/s of b/l carpal and cubital tunnels and follow up with him in 4-6 weeks    Follow Up:  4-6 weeks with Dr Javier Hamlin    To Do Next Visit:  Go over u/s results  Recheck hand s/p surgery    _____________________________________________________  CHIEF COMPLAINT:  Chief Complaint   Patient presents with    Right Hand - Post-op         SUBJECTIVE:  Meera Coto is a 65 y o  male who presents for follow up S/P Excision Dupuytren's central cord right long finger with release of metacarpophalangeal joint  Excision Dupuytren's right ring finger spiral cord ulnar aspect with release of the proximal interphalangeal joint  Excision Dupuytren's right small finger central cord and spiral cord to both radial and ulnar side, as well as ulnar-sided cord of the digital sheath and release of the proximal interphalangeal joint  - Right, Right long finger trigger finger release - Right, and Application short-arm splint - Right on 8/27/2020      Patient states he was able to go to therapy to get his splint and has just started some ROM exercises  Patient also with continued c/o n/t in his hand that he has had evaluated by Dr Leilani Mirelesist  Had EMG in 2017 which showed evidence of CTS and CuTS  He also has been seen for severe C-spine disease and has been doing therapy for this  Patient states, however, he has not had improvement with this  PAST MEDICAL HISTORY:  Past Medical History:   Diagnosis Date    Cancer ([address] )     scc back    Carpal tunnel syndrome     Constipation     with general anesthesia    ED (erectile dysfunction)     GERD (gastroesophageal reflux disease)     [place] (hard of hearing)     wears hearing aids    Hypercholesteremia     Hypertension     Numbness in both hands        PAST SURGICAL HISTORY:  Past Surgical History:   Procedure Laterality Date    APPENDECTOMY      As a child    CAST APPLICATION Right 3/67/8618    Procedure: Application short-arm splint;  Surgeon: Lesa Brown MD;  Location:  MAIN OR;  Service: Orthopedics    COLONOSCOPY      INGUINAL HERNIA REPAIR Left 2015    MASS EXCISION N/A 8/14/2018    Procedure: BACK/TRUNK MULTIPLE [facility] [address]; COMPLEX CLOSURE VERSUS FLAP RECONSTRUCTION;  Surgeon: Masoud Ruano MD;  Location: [facility];  Service: Plastics    DE INCISE FINGER TENDON SHEATH Right 8/27/2020    Procedure: Right long finger trigger finger release;  Surgeon: Lesa Brown MD;  [facility];  Service: Orthopedics    DE RELEASE PALM Silvia Roys Right 8/27/2020    Procedure: Excision Dupuytren's central cord right long finger with release of metacarpophalangeal joint  Excision Dupuytren's right ring finger spiral cord ulnar aspect with release of the proximal interphalangeal joint    Excision Dupuytren's right small finger central cord and spiral cord to both radial and ulnar side, as well as ulnar-sided cord of the digital sheath and release of the proximal    SQUAMOUS CELL CARCINOMA EXCISION N/A 8/14/2018    Procedure: BACK SCC IN SITU EXCISION;  Surgeon: Fede Caldwell MD;  [facility];  Service: Plastics       FAMILY HISTORY:  Family History   Problem Relation Age of Onset    Colon cancer Father     Heart attack Mother     Breast cancer Mother        SOCIAL HISTORY:  Social History     Tobacco Use    Smoking status: Never Smoker    Smokeless tobacco: Never Used   Substance Use Topics    Alcohol use: Yes     Alcohol/week: 14 0 standard drinks     Types: 14 Standard drinks or equivalent per week     Comment: 2 Drinks a night    Drug use: No       MEDICATIONS:    Current Outpatient Medications:     acetaminophen (Tylenol 8 Hour) 650 mg CR tablet, Take 1 tablet (650 mg total) by mouth every 8 (eight) hours, Disp: 15 tablet, Rfl: 0    amLODIPine (NORVASC) 5 mg tablet, Take 1 tablet (5 mg total) by mouth daily, Disp: 90 tablet, Rfl: 3    atorvastatin (LIPITOR) 20 mg tablet, Take 1 tablet (20 mg total) by mouth daily (Patient taking differently: Take 20 mg by mouth every evening ), Disp: 90 tablet, Rfl: 3    lisinopril (ZESTRIL) 20 mg tablet, Take 1 tablet (20 mg total) by mouth daily, Disp: 90 tablet, Rfl: 2    omeprazole (PriLOSEC) 20 mg delayed release capsule, Take by mouth, Disp: , Rfl:     Probiotic Product (PROBIOTIC DAILY PO), Take by mouth, Disp: , Rfl:     psyllium (METAMUCIL SMOOTH TEXTURE) 58 6 % powder, Take by mouth, Disp: , Rfl:     tadalafil (Cialis) 5 MG tablet, Take 1 tablet (5 mg total) by mouth daily (Patient taking differently: Take 5 mg by mouth every evening ), Disp: 90 tablet, Rfl: 3    naproxen sodium (ALEVE) 220 MG tablet, Take 1 tablet (220 mg total) by mouth 2 (two) times a day with meals for 5 days, Disp: 10 tablet, Rfl: 0    ALLERGIES:  No Known Allergies    REVIEW OF SYSTEMS:  Pertinent items are noted in HPI  A comprehensive review of systems was negative      LABS:  HgA1c:   Lab Results   Component Value Date    HGBA1C 5 6 03/17/2020     BMP:   Lab Results   Component Value Date    GLUCOSE 122 06/03/2015    CALCIUM 9 4 03/17/2020     12/20/2016    K 4 3 03/17/2020    CO2 28 03/17/2020     03/17/2020    BUN 12 03/17/2020    CREATININE 0 86 03/17/2020           _____________________________________________________  PHYSICAL EXAMINATION:  Vital signs: /76   Pulse 85   Ht 5' 8" (1 727 m)   Wt 79 4 kg (175 lb)   BMI 26 61 kg/m²   General: well developed and well nourished, alert, oriented times 3 and appears comfortable  Psychiatric: Normal  HEENT: Trachea Midline, No torticollis  Cardiovascular: No discernable arrhythmia  Pulmonary: No wheezing or stridor  Skin: No masses, erythema, lacerations, fluctation, ulcerations  Neurovascular: Sensation Intact to the Median, Ulnar, Radial Nerve, Motor Intact to the Median, Ulnar, Radial Nerve and Pulses Intact    MUSCULOSKELETAL EXAMINATION:  RIGHT SIDE:  incisions are c/d/i  No evidence of infx  Patient has just slight flexion at the PIP joints but overall appearance much improved  Pt has some expected stiffness with flexion  Mild edema        Dr David Win discussed n/t with the patient and will follow up with this at his follow up appointment     _____________________________________________________  STUDIES REVIEWED:  No Studies to review      PROCEDURES PERFORMED:  Procedures  No Procedures performed today Opt out

## 2022-06-23 ENCOUNTER — ANESTHESIA (OUTPATIENT)
Dept: PERIOP | Facility: HOSPITAL | Age: 72
End: 2022-06-23
Payer: MEDICARE

## 2022-06-23 ENCOUNTER — HOSPITAL ENCOUNTER (OUTPATIENT)
Facility: HOSPITAL | Age: 72
Setting detail: OUTPATIENT SURGERY
Discharge: HOME | End: 2022-06-23
Attending: SURGERY | Admitting: SURGERY
Payer: MEDICARE

## 2022-06-23 VITALS
SYSTOLIC BLOOD PRESSURE: 138 MMHG | OXYGEN SATURATION: 96 % | TEMPERATURE: 98 F | HEART RATE: 66 BPM | DIASTOLIC BLOOD PRESSURE: 65 MMHG | RESPIRATION RATE: 17 BRPM

## 2022-06-23 DIAGNOSIS — L98.9 SKIN LESION: ICD-10-CM

## 2022-06-23 DIAGNOSIS — C44.622 SQUAMOUS CELL CARCINOMA OF DORSUM OF RIGHT HAND: ICD-10-CM

## 2022-06-23 DIAGNOSIS — D23.4 OTHER BENIGN NEOPLASM OF SKIN OF SCALP AND NECK: ICD-10-CM

## 2022-06-23 LAB
ATRIAL RATE: 55 BPM
P AXIS: 56 DEGREES
PR INTERVAL: 128 MS
QRS AXIS: 3 DEGREES
QRSD INTERVAL: 86 MS
QT INTERVAL: 408 MS
QTC INTERVAL: 390 MS
T WAVE AXIS: -9 DEGREES
VENTRICULAR RATE: 55 BPM

## 2022-06-23 PROCEDURE — 93010 ELECTROCARDIOGRAM REPORT: CPT | Performed by: INTERNAL MEDICINE

## 2022-06-23 PROCEDURE — 13132 CMPLX RPR F/C/C/M/N/AX/G/H/F: CPT | Performed by: SURGERY

## 2022-06-23 PROCEDURE — 88305 TISSUE EXAM BY PATHOLOGIST: CPT | Performed by: PATHOLOGY

## 2022-06-23 PROCEDURE — 15004 WOUND PREP F/N/HF/G: CPT | Performed by: SURGERY

## 2022-06-23 PROCEDURE — 14041 TIS TRNFR F/C/C/M/N/A/G/H/F: CPT | Performed by: SURGERY

## 2022-06-23 PROCEDURE — 93005 ELECTROCARDIOGRAM TRACING: CPT

## 2022-06-23 PROCEDURE — 11422 EXC H-F-NK-SP B9+MARG 1.1-2: CPT | Performed by: SURGERY

## 2022-06-23 RX ORDER — FENTANYL CITRATE 50 UG/ML
INJECTION, SOLUTION INTRAMUSCULAR; INTRAVENOUS AS NEEDED
Status: DISCONTINUED | OUTPATIENT
Start: 2022-06-23 | End: 2022-06-23

## 2022-06-23 RX ORDER — SODIUM CHLORIDE, SODIUM LACTATE, POTASSIUM CHLORIDE, CALCIUM CHLORIDE 600; 310; 30; 20 MG/100ML; MG/100ML; MG/100ML; MG/100ML
125 INJECTION, SOLUTION INTRAVENOUS CONTINUOUS
Status: DISCONTINUED | OUTPATIENT
Start: 2022-06-23 | End: 2022-06-23 | Stop reason: HOSPADM

## 2022-06-23 RX ORDER — PROPOFOL 10 MG/ML
INJECTION, EMULSION INTRAVENOUS AS NEEDED
Status: DISCONTINUED | OUTPATIENT
Start: 2022-06-23 | End: 2022-06-23

## 2022-06-23 RX ORDER — LIDOCAINE HYDROCHLORIDE 10 MG/ML
0.5 INJECTION, SOLUTION EPIDURAL; INFILTRATION; INTRACAUDAL; PERINEURAL ONCE AS NEEDED
Status: DISCONTINUED | OUTPATIENT
Start: 2022-06-23 | End: 2022-06-23 | Stop reason: HOSPADM

## 2022-06-23 RX ORDER — MIDAZOLAM HYDROCHLORIDE 2 MG/2ML
INJECTION, SOLUTION INTRAMUSCULAR; INTRAVENOUS AS NEEDED
Status: DISCONTINUED | OUTPATIENT
Start: 2022-06-23 | End: 2022-06-23

## 2022-06-23 RX ORDER — PROPOFOL 10 MG/ML
INJECTION, EMULSION INTRAVENOUS CONTINUOUS PRN
Status: DISCONTINUED | OUTPATIENT
Start: 2022-06-23 | End: 2022-06-23

## 2022-06-23 RX ORDER — CEFAZOLIN SODIUM 1 G/50ML
1000 SOLUTION INTRAVENOUS ONCE
Status: COMPLETED | OUTPATIENT
Start: 2022-06-23 | End: 2022-06-23

## 2022-06-23 RX ADMIN — PROPOFOL 50 MG: 10 INJECTION, EMULSION INTRAVENOUS at 14:29

## 2022-06-23 RX ADMIN — FENTANYL CITRATE 25 MCG: 50 INJECTION, SOLUTION INTRAMUSCULAR; INTRAVENOUS at 14:37

## 2022-06-23 RX ADMIN — MIDAZOLAM 2 MG: 1 INJECTION INTRAMUSCULAR; INTRAVENOUS at 14:25

## 2022-06-23 RX ADMIN — FENTANYL CITRATE 25 MCG: 50 INJECTION, SOLUTION INTRAMUSCULAR; INTRAVENOUS at 14:29

## 2022-06-23 RX ADMIN — CEFAZOLIN SODIUM 1000 MG: 1 SOLUTION INTRAVENOUS at 14:23

## 2022-06-23 RX ADMIN — PROPOFOL 100 MCG/KG/MIN: 10 INJECTION, EMULSION INTRAVENOUS at 14:29

## 2022-06-23 RX ADMIN — SODIUM CHLORIDE, SODIUM LACTATE, POTASSIUM CHLORIDE, AND CALCIUM CHLORIDE: .6; .31; .03; .02 INJECTION, SOLUTION INTRAVENOUS at 13:40

## 2022-06-23 NOTE — INTERVAL H&P NOTE
H&P reviewed  After examining the patient I find no changes in the patients condition since the H&P had been written      Vitals:    06/23/22 1232   Pulse: 62   Resp: 16   Temp: (!) 97 °F (36 1 °C)   SpO2: 95%

## 2022-06-23 NOTE — OP NOTE
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]     Status post Mohs dorsum of right hand    Procedure 1  Preparation Mohs defect of right and by excision of wound margins, cautery artifact and scar to prepare for reconstruction 2  Adjacent tissue transfer/local flap reconstruction Mohs defect dorsum right hand 6 cm x 3 cm 3  Excision lesion right neck 1 2 cm 4  Complex closure right neck 2 7 cm  Specimen(s):  ID Type Source Tests Collected by Time Destination   1 : right neck lesion Tissue Neck TISSUE EXAM Maryuri Gant MD 2022 1502        Estimated Blood Loss:   Minimal    Drains:  * No LDAs found *    Anesthesia Type:   General/LMA    Operative Indications: Other benign neoplasm of skin of scalp and neck [D23 4]  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]    Status post Mohs right hand    Operative Findings:  As above    Complications:   None    Procedure and Technique:  Shell Baum was seen preoperatively in the holding area, the surgical sites were marked with his participation  I reviewed with him the planned procedure, potential risks, complications limitations  He was taken to the operating room and underwent induction of intravenous sedation by the anesthesia personnel  The operative field was prepped and draped in sterile fashion a proper time-out was performed  2 5 loupe magnification was used aid visualization    The Mohs defect of the right hand was addressed initially, local anesthesia was administered the wound margins were sharply excised with a 15 blade in order to remove the cautery artifact and scar and to prepare the wound for reconstruction  Following this flaps were developed ulnar and radial to the defect by elevation of skin flaps utilizing a spreading technique with tenotomy scissors and dissection with a fine tip Bovie cautery  Flaps were dissected from proximal distal toward their base, and once adequate flap dissection/elevation had been completed the wound was thoroughly irrigated hemostasis assured  The flaps were advanced to fill the defect and closure was accomplished centrally with 5 O Vicryl sutures buried at the level of the deep dermis  The skin edges were then inset utilizing interrupted 5 0 nylon sutures  The flaps were then bolstered with benzoin Steri-Strips this was followed by application dry sterile dressing and loosely applied Ace wrap  Attention was turned to the neck where the area of concern was infiltrated with lidocaine with epinephrine  The incisions were created with a 15 blade, the lesion was excised in the plane of subcutaneous fat utilizing curved iris scissors  Was marked with sutures for orientation and placed in formalin  The wound edges were widely and circumferentially undermined deep to the dermis utilizing a 15 blade  This wide undermining was carried out for approximately 2 5 times the with of the wound in order to close without significant tension  Closure was then accomplished after the wound was irrigated  Five O Vicryl sutures were placed buried at the level of the deep dermis this was followed by interrupted 5 0 nylon skin suture  Benzoin and Steri-Strips were applied the patient was then transferred to the recovery     I was present for the entire procedure    Patient Disposition:  PACU       SIGNATURE: Steven Mahajan MD  DATE: June 23, 2022  TIME: 3:37 PM

## 2022-06-23 NOTE — ANESTHESIA POSTPROCEDURE EVALUATION
Post-Op Assessment Note    CV Status:  Stable  Pain Score: 0    Pain management: adequate     Mental Status:  Alert and awake   Hydration Status:  Euvolemic   PONV Controlled:  Controlled   Airway Patency:  Patent      Post Op Vitals Reviewed: Yes      Staff: CRNA         No complications documented      BP   107/51   Temp  98 7   Pulse 71   Resp   17   SpO2   95% RA

## 2022-06-23 NOTE — ANESTHESIA PREPROCEDURE EVALUATION
Procedure:  RECONSTRUCTION MOHS DEFECT RIGHT DORSAL HAND AND FTSG WITH SPLINT APPLICATION (Right Hand)  EXCISION RIGHT NECK SKIN LESION AND COMPLEX CLOSURE (Right Neck)    Relevant Problems   ANESTHESIA   (+) History of anesthesia complications      CARDIO   (+) HTN (hypertension), benign   (+) Mixed hyperlipidemia   (+) SOB (shortness of breath) on exertion      GI/HEPATIC   (+) Chronic GERD      NEURO/PSYCH   (+) Anxiety   (+) History of anesthesia complications      PULMONARY   (+) SOB (shortness of breath) on exertion      Hard of hearing, has hearing aids    Physical Exam    Airway    Mallampati score: II  TM Distance: >3 FB  Neck ROM: full     Dental   No notable dental hx     Cardiovascular      Pulmonary      Other Findings        Anesthesia Plan  ASA Score- 2     Anesthesia Type- IV sedation with anesthesia with ASA Monitors  Additional Monitors:   Airway Plan:     Comment: IV sedation, IV access  Plan Factors-    Chart reviewed  Patient summary reviewed  Patient is not a current smoker  Patient did not smoke on day of surgery  There is medical exclusion for perioperative obstructive sleep apnea risk education  Induction- intravenous  Postoperative Plan-     Informed Consent- Anesthetic plan and risks discussed with patient  I personally reviewed this patient with the CRNA  Discussed and agreed on the Anesthesia Plan with the CRNA  Rukhsana Mcgraw

## 2022-06-23 NOTE — INTERIM OP NOTE
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

## 2022-06-23 NOTE — DISCHARGE INSTRUCTIONS
[facility]  Dr Viviana Shaffer   [address] [facility] [address], [address]  Phone: [phone]     Postoperative Instructions for Outpatient Surgery     These instructions are being provided by your doctor to give you basic guidelines during your post-op recovery  Please let our office know if your contact information has changed  Please call the office today for an appointment in 10-14 days for suture removal       Dressings: Remove outer dressing of right hand on Sunday  Leave steri strips on both the neck and the hand, replace if they fall off  Activity Restrictions: Nothing strenuous until your 73th birthday  Bathing:  May shower tomorrow but, keep dressing of right hand clean and dry  Pat steri strips dry after  Medications:    Resume pre-op medications  You may take tylenol, aleve, or ibuprofen for pain control                 Other:  Elevate right hand above heart when at rest   Apply ice to hand and neck at 10 minute intervals as needed for pain or swelling

## 2022-06-24 ENCOUNTER — TELEPHONE (OUTPATIENT)
Dept: PLASTIC SURGERY | Facility: CLINIC | Age: 72
End: 2022-06-24

## 2022-06-30 ENCOUNTER — OFFICE VISIT (OUTPATIENT)
Dept: PLASTIC SURGERY | Facility: CLINIC | Age: 72
End: 2022-06-30

## 2022-06-30 DIAGNOSIS — C44.622 SQUAMOUS CELL CARCINOMA OF DORSUM OF RIGHT HAND: Primary | ICD-10-CM

## 2022-06-30 DIAGNOSIS — D23.4 OTHER BENIGN NEOPLASM OF SKIN OF SCALP AND NECK: ICD-10-CM

## 2022-06-30 PROCEDURE — 99024 POSTOP FOLLOW-UP VISIT: CPT | Performed by: PHYSICIAN ASSISTANT

## 2022-06-30 NOTE — PROGRESS NOTES
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

## 2022-07-05 ENCOUNTER — OFFICE VISIT (OUTPATIENT)
Dept: PLASTIC SURGERY | Facility: CLINIC | Age: 72
End: 2022-07-05

## 2022-07-05 DIAGNOSIS — E78.00 HYPERCHOLESTEREMIA: ICD-10-CM

## 2022-07-05 DIAGNOSIS — C44.622 SQUAMOUS CELL CARCINOMA OF DORSUM OF RIGHT HAND: Primary | ICD-10-CM

## 2022-07-05 PROCEDURE — 99024 POSTOP FOLLOW-UP VISIT: CPT | Performed by: PHYSICIAN ASSISTANT

## 2022-07-05 RX ORDER — ATORVASTATIN CALCIUM 20 MG/1
TABLET, FILM COATED ORAL
Qty: 90 TABLET | Refills: 0 | Status: SHIPPED | OUTPATIENT
Start: 2022-07-05 | End: 2022-07-06 | Stop reason: SDUPTHER

## 2022-07-05 NOTE — PROGRESS NOTES
POST-OP EVALUATION  7/5/2022    Maura Jang is a 66 y o  male is here today for routine post-operative follow up  The patient is status post reconstruction of a Mohs defect of the right dorsal hand and local flap, and excision of the right neck skin lesion with complex closure on 06/23/2022 by Dr Ariella Yin        Past Medical History:   Diagnosis Date    Anxiety     Cancer ([address] )     scc back    Carpal tunnel syndrome     Constipation     with general anesthesia    ED (erectile dysfunction)     GERD (gastroesophageal reflux disease)     [place] (hard of hearing)     wears hearing aids    Hypercholesteremia     Hypertension     Numbness in both hands      Past Surgical History:   Procedure Laterality Date    APPENDECTOMY      As a child    CAST APPLICATION Right 9/03/4603    Procedure: Application short-arm splint;  Surgeon: Zechariah Hernandez MD;  Location: [facility];  Service: Orthopedics    COLONOSCOPY      INGUINAL HERNIA REPAIR Left 2015    MASS EXCISION N/A 8/14/2018    Procedure: BACK/TRUNK MULTIPLE [address]; COMPLEX CLOSURE VERSUS FLAP RECONSTRUCTION;  Surgeon: Bonita Gonzalez MD;  [facility];  Service: Plastics    MOHS RECONSTRUCTION Right 6/23/2022    Procedure: RECONSTRUCTION MOHS DEFECT RIGHT DORSAL HAND AND LOCAL FLAP;  Surgeon: Bonita Gonzalez MD;  Location[facility];  Service: Plastics    ND EXC SKIN BENIG >4 CM REMAINDR BODY Right 6/23/2022    Procedure: EXCISION RIGHT NECK SKIN LESION AND COMPLEX CLOSURE;  Surgeon: Bonita Gonzalez MD;  Location[facility];  Service: Plastics    ND INCISE FINGER TENDON SHEATH Right 8/27/2020    Procedure: Right long finger trigger finger release;  Surgeon: Zechariah Hernandez MD;  [facility];  Service: Orthopedics    ND RELEASE PALM Martha Santiago Right 8/27/2020    Procedure: Excision Dupuytren's central cord right long finger with release of metacarpophalangeal joint  Excision Dupuytren's right ring finger spiral cord ulnar aspect with release of the proximal interphalangeal joint  Excision Dupuytren's right small finger central cord and spiral cord to both radial and ulnar side, as well as ulnar-sided cord of the digital sheath and release of the proximal    SC REVISE ULNAR NERVE AT ELBOW Right 10/20/2020    Procedure: RELEASE CUBITAL TUNNEL;  Surgeon: Zechariah Hernandez MD;  [facility];  Service: Orthopedics    SC REVISE ULNAR NERVE AT ELBOW Left 11/4/2020    Procedure: RELEASE CUBITAL TUNNEL;  Surgeon: Zechariah Hernandez MD;  [facility];  Service: Orthopedics    SC WRIST Jen Bud LIG Right 10/20/2020    Procedure: RELEASE CARPAL TUNNEL ENDOSCOPIC;  Surgeon: Zechariah Hernandez MD;  [facility];  Service: Orthopedics    SC WRIST Jen Bud LIG Left 11/4/2020    Procedure: RELEASE CARPAL TUNNEL ENDOSCOPIC;  Surgeon: Zechariah Hernandez MD;  [facility];  Service: Orthopedics    SQUAMOUS CELL CARCINOMA EXCISION N/A 8/14/2018    Procedure: BACK SCC IN SITU EXCISION;  Surgeon: Bonita Gonzalez MD;  [facility];  Service: Plastics        Current Outpatient Medications:     amLODIPine (NORVASC) 5 mg tablet, take 1 tablet by mouth once daily, Disp: 90 tablet, Rfl: 0    atorvastatin (LIPITOR) 20 mg tablet, take 1 tablet by mouth once daily, Disp: 90 tablet, Rfl: 0    b complex vitamins capsule, Take 1 capsule by mouth daily, Disp: , Rfl:     cholecalciferol (VITAMIN D3) 1,000 units tablet, Take 1,000 Units by mouth 2 (two) times a day, Disp: , Rfl:     lisinopril (ZESTRIL) 20 mg tablet, take 1 tablet by mouth once daily (Patient taking differently:  Alternating with 40mg every other day), Disp: 90 tablet, Rfl: 2    lisinopril (ZESTRIL) 40 mg tablet, take 1 tablet by mouth once daily, Disp: 90 tablet, Rfl: 3    omeprazole (PriLOSEC) 20 mg delayed release capsule, Take 1 capsule (20 mg total) by mouth daily (Patient taking differently: Take 40 mg by mouth daily), Disp: 90 capsule, Rfl: 3    Probiotic Product (PROBIOTIC DAILY PO), Take by mouth daily, Disp: , Rfl:     psyllium (METAMUCIL SMOOTH TEXTURE) 58 6 % powder, Take by mouth daily, Disp: , Rfl:     sildenafil (VIAGRA) 100 mg tablet, Take 1 tablet (100 mg total) by mouth daily as needed for erectile dysfunction, Disp: 30 tablet, Rfl: 3    tadalafil (Cialis) 5 MG tablet, Take 1 tablet (5 mg total) by mouth daily, Disp: 90 tablet, Rfl: 3  Allergies: Patient has no known allergies  Objective    Sutures removed from right hand  Incision is clean dry and intact  The neck incision is healing well also  Pathology is still pending  Assessment/Plan   Diagnoses and all orders for this visit:    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

## 2022-07-06 DIAGNOSIS — E78.00 HYPERCHOLESTEREMIA: ICD-10-CM

## 2022-07-06 RX ORDER — ATORVASTATIN CALCIUM 20 MG/1
20 TABLET, FILM COATED ORAL DAILY
Qty: 90 TABLET | Refills: 3 | Status: SHIPPED | OUTPATIENT
Start: 2022-07-06

## 2022-07-16 ENCOUNTER — TELEPHONE (OUTPATIENT)
Dept: GASTROENTEROLOGY | Facility: CLINIC | Age: 72
End: 2022-07-16

## 2022-08-30 ENCOUNTER — TELEPHONE (OUTPATIENT)
Dept: INTERNAL MEDICINE CLINIC | Facility: CLINIC | Age: 72
End: 2022-08-30

## 2022-08-30 DIAGNOSIS — I10 ESSENTIAL HYPERTENSION: ICD-10-CM

## 2022-08-30 PROBLEM — K20.0 EOSINOPHILIC ESOPHAGITIS: Status: ACTIVE | Noted: 2022-08-30

## 2022-08-30 RX ORDER — AMLODIPINE BESYLATE 5 MG/1
TABLET ORAL
Qty: 90 TABLET | Refills: 0 | Status: SHIPPED | OUTPATIENT
Start: 2022-08-30 | End: 2022-09-06

## 2022-08-30 NOTE — TELEPHONE ENCOUNTER
Pt calling in stating he is receiving notifications through [address] [place] [address] [address] that he needs to have an endoscopy done, according to his chart it was done 6/8 and is under his Media tab - pt does not need a CB regarding but please check that his HM reflects it was done

## 2022-08-30 NOTE — TELEPHONE ENCOUNTER
Let patient know that the health maintenance part of his chart reflects the colorectal cancer screening, he had the colonoscopy done in 2021 with recommendations to recheck again in 2026, 5 year follow-up  Regarding the EGD or endoscopy done by Dr Kishan Rodgers, I added that to his problem list as eosinophilic esophagitis with recommendations to do a surveillance EGD in 1 year after the 1 he had June 8, 2022    That does not show up on his health maintenance sheet

## 2022-09-06 DIAGNOSIS — I10 ESSENTIAL HYPERTENSION: ICD-10-CM

## 2022-09-06 RX ORDER — AMLODIPINE BESYLATE 5 MG/1
TABLET ORAL
Qty: 90 TABLET | Refills: 0 | Status: SHIPPED | OUTPATIENT
Start: 2022-09-06

## 2022-09-07 RX ORDER — LISINOPRIL 20 MG/1
TABLET ORAL
Qty: 90 TABLET | Refills: 2 | Status: SHIPPED | OUTPATIENT
Start: 2022-09-07

## 2022-09-22 LAB
25(OH)D3 SERPL-MCNC: 41 NG/ML (ref 30–100)
ALBUMIN SERPL-MCNC: 4.3 G/DL (ref 3.6–5.1)
ALBUMIN/GLOB SERPL: 2 (CALC) (ref 1–2.5)
ALP SERPL-CCNC: 61 U/L (ref 35–144)
ALT SERPL-CCNC: 26 U/L (ref 9–46)
AST SERPL-CCNC: 19 U/L (ref 10–35)
BASOPHILS # BLD AUTO: 37 CELLS/UL (ref 0–200)
BASOPHILS NFR BLD AUTO: 0.6 %
BILIRUB SERPL-MCNC: 0.9 MG/DL (ref 0.2–1.2)
BUN SERPL-MCNC: 13 MG/DL (ref 7–25)
BUN/CREAT SERPL: ABNORMAL (CALC) (ref 6–22)
CALCIUM SERPL-MCNC: 9.2 MG/DL (ref 8.6–10.3)
CHLORIDE SERPL-SCNC: 104 MMOL/L (ref 98–110)
CHOLEST SERPL-MCNC: 156 MG/DL
CHOLEST/HDLC SERPL: 2.1 (CALC)
CO2 SERPL-SCNC: 28 MMOL/L (ref 20–32)
CREAT SERPL-MCNC: 0.81 MG/DL (ref 0.7–1.28)
EOSINOPHIL # BLD AUTO: 174 CELLS/UL (ref 15–500)
EOSINOPHIL NFR BLD AUTO: 2.8 %
ERYTHROCYTE [DISTWIDTH] IN BLOOD BY AUTOMATED COUNT: 12.9 % (ref 11–15)
GFR/BSA.PRED SERPLBLD CYS-BASED-ARV: 93 ML/MIN/1.73M2
GLOBULIN SER CALC-MCNC: 2.2 G/DL (CALC) (ref 1.9–3.7)
GLUCOSE SERPL-MCNC: 120 MG/DL (ref 65–99)
HBA1C MFR BLD: 5.8 % OF TOTAL HGB
HCT VFR BLD AUTO: 41.5 % (ref 38.5–50)
HDLC SERPL-MCNC: 74 MG/DL
HGB BLD-MCNC: 14 G/DL (ref 13.2–17.1)
LDLC SERPL CALC-MCNC: 68 MG/DL (CALC)
LYMPHOCYTES # BLD AUTO: 2027 CELLS/UL (ref 850–3900)
LYMPHOCYTES NFR BLD AUTO: 32.7 %
MCH RBC QN AUTO: 29.9 PG (ref 27–33)
MCHC RBC AUTO-ENTMCNC: 33.7 G/DL (ref 32–36)
MCV RBC AUTO: 88.5 FL (ref 80–100)
MONOCYTES # BLD AUTO: 502 CELLS/UL (ref 200–950)
MONOCYTES NFR BLD AUTO: 8.1 %
NEUTROPHILS # BLD AUTO: 3460 CELLS/UL (ref 1500–7800)
NEUTROPHILS NFR BLD AUTO: 55.8 %
NONHDLC SERPL-MCNC: 82 MG/DL (CALC)
PLATELET # BLD AUTO: 200 THOUSAND/UL (ref 140–400)
PMV BLD REES-ECKER: 10.3 FL (ref 7.5–12.5)
POTASSIUM SERPL-SCNC: 4.3 MMOL/L (ref 3.5–5.3)
PROT SERPL-MCNC: 6.5 G/DL (ref 6.1–8.1)
PSA SERPL-MCNC: 3.62 NG/ML
RBC # BLD AUTO: 4.69 MILLION/UL (ref 4.2–5.8)
SODIUM SERPL-SCNC: 140 MMOL/L (ref 135–146)
TRIGL SERPL-MCNC: 55 MG/DL
TSH SERPL-ACNC: 3.02 MIU/L (ref 0.4–4.5)
WBC # BLD AUTO: 6.2 THOUSAND/UL (ref 3.8–10.8)

## 2022-09-28 ENCOUNTER — OFFICE VISIT (OUTPATIENT)
Dept: UROLOGY | Facility: CLINIC | Age: 72
End: 2022-09-28
Payer: MEDICARE

## 2022-09-28 VITALS
OXYGEN SATURATION: 99 % | DIASTOLIC BLOOD PRESSURE: 82 MMHG | WEIGHT: 172 LBS | SYSTOLIC BLOOD PRESSURE: 146 MMHG | HEART RATE: 88 BPM | HEIGHT: 69 IN | BODY MASS INDEX: 25.48 KG/M2

## 2022-09-28 DIAGNOSIS — N52.9 ERECTILE DYSFUNCTION, UNSPECIFIED ERECTILE DYSFUNCTION TYPE: ICD-10-CM

## 2022-09-28 DIAGNOSIS — R97.20 ELEVATED PSA: Primary | ICD-10-CM

## 2022-09-28 PROCEDURE — 99213 OFFICE O/P EST LOW 20 MIN: CPT | Performed by: PHYSICIAN ASSISTANT

## 2022-09-28 RX ORDER — OMEPRAZOLE 40 MG/1
40 CAPSULE, DELAYED RELEASE ORAL
COMMUNITY
Start: 2022-07-04

## 2022-09-28 RX ORDER — SILDENAFIL 100 MG/1
100 TABLET, FILM COATED ORAL DAILY PRN
Qty: 90 TABLET | Refills: 3 | Status: SHIPPED | OUTPATIENT
Start: 2022-09-28

## 2022-09-28 RX ORDER — IPRATROPIUM BROMIDE 42 UG/1
SPRAY, METERED NASAL
COMMUNITY
Start: 2022-08-25

## 2022-09-28 RX ORDER — TADALAFIL 5 MG/1
5 TABLET ORAL DAILY
Qty: 90 TABLET | Refills: 3 | Status: SHIPPED | OUTPATIENT
Start: 2022-09-28

## 2022-09-28 NOTE — PROGRESS NOTES
UROLOGY PROGRESS NOTE   Patient Identifiers: Adalid Perez (MRN [id number])  Date of Service: 9/28/2022    Subjective:    75-year-old man history of BPH and ED  PSA 3 62  Uses Cialis which he is satisfied with  Occasionally will supplement with sildenafil  Otherwise no lower tract complaints  He admits to probable sexual activity around the time the blood test     Reason for visit:  BPH and ED follow-up      Objective:     VITALS:    There were no vitals filed for this visit    AUA SYMPTOM SCORE    Flowsheet Row Most Recent Value   AUA SYMPTOM SCORE    How often have you had a sensation of not emptying your bladder completely after you finished urinating? 2 (P)     How often have you had to urinate again less than two hours after you finished urinating? 4 (P)     How often have you found you stopped and started again several times when you urinate? 1 (P)     How often have you found it difficult to postpone urination? 2 (P)     How often have you had a weak urinary stream? 1 (P)     How often have you had to push or strain to begin urination? 0 (P)     How many times did you most typically get up to urinate from the time you went to bed at night until the time you got up in the morning? 1 (P)     Quality of Life: If you were to spend the rest of your life with your urinary condition just the way it is now, how would you feel about that? 2 (P)     AUA SYMPTOM SCORE 11 (P)             LABS:  Lab Results   Component Value Date    HGB 14 0 09/21/2022    HCT 41 5 09/21/2022    WBC 6 2 09/21/2022     09/21/2022   ]    Lab Results   Component Value Date     12/20/2016    K 4 3 09/21/2022     09/21/2022    CO2 28 09/21/2022    BUN 13 09/21/2022    CREATININE 0 81 09/21/2022    CALCIUM 9 2 09/21/2022    GLUCOSE 122 06/03/2015   ]        INPATIENT MEDS:    Current Outpatient Medications:     atorvastatin (LIPITOR) 20 mg tablet, Take 1 tablet (20 mg total) by mouth daily, Disp: 90 tablet, Rfl: 3   amLODIPine (NORVASC) 5 mg tablet, take 1 tablet by mouth once daily, Disp: 90 tablet, Rfl: 0    b complex vitamins capsule, Take 1 capsule by mouth daily, Disp: , Rfl:     cholecalciferol (VITAMIN D3) 1,000 units tablet, Take 1,000 Units by mouth 2 (two) times a day, Disp: , Rfl:     lisinopril (ZESTRIL) 20 mg tablet, Alternating with 40mg every other day, Disp: 90 tablet, Rfl: 2    lisinopril (ZESTRIL) 40 mg tablet, take 1 tablet by mouth once daily, Disp: 90 tablet, Rfl: 3    omeprazole (PriLOSEC) 20 mg delayed release capsule, Take 1 capsule (20 mg total) by mouth daily (Patient taking differently: Take 40 mg by mouth daily), Disp: 90 capsule, Rfl: 3    Probiotic Product (PROBIOTIC DAILY PO), Take by mouth daily, Disp: , Rfl:     psyllium (METAMUCIL SMOOTH TEXTURE) 58 6 % powder, Take by mouth daily, Disp: , Rfl:     sildenafil (VIAGRA) 100 mg tablet, Take 1 tablet (100 mg total) by mouth daily as needed for erectile dysfunction, Disp: 30 tablet, Rfl: 3    tadalafil (Cialis) 5 MG tablet, Take 1 tablet (5 mg total) by mouth daily, Disp: 90 tablet, Rfl: 3      Physical Exam:   There were no vitals taken for this visit  GEN: no acute distress    RESP: breathing comfortably with no accessory muscle use    ABD: soft, non-tender, non-distended   INCISION:    EXT: no significant peripheral edema   (Male): Penis circumcised, phallus normal, meatus patent  Testicles descended into scrotum bilaterally without masses nor tenderness  No inguinal hernias bilaterally  LOWELL: Prostate is enlarged at 35 grams  The prostate is not boggy  The prostate is not tender  No nodules noted      RADIOLOGY:   None     Assessment:   1  Elevated PSA  2   Erectile dysfunction     Plan:   -medications refilled  -follow-up in 4 months with PSA prior to visit  -we discussed multiparametric MRI should his PSA continue to rise  -

## 2022-11-09 ENCOUNTER — RA CDI HCC (OUTPATIENT)
Dept: OTHER | Facility: HOSPITAL | Age: 72
End: 2022-11-09

## 2022-11-16 ENCOUNTER — OFFICE VISIT (OUTPATIENT)
Dept: INTERNAL MEDICINE CLINIC | Facility: CLINIC | Age: 72
End: 2022-11-16

## 2022-11-16 ENCOUNTER — EVALUATION (OUTPATIENT)
Dept: PHYSICAL THERAPY | Facility: REHABILITATION | Age: 72
End: 2022-11-16

## 2022-11-16 VITALS
HEIGHT: 69 IN | HEART RATE: 83 BPM | DIASTOLIC BLOOD PRESSURE: 70 MMHG | BODY MASS INDEX: 25.92 KG/M2 | SYSTOLIC BLOOD PRESSURE: 138 MMHG | WEIGHT: 175 LBS | OXYGEN SATURATION: 97 %

## 2022-11-16 DIAGNOSIS — R73.01 IMPAIRED FASTING GLUCOSE: ICD-10-CM

## 2022-11-16 DIAGNOSIS — Z23 NEED FOR VACCINATION: ICD-10-CM

## 2022-11-16 DIAGNOSIS — M79.652 ACUTE PAIN OF LEFT THIGH: Primary | ICD-10-CM

## 2022-11-16 DIAGNOSIS — M79.652 PAIN OF LEFT THIGH: Primary | ICD-10-CM

## 2022-11-16 NOTE — ASSESSMENT & PLAN NOTE
Posterior thigh, likely high hamstring injury/tendinitis, will refer to Physical therapy for evaluation    Patient can try some topical Voltaren in the area to get acute relief

## 2022-11-16 NOTE — PROGRESS NOTES
Name: Shannan Choi      : 1950      MRN: [id number]  Encounter Provider: Declan Carolina MD  Encounter Date: 2022   Encounter department: [facility] [address]     1  Pain of left thigh  Assessment & Plan:  Posterior thigh, likely high hamstring injury/tendinitis, will refer to Physical therapy for evaluation  Patient can try some topical Voltaren in the area to get acute relief    Orders:  -     Ambulatory Referral to Physical Therapy; Future    2  Need for vaccination  -     influenza vaccine, high-dose, PF 0 7 mL (FLUZONE HIGH-DOSE)    3  Impaired fasting glucose  Assessment & Plan:  A1c 5 8, continue with healthy diet and exercise        BMI Counseling: Body mass index is 26 22 kg/m²  The BMI is above normal  Nutrition recommendations include encouraging healthy choices of fruits and vegetables and moderation in carbohydrate intake  Exercise recommendations include exercising 3-5 times per week  Rationale for BMI follow-up plan is due to patient being overweight or obese  Subjective     Pt had an injury left high hamstring playing pickleball one week ago  Rash in the area, no fevers or chills, no change in bowels  Review of Systems   Constitutional: Negative for chills, fatigue and fever  HENT: Negative for congestion, nosebleeds, postnasal drip, sore throat and trouble swallowing  Eyes: Negative for pain  Respiratory: Negative for cough, chest tightness, shortness of breath and wheezing  Cardiovascular: Negative for chest pain, palpitations and leg swelling  Gastrointestinal: Negative for abdominal pain, constipation, diarrhea, nausea and vomiting  Endocrine: Negative for polydipsia and polyuria  Genitourinary: Negative for dysuria, flank pain and hematuria  Musculoskeletal: Positive for myalgias  Negative for arthralgias  Skin: Negative for rash  Neurological: Negative for dizziness, tremors, light-headedness and headaches  Hematological: Does not bruise/bleed easily  Psychiatric/Behavioral: Negative for confusion and dysphoric mood  The patient is not nervous/anxious  Past Medical History:   Diagnosis Date   • Anxiety    • Cancer ([address] )     scc back   • Carpal tunnel syndrome    • Constipation     with general anesthesia   • ED (erectile dysfunction)    • GERD (gastroesophageal reflux disease)    • [place] (hard of hearing)     wears hearing aids   • Hypercholesteremia    • Hypertension    • Numbness in both hands      Past Surgical History:   Procedure Laterality Date   • APPENDECTOMY      As a child   • CAST APPLICATION Right 2/70/9699    Procedure: Application short-arm splint;  Surgeon: Zechariah Hernandez MD;  Location: [facility];  Service: Orthopedics   • COLONOSCOPY     • INGUINAL HERNIA REPAIR Left 2015   • MASS EXCISION N/A 8/14/2018    Procedure: BACK/TRUNK MULTIPLE [address]; COMPLEX CLOSURE VERSUS FLAP RECONSTRUCTION;  Surgeon: Bonita Gonzalez MD;  Location: [facility];  Service: Plastics   • MOHS RECONSTRUCTION Right 6/23/2022    Procedure: RECONSTRUCTION MOHS DEFECT RIGHT DORSAL HAND AND LOCAL FLAP;  Surgeon: Bonita Gonzalez MD;  Location: [facility];  Service: Plastics   • CO EXC SKIN BENIG >4 CM REMAINDR BODY Right 6/23/2022    Procedure: EXCISION RIGHT NECK SKIN LESION AND COMPLEX CLOSURE;  Surgeon: Bonita Gonzalez MD;  Location: [facility];  Service: Plastics   • CO INCISE FINGER TENDON SHEATH Right 8/27/2020    Procedure: Right long finger trigger finger release;  Surgeon: Zechariah Hernandez MD;  Location: [facility];  Service: Orthopedics   • CO RELEASE PALM Martha Santiago Right 8/27/2020    Procedure: Excision Dupuytren's central cord right long finger with release of metacarpophalangeal joint  Excision Dupuytren's right ring finger spiral cord ulnar aspect with release of the proximal interphalangeal joint    Excision Dupuytren's right small finger central cord and spiral cord to both radial and ulnar side, as well as ulnar-sided cord of the digital sheath and release of the proximal   • NH REVISE ULNAR NERVE AT ELBOW Right 10/20/2020    Procedure: Ada Dao;  Surgeon: Brian Link MD;  Location: [facility];  Service: Orthopedics   • NH REVISE ULNAR NERVE AT ELBOW Left 11/4/2020    Procedure: Ada Dao;  Surgeon: Brian Link MD;  [facility]: [facility];  Service: Orthopedics   • NH WRIST Jerene Dave LIG Right 10/20/2020    Procedure: RELEASE CARPAL TUNNEL ENDOSCOPIC;  Surgeon: Brian Link MD;  [facility];  Service: Orthopedics   • NH WRIST Jerene Dave LIG Left 11/4/2020    Procedure: RELEASE CARPAL TUNNEL ENDOSCOPIC;  Surgeon: Brian Link MD;  [facility];  Service: Orthopedics   • SQUAMOUS CELL CARCINOMA EXCISION N/A 8/14/2018    Procedure: BACK SCC IN SITU EXCISION;  Surgeon: Kari Camejo MD;  [facility];  Service: Plastics     Family History   Problem Relation Age of Onset   • Colon cancer Father    • Heart attack Mother    • Breast cancer Mother      Social History     Socioeconomic History   • Marital status: /Civil Union     Spouse name: None   • Number of children: None   • Years of education: None   • Highest education level: None   Occupational History   • Occupation:    Tobacco Use   • Smoking status: Never   • Smokeless tobacco: Never   Vaping Use   • Vaping Use: Never used   Substance and Sexual Activity   • Alcohol use:  Yes     Alcohol/week: 15 0 standard drinks     Types: 15 Standard drinks or equivalent per week     Comment: 2 Drinks a night   • Drug use: Not Currently   • Sexual activity: Yes   Other Topics Concern   • None   Social History Narrative   • None     Social Determinants of Health     Financial Resource Strain: Not on file   Food Insecurity: Not on file   Transportation Needs: Not on file   Physical Activity: Not on file   Stress: Not on file   Social Connections: Not on file   Intimate Partner Violence: Not on file   Housing Stability: Not on file     Current Outpatient Medications on File Prior to Visit   Medication Sig   • amLODIPine (NORVASC) 5 mg tablet take 1 tablet by mouth once daily   • atorvastatin (LIPITOR) 20 mg tablet Take 1 tablet (20 mg total) by mouth daily   • b complex vitamins capsule Take 1 capsule by mouth daily   • cholecalciferol (VITAMIN D3) 1,000 units tablet Take 1,000 Units by mouth 2 (two) times a day   • lisinopril (ZESTRIL) 20 mg tablet Alternating with 40mg every other day   • lisinopril (ZESTRIL) 40 mg tablet take 1 tablet by mouth once daily   • omeprazole (PriLOSEC) 20 mg delayed release capsule Take 1 capsule (20 mg total) by mouth daily (Patient taking differently: Take 40 mg by mouth daily)   • omeprazole (PriLOSEC) 40 MG capsule Take 40 mg by mouth daily before breakfast Take 30 minutes prior   • Probiotic Product (PROBIOTIC DAILY PO) Take by mouth daily   • psyllium (METAMUCIL SMOOTH TEXTURE) 58 6 % powder Take by mouth daily   • sildenafil (VIAGRA) 100 mg tablet Take 1 tablet (100 mg total) by mouth daily as needed for erectile dysfunction   • tadalafil (Cialis) 5 MG tablet Take 1 tablet (5 mg total) by mouth daily   • ipratropium (ATROVENT) 0 06 % nasal spray INSTILL 2 SPRAYS EVERY 6 HOURS FOR 30 DAYS (Patient not taking: Reported on 11/16/2022)     No Known Allergies  Immunization History   Administered Date(s) Administered   • COVID-19 MODERNA VACC 0 5 ML IM 01/19/2021, 02/16/2021, 10/28/2021   • INFLUENZA 11/02/2015, 01/30/2017, 02/06/2018, 09/05/2018, 10/12/2021   • Influenza Split High Dose Preservative Free IM 11/02/2015, 01/30/2017, 02/06/2018   • Influenza, high dose seasonal 0 7 mL 09/05/2018, 09/11/2019, 09/30/2020, 10/12/2021   • Influenza, seasonal, injectable 11/17/2011   • Pneumococcal Conjugate 13-Valent 01/30/2017, 09/10/2018   • Pneumococcal Polysaccharide PPV23 09/03/2014, 12/06/2019   • Tdap 09/10/2018   • Zoster 10/07/2014   • Zoster Vaccine Recombinant 05/07/2019, 07/11/2019       Objective     /70   Pulse 83   Ht 5' 8 5" (1 74 m)   Wt 79 4 kg (175 lb)   SpO2 97%   BMI 26 22 kg/m²     Physical Exam  Constitutional:       Appearance: Normal appearance  Abdominal:      General: Abdomen is flat  There is no distension  Palpations: Abdomen is soft  Tenderness: There is no abdominal tenderness  Musculoskeletal:      Right lower leg: No edema  Left lower leg: No edema        Comments: Straight leg raise tests negative bilaterally, negative MARCI test, no pain with adduction of legs       Balbir Caal MD

## 2022-11-16 NOTE — PATIENT INSTRUCTIONS
Problem List Items Addressed This Visit          Endocrine    Impaired fasting glucose     A1c 5 8, continue with healthy diet and exercise            Other    Pain of left thigh - Primary     Posterior thigh, likely high hamstring injury/tendinitis, will refer to Physical therapy for evaluation    Patient can try some topical Voltaren in the area to get acute relief         Relevant Orders    Ambulatory Referral to Physical Therapy     Other Visit Diagnoses       Need for vaccination        Relevant Orders    influenza vaccine, high-dose, PF 0 7 mL (FLUZONE HIGH-DOSE)

## 2022-11-16 NOTE — PROGRESS NOTES
PT Evaluation     Today's date: 2022  Patient name: Nancy Paul  : 1950  MRN: [id number]  Referring provider: No ref  provider found  Dx:   Encounter Diagnosis     ICD-10-CM    1  Acute pain of left thigh  M79 652                      Assessment  Assessment details: Jennifer Bai is a 74 y o  male presenting to PT w/ 1 week history of L posterior thigh pain starting after playing pickleball  Signs and symptoms and clinical exam findings most consistent w/ proximal hamstring strain  Pt would benefit from skilled PT services to address the below listed impairments and functional limitations to encourage return to PLOF  Impairments: abnormal or restricted ROM, activity intolerance, impaired physical strength, lacks appropriate home exercise program and pain with function  Functional limitations: Difficulty w/ prolonged ambulation, navigating stairs, lifting/carrying/pushing/pulling objectsUnderstanding of Dx/Px/POC: good   Prognosis: good    Goals  STG: LLE MMT to WNL in 4 weeks  STG: Subjectively reports pain at worst decreased by 2 points in 2-4 weeks  STG: I w/ HEPs 1 week after prescription  LTG: Returns to [employer] by d/c  LTG: FOTO >= to goal by d/c  LTG: I w/ comprehensive HEP by d/c  Plan  Patient would benefit from: PT eval and skilled physical therapy  Planned modality interventions: TENS, thermotherapy: hydrocollator packs and cryotherapy  Planned therapy interventions: joint mobilization, manual therapy, massage, neuromuscular re-education, patient education, strengthening, stretching, therapeutic activities, therapeutic exercise, therapeutic training, balance, flexibility, functional ROM exercises, gait training, graded activity, graded exercise, graded motor and home exercise program  Frequency: 1-2x/week    Duration in visits: 12  Duration in weeks: 8  Plan of Care beginning date: 2022  Plan of Care expiration date: 2023  Treatment plan discussed with: patient        Subjective Evaluation    History of Present Illness  Mechanism of injury: Reports history of L hamstring pain starting 5 days ago while playing pickleball  States that he feels he is able to walk, run, and get around okay but does have pain when he goes to stand up after sitting down for maybe around 20 minutes  States that he does want to be able to ski in 2 weeks  Typically for exercise he does the elliptical, weights, and other sports for exercise  He did the elliptical today without an issue  States that if he goes to roll in bed and then goes to get up it will hurt but once he walks around his is fine  Pain  Current pain ratin  At best pain ratin  At worst pain ratin  Quality: throbbing, tight and sharp  Aggravating factors: standing, sitting and lifting    Patient Goals  Patient goals for therapy: decreased pain, increased motion, increased strength and return to sport/leisure activities          Objective     Neurological Testing     Sensation     Hip   Left Hip   Intact: light touch    Right Hip   Intact: light touch    Active Range of Motion   Left Hip   Normal active range of motion    Right Hip   Normal active range of motion    Additional Active Range of Motion Details  Decreased ROM standing CKC hip flexion and SKTC, L<R     Strength/Myotome Testing     Left Hip   Planes of Motion   Flexion: 4-  Extension: 5    Right Hip   Normal muscle strength    Tests     Left Hip   Negative MARCI and FADIR    90/90 SLR: Positive  SLR: Negative                Precautions: N/A      Manuals                                                                 Neuro Re-Ed                                                                                                        Ther Ex             DL HEP            Standing HS curl HEP            SKTC HEP                                                                             Ther Activity             Education Regarding prognosis, anatomy, PoC                         Gait Training                                       Modalities

## 2022-11-23 ENCOUNTER — OFFICE VISIT (OUTPATIENT)
Dept: PHYSICAL THERAPY | Facility: REHABILITATION | Age: 72
End: 2022-11-23

## 2022-11-23 DIAGNOSIS — M79.652 ACUTE PAIN OF LEFT THIGH: Primary | ICD-10-CM

## 2022-11-23 NOTE — PROGRESS NOTES
Daily Note     Today's date: 2022  Patient name: Brittany Foreman  : 1950  MRN: [id number]  Referring provider: No ref  provider found  Dx:   Encounter Diagnosis     ICD-10-CM    1  Acute pain of left thigh  M79 652                      Subjective: Reports good improvement since last visit, about 80% there  Objective: See treatment diary below    Assessment: Completed exercise with correct technique and without reports of pain  Demonstrated moderate fatigue after exercise  Mild stretch in left posterior hamstring w/ hip flexion  Pt would benefit from continued PT services to reduce pain and increase level of function  Plan: Continue per plan of care        Precautions: N/A      Manuals                                                                Neuro Re-Ed            3 way hip  Green oval 3x5 ea           Slider retro lunge  3x6 ea           Resisted walk jose maria  10x fwd/bck 30# - HEP           Bosu balance  5' - HEP                                                  Ther Ex            DL HEP Reviewed           Standing HS curl HEP Reviewed           SKTC HEP Reviewed                                                  VG  L7 3' SL 2x10 ea             Slant board  10x5" ea           Ther Activity             Education Regarding prognosis, anatomy, PoC                         Gait Training                                       Modalities

## 2022-11-30 ENCOUNTER — OFFICE VISIT (OUTPATIENT)
Dept: PHYSICAL THERAPY | Facility: REHABILITATION | Age: 72
End: 2022-11-30

## 2022-11-30 DIAGNOSIS — M79.652 ACUTE PAIN OF LEFT THIGH: Primary | ICD-10-CM

## 2022-11-30 NOTE — PROGRESS NOTES
Daily Note     Today's date: 2022  Patient name: Jesus Martins  : 1950  MRN: [id number]  Referring provider: No ref  provider found  Dx:   Encounter Diagnosis     ICD-10-CM    1  Acute pain of left thigh  M79 652                      Subjective: States things going good, only feels it if he sits for a while  Objective: See treatment diary below    Assessment: Completed exercise with correct technique and without reports of pain  Demonstrated moderate fatigue after exercise  Pt would benefit from continued PT services to reduce pain and increase level of function  Plan: Continue per plan of care        Precautions: N/A      Manuals                                                               Neuro Re-Ed           3 way hip  Green oval 3x5 ea           Slider retro lunge  3x6 ea Lateral 3x5          Resisted walk jose maria  10x fwd/bck 30# - HEP 3-way 16#           Bosu balance  5' - HEP           Cone tap   2x5 ea          SLDL to cone   2x5 ea                       Ther Ex           DL HEP Reviewed           Standing HS curl HEP Reviewed           SKTC HEP Reviewed           Sciatic glide   3x8 - HEP                                    VG  L7 3' SL 2x10 ea   L7 3' SL 2x10 ea 15x jumps          Slant board  10x5" ea           Ther Activity             Education Regarding prognosis, anatomy, PoC                         Gait Training                                       Modalities

## 2022-12-05 ENCOUNTER — OFFICE VISIT (OUTPATIENT)
Dept: PHYSICAL THERAPY | Facility: REHABILITATION | Age: 72
End: 2022-12-05

## 2022-12-05 DIAGNOSIS — M79.652 ACUTE PAIN OF LEFT THIGH: Primary | ICD-10-CM

## 2022-12-05 NOTE — PROGRESS NOTES
Daily Note     Today's date: 2022  Patient name: Trina Loaiza  : 1950  MRN: [id number]  Referring provider: Luis Guaman, PT  Dx:   Encounter Diagnosis     ICD-10-CM    1  Acute pain of left thigh  M79 652                      Subjective: States doing good, better each day  Objective: See treatment diary below    Assessment: Completed exercise with correct technique and without reports of pain  Demonstrated moderate fatigue after exercise  Pt would benefit from continued PT services to reduce pain and increase level of function  Plan: Continue per plan of care  Re-assess after returns from skiing trip        Precautions: N/A      Manuals                                                              Neuro Re-Ed          3 way hip  Green oval 3x5 ea           Slider retro lunge  3x6 ea Lateral 3x5          Resisted walk jose maria  10x fwd/bck 30# - HEP 3-way 16#  Hops 3 direction 3x5          Bosu balance  5' - HEP           Cone tap   2x5 ea          SLDL to cone   2x5 ea 10# KB 3x5 ea                      Ther Ex          DL HEP Reviewed           Standing HS curl HEP Reviewed           SKTC HEP Reviewed           Sciatic glide   3x8 - HEP Reviewed                                   VG  L7 3' SL 2x10 ea   L7 3' SL 2x10 ea 15x jumps L7 4' 2x10 jumps 2x5 hops         Slant board  10x5" ea           Ther Activity             Education Regarding prognosis, anatomy, PoC                         Gait Training                                       Modalities

## 2022-12-21 ENCOUNTER — APPOINTMENT (OUTPATIENT)
Dept: PHYSICAL THERAPY | Facility: REHABILITATION | Age: 72
End: 2022-12-21

## 2022-12-21 ENCOUNTER — OFFICE VISIT (OUTPATIENT)
Dept: PHYSICAL THERAPY | Facility: REHABILITATION | Age: 72
End: 2022-12-21

## 2022-12-21 DIAGNOSIS — M79.652 ACUTE PAIN OF LEFT THIGH: Primary | ICD-10-CM

## 2023-01-11 ENCOUNTER — APPOINTMENT (OUTPATIENT)
Dept: LAB | Facility: CLINIC | Age: 73
End: 2023-01-11

## 2023-01-11 DIAGNOSIS — R97.20 ELEVATED PSA: ICD-10-CM

## 2023-01-11 LAB — PSA SERPL-MCNC: 3.4 NG/ML (ref 0–4)

## 2023-01-12 ENCOUNTER — TELEPHONE (OUTPATIENT)
Dept: UROLOGY | Facility: AMBULATORY SURGERY CENTER | Age: 73
End: 2023-01-12

## 2023-01-12 NOTE — TELEPHONE ENCOUNTER
Patient has an appointment on 2/7/23 1:45 with Melanie Gauthier in [place]  Patient calling to find out if he still needs to keep this appointment  He stated he did his PSA again yesterday and it dropped down to 3 4 and he is feeling good      He can be reached at [phone]

## 2023-02-17 ENCOUNTER — OFFICE VISIT (OUTPATIENT)
Dept: INTERNAL MEDICINE CLINIC | Facility: CLINIC | Age: 73
End: 2023-02-17

## 2023-02-17 VITALS
HEART RATE: 78 BPM | HEIGHT: 69 IN | WEIGHT: 167.8 LBS | BODY MASS INDEX: 24.85 KG/M2 | OXYGEN SATURATION: 96 % | DIASTOLIC BLOOD PRESSURE: 78 MMHG | SYSTOLIC BLOOD PRESSURE: 152 MMHG

## 2023-02-17 DIAGNOSIS — R19.7 DIARRHEA, UNSPECIFIED TYPE: ICD-10-CM

## 2023-02-17 DIAGNOSIS — R19.8 CHANGE IN BOWEL FUNCTION: Primary | ICD-10-CM

## 2023-02-17 DIAGNOSIS — I10 ESSENTIAL HYPERTENSION: ICD-10-CM

## 2023-02-17 RX ORDER — LISINOPRIL 40 MG/1
TABLET ORAL
Qty: 90 TABLET | Refills: 3 | Status: SHIPPED | OUTPATIENT
Start: 2023-02-17

## 2023-02-17 NOTE — PROGRESS NOTES
Name: Johnathan Robertson      : 1950      MRN: [id number]  Encounter Provider: Wesley Herman MD  Encounter Date: 2023   [facility] [address] [place] [address] [address]     1  Change in bowel function  Assessment & Plan:  Get stool studies including Giardia test    Orders:  -     Giardia antigen; Future  -     Stool Enteric Bacterial Panel by PCR; Future  -     Clostridium difficile toxin by PCR; Future    2  Diarrhea, unspecified type  -     Stool Enteric Bacterial Panel by PCR; Future           Subjective     After 4 weeks ago pt went to [facility] and was taking OTC med for altitude sickness prevention  Pt had some flu-like symptoms about 4 weeks ago  Pt had a change in stool with smaller, softer, less formed stool with some increased gas  No blood in the stool, no black tarry stool, no abd cramping, no fevers  He did call colorectal to get appt  No other change in meds  He does take metamucil once daily  He denies any significant change in diet  Try to cut back on alcohol, coffee, and junk food  Pt has been staying hydrated  Pt tested negative for COVID last week  Review of Systems   Constitutional: Negative for chills, fatigue and fever  HENT: Negative for congestion, nosebleeds, postnasal drip, sore throat and trouble swallowing  Eyes: Negative for pain  Respiratory: Negative for cough, chest tightness, shortness of breath and wheezing  Cardiovascular: Negative for chest pain, palpitations and leg swelling  Gastrointestinal: Negative for abdominal pain, constipation, diarrhea (but loose stool), nausea and vomiting  Endocrine: Negative for polydipsia and polyuria  Genitourinary: Negative for dysuria, flank pain and hematuria  Musculoskeletal: Negative for arthralgias  Skin: Negative for rash  Neurological: Negative for dizziness, tremors, light-headedness and headaches  Hematological: Does not bruise/bleed easily  Psychiatric/Behavioral: Negative for confusion and dysphoric mood  The patient is not nervous/anxious  Past Medical History:   Diagnosis Date   • Anxiety    • Cancer ([address] )     scc back   • Carpal tunnel syndrome    • Constipation     with general anesthesia   • ED (erectile dysfunction)    • GERD (gastroesophageal reflux disease)    • [place] (hard of hearing)     wears hearing aids   • Hypercholesteremia    • Hypertension    • Numbness in both hands      Past Surgical History:   Procedure Laterality Date   • APPENDECTOMY      As a child   • CAST APPLICATION Right 5/10/0383    Procedure: Application short-arm splint;  Surgeon: Myrna Schumacher MD;  [facility];  Service: Orthopedics   • COLONOSCOPY     • INGUINAL HERNIA REPAIR Left 2015   • MASS EXCISION N/A 8/14/2018    Procedure: BACK/TRUNK MULTIPLE [address]; COMPLEX CLOSURE VERSUS FLAP RECONSTRUCTION;  Surgeon: Judy Macias MD;  [facility];  Service: Plastics   • MOHS RECONSTRUCTION Right 6/23/2022    Procedure: RECONSTRUCTION MOHS DEFECT RIGHT DORSAL HAND AND LOCAL FLAP;  Surgeon: Judy Macias MD;  Location[facility];  Service: Plastics   • MA EXC B9 LESION MRGN XCP SK TG S/N/H/F/G > 4 0CM Right 6/23/2022    Procedure: EXCISION RIGHT NECK SKIN LESION AND COMPLEX CLOSURE;  Surgeon: Judy Macias MD;  Location: [facility];  Service: Plastics   • MA FASCIOTOMY PALMAR OPEN PARTIAL Right 8/27/2020    Procedure: Excision Dupuytren's central cord right long finger with release of metacarpophalangeal joint  Excision Dupuytren's right ring finger spiral cord ulnar aspect with release of the proximal interphalangeal joint    Excision Dupuytren's right small finger central cord and spiral cord to both radial and ulnar side, as well as ulnar-sided cord of the digital sheath and release of the proximal   • MA NDSC WRST SURG W/RLS TRANSVRS CARPL LIGM Right 10/20/2020    Procedure: RELEASE CARPAL TUNNEL ENDOSCOPIC;  Surgeon: Enriqueta Marquez MD;  [facility];  Service: Orthopedics   • IN [address] WRST SURG W/RLS TRANSVRS CARPL LIGM Left 11/4/2020    Procedure: RELEASE CARPAL TUNNEL ENDOSCOPIC;  Surgeon: Enriqueta Marquez MD;  [facility];  Service: Orthopedics   • IN NEUROPLASTY &/TRANSPOSITION ULNAR NERVE ELBOW Right 10/20/2020    Procedure: Eleanor Baires;  Surgeon: Enriqueta Marquez MD;  [facility];  Service: Orthopedics   • IN NEUROPLASTY &/TRANSPOSITION ULNAR NERVE ELBOW Left 11/4/2020    Procedure: Eleanor Dun;  Surgeon: Enriqueta Marquez MD;  [facility];  Service: Orthopedics   • IN TENDON SHEATH INCISION Right 8/27/2020    Procedure: Right long finger trigger finger release;  Surgeon: Enriqueta Marquez MD;  [facility];  Service: Orthopedics   • SQUAMOUS CELL CARCINOMA EXCISION N/A 8/14/2018    Procedure: BACK SCC IN SITU EXCISION;  Surgeon: Steven James MD;  [facility];  Service: Plastics     Family History   Problem Relation Age of Onset   • Colon cancer Father    • Heart attack Mother    • Breast cancer Mother      Social History     Socioeconomic History   • Marital status: /Civil Union     Spouse name: None   • Number of children: None   • Years of education: None   • Highest education level: None   Occupational History   • Occupation:    Tobacco Use   • Smoking status: Never   • Smokeless tobacco: Never   Vaping Use   • Vaping Use: Never used   Substance and Sexual Activity   • Alcohol use:  Yes     Alcohol/week: 15 0 standard drinks     Types: 15 Standard drinks or equivalent per week     Comment: 2 Drinks a night   • Drug use: Not Currently   • Sexual activity: Yes   Other Topics Concern   • None   Social History Narrative   • None     Social Determinants of Health     Financial Resource Strain: Not on file   Food Insecurity: Not on file   Transportation Needs: Not on file   Physical Activity: Not on file   Stress: Not on file   Social Connections: Not on file   Intimate Partner Violence: Not on file   Housing Stability: Not on file     Current Outpatient Medications on File Prior to Visit   Medication Sig   • amLODIPine (NORVASC) 5 mg tablet take 1 tablet by mouth once daily   • atorvastatin (LIPITOR) 20 mg tablet Take 1 tablet (20 mg total) by mouth daily   • b complex vitamins capsule Take 1 capsule by mouth daily   • cholecalciferol (VITAMIN D3) 1,000 units tablet Take 1,000 Units by mouth 2 (two) times a day   • ipratropium (ATROVENT) 0 06 % nasal spray    • lisinopril (ZESTRIL) 20 mg tablet Alternating with 40mg every other day   • omeprazole (PriLOSEC) 40 MG capsule Take 40 mg by mouth daily before breakfast Take 30 minutes prior   • Probiotic Product (PROBIOTIC DAILY PO) Take by mouth daily   • psyllium (METAMUCIL SMOOTH TEXTURE) 58 6 % powder Take by mouth daily   • sildenafil (VIAGRA) 100 mg tablet Take 1 tablet (100 mg total) by mouth daily as needed for erectile dysfunction   • tadalafil (Cialis) 5 MG tablet Take 1 tablet (5 mg total) by mouth daily   • ciclopirox (LOPROX) 0 77 % cream APPLY TWICE A DAY TO AREAS OF ATHLETES FEET FOR 2 WEEKS AS NEEDED   • omeprazole (PriLOSEC) 20 mg delayed release capsule Take 1 capsule (20 mg total) by mouth daily (Patient not taking: Reported on 2/17/2023)   • [DISCONTINUED] lisinopril (ZESTRIL) 40 mg tablet take 1 tablet by mouth once daily     No Known Allergies  Immunization History   Administered Date(s) Administered   • COVID-19 MODERNA VACC 0 5 ML IM 01/19/2021, 02/16/2021, 10/28/2021   • INFLUENZA 11/02/2015, 01/30/2017, 02/06/2018, 09/05/2018, 10/12/2021   • Influenza Split High Dose Preservative Free IM 11/02/2015, 01/30/2017, 02/06/2018   • Influenza, high dose seasonal 0 7 mL 09/05/2018, 09/11/2019, 09/30/2020, 10/12/2021, 11/16/2022   • Influenza, seasonal, injectable 11/17/2011   • Pneumococcal Conjugate 13-Valent 01/30/2017, 09/10/2018   • Pneumococcal Polysaccharide PPV23 09/03/2014, 12/06/2019   • Tdap 09/10/2018   • Zoster 10/07/2014   • Zoster Vaccine Recombinant 05/07/2019, 07/11/2019       Objective     /78   Pulse 78   Ht 5' 8 5" (1 74 m)   Wt 76 1 kg (167 lb 12 8 oz)   SpO2 96%   BMI 25 14 kg/m²     Physical Exam  Vitals reviewed  Constitutional:       General: He is not in acute distress  Appearance: Normal appearance  He is well-developed  HENT:      Head: Normocephalic and atraumatic  Right Ear: External ear normal       Left Ear: External ear normal    Eyes:      General: No scleral icterus  Conjunctiva/sclera: Conjunctivae normal    Neck:      Thyroid: No thyromegaly  Trachea: No tracheal deviation  Cardiovascular:      Rate and Rhythm: Normal rate and regular rhythm  Heart sounds: Normal heart sounds  Pulmonary:      Effort: Pulmonary effort is normal  No respiratory distress  Breath sounds: Normal breath sounds  No wheezing or rales  Abdominal:      General: Bowel sounds are normal  There is no distension  Palpations: Abdomen is soft  Tenderness: There is no abdominal tenderness  There is no guarding or rebound  Musculoskeletal:      Cervical back: Normal range of motion and neck supple  Right lower leg: No edema  Left lower leg: No edema  Lymphadenopathy:      Cervical: No cervical adenopathy  Skin:     Coloration: Skin is not jaundiced or pale  Neurological:      General: No focal deficit present  Mental Status: He is alert and oriented to person, place, and time  Psychiatric:         Mood and Affect: Mood normal          Behavior: Behavior normal          Thought Content:  Thought content normal          Judgment: Judgment normal        Mayi Young MD

## 2023-02-17 NOTE — PATIENT INSTRUCTIONS
Problem List Items Addressed This Visit          Other    Change in bowel function - Primary     Get stool studies including Giardia test         Relevant Orders    Giardia antigen    Stool Enteric Bacterial Panel by PCR    Clostridium difficile toxin by PCR     Other Visit Diagnoses       Diarrhea, unspecified type        Relevant Orders    Stool Enteric Bacterial Panel by PCR

## 2023-02-19 ENCOUNTER — APPOINTMENT (OUTPATIENT)
Dept: LAB | Facility: CLINIC | Age: 73
End: 2023-02-19

## 2023-02-19 DIAGNOSIS — R19.7 DIARRHEA, UNSPECIFIED TYPE: ICD-10-CM

## 2023-02-19 DIAGNOSIS — R19.8 CHANGE IN BOWEL FUNCTION: ICD-10-CM

## 2023-02-20 LAB
C DIFF TOX GENS STL QL NAA+PROBE: NEGATIVE
CAMPYLOBACTER DNA SPEC NAA+PROBE: NORMAL
SALMONELLA DNA SPEC QL NAA+PROBE: NORMAL
SHIGA TOXIN STX GENE SPEC NAA+PROBE: NORMAL
SHIGELLA DNA SPEC QL NAA+PROBE: NORMAL

## 2023-02-21 LAB — G LAMBLIA AG STL QL IA: NEGATIVE

## 2023-02-28 DIAGNOSIS — I10 ESSENTIAL HYPERTENSION: ICD-10-CM

## 2023-02-28 RX ORDER — AMLODIPINE BESYLATE 5 MG/1
TABLET ORAL
Qty: 90 TABLET | Refills: 0 | Status: SHIPPED | OUTPATIENT
Start: 2023-02-28 | End: 2023-03-06 | Stop reason: SDUPTHER

## 2023-03-05 NOTE — TELEPHONE ENCOUNTER
Pt called in stating this prescription was canceled and he doesn't know why  He's requesting a call back

## 2023-03-06 DIAGNOSIS — I10 ESSENTIAL HYPERTENSION: ICD-10-CM

## 2023-03-06 RX ORDER — AMLODIPINE BESYLATE 5 MG/1
5 TABLET ORAL DAILY
Qty: 90 TABLET | Refills: 3 | Status: SHIPPED | OUTPATIENT
Start: 2023-03-06

## 2023-03-06 RX ORDER — AMLODIPINE BESYLATE 5 MG/1
5 TABLET ORAL DAILY
Qty: 90 TABLET | Refills: 3 | Status: SHIPPED | OUTPATIENT
Start: 2023-03-06 | End: 2023-03-06 | Stop reason: SDUPTHER

## 2023-03-06 NOTE — TELEPHONE ENCOUNTER
Patient called stating his amlodipine ? Can this be re-sent to [facility] in [facility]?

## 2023-03-20 NOTE — PROGRESS NOTES
Colon and Rectal Surgery   Mc Webb 74 y o  male MRN: [id number]   Encounter: [id number]  03/22/23   3:51 PM          ASSESSMENT:  Mc Webb is a 74 y o  male who presents in follow-up, he has had some change in bowel habits, no alarm signs  Diarrhea work-up last month negative for C  difficile/enteric panel, some improvement with mushy stool at this point  He has had multiple travels, discussed some dietary/lifestyle changes with him, his colonoscopy is recent and current 12/2021  PLAN:  High fiber diet/increased hydration, 20-30grams fiber per day, increased fruits/vegetables/psyllium(metamucil or konsyl 1 tbsp 1-2x/day)   Knows to call back in the next 4 to 6 weeks if there is no improvement, would consider repeat colonoscopy, possible EGD add on  HPI  Mc Webb is a 74 y o  male who is here today for evaluation of change in bowel habits  Patient complains of change in bowel habits since [facility] January  He has increased gas, urgency and stools are more soft  He has increased gas as well  He has recently been [place]  He had stool studies in February which were all normal   He denies any abdominal pain or rectal bleeding  His last colonoscopy was 12/1/2021 which was within normal limits  Recall colonoscopy 5 years       Historical Information   Past Medical History:   Diagnosis Date   • Anxiety    • Cancer ([address] )     scc back   • Carpal tunnel syndrome    • Constipation     with general anesthesia   • ED (erectile dysfunction)    • GERD (gastroesophageal reflux disease)    • [place] (hard of hearing)     wears hearing aids   • Hypercholesteremia    • Hypertension    • Numbness in both hands      Past Surgical History:   Procedure Laterality Date   • APPENDECTOMY      As a child   • CAST APPLICATION Right 8/74/2200    Procedure: Application short-arm splint;  Surgeon: Pam Briggs MD;  Location:  MAIN OR;  Service: Orthopedics   • COLONOSCOPY     • INGUINAL HERNIA REPAIR Left 2015   • MASS EXCISION N/A 8/14/2018    Procedure: BACK/TRUNK MULTIPLE SEBBORHEIC KERATOSIS SHAVE/EXCISION; COMPLEX CLOSURE VERSUS FLAP RECONSTRUCTION;  Surgeon: Gregg Burns MD;  [facility];  Service: Plastics   • MOHS RECONSTRUCTION Right 6/23/2022    Procedure: RECONSTRUCTION MOHS DEFECT RIGHT DORSAL HAND AND LOCAL FLAP;  Surgeon: Gregg Burns MD;  [facility];  Service: Plastics   • [place] EXC B9 LESION MRGN XCP SK TG S/N/H/F/G > 4 0CM Right 6/23/2022    Procedure: EXCISION RIGHT NECK SKIN LESION AND COMPLEX CLOSURE;  Surgeon: Gregg Burns MD;  Location: [facility];  Service: Plastics   • [place] FASCIOTOMY PALMAR OPEN PARTIAL Right 8/27/2020    Procedure: Excision Dupuytren's central cord right long finger with release of metacarpophalangeal joint  Excision Dupuytren's right ring finger spiral cord ulnar aspect with release of the proximal interphalangeal joint    Excision Dupuytren's right small finger central cord and spiral cord to both radial and ulnar side, as well as ulnar-sided cord of the digital sheath and release of the proximal   • [place] NDSC WRST SURG W/RLS TRANSVRS CARPL LIGM Right 10/20/2020    Procedure: RELEASE CARPAL TUNNEL ENDOSCOPIC;  Surgeon: Pam Briggs MD;  [facility];  Service: Orthopedics   •  [address] W/RLS TRANSVRS CARPL LIGM Left 11/4/2020    Procedure: RELEASE CARPAL TUNNEL ENDOSCOPIC;  Surgeon: Pam Briggs MD;  [facility];  Service: Orthopedics   • [place] NEUROPLASTY &/TRANSPOSITION ULNAR NERVE ELBOW Right 10/20/2020    Procedure: RELEASE CUBITAL TUNNEL;  Surgeon: Pam Briggs MD;  [facility];  Service: Orthopedics   • [place] NEUROPLASTY &/TRANSPOSITION ULNAR NERVE ELBOW Left 11/4/2020    Procedure: Rudolfo Sensor;  Surgeon: Pam Briggs MD;  [facility];  Service: Orthopedics   • [place] TENDON SHEATH INCISION Right 8/27/2020    Procedure: Right long finger trigger finger release;  Surgeon: Kaushik Abad MD;  [facility];  Service: Orthopedics   • SQUAMOUS CELL CARCINOMA EXCISION N/A 8/14/2018    Procedure: BACK SCC IN SITU EXCISION;  Surgeon: Cosmo Mora MD;  [facility];  Service: Plastics       Meds/Allergies       Current Outpatient Medications:   •  amLODIPine (NORVASC) 5 mg tablet, Take 1 tablet (5 mg total) by mouth daily, Disp: 90 tablet, Rfl: 3  •  atorvastatin (LIPITOR) 20 mg tablet, Take 1 tablet (20 mg total) by mouth daily, Disp: 90 tablet, Rfl: 3  •  b complex vitamins capsule, Take 1 capsule by mouth daily, Disp: , Rfl:   •  cholecalciferol (VITAMIN D3) 1,000 units tablet, Take 1,000 Units by mouth 2 (two) times a day, Disp: , Rfl:   •  ciclopirox (LOPROX) 0 77 % cream, APPLY TWICE A DAY TO AREAS OF ATHLETES FEET FOR 2 WEEKS AS NEEDED, Disp: , Rfl:   •  ipratropium (ATROVENT) 0 06 % nasal spray, , Disp: , Rfl:   •  lisinopril (ZESTRIL) 20 mg tablet, Alternating with 40mg every other day, Disp: 90 tablet, Rfl: 2  •  lisinopril (ZESTRIL) 40 mg tablet, take 1 tablet by mouth once daily, Disp: 90 tablet, Rfl: 3  •  omeprazole (PriLOSEC) 20 mg delayed release capsule, Take 1 capsule (20 mg total) by mouth daily (Patient not taking: Reported on 2/17/2023), Disp: 90 capsule, Rfl: 3  •  omeprazole (PriLOSEC) 40 MG capsule, Take 40 mg by mouth daily before breakfast Take 30 minutes prior, Disp: , Rfl:   •  Probiotic Product (PROBIOTIC DAILY PO), Take by mouth daily, Disp: , Rfl:   •  psyllium (METAMUCIL SMOOTH TEXTURE) 58 6 % powder, Take by mouth daily, Disp: , Rfl:   •  sildenafil (VIAGRA) 100 mg tablet, Take 1 tablet (100 mg total) by mouth daily as needed for erectile dysfunction, Disp: 90 tablet, Rfl: 3  •  tadalafil (Cialis) 5 MG tablet, Take 1 tablet (5 mg total) by mouth daily, Disp: 90 tablet, Rfl: 3      No Known Allergies      Social History   Social History     Substance and Sexual Activity   Alcohol Use Yes   • Alcohol/week: 15 0 standard drinks   • Types: 15 Standard drinks or equivalent per week    Comment: 2 Drinks a night     Social History     Substance and Sexual Activity   Drug Use Not Currently     Social History     Tobacco Use   Smoking Status Never   Smokeless Tobacco Never         Family History:   Family History   Problem Relation Age of Onset   • Colon cancer Father    • Heart attack Mother    • Breast cancer Mother        Review of Systems   Constitutional: Negative  HENT: Negative  Eyes: Negative  Respiratory: Negative  Cardiovascular: Negative  Gastrointestinal: Negative  Loose stools   Endocrine: Negative  Genitourinary: Negative  Musculoskeletal: Negative  Skin: Negative  Allergic/Immunologic: Negative  Neurological: Negative  Hematological: Negative  Psychiatric/Behavioral: Negative        Objective   Current Vitals:   Vitals:    03/22/23 1512   Weight: 77 6 kg (171 lb)   Height: 5' 8 5" (1 74 m)     Physical Exam:  General:no distress  HEENT:moist mucus membranes  Neck supple  Pulm:no increased work of breathing  Abdomen:soft,nontender  Extremities:no edema

## 2023-03-22 ENCOUNTER — OFFICE VISIT (OUTPATIENT)
Age: 73
End: 2023-03-22

## 2023-03-22 VITALS — HEIGHT: 69 IN | WEIGHT: 171 LBS | BODY MASS INDEX: 25.33 KG/M2

## 2023-03-22 DIAGNOSIS — R19.8 CHANGE IN BOWEL FUNCTION: Primary | ICD-10-CM

## 2023-03-25 NOTE — PATIENT INSTRUCTIONS
ASSESSMENT:  Raoul Byers is a 74 y o  male who presents in follow-up, he has had some change in bowel habits, no alarm signs  Diarrhea work-up last month negative for C  difficile/enteric panel, some improvement with mushy stool at this point  He has had multiple travels, discussed some dietary/lifestyle changes with him, his colonoscopy is recent and current 12/2021  PLAN:  High fiber diet/increased hydration, 20-30grams fiber per day, increased fruits/vegetables/psyllium(metamucil or konsyl 1 tbsp 1-2x/day)   Knows to call back in the next 4 to 6 weeks if there is no improvement, would consider repeat colonoscopy, possible EGD add on

## 2023-05-10 ENCOUNTER — RA CDI HCC (OUTPATIENT)
Dept: OTHER | Facility: HOSPITAL | Age: 73
End: 2023-05-10

## 2023-05-11 ENCOUNTER — HOSPITAL ENCOUNTER (OUTPATIENT)
Dept: RADIOLOGY | Age: 73
Discharge: HOME | End: 2023-05-11

## 2023-05-11 DIAGNOSIS — R91.1 PULMONARY NODULE: ICD-10-CM

## 2023-05-17 ENCOUNTER — OFFICE VISIT (OUTPATIENT)
Dept: INTERNAL MEDICINE CLINIC | Facility: CLINIC | Age: 73
End: 2023-05-17

## 2023-05-17 VITALS
WEIGHT: 171.2 LBS | SYSTOLIC BLOOD PRESSURE: 136 MMHG | BODY MASS INDEX: 25.36 KG/M2 | OXYGEN SATURATION: 97 % | HEIGHT: 69 IN | DIASTOLIC BLOOD PRESSURE: 76 MMHG | HEART RATE: 68 BPM

## 2023-05-17 DIAGNOSIS — R19.8 CHANGE IN BOWEL FUNCTION: ICD-10-CM

## 2023-05-17 DIAGNOSIS — Z12.5 SCREENING FOR PROSTATE CANCER: ICD-10-CM

## 2023-05-17 DIAGNOSIS — R91.1 PULMONARY NODULE: ICD-10-CM

## 2023-05-17 DIAGNOSIS — N52.9 ERECTILE DYSFUNCTION, UNSPECIFIED ERECTILE DYSFUNCTION TYPE: ICD-10-CM

## 2023-05-17 DIAGNOSIS — E78.2 MIXED HYPERLIPIDEMIA: Primary | ICD-10-CM

## 2023-05-17 DIAGNOSIS — R73.01 IMPAIRED FASTING GLUCOSE: ICD-10-CM

## 2023-05-17 DIAGNOSIS — Z00.00 MEDICARE ANNUAL WELLNESS VISIT, SUBSEQUENT: ICD-10-CM

## 2023-05-17 DIAGNOSIS — R17 ELEVATED BILIRUBIN: ICD-10-CM

## 2023-05-17 DIAGNOSIS — E55.9 VITAMIN D DEFICIENCY: ICD-10-CM

## 2023-05-17 DIAGNOSIS — I10 HTN (HYPERTENSION), BENIGN: ICD-10-CM

## 2023-05-17 NOTE — PROGRESS NOTES
Assessment and Plan:     Problem List Items Addressed This Visit        Endocrine    Impaired fasting glucose     Continue with healthy diet and exercise, will recheck A1c with next labs         Relevant Orders    Hemoglobin A1C       Respiratory    Pulmonary nodule     No further follow up needed for this as per report, but pt is going to get another CT for a one year follow up            Cardiovascular and Mediastinum    HTN (hypertension), benign     Well-controlled, continue current meds along with healthy diet and exercise, no dry cough            Other    Elevated bilirubin     Likely Gilbert's syndrome, with slightly elevated bilirubin         Mixed hyperlipidemia - Primary     Continue atorvastatin along with healthy diet and exercise         Relevant Orders    CBC and differential    Comprehensive metabolic panel    Lipid Panel with Direct LDL reflex    TSH, 3rd generation with Free T4 reflex    Erectile dysfunction     Continue cialis which also helps with urine flow         Medicare annual wellness visit, subsequent     Discussed preventative health, cancer screening, immunizations, and safety issues  Last colonoscopy December 1, 2021 with recommendations to recheck again in 5 years  Patient up-to-date with immunizations  Vitamin D deficiency     Continue vitamin D         Relevant Orders    Vitamin D 25 hydroxy    Change in bowel function     Follow up colorectal        Other Visit Diagnoses     Screening for prostate cancer        Relevant Orders    PSA, Total Screen        BMI Counseling: Body mass index is 25 65 kg/m²  The BMI is above normal  Nutrition recommendations include encouraging healthy choices of fruits and vegetables and moderation in carbohydrate intake  Exercise recommendations include exercising 3-5 times per week  Rationale for BMI follow-up plan is due to patient being overweight or obese       Depression Screening and Follow-up Plan: Patient was screened for depression during today's encounter  They screened negative with a PHQ-2 score of 0  Preventive health issues were discussed with patient, and age appropriate screening tests were ordered as noted in patient's After Visit Summary  Personalized health advice and appropriate referrals for health education or preventive services given if needed, as noted in patient's After Visit Summary  History of Present Illness:     Patient presents for a Medicare Wellness Visit    Here for regular follow-up and annual wellness visit     Patient Care Team:  Kofi Rivera MD as PCP - General     Review of Systems:     Review of Systems   Constitutional: Negative for chills, fatigue and fever  HENT: Negative for congestion, nosebleeds, postnasal drip, sore throat and trouble swallowing  Eyes: Negative for pain  Respiratory: Negative for cough, chest tightness, shortness of breath and wheezing  Cardiovascular: Negative for chest pain, palpitations and leg swelling  Gastrointestinal: Negative for abdominal pain, constipation, diarrhea, nausea and vomiting  Endocrine: Negative for polydipsia and polyuria  Genitourinary: Negative for dysuria, flank pain and hematuria  Musculoskeletal: Negative for arthralgias  Skin: Negative for rash  Neurological: Negative for dizziness, tremors and headaches  Hematological: Does not bruise/bleed easily  Psychiatric/Behavioral: Negative for confusion and dysphoric mood  The patient is not nervous/anxious           Problem List:     Patient Active Problem List   Diagnosis   • Bilateral carpal tunnel syndrome   • Pulmonary nodule   • SOB (shortness of breath) on exertion   • Elevated bilirubin   • Impaired fasting glucose   • Mixed hyperlipidemia   • Chronic GERD   • Erectile dysfunction   • HTN (hypertension), benign   • 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   • Trigger little finger of right hand   • Dupuytren contracture   • Medicare annual wellness visit, subsequent   • Anxiety   • Preop exam for internal medicine   • Radiculopathy, cervical region   • Dupuytren's contracture of right hand   • Neck pain   • Abnormal MRI   • Cubital tunnel syndrome of both upper extremities   • History of anesthesia complications   • Neck stiffness   • Vitamin D deficiency   • Other benign neoplasm of skin of scalp and neck   • 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   • Eosinophilic esophagitis   • Pain of left thigh   • Change in bowel function      Past Medical and Surgical History:     Past Medical History:   Diagnosis Date   • Anxiety    • Cancer (HCC)     scc back   • Carpal tunnel syndrome    • Constipation     with general anesthesia   • ED (erectile dysfunction)    • GERD (gastroesophageal reflux disease)    • Andreafski (hard of hearing)     wears hearing aids   • Hypercholesteremia    • Hypertension    • Numbness in both hands      Past Surgical History:   Procedure Laterality Date   • APPENDECTOMY      As a child   • CAST APPLICATION Right 1/52/4663    Procedure: Application short-arm splint;  Surgeon: Marleny Gonzalez MD;  Location:  MAIN OR;  Service: Orthopedics   • COLONOSCOPY     • INGUINAL HERNIA REPAIR Left 2015   • MASS EXCISION N/A 8/14/2018    Procedure: BACK/TRUNK MULTIPLE [address] [place] [address] Rd; COMPLEX CLOSURE VERSUS FLAP RECONSTRUCTION;  Surgeon: Lucia Recio MD;  Location: [facility];  Service: Plastics   • MOHS RECONSTRUCTION Right 6/23/2022    Procedure: RECONSTRUCTION MOHS DEFECT RIGHT DORSAL HAND AND LOCAL FLAP;  Surgeon: Lucia Recio MD;  Location:  MAIN OR;  Service: Plastics   • UT EXC B9 LESION MRGN XCP SK TG S/N/H/F/G > 4 0CM Right 6/23/2022    Procedure: EXCISION RIGHT NECK SKIN LESION AND COMPLEX CLOSURE;  Surgeon: Lucia Recio MD;  Location:  MAIN OR;  Service: Plastics   • UT FASCIOTOMY PALMAR OPEN PARTIAL Right 8/27/2020    Procedure: Excision Dupuytren's central cord right long finger with release of metacarpophalangeal joint  Excision Dupuytren's right ring finger spiral cord ulnar aspect with release of the proximal interphalangeal joint    Excision Dupuytren's right small finger central cord and spiral cord to both radial and ulnar side, as well as ulnar-sided cord of the digital sheath and release of the proximal   • AK NDSC WRST SURG W/RLS TRANSVRS CARPL LIGM Right 10/20/2020    Procedure: RELEASE CARPAL TUNNEL ENDOSCOPIC;  Surgeon: Tyron Medina MD;  Location: [facility];  Service: Orthopedics   •  East I 20 W/RLS TRANSVRS CARPL LIGM Left 11/4/2020    Procedure: RELEASE CARPAL TUNNEL ENDOSCOPIC;  Surgeon: Tyron Medina MD;  Location: [facility];  Service: Orthopedics   • AK NEUROPLASTY &/TRANSPOSITION ULNAR NERVE ELBOW Right 10/20/2020    Procedure: Darshana Merino;  Surgeon: Tyron Medina MD;  [facility];  Service: Orthopedics   • AK NEUROPLASTY &/TRANSPOSITION ULNAR NERVE ELBOW Left 11/4/2020    Procedure: Darshana Merino;  Surgeon: Tyron Medina MD;  [facility];  Service: Orthopedics   • AK TENDON SHEATH INCISION Right 8/27/2020    Procedure: Right long finger trigger finger release;  Surgeon: Tyron Medina MD;  [facility];  Service: Orthopedics   • SQUAMOUS CELL CARCINOMA EXCISION N/A 8/14/2018    Procedure: BACK SCC IN SITU EXCISION;  Surgeon: Jailyn Luu MD;  [facility];  Service: Plastics      Family History:     Family History   Problem Relation Age of Onset   • Colon cancer Father    • Heart attack Mother    • Breast cancer Mother    • Hypertension Mother       Social History:     Social History     Socioeconomic History   • Marital status: /Civil Union     Spouse name: None   • Number of children: None   • Years of education: None   • Highest education level: None   Occupational History   • Occupation:    Tobacco Use   • Smoking status: Never   • Smokeless tobacco: Never Vaping Use   • Vaping Use: Never used   Substance and Sexual Activity   • Alcohol use: Yes     Alcohol/week: 15 0 standard drinks     Types: 15 Standard drinks or equivalent per week     Comment: 2 Drinks a night   • Drug use: Never   • Sexual activity: Yes     Partners: Female     Birth control/protection: None   Other Topics Concern   • None   Social History Narrative   • None     Social Determinants of Health     Financial Resource Strain: Low Risk    • Difficulty of Paying Living Expenses: Not hard at all   Food Insecurity: Not on file   Transportation Needs: No Transportation Needs   • Lack of Transportation (Medical): No   • Lack of Transportation (Non-Medical):  No   Physical Activity: Not on file   Stress: Not on file   Social Connections: Not on file   Intimate Partner Violence: Not on file   Housing Stability: Not on file      Medications and Allergies:     Current Outpatient Medications   Medication Sig Dispense Refill   • amLODIPine (NORVASC) 5 mg tablet Take 1 tablet (5 mg total) by mouth daily 90 tablet 3   • atorvastatin (LIPITOR) 20 mg tablet Take 1 tablet (20 mg total) by mouth daily 90 tablet 3   • b complex vitamins capsule Take 1 capsule by mouth daily     • cholecalciferol (VITAMIN D3) 1,000 units tablet Take 1,000 Units by mouth 2 (two) times a day     • ciclopirox (LOPROX) 0 77 % cream APPLY TWICE A DAY TO AREAS OF ATHLETES FEET FOR 2 WEEKS AS NEEDED     • ipratropium (ATROVENT) 0 06 % nasal spray      • lisinopril (ZESTRIL) 20 mg tablet Alternating with 40mg every other day 90 tablet 2   • lisinopril (ZESTRIL) 40 mg tablet take 1 tablet by mouth once daily 90 tablet 3   • omeprazole (PriLOSEC) 40 MG capsule Take 40 mg by mouth daily before breakfast Take 30 minutes prior     • Probiotic Product (PROBIOTIC DAILY PO) Take by mouth daily     • psyllium (METAMUCIL SMOOTH TEXTURE) 58 6 % powder Take by mouth daily     • sildenafil (VIAGRA) 100 mg tablet Take 1 tablet (100 mg total) by mouth daily as needed for erectile dysfunction 90 tablet 3   • tadalafil (Cialis) 5 MG tablet Take 1 tablet (5 mg total) by mouth daily 90 tablet 3   • omeprazole (PriLOSEC) 20 mg delayed release capsule Take 1 capsule (20 mg total) by mouth daily (Patient not taking: Reported on 2/17/2023) 90 capsule 3     No current facility-administered medications for this visit  No Known Allergies   Immunizations:     Immunization History   Administered Date(s) Administered   • COVID-19 MODERNA VACC 0 5 ML IM 01/19/2021, 02/16/2021, 10/28/2021   • INFLUENZA 11/02/2015, 01/30/2017, 02/06/2018, 09/05/2018, 10/12/2021   • Influenza Split High Dose Preservative Free IM 11/02/2015, 01/30/2017, 02/06/2018   • Influenza, high dose seasonal 0 7 mL 09/05/2018, 09/11/2019, 09/30/2020, 10/12/2021, 11/16/2022   • Influenza, seasonal, injectable 11/17/2011   • Pneumococcal Conjugate 13-Valent 01/30/2017, 09/10/2018   • Pneumococcal Polysaccharide PPV23 09/03/2014, 12/06/2019   • Tdap 09/10/2018   • Zoster 10/07/2014   • Zoster Vaccine Recombinant 05/07/2019, 07/11/2019      Health Maintenance:         Topic Date Due   • Colorectal Cancer Screening  12/01/2026   • Hepatitis C Screening  Completed         Topic Date Due   • COVID-19 Vaccine (4 - Booster for Ray Adan series) 12/23/2021      Medicare Screening Tests and Risk Assessments:     Rylie Moncada is here for his Subsequent Wellness visit  Health Risk Assessment:   Patient rates overall health as good  Patient feels that their physical health rating is same  Patient is satisfied with their life  Eyesight was rated as same  Hearing was rated as same  Patient feels that their emotional and mental health rating is same  Patients states they are never, rarely angry  Patient states they are never, rarely unusually tired/fatigued  Pain experienced in the last 7 days has been none  Patient states that he has experienced no weight loss or gain in last 6 months       Depression Screening:   PHQ-2 Score: 0      Fall Risk Screening: In the past year, patient has experienced: no history of falling in past year      Home Safety:  Patient does not have trouble with stairs inside or outside of their home  Patient has working smoke alarms and has no working carbon monoxide detector  Home safety hazards include: none  Nutrition:   Current diet is Regular  Medications:   Patient is not currently taking any over-the-counter supplements  Patient is able to manage medications  Activities of Daily Living (ADLs)/Instrumental Activities of Daily Living (IADLs):   Walk and transfer into and out of bed and chair?: Yes  Dress and groom yourself?: Yes    Bathe or shower yourself?: Yes    Feed yourself? Yes  Do your laundry/housekeeping?: Yes  Manage your money, pay your bills and track your expenses?: Yes  Make your own meals?: Yes    Do your own shopping?: Yes    Previous Hospitalizations:   Any hospitalizations or ED visits within the last 12 months?: No      Advance Care Planning:   Living will: Yes    Durable POA for healthcare:  Yes    Advanced directive: Yes      Cognitive Screening:   Provider or family/friend/caregiver concerned regarding cognition?: No    PREVENTIVE SCREENINGS      Cardiovascular Screening:    General: Screening Not Indicated, History Lipid Disorder, Risks and Benefits Discussed and Screening Current      Diabetes Screening:     General: Screening Current and Risks and Benefits Discussed      Colorectal Cancer Screening:     General: Screening Current      Prostate Cancer Screening:    General: Screening Current and Risks and Benefits Discussed      Osteoporosis Screening:    General: Screening Not Indicated      Abdominal Aortic Aneurysm (AAA) Screening:    Risk factors include: age between 73-67 yo        General: Screening Not Indicated      Lung Cancer Screening:     General: Screening Not Indicated      Hepatitis C Screening:    General: Screening Current    Screening, Brief Intervention, and Referral to Treatment (SBIRT)    Screening  Typical number of drinks in a day: 2  Typical number of drinks in a week: 14  Interpretation: Low risk drinking behavior  AUDIT-C Screenin) How often did you have a drink containing alcohol in the past year? 4 or more times a week  2) How many drinks did you have on a typical day when you were drinking in the past year? 1 to 2  3) How often did you have 6 or more drinks on one occasion in the past year? never    AUDIT-C Score: 4  Interpretation: Score 4-12 (male): POSITIVE screen for alcohol misuse    AUDIT Screenin) How often during the last year have you found that you were not able to stop drinking once you had started? 0 - never  5) How often during the last year have you failed to do what was normally expected from you because of drinking? 0 - never  6) How often during the last year have you needed a first drink in the morning to get yourself going after a heavy drinking session? 0 - never  7) How often during the last year have you had a feeling of guilt or remorse after drinking? 0 - never  8) How often during the last year have you been unable to remember what happened the night before because you had been drinking? 0 - never  9) Have you or someone else been injured as a result of your drinking? 0 - no  10) Has a relative or friend or a doctor or another health worker been concerned about your drinking or suggested you cut down? 0 - no    AUDIT Score: 4  Interpretation: Low risk alcohol consumption    Single Item Drug Screening:  How often have you used an illegal drug (including marijuana) or a prescription medication for non-medical reasons in the past year? never    Single Item Drug Screen Score: 0  Interpretation: Negative screen for possible drug use disorder    Brief Intervention  Alcohol & drug use screenings were reviewed  No concerns regarding substance use disorder identified       Other Counseling Topics:   Car/seat belt/driving safety, "skin self-exam and sunscreen  No results found  Physical Exam:     /76 (BP Location: Left arm, Patient Position: Sitting, Cuff Size: Standard)   Pulse 68   Ht 5' 8 5\" (1 74 m)   Wt 77 7 kg (171 lb 3 2 oz)   SpO2 97%   BMI 25 65 kg/m²     Physical Exam  Vitals reviewed  Constitutional:       General: He is not in acute distress  Appearance: Normal appearance  He is well-developed  HENT:      Head: Normocephalic and atraumatic  Right Ear: External ear normal       Left Ear: External ear normal       Nose: Nose normal    Eyes:      General: No scleral icterus  Conjunctiva/sclera: Conjunctivae normal    Neck:      Trachea: No tracheal deviation  Cardiovascular:      Rate and Rhythm: Normal rate and regular rhythm  Heart sounds: Normal heart sounds  No murmur heard  Pulmonary:      Effort: Pulmonary effort is normal  No respiratory distress  Breath sounds: Normal breath sounds  No wheezing or rales  Abdominal:      General: Bowel sounds are normal       Palpations: Abdomen is soft  There is no mass  Tenderness: There is no abdominal tenderness  There is no guarding  Musculoskeletal:      Cervical back: Normal range of motion and neck supple  Right lower leg: No edema  Left lower leg: No edema  Lymphadenopathy:      Cervical: No cervical adenopathy  Skin:     Coloration: Skin is not jaundiced or pale  Neurological:      General: No focal deficit present  Mental Status: He is alert and oriented to person, place, and time  Psychiatric:         Mood and Affect: Mood normal          Behavior: Behavior normal          Thought Content:  Thought content normal          Judgment: Judgment normal           Soraida Botello MD  "

## 2023-05-17 NOTE — PATIENT INSTRUCTIONS
Medicare Preventive Visit Patient Instructions  Thank you for completing your Welcome to Medicare Visit or Medicare Annual Wellness Visit today  Your next wellness visit will be due in one year (5/17/2024)  The screening/preventive services that you may require over the next 5-10 years are detailed below  Some tests may not apply to you based off risk factors and/or age  Screening tests ordered at today's visit but not completed yet may show as past due  Also, please note that scanned in results may not display below  Preventive Screenings:  Service Recommendations Previous Testing/Comments   Colorectal Cancer Screening  · Colonoscopy    · Fecal Occult Blood Test (FOBT)/Fecal Immunochemical Test (FIT)  · Fecal DNA/Cologuard Test  · Flexible Sigmoidoscopy Age: 39-70 years old   Colonoscopy: every 10 years (May be performed more frequently if at higher risk)  OR  FOBT/FIT: every 1 year  OR  Cologuard: every 3 years  OR  Sigmoidoscopy: every 5 years  Screening may be recommended earlier than age 39 if at higher risk for colorectal cancer  Also, an individualized decision between you and your healthcare provider will decide whether screening between the ages of 74-80 would be appropriate  Colonoscopy: 12/01/2021  FOBT/FIT: Not on file  Cologuard: Not on file  Sigmoidoscopy: Not on file          Prostate Cancer Screening Individualized decision between patient and health care provider in men between ages of 53-78   Medicare will cover every 12 months beginning on the day after your 50th birthday PSA: 3 4 ng/mL           Hepatitis C Screening Once for adults born between 1945 and 1965  More frequently in patients at high risk for Hepatitis C Hep C Antibody: 01/31/2018        Diabetes Screening 1-2 times per year if you're at risk for diabetes or have pre-diabetes Fasting glucose: 116 mg/dL (6/13/2022)  A1C: 5 8 % of total Hgb (9/21/2022)      Cholesterol Screening Once every 5 years if you don't have a lipid disorder  May order more often based on risk factors  Lipid panel: 09/21/2022         Other Preventive Screenings Covered by Medicare:  1  Abdominal Aortic Aneurysm (AAA) Screening: covered once if your at risk  You're considered to be at risk if you have a family history of AAA or a male between the age of 73-68 who smoking at least 100 cigarettes in your lifetime  2  Lung Cancer Screening: covers low dose CT scan once per year if you meet all of the following conditions: (1) Age 50-69; (2) No signs or symptoms of lung cancer; (3) Current smoker or have quit smoking within the last 15 years; (4) You have a tobacco smoking history of at least 20 pack years (packs per day x number of years you smoked); (5) You get a written order from a healthcare provider  3  Glaucoma Screening: covered annually if you're considered high risk: (1) You have diabetes OR (2) Family history of glaucoma OR (3)  aged 48 and older OR (3)  American aged 72 and older  3  Osteoporosis Screening: covered every 2 years if you meet one of the following conditions: (1) Have a vertebral abnormality; (2) On glucocorticoid therapy for more than 3 months; (3) Have primary hyperparathyroidism; (4) On osteoporosis medications and need to assess response to drug therapy  5  HIV Screening: covered annually if you're between the age of 12-76  Also covered annually if you are younger than 13 and older than 72 with risk factors for HIV infection  For pregnant patients, it is covered up to 3 times per pregnancy      Immunizations:  Immunization Recommendations   Influenza Vaccine Annual influenza vaccination during flu season is recommended for all persons aged >= 6 months who do not have contraindications   Pneumococcal Vaccine   * Pneumococcal conjugate vaccine = PCV13 (Prevnar 13), PCV15 (Vaxneuvance), PCV20 (Prevnar 20)  * Pneumococcal polysaccharide vaccine = PPSV23 (Pneumovax) Adults 25-60 years old: 1-3 doses may be recommended based on certain risk factors  Adults 72 years old: 1-2 doses may be recommended based off what pneumonia vaccine you previously received   Hepatitis B Vaccine 3 dose series if at intermediate or high risk (ex: diabetes, end stage renal disease, liver disease)   Tetanus (Td) Vaccine - COST NOT COVERED BY MEDICARE PART B Following completion of primary series, a booster dose should be given every 10 years to maintain immunity against tetanus  Td may also be given as tetanus wound prophylaxis  Tdap Vaccine - COST NOT COVERED BY MEDICARE PART B Recommended at least once for all adults  For pregnant patients, recommended with each pregnancy  Shingles Vaccine (Shingrix) - COST NOT COVERED BY MEDICARE PART B  2 shot series recommended in those aged 48 and above     Health Maintenance Due:      Topic Date Due   • Colorectal Cancer Screening  12/01/2026   • Hepatitis C Screening  Completed     Immunizations Due:      Topic Date Due   • COVID-19 Vaccine (4 - Booster for Derral Loose series) 12/23/2021     Advance Directives   What are advance directives? Advance directives are legal documents that state your wishes and plans for medical care  These plans are made ahead of time in case you lose your ability to make decisions for yourself  Advance directives can apply to any medical decision, such as the treatments you want, and if you want to donate organs  What are the types of advance directives? There are many types of advance directives, and each state has rules about how to use them  You may choose a combination of any of the following:  · Living will: This is a written record of the treatment you want  You can also choose which treatments you do not want, which to limit, and which to stop at a certain time  This includes surgery, medicine, IV fluid, and tube feedings  · Durable power of  for healthcare [facility]):   This is a written record that states who you want to make healthcare choices for you when you are unable to make them for yourself  This person, called a proxy, is usually a family member or a friend  You may choose more than 1 proxy  · Do not resuscitate (DNR) order:  A DNR order is used in case your heart stops beating or you stop breathing  It is a request not to have certain forms of treatment, such as CPR  A DNR order may be included in other types of advance directives  · Medical directive: This covers the care that you want if you are in a coma, near death, or unable to make decisions for yourself  You can list the treatments you want for each condition  Treatment may include pain medicine, surgery, blood transfusions, dialysis, IV or tube feedings, and a ventilator (breathing machine)  · Values history: This document has questions about your views, beliefs, and how you feel and think about life  This information can help others choose the care that you would choose  Why are advance directives important? An advance directive helps you control your care  Although spoken wishes may be used, it is better to have your wishes written down  Spoken wishes can be misunderstood, or not followed  Treatments may be given even if you do not want them  An advance directive may make it easier for your family to make difficult choices about your care  Weight Management   Why it is important to manage your weight:  Being overweight increases your risk of health conditions such as heart disease, high blood pressure, type 2 diabetes, and certain types of cancer  It can also increase your risk for osteoarthritis, sleep apnea, and other respiratory problems  Aim for a slow, steady weight loss  Even a small amount of weight loss can lower your risk of health problems  How to lose weight safely:  A safe and healthy way to lose weight is to eat fewer calories and get regular exercise  You can lose up about 1 pound a week by decreasing the number of calories you eat by 500 calories each day     Healthy meal plan for weight management:  A healthy meal plan includes a variety of foods, contains fewer calories, and helps you stay healthy  A healthy meal plan includes the following:  · Eat whole-grain foods more often  A healthy meal plan should contain fiber  Fiber is the part of grains, fruits, and vegetables that is not broken down by your body  Whole-grain foods are healthy and provide extra fiber in your diet  Some examples of whole-grain foods are whole-wheat breads and pastas, oatmeal, brown rice, and bulgur  · Eat a variety of vegetables every day  Include dark, leafy greens such as spinach, kale, gerhard greens, and mustard greens  Eat yellow and orange vegetables such as carrots, sweet potatoes, and winter squash  · Eat a variety of fruits every day  Choose fresh or canned fruit (canned in its own juice or light syrup) instead of juice  Fruit juice has very little or no fiber  · Eat low-fat dairy foods  Drink fat-free (skim) milk or 1% milk  Eat fat-free yogurt and low-fat cottage cheese  Try low-fat cheeses such as mozzarella and other reduced-fat cheeses  · Choose meat and other protein foods that are low in fat  Choose beans or other legumes such as split peas or lentils  Choose fish, skinless poultry (chicken or turkey), or lean cuts of red meat (beef or pork)  Before you cook meat or poultry, cut off any visible fat  · Use less fat and oil  Try baking foods instead of frying them  Add less fat, such as margarine, sour cream, regular salad dressing and mayonnaise to foods  Eat fewer high-fat foods  Some examples of high-fat foods include french fries, doughnuts, ice cream, and cakes  · Eat fewer sweets  Limit foods and drinks that are high in sugar  This includes candy, cookies, regular soda, and sweetened drinks  Exercise:  Exercise at least 30 minutes per day on most days of the week  Some examples of exercise include walking, biking, dancing, and swimming   You can also fit in more physical activity by taking the stairs instead of the elevator or parking farther away from stores  Ask your healthcare provider about the best exercise plan for you  © Copyright [employer] 2018 Information is for End User's use only and may not be sold, redistributed or otherwise used for commercial purposes   All illustrations and images included in CareNotes® are the copyrighted property of A D A M , Inc  or [address] [place] [address] [employer]

## 2023-05-17 NOTE — ASSESSMENT & PLAN NOTE
Discussed preventative health, cancer screening, immunizations, and safety issues  Last colonoscopy December 1, 2021 with recommendations to recheck again in 5 years  Patient up-to-date with immunizations

## 2023-05-17 NOTE — ASSESSMENT & PLAN NOTE
No further follow up needed for this as per report, but pt is going to get another CT for a one year follow up

## 2023-07-06 DIAGNOSIS — E78.00 HYPERCHOLESTEREMIA: ICD-10-CM

## 2023-07-06 RX ORDER — ATORVASTATIN CALCIUM 20 MG/1
20 TABLET, FILM COATED ORAL DAILY
Qty: 90 TABLET | Refills: 3 | Status: SHIPPED | OUTPATIENT
Start: 2023-07-06

## 2023-07-19 ENCOUNTER — TELEPHONE (OUTPATIENT)
Dept: INTERNAL MEDICINE CLINIC | Facility: CLINIC | Age: 73
End: 2023-07-19

## 2023-07-19 ENCOUNTER — OFFICE VISIT (OUTPATIENT)
Dept: PHYSICAL THERAPY | Facility: REHABILITATION | Age: 73
End: 2023-07-19
Payer: MEDICARE

## 2023-07-19 DIAGNOSIS — S16.1XXA ACUTE STRAIN OF NECK MUSCLE, INITIAL ENCOUNTER: ICD-10-CM

## 2023-07-19 DIAGNOSIS — M79.605 LEFT LEG PAIN: Primary | ICD-10-CM

## 2023-07-19 DIAGNOSIS — M54.2 NECK PAIN: Primary | ICD-10-CM

## 2023-07-19 DIAGNOSIS — M79.605 PAIN OF LEFT LOWER EXTREMITY: ICD-10-CM

## 2023-07-19 PROCEDURE — 97162 PT EVAL MOD COMPLEX 30 MIN: CPT

## 2023-07-19 PROCEDURE — 97110 THERAPEUTIC EXERCISES: CPT

## 2023-07-19 NOTE — PROGRESS NOTES
PT Evaluation     Today's date: 2023  Patient name: Jacqueline Resendez  : 1950  MRN: [id number]  Referring provider: Johnie Hannah MD  Dx:   Encounter Diagnosis     ICD-10-CM    1. Left leg pain  M79.605 Ambulatory Referral to Physical Therapy     PT plan of care cert/re-cert      2. Acute strain of neck muscle, initial encounter  S16. 1XXA PT plan of care cert/re-cert          Start Time: 1215  Stop Time: 1300  Total time in clinic (min): 45 minutes    Assessment  Assessment details:   Jacqueline Resendez is a pleasant 74 y.o. male who presents with acute left leg pain and bilateral neck pain. Lower extremity symptoms consistent with deep gluteal syndrome and neck symptoms consistent with acute neck pain with mobility deficits. No concerns for myotomal and/or dermatomal changes. The impairments below are resulting in fear of not being able to keep active and future ill health (and wanting to prevent it). No further referral appears necessary at this time based upon examination results. I expect he will improve in 4-6 weeks. Positive prognostic indicators include positive attitude toward recovery, good understanding of diagnosis and treatment plan options, acuity of symptoms and absence of peripheralization. Negative prognostic indicators include none. Impairments: abnormal muscle tone, abnormal or restricted ROM, activity intolerance, impaired physical strength, lacks appropriate home exercise program and pain with function    Symptom irritability: moderateUnderstanding of Dx/Px/POC: good   Prognosis: good    Goals  Left Leg Short Term Goals (Week 4):  1. Decreased pain by 50%  2. GROC >75%    Left Leg Long Term Goals (8 weeks):  1. Patient will exceed FOTO predicted outcome score  2. Patient will be fully independent with HEP by discharge  3. Patient will be able to manage symptoms independently. Neck Short Term Goals (Week 4):  1. Decreased pain by 50%  2.  GROC >75%  3. 25% restrictions with cervical AROM in all directions    Neck Long Term Goals (8 weeks):  1. Patient will exceed FOTO predicted outcome score  2. Patient will be fully independent with HEP by discharge  3. Patient will be able to manage symptoms independently. Plan  Plan details: Plan to see patient 1-2x/week for the next 4-6 weeks  Patient would benefit from: skilled physical therapy  Planned therapy interventions: joint mobilization, manual therapy, activity modification, massage, nerve gliding, neuromuscular re-education, patient education, IADL retraining, functional ROM exercises, flexibility, graded activity, strengthening, stretching, therapeutic activities and therapeutic exercise  Treatment plan discussed with: patient        Subjective Evaluation    History of Present Illness  Mechanism of injury: Patient presents to PT with acute left leg pain and acute bilateral neck pain and stiffness. The left lower extremity pain started around 4 days ago patient was playing pickleball. Patient reports pain started after a quick decelration onto the LLE in which had had immediate pain. Patient denies any popping sensation at the time and reports no swelling and/or bruising in the leg since. States leg symptoms are worst at night and gradually improve through the day with activity. Denies any weakness and/or n/t in the leg. Denies any LBP. Neck:  Patient reports neck pain started around 2-3 days ago when he started laying on his back while sleeping to accomodates the pain in his leg. States he normally does not sleep on his back. States the neck is very stiff and gets pain when moving head into stiffness. Denies any 5 D's, 3 N's, and/or gait ataxia. States his family says he looks like his right shoulder is sitting higher than his left. Patient reports neck symptoms are all localized to bilateral neck.    Patient Goals  Patient goals for therapy: decreased pain, increased motion, increased strength, independence with ADLs/IADLs and return to sport/leisure activities    Pain  At worst pain ratin  Quality: sharp    Treatments  Previous treatment: physical therapy        Objective  Postural Observation   Relevant right lateral shift of the cervical spine    Myotomes  Strength WNL bilaterally.   No weakness or pain with resisted knee flexion    Dermatomes  Sensation intact bilaterally    Neurodynamic Testing  Slump Test: positive  SLR: positive   Femoral Nerve Traction Test: tightness    Reduced pain and improved LLE strength following sciatic nerve tensioners    Lumbar Active Range of Motion  Movement Loss Symptoms Ronnie Mod Min Nil Symptoms   Flexion  x      Extension  x      R SG  x      L SG  x      Other          Cervical Active Range of Motion  Pain reproduced with extension, L Rotation, and R rotation  50% limitations in all directions    Cristina Assessment  Reduced pain following REIL and JOSE R    Hip Special Tests:  FADIRS= (-), FABERS= (-), Scours= (-), Distraction= (-)      SIJ Special Tests:  Compression= (-), Gapping= (-), FABERS= (-), Gaenslan's= (-), Sacral Thrust/PA Pressure= (-), Post Thigh Thrust= (-)         Precautions: standard, hx of cancer      Manuals                                                                 Neuro Re-Ed                                                                                                        Ther Ex             REIL HEP            JOSE R HEP            Self Snags HEP            90/90 Sciatic Nerve Tensioners HEP NV                                                                Ther Activity                                       Gait Training                                       Modalities

## 2023-07-20 ENCOUNTER — OFFICE VISIT (OUTPATIENT)
Dept: PHYSICAL THERAPY | Facility: REHABILITATION | Age: 73
End: 2023-07-20
Payer: MEDICARE

## 2023-07-20 DIAGNOSIS — M79.605 LEFT LEG PAIN: Primary | ICD-10-CM

## 2023-07-20 DIAGNOSIS — S16.1XXA ACUTE STRAIN OF NECK MUSCLE, INITIAL ENCOUNTER: ICD-10-CM

## 2023-07-20 PROCEDURE — 97110 THERAPEUTIC EXERCISES: CPT

## 2023-07-20 PROCEDURE — 97140 MANUAL THERAPY 1/> REGIONS: CPT

## 2023-07-20 NOTE — PROGRESS NOTES
Daily Note     Today's date: 2023  Patient name: Blake Mcfadden  : 1950  MRN: [id number]  Referring provider: Amol Lovelace MD  Dx:   Encounter Diagnosis     ICD-10-CM    1. Left leg pain  M79.605       2. Acute strain of neck muscle, initial encounter  S16. 1XXA           Start Time: 6095  Stop Time: 1374  Total time in clinic (min): 30 minutes    Subjective: Patient reports 90% improvement in leg pain since yesterday. States he was able to sleep well last night which is the first since his symptoms started. Objective: See treatment diary below  Thoracic hypomobility noted with spring testing    Reduced left SG at c5-6 with reproduction of pain    Cervical AROM  Extension: 50% limited  Flexion: 25% limited  R Rotation: 50% limited  L Rotation: 25% limited      Assessment: Patient had good responses to manual and updated HEP. Patient ended session with significant improvements in neck stiffness and pain. Updated HEP see below. Patient would benefit from continued PT      Plan: Continue per plan of care.       Precautions: standard, hx of cancer      Manuals            Prone PA Mobs T-s  PRR Gr3-4 lower c-s and upper t-s           Seated LS Release  PRR           Supine R SG  PRR Gr3-4 C5-6                        Neuro Re-Ed                                                                                                        Ther Ex             REIL HEP            JOSE R HEP            Self Snags HEP            90/90 Sciatic Nerve Tensioners HEP NV            Seated Ret  2x5 HEP           Seated Ret + ext  2x5 HEP           Seated CTJ Extens ions  2x5 HEP                        Ther Activity                                       Gait Training                                       Modalities

## 2023-07-24 ENCOUNTER — OFFICE VISIT (OUTPATIENT)
Dept: PHYSICAL THERAPY | Facility: REHABILITATION | Age: 73
End: 2023-07-24
Payer: MEDICARE

## 2023-07-24 DIAGNOSIS — M79.605 LEFT LEG PAIN: Primary | ICD-10-CM

## 2023-07-24 DIAGNOSIS — S16.1XXA ACUTE STRAIN OF NECK MUSCLE, INITIAL ENCOUNTER: ICD-10-CM

## 2023-07-24 PROCEDURE — 97140 MANUAL THERAPY 1/> REGIONS: CPT

## 2023-07-24 PROCEDURE — 97110 THERAPEUTIC EXERCISES: CPT

## 2023-07-24 NOTE — PROGRESS NOTES
Daily Note     Today's date: 2023  Patient name: Ismael Begum  : 1950  MRN: [id number]  Referring provider: Magdy Hilario MD  Dx:   Encounter Diagnosis     ICD-10-CM    1. Left leg pain  M79.605       2. Acute strain of neck muscle, initial encounter  S16. 1XXA           Start Time: 1500  Stop Time:   Total time in clinic (min): 45 minutes   Patient 1 on 1 with PT WA and QD from 315-345p. Subjective: Patient reports his left leg is feeling 90% improved at this time. Patient reports his neck is feeling 75-80% improved at this time. Patient reports stiffness in neck associated with heads turns. Objective: See treatment diary below      Assessment: Patient had improvements in neck ROM following manuals and TE. Reviewed HEP. Patient progressing nicely. Patient would benefit from continued PT      Plan: Continue per plan of care.       Precautions: standard, hx of cancer      Manuals           Prone PA Mobs T-s  PRR Gr3-4 lower c-s and upper t-s PRR Gr3-4 lower c-s and upper t-s          Seated LS Release  PRR           Supine R SG  PRR Gr3-4 C5-6 PRR Gr3-4 C5-6 R/L          Supine R Rot C1-2 Distraction Mob   PRR 2rds           Neuro Re-Ed                                                                                                        Ther Ex             REIL HEP            JOSE R HEP            Self Snags HEP  reviewed          / Sciatic Nerve Tensioners HEP NV            Seated Ret  2x5 HEP           Seated Ret + ext  2x5 HEP           Seated CTJ Extens ions  2x5 HEP           Open Books    2x10 ea          Ther Activity                                       Gait Training                                       Modalities

## 2023-07-27 ENCOUNTER — OFFICE VISIT (OUTPATIENT)
Dept: PHYSICAL THERAPY | Facility: REHABILITATION | Age: 73
End: 2023-07-27
Payer: MEDICARE

## 2023-07-27 DIAGNOSIS — M79.605 LEFT LEG PAIN: Primary | ICD-10-CM

## 2023-07-27 DIAGNOSIS — S16.1XXA ACUTE STRAIN OF NECK MUSCLE, INITIAL ENCOUNTER: ICD-10-CM

## 2023-07-27 PROCEDURE — 97140 MANUAL THERAPY 1/> REGIONS: CPT

## 2023-07-27 PROCEDURE — 97110 THERAPEUTIC EXERCISES: CPT

## 2023-07-27 NOTE — PROGRESS NOTES
Daily Note     Today's date: 2023  Patient name: Jacek Johnson  : 1950  MRN: [id number]  Referring provider: Crissy Cancino MD  Dx:   Encounter Diagnosis     ICD-10-CM    1. Left leg pain  M79.605       2. Acute strain of neck muscle, initial encounter  S16. 1XXA           Start Time: 930  Stop Time:   Total time in clinic (min): 38 minutes    Subjective: Patient reports no pain in leg at this time. Patient reports neck is doing better but still limited with turning head while driving and looking up. Objective: See treatment diary below      Assessment: Patient had very good response to manuals today with less pain and improved cervical AROM in all directions. Reviewed HEP. Progress as tolerated. Patient would benefit from continued PT      Plan: Continue per plan of care.       Precautions: standard, hx of cancer      Manuals          Prone PA Mobs T-s  PRR Gr3-4 lower c-s and upper t-s PRR Gr3-4 lower c-s and upper t-s PRR Gr3-4 lower c-s and upper t-s         Seated Upper T-s extension Mob    PRR 2 rds         Seated C-s Ext MWM    PRR Gr3-4 2x10         Seated LS Release  PRR           Supine R SG  PRR Gr3-4 C5-6 PRR Gr3-4 C5-6 R/L          Supine R Rot C1-2 Distraction Mob   PRR 2rds           Neuro Re-Ed                                                                                                        Ther Ex             REIL HEP            JOSE R HEP            Self Snags HEP  reviewed          90/90 Sciatic Nerve Tensioners HEP NV            Seated Ret  2x5 HEP           Seated Ret + ext  2x5 HEP           Seated CTJ Extens ions  2x5 HEP           Open Books    2x10 ea          Supine Ret + Ext    2x10         Ther Activity                                       Gait Training                                       Modalities

## 2023-07-31 ENCOUNTER — OFFICE VISIT (OUTPATIENT)
Dept: PHYSICAL THERAPY | Facility: REHABILITATION | Age: 73
End: 2023-07-31
Payer: MEDICARE

## 2023-07-31 DIAGNOSIS — M79.605 LEFT LEG PAIN: Primary | ICD-10-CM

## 2023-07-31 DIAGNOSIS — S16.1XXA ACUTE STRAIN OF NECK MUSCLE, INITIAL ENCOUNTER: ICD-10-CM

## 2023-07-31 PROCEDURE — 97140 MANUAL THERAPY 1/> REGIONS: CPT

## 2023-07-31 PROCEDURE — 97110 THERAPEUTIC EXERCISES: CPT

## 2023-07-31 NOTE — PROGRESS NOTES
Daily Note     Today's date: 2023  Patient name: Ismael Begum  : 1950  MRN: [id number]  Referring provider: Magdy Hilario MD  Dx:   Encounter Diagnosis     ICD-10-CM    1. Left leg pain  M79.605       2. Acute strain of neck muscle, initial encounter  S16. 1XXA           Start Time: 3878  Stop Time: 1805  Total time in clinic (min): 45 minutes    Subjective: Patient reports his legs is feeling 100% improved at this time. Patient states no pain in neck last 2 days. States just mild stiffness. Objective: See treatment diary below      Assessment: Patient continues to respond well to manuals. Reviewed and updated HEP to include hamstring program to prevent re-injury upon resumption of pickleball. Progress as tolerated. Patient would benefit from continued PT      Plan: Continue per plan of care.       Precautions: standard, hx of cancer      Manuals         Prone PA Mobs T-s  PRR Gr3-4 lower c-s and upper t-s PRR Gr3-4 lower c-s and upper t-s PRR Gr3-4 lower c-s and upper t-s PRR Gr3-4 lower c-s and upper t-s        Seated Upper T-s extension Mob    PRR 2 rds         Seated C-s Ext MWM    PRR Gr3-4 2x10         Seated C-s Rot MWM     2 rds ea        Seated LS Release  PRR           Supine R SG  PRR Gr3-4 C5-6 PRR Gr3-4 C5-6 R/L          Supine R Rot C1-2 Distraction Mob   PRR 2rds   PRR 1 rds ea        Neuro Re-Ed                                                                                                        Ther Ex             REIL HEP            JOSE R HEP            Self Snags HEP  reviewed          90/90 Sciatic Nerve Tensioners HEP NV            Seated Ret  2x5 HEP           Seated Ret + ext  2x5 HEP           Seated CTJ Extens ions  2x5 HEP           Open Books    2x10 ea          Supine Ret + Ext    2x10         Bridges on Pball    HEP         Hamstring Curl    HEP         Knee Extension    HEP         Ther Activity                                       Gait Training                                       Modalities

## 2023-08-23 ENCOUNTER — TELEPHONE (OUTPATIENT)
Age: 73
End: 2023-08-23

## 2023-08-23 ENCOUNTER — APPOINTMENT (EMERGENCY)
Dept: RADIOLOGY | Facility: HOSPITAL | Age: 73
End: 2023-08-23
Payer: MEDICARE

## 2023-08-23 ENCOUNTER — HOSPITAL ENCOUNTER (EMERGENCY)
Facility: HOSPITAL | Age: 73
Discharge: HOME | End: 2023-08-23
Attending: EMERGENCY MEDICINE
Payer: MEDICARE

## 2023-08-23 VITALS
OXYGEN SATURATION: 97 % | HEART RATE: 82 BPM | DIASTOLIC BLOOD PRESSURE: 74 MMHG | SYSTOLIC BLOOD PRESSURE: 170 MMHG | TEMPERATURE: 97.6 F | RESPIRATION RATE: 17 BRPM

## 2023-08-23 DIAGNOSIS — S62.619A AVULSION FRACTURE OF PROXIMAL PHALANX OF FINGER: Primary | ICD-10-CM

## 2023-08-23 PROCEDURE — 99284 EMERGENCY DEPT VISIT MOD MDM: CPT | Performed by: EMERGENCY MEDICINE

## 2023-08-23 PROCEDURE — 99283 EMERGENCY DEPT VISIT LOW MDM: CPT

## 2023-08-23 PROCEDURE — 73140 X-RAY EXAM OF FINGER(S): CPT

## 2023-08-23 NOTE — TELEPHONE ENCOUNTER
Hello,  Please advise if the following patient can be forced onto the schedule:    Patient: Tadeo Ramírez    : 1950    MRN: [id number]    Call back #: [phone]    Insurance: [employer]     Reason for appointment: Left hand-pinky finger no tests no prev surg-PPR wanted this Dr only as he stated that he knows the Dr and would be able to fit him in today or tomorrow     Requested doctor/location: Dr Baron Boeck       Thank you.

## 2023-08-23 NOTE — TELEPHONE ENCOUNTER
Hello,  Please advise if the following patient can be forced onto the schedule:    Call back #: [phone]    Insurance: Medicare     Reason for appointment: Left hand pinky Tiny acute avulsion fracture head of fifth proximal phalanx       Requested doctor/location:  @ [facility]       Thank you.

## 2023-08-23 NOTE — DISCHARGE INSTRUCTIONS
You are seen emergency department for left knee pain. X-rays show that you have a small avulsion fracture at the first joint of your pinky finger. Please follow-up with hand clinic. We are also giving a splint to wear until you see the hand surgeon.

## 2023-08-23 NOTE — ED PROVIDER NOTES
History  Chief Complaint   Patient presents with   • Finger Injury     Pt presents with L pinky finger after injuring it while playing tennis 10 days ago      HPI  Patient is a 74 y.o. male with history of Dupuytren's contracture presenting to the emergency department for pinky pain. Patient states that 10 days ago he was playing tennis when he fell and noticed that he dislocated his left pinky patient states that he popped it back in place by himself patient states ever since that he has been splinting as well as following the advice of [employer] to keep it splinted. Patient states that the swelling has not gone down however he is able to bend it but feels pain on xtreme flexion. Sensation remains intact. Prior to Admission Medications   Prescriptions Last Dose Informant Patient Reported? Taking?    Probiotic Product (PROBIOTIC DAILY PO)  Self Yes No   Sig: Take by mouth daily   amLODIPine (NORVASC) 5 mg tablet   No No   Sig: Take 1 tablet (5 mg total) by mouth daily   atorvastatin (LIPITOR) 20 mg tablet   No No   Sig: Take 1 tablet (20 mg total) by mouth daily   b complex vitamins capsule  Self Yes No   Sig: Take 1 capsule by mouth daily   cholecalciferol (VITAMIN D3) 1,000 units tablet  Self Yes No   Sig: Take 1,000 Units by mouth 2 (two) times a day   ciclopirox (LOPROX) 0.77 % cream   Yes No   Sig: APPLY TWICE A DAY TO AREAS OF ATHLETES FEET FOR 2 WEEKS AS NEEDED   ipratropium (ATROVENT) 0.06 % nasal spray  Self Yes No   lisinopril (ZESTRIL) 20 mg tablet  Self No No   Sig: Alternating with 40mg every other day   lisinopril (ZESTRIL) 40 mg tablet   No No   Sig: take 1 tablet by mouth once daily   omeprazole (PriLOSEC) 20 mg delayed release capsule  Self No No   Sig: Take 1 capsule (20 mg total) by mouth daily   Patient not taking: Reported on 2/17/2023   omeprazole (PriLOSEC) 40 MG capsule  Self Yes No   Sig: Take 40 mg by mouth daily before breakfast Take 30 minutes prior   psyllium (METAMUCIL SMOOTH TEXTURE) 58.6 % powder  Self Yes No   Sig: Take by mouth daily   sildenafil (VIAGRA) 100 mg tablet   No No   Sig: Take 1 tablet (100 mg total) by mouth daily as needed for erectile dysfunction   tadalafil (Cialis) 5 MG tablet   No No   Sig: Take 1 tablet (5 mg total) by mouth daily      Facility-Administered Medications: None       Past Medical History:   Diagnosis Date   • Anxiety    • Cancer (HCC)     scc back   • Carpal tunnel syndrome    • Constipation     with general anesthesia   • ED (erectile dysfunction)    • GERD (gastroesophageal reflux disease)    • [place] (hard of hearing)     wears hearing aids   • Hypercholesteremia    • Hypertension    • Numbness in both hands        Past Surgical History:   Procedure Laterality Date   • APPENDECTOMY      As a child   • CAST APPLICATION Right 8/10/7745    Procedure: Application short-arm splint;  Surgeon: Karen Barr MD;  Location:  MAIN OR;  Service: Orthopedics   • COLONOSCOPY     • INGUINAL HERNIA REPAIR Left 2015   • MASS EXCISION N/A 8/14/2018    Procedure: BACK/TRUNK MULTIPLE [address]; COMPLEX CLOSURE VERSUS FLAP RECONSTRUCTION;  Surgeon: Terrence Duarte MD;  Location: [facility];  Service: Plastics   • MOHS RECONSTRUCTION Right 6/23/2022    Procedure: RECONSTRUCTION MOHS DEFECT RIGHT DORSAL HAND AND LOCAL FLAP;  Surgeon: Terrence Duarte MD;  [facility]: [facility];  Service: Plastics   • VT EXC B9 LESION MRGN XCP SK TG S/N/H/F/G > 4.0CM Right 6/23/2022    Procedure: EXCISION RIGHT NECK SKIN LESION AND COMPLEX CLOSURE;  Surgeon: Terrence Duarte MD;  Location:  MAIN OR;  Service: Plastics   • VT FASCIOTOMY PALMAR OPEN PARTIAL Right 8/27/2020    Procedure: Excision Dupuytren's central cord right long finger with release of metacarpophalangeal joint. Excision Dupuytren's right ring finger spiral cord ulnar aspect with release of the proximal interphalangeal joint.   Excision Dupuytren's right small finger central cord and spiral cord to both radial and ulnar side, as well as ulnar-sided cord of the digital sheath and release of the proximal   • AK NDSC WRST SURG W/RLS TRANSVRS CARPL LIGM Right 10/20/2020    Procedure: RELEASE CARPAL TUNNEL ENDOSCOPIC;  Surgeon: Rosy Foy MD;  Location: [facility];  Service: Orthopedics   • [place] [address] WRST SURG W/RLS TRANSVRS CARPL LIGM Left 11/4/2020    Procedure: RELEASE CARPAL TUNNEL ENDOSCOPIC;  Surgeon: Rosy Foy MD;  Location[facility];  Service: Orthopedics   • AK NEUROPLASTY &/TRANSPOSITION ULNAR NERVE ELBOW Right 10/20/2020    Procedure: RELEASE CUBITAL TUNNEL;  Surgeon: Rosy Foy MD;  [facility];  Service: Orthopedics   • AK NEUROPLASTY &/TRANSPOSITION ULNAR NERVE ELBOW Left 11/4/2020    Procedure: Terrilyn Serge;  Surgeon: Rosy Foy MD;  [facility];  Service: Orthopedics   • AK TENDON SHEATH INCISION Right 8/27/2020    Procedure: Right long finger trigger finger release;  Surgeon: Rosy Foy MD;  [facility];  Service: Orthopedics   • SQUAMOUS CELL CARCINOMA EXCISION N/A 8/14/2018    Procedure: BACK SCC IN SITU EXCISION;  Surgeon: Gaston Friedman MD;  Location: [facility];  Service: Plastics       Family History   Problem Relation Age of Onset   • Colon cancer Father    • Heart attack Mother    • Breast cancer Mother    • Hypertension Mother      I have reviewed and agree with the history as documented. E-Cigarette/Vaping   • E-Cigarette Use Never User      E-Cigarette/Vaping Substances   • Nicotine No    • THC No    • CBD No    • Flavoring No    • Other No    • Unknown No      Social History     Tobacco Use   • Smoking status: Never   • Smokeless tobacco: Never   Vaping Use   • Vaping Use: Never used   Substance Use Topics   • Alcohol use:  Yes     Alcohol/week: 15.0 standard drinks of alcohol     Types: 15 Standard drinks or equivalent per week     Comment: 2 Drinks a night   • Drug use: Never        Review of Systems   Constitutional: Negative. Negative for chills, diaphoresis, fatigue and fever. HENT: Negative. Negative for congestion. Eyes: Negative. Negative for photophobia. Respiratory: Negative. Negative for cough and shortness of breath. Cardiovascular: Negative. Negative for chest pain and palpitations. Gastrointestinal: Negative. Negative for abdominal distention, abdominal pain, blood in stool, constipation, diarrhea, nausea and vomiting. Genitourinary: Negative. Negative for decreased urine volume, difficulty urinating, dysuria, flank pain, frequency, hematuria and urgency. Musculoskeletal: Negative for back pain, myalgias, neck pain and neck stiffness. Left pinky pain and swelling   Skin: Negative. Negative for rash. Neurological: Negative. Negative for dizziness, numbness and headaches. All other systems reviewed and are negative. Physical Exam  ED Triage Vitals   Temperature Pulse Respirations Blood Pressure SpO2   08/23/23 0926 08/23/23 0925 08/23/23 0925 08/23/23 0926 08/23/23 0926   97.6 °F (36.4 °C) 82 17 170/74 97 %      Temp Source Heart Rate Source Patient Position - Orthostatic VS BP Location FiO2 (%)   08/23/23 0926 08/23/23 0925 08/23/23 0926 08/23/23 0926 --   Oral Monitor Lying Right arm       Pain Score       08/23/23 0925       4             Orthostatic Vital Signs  Vitals:    08/23/23 0925 08/23/23 0926   BP:  170/74   Pulse: 82    Patient Position - Orthostatic VS:  Lying       Physical Exam  Vitals and nursing note reviewed. Constitutional:       General: He is not in acute distress. Appearance: He is well-developed. HENT:      Head: Normocephalic and atraumatic. Eyes:      Conjunctiva/sclera: Conjunctivae normal.   Cardiovascular:      Rate and Rhythm: Normal rate and regular rhythm. Heart sounds: No murmur heard. Pulmonary:      Effort: Pulmonary effort is normal. No respiratory distress. Breath sounds: Normal breath sounds. Abdominal:      Palpations: Abdomen is soft. Tenderness: There is no abdominal tenderness. Musculoskeletal:         General: Swelling and tenderness present. Cervical back: Neck supple. Comments: Left pinky swelling at the first MCP with tenderness on palpation full active and passive range of motion with limited on flexion due to swelling. Distal cap refill 2+, sensation intact   Skin:     General: Skin is warm and dry. Capillary Refill: Capillary refill takes less than 2 seconds. Neurological:      Mental Status: He is alert. Psychiatric:         Mood and Affect: Mood normal.         ED Medications  Medications - No data to display    Diagnostic Studies  Results Reviewed     None                 XR finger fifth digit-pinkie LEFT   Final Result by Pantera Perdue MD (08/23 1005)      Tiny acute avulsion fracture head of fifth proximal phalanx         Workstation performed: [id number]               Procedures  Procedures      ED Course               Identification of Seniors at [facility] Most Recent Value   (ISAR) Identification of Seniors at Risk    Before the illness or injury that brought you to the Emergency, did you need someone to help you on a regular basis? 0 Filed at: 08/23/2023 8980   In the last 24 hours, have you needed more help than usual? 0 Filed at: 08/23/2023 3144   Have you been hospitalized for one or more nights during the past 6 months? 0 Filed at: 08/23/2023 3947   In general, do you see well? 0 Filed at: 08/23/2023 1463   In general, do you have serious problems with your memory? 0 Filed at: 08/23/2023 1898   Do you take more than three different medications every day? 0 Filed at: 08/23/2023 6870   ISAR Score 0 Filed at: 08/23/2023 3105                              Medical Decision Making  Amount and/or Complexity of Data Reviewed  Radiology: ordered.          Patient is a 74 y.o. male with PMH ofDupuytren's  contracture who presents to the ED with left pinky swelling    Vital signs within normal limits    On exam well-appearing, left pinky swelling of the first MCP with tenderness on palpation as well as passive flexion and active flexion. History and physical exam most consistent with left pinky sprain versus fracture. Plan x-ray of the left pinky. View ED course above for further discussion on patient workup. All labs reviewed and utilized in the medical decision making process  All radiology studies independently viewed by me and interpreted by the radiologist.  I reviewed all testing with the patient. Upon re-evaluation x-ray reveals a small avulsion fracture of the fifth metacarpal.  Spoke to patient regarding follow-up with orthopedic hand. Also recommended that he wear finger splint while there is still pain. Gave patient return precautions including worsening of pain increase in swelling decreased sensation. Recommended the patient can remove splint for showering and other activities but to try to wear the splint, it is possible to assist with healing. Disposition  Final diagnoses:   Avulsion fracture of proximal phalanx of finger     Time reflects when diagnosis was documented in both MDM as applicable and the Disposition within this note     Time User Action Codes Description Comment    8/23/2023 10:08 AM Namita Kay Add [S62.619A] Avulsion fracture of proximal phalanx of finger       ED Disposition     ED Disposition   Discharge    Condition   Stable    Date/Time   Wed Aug 23, 2023 10:08 AM    Comment   [facility] discharge to home/self care.                Follow-up Information     Follow up With Specialties Details Why Contact Info    Paige Diaz MD Orthopedic Surgery, Hand Surgery Schedule an appointment as soon as possible for a visit   [address] [place] [address]  [phone]            Discharge Medication List as of 8/23/2023 10:14 AM      CONTINUE these medications which have NOT CHANGED Details   amLODIPine (NORVASC) 5 mg tablet Take 1 tablet (5 mg total) by mouth daily, Starting Mon 3/6/2023, Normal      atorvastatin (LIPITOR) 20 mg tablet Take 1 tablet (20 mg total) by mouth daily, Starting Thu 7/6/2023, Normal      b complex vitamins capsule Take 1 capsule by mouth daily, Historical Med      cholecalciferol (VITAMIN D3) 1,000 units tablet Take 1,000 Units by mouth 2 (two) times a day, Historical Med      ciclopirox (LOPROX) 0.77 % cream APPLY TWICE A DAY TO AREAS OF ATHLETES FEET FOR 2 WEEKS AS NEEDED, Historical Med      ipratropium (ATROVENT) 0.06 % nasal spray Historical Med      !! lisinopril (ZESTRIL) 20 mg tablet Alternating with 40mg every other day, Normal      !! lisinopril (ZESTRIL) 40 mg tablet take 1 tablet by mouth once daily, Normal      !! omeprazole (PriLOSEC) 20 mg delayed release capsule Take 1 capsule (20 mg total) by mouth daily, Starting Tue 6/22/2021, Normal      !! omeprazole (PriLOSEC) 40 MG capsule Take 40 mg by mouth daily before breakfast Take 30 minutes prior, Starting Mon 7/4/2022, Historical Med      Probiotic Product (PROBIOTIC DAILY PO) Take by mouth daily, Starting Mon 1/30/2017, Historical Med      psyllium (METAMUCIL SMOOTH TEXTURE) 58.6 % powder Take by mouth daily, Starting Mon 1/30/2017, Historical Med      sildenafil (VIAGRA) 100 mg tablet Take 1 tablet (100 mg total) by mouth daily as needed for erectile dysfunction, Starting Wed 9/28/2022, Normal      tadalafil (Cialis) 5 MG tablet Take 1 tablet (5 mg total) by mouth daily, Starting Wed 9/28/2022, Normal       !! - Potential duplicate medications found. Please discuss with provider. PDMP Review       Value Time User    PDMP Reviewed  Yes 11/4/2020 10:40 AM Hiral Johansen MD           ED Provider  Attending physically available and evaluated Ezekiel Florian. I managed the patient along with the ED Attending.     Electronically Signed by         Major Kirkpatrick MD  08/25/23 309 [facility] Iris Amor MD  08/25/23 9611

## 2023-08-28 ENCOUNTER — APPOINTMENT (OUTPATIENT)
Dept: RADIOLOGY | Facility: CLINIC | Age: 73
End: 2023-08-28
Payer: MEDICARE

## 2023-08-28 ENCOUNTER — OFFICE VISIT (OUTPATIENT)
Dept: OBGYN CLINIC | Facility: CLINIC | Age: 73
End: 2023-08-28
Payer: MEDICARE

## 2023-08-28 VITALS
DIASTOLIC BLOOD PRESSURE: 80 MMHG | HEIGHT: 69 IN | BODY MASS INDEX: 25.33 KG/M2 | SYSTOLIC BLOOD PRESSURE: 132 MMHG | WEIGHT: 171 LBS

## 2023-08-28 DIAGNOSIS — S62.619A AVULSION FRACTURE OF PROXIMAL PHALANX OF FINGER: ICD-10-CM

## 2023-08-28 DIAGNOSIS — S62.619A CLOSED AVULSION FRACTURE OF PROXIMAL PHALANX OF FINGER, INITIAL ENCOUNTER: Primary | ICD-10-CM

## 2023-08-28 PROCEDURE — 99204 OFFICE O/P NEW MOD 45 MIN: CPT | Performed by: ORTHOPAEDIC SURGERY

## 2023-08-28 PROCEDURE — 73140 X-RAY EXAM OF FINGER(S): CPT

## 2023-08-28 PROCEDURE — 99214 OFFICE O/P EST MOD 30 MIN: CPT | Performed by: ORTHOPAEDIC SURGERY

## 2023-08-28 NOTE — PROGRESS NOTES
ASSESSMENT/PLAN:    Assessment:   Avulsion fracture of the proximal phalanx of left small finger    Plan:   He was encouraged to use ra straps during sporting events. He may continue progressing his range of motion. He may continue activities as tolerated. Follow Up:  PRN    To Do Next Visit:  Re-evaluation  _____________________________________________________  CHIEF COMPLAINT:  Chief Complaint   Patient presents with   • Left Little Finger - Fracture     XR 8/23/23 DOI 8/10/23         SUBJECTIVE:  Janice Dahl is a 74 y.o. male who presents with left small finger pain due to a fall while playing tennis causing a dislocation event for which he self relocated on 8/13/2023. He then started using a hard splint. He was seen in the ED on 8/23/2023 and transitioned to a soft splint. He has noticed swelling and stiffness to the left small finger. He notes that his pain has improved. He denies any numbness or tingling.     PAST MEDICAL HISTORY:  Past Medical History:   Diagnosis Date   • Anxiety    • Cancer ([address])     scc back   • Carpal tunnel syndrome    • Constipation     with general anesthesia   • ED (erectile dysfunction)    • GERD (gastroesophageal reflux disease)    • [place] (hard of hearing)     wears hearing aids   • Hypercholesteremia    • Hypertension    • Numbness in both hands        PAST SURGICAL HISTORY:  Past Surgical History:   Procedure Laterality Date   • APPENDECTOMY      As a child   • CAST APPLICATION Right 3/76/0925    Procedure: Application short-arm splint;  Surgeon: Luz Franco MD;  Location:  MAIN OR;  Service: Orthopedics   • COLONOSCOPY     • INGUINAL HERNIA REPAIR Left 2015   • MASS EXCISION N/A 8/14/2018    Procedure: BACK/TRUNK MULTIPLE [address]; COMPLEX CLOSURE VERSUS FLAP RECONSTRUCTION;  Surgeon: Kingston Hunter MD;  Location[facility];  Service: Plastics   • MOHS RECONSTRUCTION Right 6/23/2022    Procedure: RECONSTRUCTION MOHS DEFECT RIGHT DORSAL HAND AND LOCAL FLAP;  Surgeon: Awilda Ortiz MD;  Location[facility];  Service: Plastics   • NE EXC B9 LESION MRGN XCP SK TG S/N/H/F/G > 4.0CM Right 6/23/2022    Procedure: EXCISION RIGHT NECK SKIN LESION AND COMPLEX CLOSURE;  Surgeon: Awilda Ortiz MD;  [facility];  Service: Plastics   • NE FASCIOTOMY PALMAR OPEN PARTIAL Right 8/27/2020    Procedure: Excision Dupuytren's central cord right long finger with release of metacarpophalangeal joint. Excision Dupuytren's right ring finger spiral cord ulnar aspect with release of the proximal interphalangeal joint.   Excision Dupuytren's right small finger central cord and spiral cord to both radial and ulnar side, as well as ulnar-sided cord of the digital sheath and release of the proximal   • [place] NDSC WRST SURG W/RLS TRANSVRS CARPL LIGM Right 10/20/2020    Procedure: RELEASE CARPAL TUNNEL ENDOSCOPIC;  Surgeon: Valorie Litten, MD;  [facility];  Service: Orthopedics   • [place] [address] WRST SURG W/RLS TRANSVRS CARPL LIGM Left 11/4/2020    Procedure: RELEASE CARPAL TUNNEL ENDOSCOPIC;  Surgeon: Valorie Litten, MD;  [facility];  Service: Orthopedics   • NE NEUROPLASTY &/TRANSPOSITION ULNAR NERVE ELBOW Right 10/20/2020    Procedure: RELEASE CUBITAL TUNNEL;  Surgeon: Valorie Litten, MD;  [facility];  Service: Orthopedics   • NE NEUROPLASTY &/TRANSPOSITION ULNAR NERVE ELBOW Left 11/4/2020    Procedure: Hayley Wheatley;  Surgeon: Valorie Litten, MD;  [facility];  Service: Orthopedics   • NE TENDON SHEATH INCISION Right 8/27/2020    Procedure: Right long finger trigger finger release;  Surgeon: Valorie Litten, MD;  [facility];  Service: Orthopedics   • SQUAMOUS CELL CARCINOMA EXCISION N/A 8/14/2018    Procedure: BACK SCC IN SITU EXCISION;  Surgeon: Awilda Ortiz MD;  Location[facility];  Service: Plastics       FAMILY HISTORY:  Family History   Problem Relation Age of Onset • Colon cancer Father    • Heart attack Mother    • Breast cancer Mother    • Hypertension Mother        SOCIAL HISTORY:  Social History     Tobacco Use   • Smoking status: Never   • Smokeless tobacco: Never   Vaping Use   • Vaping Use: Never used   Substance Use Topics   • Alcohol use: Yes     Alcohol/week: 15.0 standard drinks of alcohol     Types: 15 Standard drinks or equivalent per week     Comment: 2 Drinks a night   • Drug use: Never       MEDICATIONS:    Current Outpatient Medications:   •  omeprazole (PriLOSEC) 40 MG capsule, Take 40 mg by mouth daily before breakfast Take 30 minutes prior takes 2 times a day, Disp: , Rfl:   •  amLODIPine (NORVASC) 5 mg tablet, Take 1 tablet (5 mg total) by mouth daily, Disp: 90 tablet, Rfl: 3  •  atorvastatin (LIPITOR) 20 mg tablet, Take 1 tablet (20 mg total) by mouth daily, Disp: 90 tablet, Rfl: 3  •  b complex vitamins capsule, Take 1 capsule by mouth daily, Disp: , Rfl:   •  cholecalciferol (VITAMIN D3) 1,000 units tablet, Take 1,000 Units by mouth 2 (two) times a day, Disp: , Rfl:   •  lisinopril (ZESTRIL) 20 mg tablet, Alternating with 40mg every other day, Disp: 90 tablet, Rfl: 2  •  lisinopril (ZESTRIL) 40 mg tablet, take 1 tablet by mouth once daily, Disp: 90 tablet, Rfl: 3  •  Probiotic Product (PROBIOTIC DAILY PO), Take by mouth daily, Disp: , Rfl:   •  psyllium (METAMUCIL SMOOTH TEXTURE) 58.6 % powder, Take by mouth daily, Disp: , Rfl:   •  sildenafil (VIAGRA) 100 mg tablet, Take 1 tablet (100 mg total) by mouth daily as needed for erectile dysfunction, Disp: 90 tablet, Rfl: 3  •  tadalafil (Cialis) 5 MG tablet, Take 1 tablet (5 mg total) by mouth daily, Disp: 90 tablet, Rfl: 3    ALLERGIES:  No Known Allergies    REVIEW OF SYSTEMS:  Pertinent items are noted in HPI. A comprehensive review of systems was negative.     LABS:  HgA1c:   Lab Results   Component Value Date    HGBA1C 5.8 (H) 09/21/2022     BMP:   Lab Results   Component Value Date    GLUCOSE 122 06/03/2015    CALCIUM 9.2 09/21/2022     12/20/2016    K 4.3 09/21/2022    CO2 28 09/21/2022     09/21/2022    BUN 13 09/21/2022    CREATININE 0.81 09/21/2022         _____________________________________________________  PHYSICAL EXAMINATION:  Vital signs: /80   Ht 5' 8.5" (1.74 m)   Wt 77.6 kg (171 lb)   BMI 25.62 kg/m²   General: well developed and well nourished, alert, oriented times 3 and appears comfortable  Psychiatric: Normal  HEENT: Trachea Midline, No torticollis  Cardiovascular: No discernable arrhythmia  Pulmonary: No wheezing or stridor  Abdomen: No rebound or guarding  Extremities: No peripheral edema  Skin: No masses, erythema, lacerations, fluctation, ulcerations  Neurovascular: Sensation Intact to the Median, Ulnar, Radial Nerve, Motor Intact to the Median, Ulnar, Radial Nerve and Pulses Intact    MUSCULOSKELETAL EXAMINATION:  Left small finger: grade 2 instability of RCL of PIP joint, swelling to the small finger present, no erythema, no lacerations, decreased flexion to the PIP joint.    _____________________________________________________  STUDIES REVIEWED:  Images were reviewed in PACS by Dr. Eder Persaud and demonstrate: XR left small finger 08/28/2023 were reviewed and shows avulsion fracture of the fifth proximal phalanx      PROCEDURES PERFORMED:  Procedures  No Procedures performed today   Scribe Attestation    I,:  Alton Ledezma am acting as a scribe while in the presence of the attending physician.:       I,:  Latrice Gipson MD personally performed the services described in this documentation    as scribed in my presence.:

## 2023-08-31 NOTE — ED ATTENDING ATTESTATION
8/23/2023  IBeatriz DO, saw and evaluated the patient. I have discussed the patient with the resident/non-physician practitioner and agree with the resident's/non-physician practitioner's findings, Plan of Care, and MDM as documented in the resident's/non-physician practitioner's note, except where noted. All available labs and Radiology studies were reviewed. I was present for key portions of any procedure(s) performed by the resident/non-physician practitioner and I was immediately available to provide assistance. At this point I agree with the current assessment done in the Emergency Department.   I have conducted an independent evaluation of this patient a history and physical is as follows:  Chief Complaint   Patient presents with   • Finger Injury     Pt presents with L pinky finger after injuring it while playing tennis 10 days ago        Left pinky injury while playing tennis  Splinted it  Painful ROM  PIP swelling    Xray interpreted by me as avulsion fracture proximal IP joint    F/u ortho, recommend splint, increase ROM        ED Course         Critical Care Time  Procedures

## 2023-09-24 DIAGNOSIS — N52.9 ERECTILE DYSFUNCTION, UNSPECIFIED ERECTILE DYSFUNCTION TYPE: ICD-10-CM

## 2023-09-24 DIAGNOSIS — I10 ESSENTIAL HYPERTENSION: ICD-10-CM

## 2023-09-25 ENCOUNTER — TELEPHONE (OUTPATIENT)
Dept: CARDIOLOGY CLINIC | Facility: CLINIC | Age: 73
End: 2023-09-25

## 2023-09-25 DIAGNOSIS — N52.9 ERECTILE DYSFUNCTION, UNSPECIFIED ERECTILE DYSFUNCTION TYPE: ICD-10-CM

## 2023-09-25 DIAGNOSIS — I10 ESSENTIAL HYPERTENSION: ICD-10-CM

## 2023-09-25 RX ORDER — LISINOPRIL 20 MG/1
TABLET ORAL
Qty: 90 TABLET | Refills: 0 | Status: SHIPPED | OUTPATIENT
Start: 2023-09-25

## 2023-09-25 RX ORDER — TADALAFIL 5 MG/1
5 TABLET ORAL DAILY
Qty: 90 TABLET | Refills: 3 | Status: SHIPPED | OUTPATIENT
Start: 2023-09-25

## 2023-09-25 RX ORDER — TADALAFIL 5 MG/1
5 TABLET ORAL DAILY
Qty: 90 TABLET | Refills: 3 | OUTPATIENT
Start: 2023-09-25

## 2023-09-25 NOTE — TELEPHONE ENCOUNTER
Patient needs updated blood work and has previously placed orders. Please contact patient to go for labs.   Courtesy refill given 08/25/23

## 2023-09-25 NOTE — TELEPHONE ENCOUNTER
Reason for call:   [x] Refill   [] Prior Auth  [] Other:     Office:   [x] PCP/Provider - [facility]  [] Speciality/Provider -     Medication: LISINOPRIL    Dose/Frequency: 20 MG    Quantity: 90    Pharmacy:   [address] [place] [address]    Does the patient have enough for 3 days?    [x] Yes   [] No - Send as HP to POD

## 2023-09-25 NOTE — TELEPHONE ENCOUNTER
Medication Refill  Patient is calling to request a prescription refill for:   Tadalafil   30 / 90 day supply: 90 day  Pharmacy:[facility]   Pt can be reached at: [phone]

## 2023-09-25 NOTE — TELEPHONE ENCOUNTER
Requested medication(s) are due for refill today: Yes  Patient has already received a courtesy refill: No  Other reason request has been forwarded to provider: this pt is 1 year overdue for office visit.  I sent it to clerical to make an appt

## 2023-09-26 LAB
25(OH)D3 SERPL-MCNC: 50 NG/ML (ref 30–100)
ALBUMIN SERPL-MCNC: 4.2 G/DL (ref 3.6–5.1)
ALBUMIN/GLOB SERPL: 1.6 (CALC) (ref 1–2.5)
ALP SERPL-CCNC: 68 U/L (ref 35–144)
ALT SERPL-CCNC: 32 U/L (ref 9–46)
AST SERPL-CCNC: 23 U/L (ref 10–35)
BASOPHILS # BLD AUTO: 47 CELLS/UL (ref 0–200)
BASOPHILS NFR BLD AUTO: 0.6 %
BILIRUB SERPL-MCNC: 0.8 MG/DL (ref 0.2–1.2)
BUN SERPL-MCNC: 17 MG/DL (ref 7–25)
BUN/CREAT SERPL: ABNORMAL (CALC) (ref 6–22)
CALCIUM SERPL-MCNC: 9.7 MG/DL (ref 8.6–10.3)
CHLORIDE SERPL-SCNC: 102 MMOL/L (ref 98–110)
CHOLEST SERPL-MCNC: 175 MG/DL
CHOLEST/HDLC SERPL: 2.5 (CALC)
CO2 SERPL-SCNC: 29 MMOL/L (ref 20–32)
CREAT SERPL-MCNC: 1.09 MG/DL (ref 0.7–1.28)
EOSINOPHIL # BLD AUTO: 150 CELLS/UL (ref 15–500)
EOSINOPHIL NFR BLD AUTO: 1.9 %
ERYTHROCYTE [DISTWIDTH] IN BLOOD BY AUTOMATED COUNT: 12.8 % (ref 11–15)
GFR/BSA.PRED SERPLBLD CYS-BASED-ARV: 71 ML/MIN/1.73M2
GLOBULIN SER CALC-MCNC: 2.7 G/DL (CALC) (ref 1.9–3.7)
GLUCOSE SERPL-MCNC: 115 MG/DL (ref 65–99)
HBA1C MFR BLD: 5.6 % OF TOTAL HGB
HCT VFR BLD AUTO: 41.7 % (ref 38.5–50)
HDLC SERPL-MCNC: 70 MG/DL
HGB BLD-MCNC: 14.2 G/DL (ref 13.2–17.1)
LDLC SERPL CALC-MCNC: 88 MG/DL (CALC)
LYMPHOCYTES # BLD AUTO: 1959 CELLS/UL (ref 850–3900)
LYMPHOCYTES NFR BLD AUTO: 24.8 %
MCH RBC QN AUTO: 30.1 PG (ref 27–33)
MCHC RBC AUTO-ENTMCNC: 34.1 G/DL (ref 32–36)
MCV RBC AUTO: 88.5 FL (ref 80–100)
MONOCYTES # BLD AUTO: 529 CELLS/UL (ref 200–950)
MONOCYTES NFR BLD AUTO: 6.7 %
NEUTROPHILS # BLD AUTO: 5214 CELLS/UL (ref 1500–7800)
NEUTROPHILS NFR BLD AUTO: 66 %
NONHDLC SERPL-MCNC: 105 MG/DL (CALC)
PLATELET # BLD AUTO: 224 THOUSAND/UL (ref 140–400)
PMV BLD REES-ECKER: 10.3 FL (ref 7.5–12.5)
POTASSIUM SERPL-SCNC: 4.5 MMOL/L (ref 3.5–5.3)
PROT SERPL-MCNC: 6.9 G/DL (ref 6.1–8.1)
PSA SERPL-MCNC: 3.75 NG/ML
RBC # BLD AUTO: 4.71 MILLION/UL (ref 4.2–5.8)
SODIUM SERPL-SCNC: 138 MMOL/L (ref 135–146)
TRIGL SERPL-MCNC: 77 MG/DL
TSH SERPL-ACNC: 1.73 MIU/L (ref 0.4–4.5)
WBC # BLD AUTO: 7.9 THOUSAND/UL (ref 3.8–10.8)

## 2023-09-29 ENCOUNTER — TELEPHONE (OUTPATIENT)
Dept: CARDIOLOGY CLINIC | Facility: CLINIC | Age: 73
End: 2023-09-29

## 2023-09-29 RX ORDER — LISINOPRIL 20 MG/1
TABLET ORAL
Qty: 90 TABLET | Refills: 2 | OUTPATIENT
Start: 2023-09-29

## 2023-10-10 ENCOUNTER — TELEPHONE (OUTPATIENT)
Age: 73
End: 2023-10-10

## 2023-10-10 NOTE — TELEPHONE ENCOUNTER
Patient returned call to make sure all information was collected for his medication question and confirmed call back number.  All medication concerns forwarded to PCP>

## 2023-10-10 NOTE — TELEPHONE ENCOUNTER
Pt called and stated he received a message stating his appt was cancelled and asking for a sooner appt with Kailyn Moe.  Next appt available is 1/3/24 and pt does not want to wait til then due to having an elevated psa and also still having issues with ED     Pt call back-[phone]

## 2023-10-10 NOTE — TELEPHONE ENCOUNTER
Pt called to ask Dr Rajwinder Case opinion on omeprazole. He is now taking 40mg BID. He is concerned about the long term effects he reads about. This is prescribed this Dr Cat Coronado and has been taking it for the past five years. Dr Cat Coronado says not to worry about it. Does he need another GI doctors opinion? Please advise.

## 2023-10-25 ENCOUNTER — OFFICE VISIT (OUTPATIENT)
Dept: UROLOGY | Facility: CLINIC | Age: 73
End: 2023-10-25
Payer: MEDICARE

## 2023-10-25 VITALS
DIASTOLIC BLOOD PRESSURE: 78 MMHG | HEIGHT: 68 IN | SYSTOLIC BLOOD PRESSURE: 136 MMHG | HEART RATE: 78 BPM | BODY MASS INDEX: 26.43 KG/M2 | WEIGHT: 174.4 LBS | RESPIRATION RATE: 16 BRPM | OXYGEN SATURATION: 97 %

## 2023-10-25 DIAGNOSIS — R97.20 ELEVATED PSA: Primary | ICD-10-CM

## 2023-10-25 PROCEDURE — 99213 OFFICE O/P EST LOW 20 MIN: CPT | Performed by: PHYSICIAN ASSISTANT

## 2023-11-16 ENCOUNTER — TELEPHONE (OUTPATIENT)
Age: 73
End: 2023-11-16

## 2023-11-16 NOTE — TELEPHONE ENCOUNTER
Spoke to pt to let him know that Dr Juan Carlos Coffey did not have any sooner appt in  bu he did have appts avail in [facility] office next week. Pt declined and verbalized understanding. Pt asked if Dr Juan Carlos Coffey would kindly look at EGD report from Dr Cat Coronado and call him to discuss Omeprazole usage. Will send message to Dr Juan Carlos Coffey.

## 2023-11-16 NOTE — TELEPHONE ENCOUNTER
Patients GI provider:  Dr. Manoj Villalpando    Number to return call: [phone]    Reason for call: Pt calling in requesting a sooner appt with Dr. Mariya Alexandre. Patient was placed on a wait list. Patient Is requesting a call from Dr. Yosef Mcguire regarding an EGD report sent over from Dr. Ag Tipton office and patient has questions regarding Omeprazole 80mg. Patient has been on Omeprazole for over ten years and has concerns. Please reach out to patient, thank you.       Scheduled procedure/appointment date if applicable: Apt/procedure 12/15/2023

## 2023-11-17 ENCOUNTER — TELEPHONE (OUTPATIENT)
Age: 73
End: 2023-11-17

## 2023-11-17 NOTE — TELEPHONE ENCOUNTER
Pt called and cancelled 12/15/2023 appt with Dr Juan Carlos Coffey. Pt stated he spoke to Dr Juan Carlos Coffey and did not need the appointment.

## 2024-01-21 DIAGNOSIS — N52.9 ERECTILE DYSFUNCTION, UNSPECIFIED ERECTILE DYSFUNCTION TYPE: ICD-10-CM

## 2024-01-22 DIAGNOSIS — N52.9 ERECTILE DYSFUNCTION, UNSPECIFIED ERECTILE DYSFUNCTION TYPE: ICD-10-CM

## 2024-01-22 RX ORDER — SILDENAFIL 100 MG/1
100 TABLET, FILM COATED ORAL DAILY PRN
Qty: 90 TABLET | Refills: 1 | Status: SHIPPED | OUTPATIENT
Start: 2024-01-22

## 2024-01-22 NOTE — TELEPHONE ENCOUNTER
Reason for call:   [x] Refill   [] Prior Auth  [] Other:     Office:   [] PCP/Provider -   [x] Specialty/Provider - Zak LAFLEUR    Medication:   Sildenafil 100 mg, 1 daily prn, 90    Pharmacy:   [facility]    Does the patient have enough for 3 days?   [x] Yes   [] No - Send as HP to POD

## 2024-01-30 RX ORDER — SILDENAFIL 100 MG/1
100 TABLET, FILM COATED ORAL DAILY PRN
Qty: 90 TABLET | Refills: 3 | Status: SHIPPED | OUTPATIENT
Start: 2024-01-30

## 2024-02-15 DIAGNOSIS — I10 ESSENTIAL HYPERTENSION: ICD-10-CM

## 2024-02-15 RX ORDER — AMLODIPINE BESYLATE 5 MG/1
5 TABLET ORAL DAILY
Qty: 90 TABLET | Refills: 1 | Status: SHIPPED | OUTPATIENT
Start: 2024-02-15

## 2024-02-21 PROBLEM — Z00.00 MEDICARE ANNUAL WELLNESS VISIT, SUBSEQUENT: Status: RESOLVED | Noted: 2019-03-18 | Resolved: 2024-02-21

## 2024-03-02 DIAGNOSIS — I10 ESSENTIAL HYPERTENSION: ICD-10-CM

## 2024-03-04 RX ORDER — LISINOPRIL 20 MG/1
TABLET ORAL
Qty: 90 TABLET | Refills: 1 | Status: SHIPPED | OUTPATIENT
Start: 2024-03-04

## 2024-04-12 ENCOUNTER — TELEPHONE (OUTPATIENT)
Age: 74
End: 2024-04-12

## 2024-04-12 DIAGNOSIS — R73.01 IMPAIRED FASTING GLUCOSE: ICD-10-CM

## 2024-04-12 DIAGNOSIS — E55.9 VITAMIN D DEFICIENCY: ICD-10-CM

## 2024-04-12 DIAGNOSIS — E78.2 MIXED HYPERLIPIDEMIA: Primary | ICD-10-CM

## 2024-04-12 NOTE — TELEPHONE ENCOUNTER
Done, let pt know.  Put in an order for the labs since his glucose has been a little elevated in the past, there should be no issue with him getting those labs.  The only lab issue at times can be the PSA, but I did not put an order in for that.

## 2024-04-12 NOTE — TELEPHONE ENCOUNTER
Pt called wants to get clarification on BW orders.    Pt states he's scheduled for AWV on 5/22 and wants to know if he needs to get his BW done prior?    Pt states the last BW done was 9/25/23. Pt wants to know if he needs to wait a year from this date to get BW done again so insurance can cover it? If so, he will wait for appt date to get future lab orders.    Please advise

## 2024-04-18 NOTE — TELEPHONE ENCOUNTER
Keith is calling back to follow up again on his labs he got in the mail. He does not want to get these done again until September because he doesn't want to take the chance of Medicare not covering them.     Keith states that his glucose is always a little high and wants to know if Dr. Joyner is OK with him getting these done in Sept again instead.     Please advise and call Keith back to notify of response.

## 2024-04-29 NOTE — PROGRESS NOTES
UROLOGY PROGRESS NOTE   Patient Identifiers: John Eureyecko (MRN [id number])  Date of Service: 10/25/2023    Subjective:   72-year-old man history of BPH and ED.  PSA 3.75.  He uses Cialis as needed.  Occasional supplement with sildenafil.  No lower tract complaints.    Reason for visit: BPH follow-up    Objective:     VITALS:    Vitals:    10/25/23 1020   BP: 136/78   Pulse: 78   Resp: 16   SpO2: 97%           LABS:  Lab Results   Component Value Date    HGB 14.2 09/25/2023    HCT 41.7 09/25/2023    WBC 7.9 09/25/2023     09/25/2023   ]    Lab Results   Component Value Date     12/20/2016    K 4.5 09/25/2023     09/25/2023    CO2 29 09/25/2023    BUN 17 09/25/2023    CREATININE 1.09 09/25/2023    CALCIUM 9.7 09/25/2023    GLUCOSE 122 06/03/2015   ]        INPATIENT MEDS:    Current Outpatient Medications:     atorvastatin (LIPITOR) 20 mg tablet, Take 1 tablet (20 mg total) by mouth daily, Disp: 90 tablet, Rfl: 3    b complex vitamins capsule, Take 1 capsule by mouth daily, Disp: , Rfl:     cholecalciferol (VITAMIN D3) 1,000 units tablet, Take 1,000 Units by mouth 2 (two) times a day, Disp: , Rfl:     lisinopril (ZESTRIL) 40 mg tablet, take 1 tablet by mouth once daily, Disp: 90 tablet, Rfl: 3    omeprazole (PriLOSEC) 40 MG capsule, Take 40 mg by mouth daily before breakfast Take 30 minutes prior takes 2 times a day, Disp: , Rfl:     Probiotic Product (PROBIOTIC DAILY PO), Take by mouth daily, Disp: , Rfl:     psyllium (METAMUCIL SMOOTH TEXTURE) 58.6 % powder, Take by mouth daily, Disp: , Rfl:     tadalafil (CIALIS) 5 MG tablet, TAKE ONE TABLET BY MOUTH EVERY DAY, Disp: 90 tablet, Rfl: 3    amLODIPine (NORVASC) 5 mg tablet, take 1 tablet by mouth once daily, Disp: 90 tablet, Rfl: 1    lisinopril (ZESTRIL) 20 mg tablet, take 1 tablet by mouth once daily ALTERNATING WITH 40 MG EVERY OTHER DAY, Disp: 90 tablet, Rfl: 1    sildenafil (VIAGRA) 100 mg tablet, TAKE ONE TABLET BY MOUTH EVERY DAY AS  "NEEDED FOR ERECTILE DYSFUNCTION, Disp: 90 tablet, Rfl: 3    sildenafil (VIAGRA) 100 mg tablet, Take 1 tablet (100 mg total) by mouth daily as needed for erectile dysfunction, Disp: 90 tablet, Rfl: 1      Physical Exam:   /78 (BP Location: Right arm, Patient Position: Sitting, Cuff Size: Large)   Pulse 78   Resp 16   Ht 5' 8\" (1.727 m)   Wt 79.1 kg (174 lb 6.4 oz)   SpO2 97%   BMI 26.52 kg/m²   GEN: no acute distress    RESP: breathing comfortably with no accessory muscle use    ABD: soft, non-tender, non-distended   INCISION:    EXT: no significant peripheral edema   (Male): Penis circumcised, phallus normal, meatus patent.  Testicles descended into scrotum bilaterally without masses nor tenderness.  No inguinal hernias bilaterally.  LOWELL: Prostate is enlarged at 45 grams. The prostate is not boggy. The prostate is not tender.  No nodules noted      RADIOLOGY:   none     Assessment:   #1.  BPH   #2.  Erectile dysfunction    Plan:   -Follow-up in 1 year with PSA prior to visit  -  -  -          "

## 2024-05-15 ENCOUNTER — RA CDI HCC (OUTPATIENT)
Dept: OTHER | Facility: HOSPITAL | Age: 74
End: 2024-05-15

## 2024-05-15 PROBLEM — Z01.818 PREOP EXAM FOR INTERNAL MEDICINE: Status: RESOLVED | Noted: 2019-09-11 | Resolved: 2024-05-15

## 2024-05-15 PROBLEM — M79.652 PAIN OF LEFT THIGH: Status: RESOLVED | Noted: 2022-11-16 | Resolved: 2024-05-15

## 2024-05-15 PROBLEM — M72.0 DUPUYTREN'S CONTRACTURE OF RIGHT HAND: Status: ACTIVE | Noted: 2019-01-09

## 2024-05-21 RX ORDER — IPRATROPIUM BROMIDE 42 UG/1
2 SPRAY, METERED NASAL EVERY 6 HOURS
COMMUNITY
Start: 2024-03-02 | End: 2024-05-22 | Stop reason: SDUPTHER

## 2024-05-22 ENCOUNTER — OFFICE VISIT (OUTPATIENT)
Dept: INTERNAL MEDICINE CLINIC | Facility: CLINIC | Age: 74
End: 2024-05-22
Payer: MEDICARE

## 2024-05-22 VITALS
HEART RATE: 75 BPM | HEIGHT: 68 IN | SYSTOLIC BLOOD PRESSURE: 130 MMHG | WEIGHT: 168 LBS | BODY MASS INDEX: 25.46 KG/M2 | DIASTOLIC BLOOD PRESSURE: 76 MMHG | OXYGEN SATURATION: 95 %

## 2024-05-22 DIAGNOSIS — E55.9 VITAMIN D DEFICIENCY: ICD-10-CM

## 2024-05-22 DIAGNOSIS — T78.40XS ALLERGY, SEQUELA: Primary | ICD-10-CM

## 2024-05-22 DIAGNOSIS — R73.01 IMPAIRED FASTING GLUCOSE: ICD-10-CM

## 2024-05-22 DIAGNOSIS — Z00.00 MEDICARE ANNUAL WELLNESS VISIT, SUBSEQUENT: ICD-10-CM

## 2024-05-22 DIAGNOSIS — K21.9 CHRONIC GERD: ICD-10-CM

## 2024-05-22 DIAGNOSIS — K20.0 EOSINOPHILIC ESOPHAGITIS: ICD-10-CM

## 2024-05-22 DIAGNOSIS — N52.9 ERECTILE DYSFUNCTION, UNSPECIFIED ERECTILE DYSFUNCTION TYPE: ICD-10-CM

## 2024-05-22 DIAGNOSIS — I10 HTN (HYPERTENSION), BENIGN: Primary | ICD-10-CM

## 2024-05-22 DIAGNOSIS — I10 ESSENTIAL HYPERTENSION: ICD-10-CM

## 2024-05-22 DIAGNOSIS — E78.2 MIXED HYPERLIPIDEMIA: ICD-10-CM

## 2024-05-22 DIAGNOSIS — E78.00 HYPERCHOLESTEREMIA: ICD-10-CM

## 2024-05-22 PROCEDURE — G0439 PPPS, SUBSEQ VISIT: HCPCS | Performed by: INTERNAL MEDICINE

## 2024-05-22 PROCEDURE — 99214 OFFICE O/P EST MOD 30 MIN: CPT | Performed by: INTERNAL MEDICINE

## 2024-05-22 RX ORDER — LISINOPRIL 20 MG/1
TABLET ORAL
Qty: 90 TABLET | Refills: 3 | Status: SHIPPED | OUTPATIENT
Start: 2024-05-22

## 2024-05-22 RX ORDER — ATORVASTATIN CALCIUM 20 MG/1
20 TABLET, FILM COATED ORAL DAILY
Qty: 90 TABLET | Refills: 3 | Status: SHIPPED | OUTPATIENT
Start: 2024-05-22

## 2024-05-22 RX ORDER — SILDENAFIL 100 MG/1
100 TABLET, FILM COATED ORAL DAILY PRN
Qty: 90 TABLET | Refills: 1 | Status: CANCELLED | OUTPATIENT
Start: 2024-05-22

## 2024-05-22 RX ORDER — AMLODIPINE BESYLATE 5 MG/1
5 TABLET ORAL DAILY
Qty: 90 TABLET | Refills: 3 | Status: SHIPPED | OUTPATIENT
Start: 2024-05-22

## 2024-05-22 RX ORDER — IPRATROPIUM BROMIDE 42 UG/1
2 SPRAY, METERED NASAL EVERY 6 HOURS
Qty: 15 ML | Refills: 5 | Status: SHIPPED | OUTPATIENT
Start: 2024-05-22

## 2024-05-22 RX ORDER — LISINOPRIL 40 MG/1
40 TABLET ORAL DAILY
Qty: 90 TABLET | Refills: 3 | Status: SHIPPED | OUTPATIENT
Start: 2024-05-22

## 2024-05-22 RX ORDER — TADALAFIL 5 MG/1
5 TABLET ORAL DAILY
Qty: 90 TABLET | Refills: 3 | Status: SHIPPED | OUTPATIENT
Start: 2024-05-22

## 2024-05-22 RX ORDER — SILDENAFIL 100 MG/1
100 TABLET, FILM COATED ORAL DAILY PRN
Qty: 90 TABLET | Refills: 1 | Status: SHIPPED | OUTPATIENT
Start: 2024-05-22

## 2024-05-22 RX ORDER — OMEPRAZOLE 40 MG/1
80 CAPSULE, DELAYED RELEASE ORAL
Qty: 180 CAPSULE | Refills: 3 | Status: SHIPPED | OUTPATIENT
Start: 2024-05-22

## 2024-05-22 NOTE — ASSESSMENT & PLAN NOTE
Patient is been taking lisinopril 20 mg daily alternating with 40 mg daily, along with amlodipine 5 mg daily.  Patient's blood pressure was a little up when he first got here, then came down to normal range, continue current meds

## 2024-05-22 NOTE — PROGRESS NOTES
Ambulatory Visit  Name: John F Eureyecko      : 1950      MRN: [id number]  Encounter Provider: Dc Joyner MD  Encounter Date: 2024   Enc[facility]    Assessment & Plan   1. HTN (hypertension), benign  Assessment & Plan:  Patient is been taking lisinopril 20 mg daily alternating with 40 mg daily, along with amlodipine 5 mg daily.  Patient's blood pressure was a little up when he first got here, then came down to normal range, continue current meds  2. Medicare annual wellness visit, subsequent  Assessment & Plan:  Discussed preventative health, cancer screening, immunizations, and safety issues.  Last colonoscopy 2021 with recommendations recheck again in 5 years.  Patient can get Prevnar 20 after 2024.  Patient had both Shingrix shots.  I recommend yearly flu shot.  Patient had Tdap vaccination .  Pt can get RSV vaccine at pharmacy.  3. Chronic GERD  Assessment & Plan:  Patient on omeprazole 40 mg twice a day as per GI, no problems with food getting stuck, no significant GERD, he just burps a lot  4. Eosinophilic esophagitis  Assessment & Plan:  Follow-up GI for surveillance  5. Impaired fasting glucose  Assessment & Plan:  Continue with healthy diet and exercise and monitoring  6. Mixed hyperlipidemia  Assessment & Plan:  Continue atorvastatin along with healthy diet and exercise  7. Vitamin D deficiency  Assessment & Plan:  Continue Vitamin D  8. Erectile dysfunction, unspecified erectile dysfunction type  Assessment & Plan:  Pt on Cialis daily for BPH also.  He has viagra to use as needed.  Orders:  -     Testosterone, free, total; Future    Depression Screening and Follow-up Plan: Patient was screened for depression during today's encounter. They screened negative with a PHQ-2 score of 0.      Preventive health issues were discussed with patient, and age appropriate screening tests were ordered as noted in patient's After Visit  Summary. Personalized health advice and appropriate referrals for health education or preventive services given if needed, as noted in patient's After Visit Summary.    History of Present Illness     Patient here for annual wellness visit and follow-up       Patient Care Team:  Dc Joyner MD as PCP - General    Review of Systems   Constitutional:  Negative for chills, fatigue and fever.   HENT:  Negative for congestion, nosebleeds, postnasal drip, sore throat and trouble swallowing.    Eyes:  Negative for pain.   Respiratory:  Negative for cough, chest tightness, shortness of breath and wheezing.    Cardiovascular:  Negative for chest pain, palpitations and leg swelling.   Gastrointestinal:  Negative for abdominal pain, constipation, diarrhea, nausea and vomiting.   Endocrine: Negative for polydipsia and polyuria.   Genitourinary:  Negative for dysuria, flank pain and hematuria.   Musculoskeletal:  Negative for arthralgias.   Skin:  Negative for rash.   Neurological:  Negative for dizziness, tremors, light-headedness and headaches.   Hematological:  Does not bruise/bleed easily.   Psychiatric/Behavioral:  Negative for confusion and dysphoric mood. The patient is not nervous/anxious.      Medical History Reviewed by provider this encounter:  Tobacco  Allergies  Meds  Problems  Med Hx  Surg Hx  Fam Hx       Annual Wellness Visit Questionnaire       Health Risk Assessment:   Patient rates overall health as very good. Patient feels that their physical health rating is same. Patient is satisfied with their life. Eyesight was rated as slightly worse. Hearing was rated as slightly worse. Patient feels that their emotional and mental health rating is slightly better. Patients states they are never, rarely angry. Patient states they are never, rarely unusually tired/fatigued. Pain experienced in the last 7 days has been none. Patient states that he has experienced no weight loss or gain in last 6 months.      Depression Screening:   PHQ-2 Score: 0      Fall Risk Screening:   In the past year, patient has experienced: no history of falling in past year      Home Safety:  Patient does not have trouble with stairs inside or outside of their home. Patient has working smoke alarms and has no working carbon monoxide detector. Home safety hazards include: none.     Nutrition:   Current diet is Regular.     Medications:   Patient is currently taking over-the-counter supplements. OTC medications include: see medication list. Patient is able to manage medications.     Activities of Daily Living (ADLs)/Instrumental Activities of Daily Living (IADLs):   Walk and transfer into and out of bed and chair?: Yes  Dress and groom yourself?: Yes    Bathe or shower yourself?: Yes    Feed yourself? Yes  Do your laundry/housekeeping?: Yes  Manage your money, pay your bills and track your expenses?: Yes  Make your own meals?: Yes    Do your own shopping?: Yes    Durable Medical Equipment Suppliers  none    Previous Hospitalizations:   Any hospitalizations or ED visits within the last 12 months?: No      Advance Care Planning:   Living will: Yes    Durable POA for healthcare: Yes    Advanced directive: Yes      Cognitive Screening:   Provider or family/friend/caregiver concerned regarding cognition?: No    PREVENTIVE SCREENINGS      Cardiovascular Screening:    General: Screening Not Indicated, History Lipid Disorder and Risks and Benefits Discussed      Diabetes Screening:     General: Screening Current and Risks and Benefits Discussed      Colorectal Cancer Screening:     General: Screening Current      Prostate Cancer Screening:    General: Screening Not Indicated, Risks and Benefits Discussed and Screening Current      Osteoporosis Screening:    General: Screening Not Indicated      Abdominal Aortic Aneurysm (AAA) Screening:    Risk factors include: age between 65-74 yo        General: Screening Not Indicated      Lung Cancer Screening:      General: Screening Not Indicated      Hepatitis C Screening:    General: Screening Current and Risks and Benefits Discussed    Screening, Brief Intervention, and Referral to Treatment (SBIRT)    Screening  Typical number of drinks in a day: 2  Typical number of drinks in a week: 14  Interpretation: Low risk drinking behavior.    AUDIT-C Screenin) How often did you have a drink containing alcohol in the past year? 4 or more times a week  2) How many drinks did you have on a typical day when you were drinking in the past year? 1 to 2  3) How often did you have 6 or more drinks on one occasion in the past year? less than monthly    AUDIT-C Score: 5  Interpretation: Score 4-12 (male): POSITIVE screen for alcohol misuse    AUDIT Screenin) How often during the last year have you found that you were not able to stop drinking once you had started? 0 - never  5) How often during the last year have you failed to do what was normally expected from you because of drinking? 0 - never  6) How often during the last year have you needed a first drink in the morning to get yourself going after a heavy drinking session? 0 - never  7) How often during the last year have you had a feeling of guilt or remorse after drinking? 0 - never  8) How often during the last year have you been unable to remember what happened the night before because you had been drinking? 0 - never  9) Have you or someone else been injured as a result of your drinking? 0 - no  10) Has a relative or friend or a doctor or another health worker been concerned about your drinking or suggested you cut down? 0 - no    AUDIT Score: 5  Interpretation: Low risk alcohol consumption    Single Item Drug Screening:  How often have you used an illegal drug (including marijuana) or a prescription medication for non-medical reasons in the past year? never    Single Item Drug Screen Score: 0  Interpretation: Negative screen for possible drug use disorder    Brief  "Intervention  Alcohol & drug use screenings were reviewed. No concerns regarding substance use disorder identified.     Other Counseling Topics:   Car/seat belt/driving safety, skin self-exam and sunscreen.     Social Determinants of Health     Financial Resource Strain: Low Risk  (5/10/2023)    Overall Financial Resource Strain (CARDIA)    • Difficulty of Paying Living Expenses: Not hard at all   Food Insecurity: No Food Insecurity (5/15/2024)    Hunger Vital Sign    • Worried About Running Out of Food in the Last Year: Never true    • Ran Out of Food in the Last Year: Never true   Transportation Needs: No Transportation Needs (5/15/2024)    PRAPARE - Transportation    • Lack of Transportation (Medical): No    • Lack of Transportation (Non-Medical): No   Housing Stability: Low Risk  (5/15/2024)    Housing Stability Vital Sign    • Unable to Pay for Housing in the Last Year: No    • Number of Times Moved in the Last Year: 0    • Homeless in the Last Year: No   Utilities: Not At Risk (5/15/2024)    [facility] Utilities    • Threatened with loss of utilities: No     No results found.    Objective     /76   Pulse 75   Ht 5' 8\" (1.727 m)   Wt 76.2 kg (168 lb)   SpO2 95%   BMI 25.54 kg/m²     Physical Exam  Vitals reviewed.   Constitutional:       General: He is not in acute distress.     Appearance: Normal appearance. He is well-developed.   HENT:      Head: Normocephalic and atraumatic.      Right Ear: External ear normal.      Left Ear: External ear normal.      Nose: Nose normal.   Eyes:      General: No scleral icterus.     Conjunctiva/sclera: Conjunctivae normal.   Neck:      Trachea: No tracheal deviation.   Cardiovascular:      Rate and Rhythm: Normal rate and regular rhythm.      Heart sounds: Normal heart sounds. No murmur heard.  Pulmonary:      Effort: Pulmonary effort is normal. No respiratory distress.      Breath sounds: Normal breath sounds. No wheezing or rales.   Abdominal:      General: Bowel " sounds are normal.      Palpations: Abdomen is soft. There is no mass.      Tenderness: There is no abdominal tenderness. There is no guarding.      Hernia: There is no hernia in the left inguinal area or right inguinal area.   Genitourinary:     Testes: Normal.   Musculoskeletal:      Cervical back: Normal range of motion and neck supple.      Right lower leg: No edema.      Left lower leg: No edema.   Lymphadenopathy:      Cervical: No cervical adenopathy.   Skin:     Coloration: Skin is not jaundiced or pale.   Neurological:      General: No focal deficit present.      Mental Status: He is alert and oriented to person, place, and time.   Psychiatric:         Mood and Affect: Mood normal.         Behavior: Behavior normal.         Thought Content: Thought content normal.         Judgment: Judgment normal.       Administrative Statements

## 2024-05-22 NOTE — ASSESSMENT & PLAN NOTE
Discussed preventative health, cancer screening, immunizations, and safety issues.  Last colonoscopy December 1, 2021 with recommendations recheck again in 5 years.  Patient can get Prevnar 20 after December 2024.  Patient had both Shingrix shots.  I recommend yearly flu shot.  Patient had Tdap vaccination 2018.  Pt can get RSV vaccine at pharmacy.

## 2024-05-22 NOTE — ASSESSMENT & PLAN NOTE
Patient on omeprazole 40 mg twice a day as per GI, no problems with food getting stuck, no significant GERD, he just burps a lot

## 2024-05-22 NOTE — PATIENT INSTRUCTIONS
Problem List Items Addressed This Visit          Cardiovascular and Mediastinum    HTN (hypertension), benign - Primary     Patient is been taking lisinopril 20 mg daily alternating with 40 mg daily, along with amlodipine 5 mg daily.  Patient's blood pressure was a little up when he first got here, then came down to normal range, continue current meds            Digestive    Chronic GERD     Patient on omeprazole 40 mg twice a day as per GI, no problems with food getting stuck, no significant GERD, he just burps a lot         Eosinophilic esophagitis     Follow-up GI for surveillance            Endocrine    Impaired fasting glucose     Continue with healthy diet and exercise and monitoring            Other    Mixed hyperlipidemia     Continue atorvastatin along with healthy diet and exercise         Erectile dysfunction     Pt on Cialis daily for BPH also.  He has viagra to use as needed.         Relevant Orders    Testosterone, free, total    Medicare annual wellness visit, subsequent     Discussed preventative health, cancer screening, immunizations, and safety issues.  Last colonoscopy December 1, 2021 with recommendations recheck again in 5 years.  Patient can get Prevnar 20 after December 2024.  Patient had both Shingrix shots.  I recommend yearly flu shot.  Patient had Tdap vaccination 2018.  Pt can get RSV vaccine at pharmacy.         Vitamin D deficiency     Continue Vitamin D              Medicare Preventive Visit Patient Instructions  Thank you for completing your Welcome to Medicare Visit or Medicare Annual Wellness Visit today. Your next wellness visit will be due in one year (5/23/2025).  The screening/preventive services that you may require over the next 5-10 years are detailed below. Some tests may not apply to you based off risk factors and/or age. Screening tests ordered at today's visit but not completed yet may show as past due. Also, please note that scanned in results may not display  below.  Preventive Screenings:  Service Recommendations Previous Testing/Comments   Colorectal Cancer Screening  Colonoscopy    Fecal Occult Blood Test (FOBT)/Fecal Immunochemical Test (FIT)  Fecal DNA/Cologuard Test  Flexible Sigmoidoscopy Age: 45-75 years old   Colonoscopy: every 10 years (May be performed more frequently if at higher risk)  OR  FOBT/FIT: every 1 year  OR  Cologuard: every 3 years  OR  Sigmoidoscopy: every 5 years  Screening may be recommended earlier than age 45 if at higher risk for colorectal cancer. Also, an individualized decision between you and your healthcare provider will decide whether screening between the ages of 76-85 would be appropriate. Colonoscopy: 12/01/2021  FOBT/FIT: Not on file  Cologuard: Not on file  Sigmoidoscopy: Not on file          Prostate Cancer Screening Individualized decision between patient and health care provider in men between ages of 55-69   Medicare will cover every 12 months beginning on the day after your 50th birthday PSA: 3.75 ng/mL           Hepatitis C Screening Once for adults born between 1945 and 1965  More frequently in patients at high risk for Hepatitis C Hep C Antibody: 01/31/2018        Diabetes Screening 1-2 times per year if you're at risk for diabetes or have pre-diabetes Fasting glucose: 116 mg/dL (6/13/2022)  A1C: 5.6 % of total Hgb (9/25/2023)      Cholesterol Screening Once every 5 years if you don't have a lipid disorder. May order more often based on risk factors. Lipid panel: 09/25/2023         Other Preventive Screenings Covered by Medicare:  Abdominal Aortic Aneurysm (AAA) Screening: covered once if your at risk. You're considered to be at risk if you have a family history of AAA or a male between the age of 65-75 who smoking at least 100 cigarettes in your lifetime.  Lung Cancer Screening: covers low dose CT scan once per year if you meet all of the following conditions: (1) Age 55-77; (2) No signs or symptoms of lung cancer; (3)  Current smoker or have quit smoking within the last 15 years; (4) You have a tobacco smoking history of at least 20 pack years (packs per day x number of years you smoked); (5) You get a written order from a healthcare provider.  Glaucoma Screening: covered annually if you're considered high risk: (1) You have diabetes OR (2) Family history of glaucoma OR (3)  aged 50 and older OR (4)  American aged 65 and older  Osteoporosis Screening: covered every 2 years if you meet one of the following conditions: (1) Have a vertebral abnormality; (2) On glucocorticoid therapy for more than 3 months; (3) Have primary hyperparathyroidism; (4) On osteoporosis medications and need to assess response to drug therapy.  HIV Screening: covered annually if you're between the age of 15-65. Also covered annually if you are younger than 15 and older than 65 with risk factors for HIV infection. For pregnant patients, it is covered up to 3 times per pregnancy.    Immunizations:  Immunization Recommendations   Influenza Vaccine Annual influenza vaccination during flu season is recommended for all persons aged >= 6 months who do not have contraindications   Pneumococcal Vaccine   * Pneumococcal conjugate vaccine = PCV13 (Prevnar 13), PCV15 (Vaxneuvance), PCV20 (Prevnar 20)  * Pneumococcal polysaccharide vaccine = PPSV23 (Pneumovax) Adults 19-65 yo with certain risk factors or if 65+ yo  If never received any pneumonia vaccine: recommend Prevnar 20 (PCV20)  Give PCV20 if previously received 1 dose of PCV13 or PPSV23   Hepatitis B Vaccine 3 dose series if at intermediate or high risk (ex: diabetes, end stage renal disease, liver disease)   Respiratory syncytial virus (RSV) Vaccine - COVERED BY MEDICARE PART D  * RSVPreF3 (Arexvy) CDC recommends that adults 60 years of age and older may receive a single dose of RSV vaccine using shared clinical decision-making (SCDM)   Tetanus (Td) Vaccine - COST NOT COVERED BY MEDICARE  PART B Following completion of primary series, a booster dose should be given every 10 years to maintain immunity against tetanus. Td may also be given as tetanus wound prophylaxis.   Tdap Vaccine - COST NOT COVERED BY MEDICARE PART B Recommended at least once for all adults. For pregnant patients, recommended with each pregnancy.   Shingles Vaccine (Shingrix) - COST NOT COVERED BY MEDICARE PART B  2 shot series recommended in those 19 years and older who have or will have weakened immune systems or those 50 years and older     Health Maintenance Due:      Topic Date Due    Colorectal Cancer Screening  12/01/2026    Hepatitis C Screening  Completed     Immunizations Due:      Topic Date Due    COVID-19 Vaccine (5 - 2023-24 season) 09/01/2023     Advance Directives   What are advance directives?  Advance directives are legal documents that state your wishes and plans for medical care. These plans are made ahead of time in case you lose your ability to make decisions for yourself. Advance directives can apply to any medical decision, such as the treatments you want, and if you want to donate organs.   What are the types of advance directives?  There are many types of advance directives, and each state has rules about how to use them. You may choose a combination of any of the following:  Living will:  This is a written record of the treatment you want. You can also choose which treatments you do not want, which to limit, and which to stop at a certain time. This includes surgery, medicine, IV fluid, and tube feedings.   Durable power of  for healthcare (DPAHC):  This is a written record that states who you want to make healthcare choices for you when you are unable to make them for yourself. This person, called a proxy, is usually a family member or a friend. You may choose more than 1 proxy.  Do not resuscitate (DNR) order:  A DNR order is used in case your heart stops beating or you stop breathing. It is a  request not to have certain forms of treatment, such as CPR. A DNR order may be included in other types of advance directives.  Medical directive:  This covers the care that you want if you are in a coma, near death, or unable to make decisions for yourself. You can list the treatments you want for each condition. Treatment may include pain medicine, surgery, blood transfusions, dialysis, IV or tube feedings, and a ventilator (breathing machine).  Values history:  This document has questions about your views, beliefs, and how you feel and think about life. This information can help others choose the care that you would choose.  Why are advance directives important?  An advance directive helps you control your care. Although spoken wishes may be used, it is better to have your wishes written down. Spoken wishes can be misunderstood, or not followed. Treatments may be given even if you do not want them. An advance directive may make it easier for your family to make difficult choices about your care.   Weight Management   Why it is important to manage your weight:  Being overweight increases your risk of health conditions such as heart disease, high blood pressure, type 2 diabetes, and certain types of cancer. It can also increase your risk for osteoarthritis, sleep apnea, and other respiratory problems. Aim for a slow, steady weight loss. Even a small amount of weight loss can lower your risk of health problems.  How to lose weight safely:  A safe and healthy way to lose weight is to eat fewer calories and get regular exercise. You can lose up about 1 pound a week by decreasing the number of calories you eat by 500 calories each day.   Healthy meal plan for weight management:  A healthy meal plan includes a variety of foods, contains fewer calories, and helps you stay healthy. A healthy meal plan includes the following:  Eat whole-grain foods more often.  A healthy meal plan should contain fiber. Fiber is the part of  grains, fruits, and vegetables that is not broken down by your body. Whole-grain foods are healthy and provide extra fiber in your diet. Some examples of whole-grain foods are whole-wheat breads and pastas, oatmeal, brown rice, and bulgur.  Eat a variety of vegetables every day.  Include dark, leafy greens such as spinach, kale, gerhard greens, and mustard greens. Eat yellow and orange vegetables such as carrots, sweet potatoes, and winter squash.   Eat a variety of fruits every day.  Choose fresh or canned fruit (canned in its own juice or light syrup) instead of juice. Fruit juice has very little or no fiber.  Eat low-fat dairy foods.  Drink fat-free (skim) milk or 1% milk. Eat fat-free yogurt and low-fat cottage cheese. Try low-fat cheeses such as mozzarella and other reduced-fat cheeses.  Choose meat and other protein foods that are low in fat.  Choose beans or other legumes such as split peas or lentils. Choose fish, skinless poultry (chicken or turkey), or lean cuts of red meat (beef or pork). Before you cook meat or poultry, cut off any visible fat.   Use less fat and oil.  Try baking foods instead of frying them. Add less fat, such as margarine, sour cream, regular salad dressing and mayonnaise to foods. Eat fewer high-fat foods. Some examples of high-fat foods include french fries, doughnuts, ice cream, and cakes.  Eat fewer sweets.  Limit foods and drinks that are high in sugar. This includes candy, cookies, regular soda, and sweetened drinks.  Exercise:  Exercise at least 30 minutes per day on most days of the week. Some examples of exercise include walking, biking, dancing, and swimming. You can also fit in more physical activity by taking the stairs instead of the elevator or parking farther away from stores. Ask your healthcare provider about the best exercise plan for you.      © Copyright [employer] 2018 Information is for End User's use only and may not be sold, redistributed or otherwise used  for commercial purposes. All illustrations and images included in CareNotes® are the copyrighted property of A.D.A.M., Inc. or [employer]

## 2024-06-19 ENCOUNTER — TELEPHONE (OUTPATIENT)
Age: 74
End: 2024-06-19

## 2024-06-19 DIAGNOSIS — H91.90 HEARING LOSS, UNSPECIFIED HEARING LOSS TYPE, UNSPECIFIED LATERALITY: Primary | ICD-10-CM

## 2024-06-19 NOTE — TELEPHONE ENCOUNTER
Patient needs a script for [facility] has an appointment scheduled for 06/26/2024.    Please contact the patient when the schedule will be available

## 2024-06-21 PROBLEM — Z00.00 MEDICARE ANNUAL WELLNESS VISIT, SUBSEQUENT: Status: RESOLVED | Noted: 2019-03-18 | Resolved: 2024-06-21

## 2024-06-26 ENCOUNTER — OFFICE VISIT (OUTPATIENT)
Dept: AUDIOLOGY | Age: 74
End: 2024-06-26

## 2024-06-26 ENCOUNTER — OFFICE VISIT (OUTPATIENT)
Dept: AUDIOLOGY | Age: 74
End: 2024-06-26
Payer: MEDICARE

## 2024-06-26 DIAGNOSIS — H90.3 SENSORY HEARING LOSS, BILATERAL: Primary | ICD-10-CM

## 2024-06-26 DIAGNOSIS — E78.00 HYPERCHOLESTEREMIA: ICD-10-CM

## 2024-06-26 DIAGNOSIS — H91.90 HEARING LOSS, UNSPECIFIED HEARING LOSS TYPE, UNSPECIFIED LATERALITY: ICD-10-CM

## 2024-06-26 PROCEDURE — 92557 COMPREHENSIVE HEARING TEST: CPT | Performed by: AUDIOLOGIST

## 2024-06-26 PROCEDURE — 92567 TYMPANOMETRY: CPT | Performed by: AUDIOLOGIST

## 2024-06-26 RX ORDER — ATORVASTATIN CALCIUM 20 MG/1
20 TABLET, FILM COATED ORAL DAILY
Qty: 90 TABLET | Refills: 1 | Status: SHIPPED | OUTPATIENT
Start: 2024-06-26

## 2024-06-26 NOTE — PROGRESS NOTES
Diagnostic Hearing Evaluation    Name:  John F Eureyecko  :  1950  Age:  73 y.o.   MRN:  [id number]  Date of Evaluation: 24     HISTORY:     Reason for visit: Difficulty Understanding    John F Eureyecko is being seen today at the request of Dr. Joyner for an initial  evaluation of hearing. Patient reports difficulty understanding despite utilizing binaural hearing aids.  Patient denies otalgia, otorrhea, dizziness, fullness, tinnitus, noise exposure, and family history of hearing loss.     EVALUATION:    Otoscopic Evaluation:   Right Ear: Unremarkable, canal clear   Left Ear: Unremarkable, canal clear    Tympanometry:   Right Ear: Type A; normal middle ear pressure and static compliance    Left Ear: Type A; normal middle ear pressure and static compliance     Speech Audiometry:  Speech Reception (SRT)    Right Ear: 60 dB HL    Left Ear: 60 dB HL    Word Recognition Scores (WRS):  Right Ear: fair (64 % correct)     Left Ear: poor (40 % correct)    Stimuli: [id number]    Pure Tone Audiometry:  Conventional pure tone audiometry from 250 - 8000 Hz  was obtained with good reliability and revealed the following:     Right Ear: Mild sloping to profound sensorineural hearing loss (SNHL)    Left Ear: Mild sloping to profound sensorineural hearing loss (SNHL)     *see attached audiogram    IMPRESSIONS:   Mild sloping to profound SNHL, bilaterally. WRS poor in the left ear and fair in the right ear.     RECOMMENDATIONS:  Return to [facility]. for F/U and Copy to Patient/Caregiver    PATIENT EDUCATION:   The results of today's results and recommendations were reviewed with the patient and his hearing thresholds were explained at length. Treatment options, including amplification and communication strategies, were discussed as appropriate. The patient voiced understanding of his test results. Questions were addressed and the patient was encouraged to contact our department should concerns arise.      Wiliam Segundo.,  CCC-A  Clinical Audiologist  [facility]  [address] [address]

## 2024-06-26 NOTE — TELEPHONE ENCOUNTER
NOT A DUPLICATE PHARMACY NEVER RECEIVED PRESCRIPTION ON MAY 22, 2024    Reason for call:   [x] Refill   [] Prior Auth  [] Other:     Office:   [x] PCP/Provider - Dc Joyner MD   [] Specialty/Provider -     Medication: atorvastatin (LIPITOR) 20 mg tablet     Dose/Frequency: 20 mg, Oral, Daily     Quantity:  90 tablet     Pharmacy: [id number] - [address]     Does the patient have enough for 3 days?   [x] Yes   [] No - Send as HP to POD

## 2024-06-26 NOTE — PROGRESS NOTES
Hearing Aid Visit:    Name:  John F Eureyecko  :  1950  Age:  73 y.o.  MRN:  [id number]  Date of Evaluation: 24     HISTORY:    John F Eureyecko was seen today (2024) for a(n) in-warranty hearing aid check of his bilateral hearing aids. Today, Keith felt his hearing aids were providing inadequate amplification. He currently utilizes binaural hearing aids that he purchased elsewhere. Today, an updated hearing evaluation was performed and hearing aids needed to be reprogrammed to appropriate settings for hearing loss.    DEVICE INFORMATION:     Left Device Right Device   Hearing Aid Make: Phonak  Phonak    Hearing Aid Model: Audeo P70-R Audeo P70-R   Serial Number: [id number]   Repair Warranty Date: 2025   Loss/Damage Warranty Status:   Active  Active        Length/Output 2P 2P   Wax System: CeruStop CeruStop   Dome Size/Style: Medium closed Medium closed   Battery: Lithium-ion Rechargeable Lithium-ion Rechargeable      Earmold Serial Number: N/A N/A   Earmold Warranty Date:  N/A N/A    Serial Number: N/A   Warranty Date: N/A    Accessories: N/A       ACTION/ADJUSTMENTS:    Changed receivers from medium to power receivers and replaced open domes with closed domes. Reprogrammed hearing aids to initial fit and to new hearing test. Counseled patient on his hearing loss and realistic expectations for communication. Gave suggestions for communication strategies such as facing speaker, speaker gaining his attention before speaking, using context clues. Patient may need earmolds moving forward.     RECOMMENDATIONS:     Return PRN.      Luis Segundo, CCC-A  Clinical Audiologist  [facility]  [address]  [place] [address]

## 2024-10-04 LAB
25(OH)D3+25(OH)D2 SERPL-MCNC: 56 NG/ML (ref 30–100)
ALBUMIN SERPL-MCNC: 4.1 G/DL (ref 3.5–5.7)
ALP SERPL-CCNC: 60 U/L (ref 35–120)
ALT SERPL-CCNC: 25 U/L
ANION GAP SERPL CALCULATED.3IONS-SCNC: 9 MMOL/L (ref 3–11)
AST SERPL-CCNC: 19 U/L
BASOPHILS # BLD AUTO: 0.1 THOU/CMM (ref 0–0.1)
BASOPHILS NFR BLD AUTO: 2 %
BILIRUB SERPL-MCNC: 0.6 MG/DL (ref 0.2–1)
BUN SERPL-MCNC: 22 MG/DL (ref 7–28)
CALCIUM SERPL-MCNC: 9.2 MG/DL (ref 8.5–10.1)
CHLORIDE SERPL-SCNC: 105 MMOL/L (ref 100–109)
CHOLEST SERPL-MCNC: 159 MG/DL
CHOLEST/HDLC SERPL: 2.6 {RATIO}
CO2 SERPL-SCNC: 28 MMOL/L (ref 21–31)
CREAT SERPL-MCNC: 0.96 MG/DL (ref 0.53–1.3)
CYTOLOGY CMNT CVX/VAG CYTO-IMP: ABNORMAL
DIFFERENTIAL METHOD BLD: NORMAL
EOSINOPHIL # BLD AUTO: 0.2 THOU/CMM (ref 0–0.5)
EOSINOPHIL NFR BLD AUTO: 4 %
ERYTHROCYTE [DISTWIDTH] IN BLOOD BY AUTOMATED COUNT: 13.9 % (ref 12–16)
EST. AVERAGE GLUCOSE BLD GHB EST-MCNC: 117 MG/DL
GFR/BSA.PRED SERPLBLD CYS-BASED-ARV: 82 ML/MIN/{1.73_M2}
GLUCOSE SERPL-MCNC: 113 MG/DL (ref 65–99)
HBA1C MFR BLD: 5.7 %
HCT VFR BLD AUTO: 40.8 % (ref 37–48)
HDLC SERPL-MCNC: 62 MG/DL (ref 23–92)
HGB BLD-MCNC: 14.2 G/DL (ref 12.5–17)
LDLC SERPL CALC-MCNC: 84 MG/DL
LYMPHOCYTES # BLD AUTO: 2.1 THOU/CMM (ref 1–3)
LYMPHOCYTES NFR BLD AUTO: 35 %
MCH RBC QN AUTO: 31.1 PG (ref 27–36)
MCHC RBC AUTO-ENTMCNC: 34.8 G/DL (ref 32–37)
MCV RBC AUTO: 89 FL (ref 80–100)
MONOCYTES # BLD AUTO: 0.4 THOU/CMM (ref 0.3–1)
MONOCYTES NFR BLD AUTO: 7 %
NEUTROPHILS # BLD AUTO: 3.2 THOU/CMM (ref 1.8–7.8)
NEUTROPHILS NFR BLD AUTO: 52 %
NONHDLC SERPL-MCNC: 97 MG/DL
PLATELET # BLD AUTO: 200 THOU/CMM (ref 140–350)
PMV BLD REES-ECKER: 8.1 FL (ref 7.5–11.3)
POTASSIUM SERPL-SCNC: 4.4 MMOL/L (ref 3.5–5.2)
PROT SERPL-MCNC: 6.5 G/DL (ref 6.3–8.3)
RBC # BLD AUTO: 4.57 MILL/CMM (ref 4–5.4)
SHBG SERPL-SCNC: 50.1 NMOL/L
SODIUM SERPL-SCNC: 142 MMOL/L (ref 135–145)
TESTOST FREE MFR SERPL: 1.5 % (ref 1.5–3.2)
TESTOST FREE SERPL-MCNC: 63 PG/ML (ref 21–135)
TESTOST SERPL-MCNC: 407 NG/DL
TESTOSTERONE.FREE+WB MFR SERPL: 36.3 %
TESTOSTERONE.FREE+WB SERPL-MCNC: 148 NG/DL (ref 48–317)
TRIGL SERPL-MCNC: 66 MG/DL
TSH SERPL-ACNC: 1.49 UIU/ML (ref 0.45–5.33)
WBC # BLD AUTO: 6 THOU/CMM (ref 4–10.5)

## 2024-10-07 ENCOUNTER — TELEPHONE (OUTPATIENT)
Age: 74
End: 2024-10-07

## 2024-10-07 DIAGNOSIS — Z12.5 SCREENING FOR PROSTATE CANCER: Primary | ICD-10-CM

## 2024-10-07 NOTE — TELEPHONE ENCOUNTER
Patient called requesting a new order for the PSA level.Please fax order to [phone].Patient stated he had blood work completed on 10/4 or patient requesting can the PSA blood work be added to the previous labs that were drawn on 110/4 @ [facility].

## 2024-10-07 NOTE — TELEPHONE ENCOUNTER
Patient called in, wanted a message conveyed to Zak cid; had PSA done at [facility], was 4.19, 3.75 last year around same time. Confirmed upcoming appt with patient as well.

## 2024-10-07 NOTE — TELEPHONE ENCOUNTER
Lab calling stating that patient did the PSA already done through his urologist. was informed that if patient proceeds with PSA retest he will be liable for a bill if he had one done this year already.  Casi Fajardo   Transferred call to office so they can fax order to [facility] lab.

## 2024-10-07 NOTE — TELEPHONE ENCOUNTER
BUCKY   Called [facility] to confirm PSA was completed and requested a fax of results. Called patient back to advises he did not need to repeat PSA because it would be to soon, patient verbalized understanding. Advised that we would call with instructions once we receive faxed results.

## 2024-10-07 NOTE — TELEPHONE ENCOUNTER
Order entered for PSA, not sure if it can be added to those labs that he already had done, but please call to ask and let patient know, and faxed the order where he wants it

## 2024-10-08 ENCOUNTER — TELEPHONE (OUTPATIENT)
Age: 74
End: 2024-10-08

## 2024-10-08 NOTE — TELEPHONE ENCOUNTER
Patient called requested the PSA to faxed .Tried faxing from computer ,fax did not go through,please fax the external lab results from 10/4/24 to [phone].

## 2024-10-25 ENCOUNTER — OFFICE VISIT (OUTPATIENT)
Dept: AUDIOLOGY | Age: 74
End: 2024-10-25
Payer: COMMERCIAL

## 2024-10-25 DIAGNOSIS — H90.3 SENSORY HEARING LOSS, BILATERAL: Primary | ICD-10-CM

## 2024-10-25 NOTE — PROGRESS NOTES
Hearing Aid Visit:    Name:  John F Eureyecko  :  1950  Age:  74 y.o.  MRN:  [id number]  Date of Evaluation: 10/25/24     HISTORY:    John F Eureyecko was seen today (10/25/2024) for a(n) in-warranty hearing aid check of his bilateral hearing aids. Today, Keith reported his right hearing aid stopped working.      DEVICE INFORMATION:       Left Device Right Device    Hearing Aid Make: Phonak  Phonak    Hearing Aid Model: Audeo P70-R Audeo P70-R   Serial Number: [id number]   Repair Warranty Date: 2025   Loss/Damage Warranty Status:   Active  Active         Length/Output 2P 2P   Wax System: CeruStop CeruStop   Dome Size/Style: Medium closed Medium closed   Battery: Lithium-ion Rechargeable Lithium-ion Rechargeable       Earmold Serial Number: N/A N/A   Earmold Warranty Date:  N/A N/A    Serial Number: N/A   Warranty Date: N/A    Accessories: N/A       ACTION/ADJUSTMENTS:    Changed wax guard with improvement. Reconnected hearing aids to Keith's phone. Patient paid $40.00.    RECOMMENDATIONS:     Return Ynes Hannon, CCC-A  Clinical Audiologist  [facility]  [address] [place] [address] [address]  [place] [address]

## 2024-11-05 ENCOUNTER — OFFICE VISIT (OUTPATIENT)
Dept: UROLOGY | Facility: CLINIC | Age: 74
End: 2024-11-05
Payer: MEDICARE

## 2024-11-05 VITALS
SYSTOLIC BLOOD PRESSURE: 142 MMHG | DIASTOLIC BLOOD PRESSURE: 84 MMHG | HEART RATE: 61 BPM | HEIGHT: 68 IN | OXYGEN SATURATION: 97 % | BODY MASS INDEX: 26.01 KG/M2 | WEIGHT: 171.6 LBS

## 2024-11-05 DIAGNOSIS — R97.20 ELEVATED PSA: Primary | ICD-10-CM

## 2024-11-05 PROCEDURE — 99213 OFFICE O/P EST LOW 20 MIN: CPT | Performed by: PHYSICIAN ASSISTANT

## 2024-11-05 NOTE — PROGRESS NOTES
UROLOGY PROGRESS NOTE   Patient Identifiers: John Eureyecko (MRN [id number])  Date of Service: 11/5/2024    Subjective:   74-year-old man history of BPH and ED.  Last PSA was 3.75.  Current PSA 4.97.  May have had sexual activity 2 days prior to test.  All urinary voiding pattern is slower.  ED is becoming more problematic.  Testosterone was over 400.    Bladder stone reason for visit: Elevated PSA follow-up    Objective:     VITALS:    Vitals:    11/05/24 1027   BP: 142/84   Pulse: 61   SpO2: 97%           LABS:  Lab Results   Component Value Date    HGB 14.2 10/04/2024    HCT 40.8 10/04/2024    WBC 6.0 10/04/2024     10/04/2024   ]    Lab Results   Component Value Date     12/20/2016    K 4.4 10/04/2024     10/04/2024    CO2 28 10/04/2024    BUN 22 10/04/2024    CREATININE 0.96 10/04/2024    CALCIUM 9.2 10/04/2024    GLUCOSE 122 06/03/2015   ]        INPATIENT MEDS:    Current Outpatient Medications:     amLODIPine (NORVASC) 5 mg tablet, Take 1 tablet (5 mg total) by mouth daily, Disp: 90 tablet, Rfl: 3    atorvastatin (LIPITOR) 20 mg tablet, Take 1 tablet (20 mg total) by mouth daily, Disp: 90 tablet, Rfl: 1    b complex vitamins capsule, Take 1 capsule by mouth daily, Disp: , Rfl:     cholecalciferol (VITAMIN D3) 1,000 units tablet, Take 1,000 Units by mouth 2 (two) times a day, Disp: , Rfl:     ciclopirox (LOPROX) 0.77 % cream, Apply topically in the morning, Disp: , Rfl:     lisinopril (ZESTRIL) 20 mg tablet, take 1 tablet by mouth once daily ALTERNATING WITH 40 MG EVERY OTHER DAY, Disp: 90 tablet, Rfl: 3    lisinopril (ZESTRIL) 40 mg tablet, Take 1 tablet (40 mg total) by mouth daily, Disp: 90 tablet, Rfl: 3    omeprazole (PriLOSEC) 40 MG capsule, Take 2 capsules (80 mg total) by mouth daily before breakfast, Disp: 180 capsule, Rfl: 3    Probiotic Product (PROBIOTIC DAILY PO), Take by mouth daily, Disp: , Rfl:     psyllium (METAMUCIL SMOOTH TEXTURE) 58.6 % powder, Take by mouth daily,  "Disp: , Rfl:     sildenafil (VIAGRA) 100 mg tablet, TAKE ONE TABLET BY MOUTH EVERY DAY AS NEEDED FOR ERECTILE DYSFUNCTION, Disp: 90 tablet, Rfl: 3    tadalafil (CIALIS) 5 MG tablet, Take 1 tablet (5 mg total) by mouth daily, Disp: 90 tablet, Rfl: 3    ipratropium (ATROVENT) 0.06 % nasal spray, 2 sprays into each nostril every 6 (six) hours (Patient not taking: Reported on 11/5/2024), Disp: 15 mL, Rfl: 5    sildenafil (VIAGRA) 100 mg tablet, Take 1 tablet (100 mg total) by mouth daily as needed for erectile dysfunction (Patient not taking: Reported on 11/5/2024), Disp: 90 tablet, Rfl: 1      Physical Exam:   /84 (BP Location: Left arm, Patient Position: Sitting, Cuff Size: Adult)   Pulse 61   Ht 5' 8\" (1.727 m)   Wt 77.8 kg (171 lb 9.6 oz)   SpO2 97%   BMI 26.09 kg/m²   GEN: no acute distress    RESP: breathing comfortably with no accessory muscle use    ABD: soft, non-tender, non-distended   INCISION:    EXT: no significant peripheral edema   (Male): Penis circumcised, phallus normal, meatus patent.  Testicles descended into scrotum bilaterally without masses nor tenderness.  No inguinal hernias bilaterally.  LOWELL: Prostate is enlarged at 50+ grams. The prostate is not boggy. The prostate is not tender.  No nodules noted      RADIOLOGY:   None    Assessment:   1.  Elevated PSA    Plan:   -He will repeat a PSA at in about 3 to 4 weeks  -Multiparametric MRI the patient explained  -Will call with any concerning findings  -If okay will follow-up in 6 months          "

## 2024-11-12 ENCOUNTER — APPOINTMENT (OUTPATIENT)
Dept: LAB | Facility: CLINIC | Age: 74
End: 2024-11-12
Payer: MEDICARE

## 2024-11-12 DIAGNOSIS — R97.20 ELEVATED PSA: ICD-10-CM

## 2024-11-12 LAB — PSA SERPL-MCNC: 4.09 NG/ML (ref 0–4)

## 2024-11-12 PROCEDURE — 36415 COLL VENOUS BLD VENIPUNCTURE: CPT

## 2024-11-12 PROCEDURE — 84153 ASSAY OF PSA TOTAL: CPT

## 2024-11-20 ENCOUNTER — OFFICE VISIT (OUTPATIENT)
Dept: INTERNAL MEDICINE CLINIC | Facility: CLINIC | Age: 74
End: 2024-11-20
Payer: MEDICARE

## 2024-11-20 VITALS
BODY MASS INDEX: 26.13 KG/M2 | DIASTOLIC BLOOD PRESSURE: 66 MMHG | HEIGHT: 68 IN | OXYGEN SATURATION: 96 % | WEIGHT: 172.4 LBS | HEART RATE: 83 BPM | SYSTOLIC BLOOD PRESSURE: 122 MMHG

## 2024-11-20 DIAGNOSIS — K21.9 CHRONIC GERD: ICD-10-CM

## 2024-11-20 DIAGNOSIS — K20.0 EOSINOPHILIC ESOPHAGITIS: ICD-10-CM

## 2024-11-20 DIAGNOSIS — R97.20 ELEVATED PSA: ICD-10-CM

## 2024-11-20 DIAGNOSIS — R01.1 SYSTOLIC MURMUR: ICD-10-CM

## 2024-11-20 DIAGNOSIS — E78.2 MIXED HYPERLIPIDEMIA: ICD-10-CM

## 2024-11-20 DIAGNOSIS — R73.01 IMPAIRED FASTING GLUCOSE: ICD-10-CM

## 2024-11-20 DIAGNOSIS — I10 HTN (HYPERTENSION), BENIGN: Primary | ICD-10-CM

## 2024-11-20 PROCEDURE — 99214 OFFICE O/P EST MOD 30 MIN: CPT | Performed by: INTERNAL MEDICINE

## 2024-11-20 PROCEDURE — G2211 COMPLEX E/M VISIT ADD ON: HCPCS | Performed by: INTERNAL MEDICINE

## 2024-11-20 NOTE — ASSESSMENT & PLAN NOTE
Follow up GI and continue PPI as per GI.  Patient has tried titrating down to omeprazole 40 mg once daily and has not noticed any change in his symptoms    Orders:    omeprazole (PriLOSEC) 20 mg delayed release capsule; Take 1 capsule (20 mg total) by mouth 2 (two) times a day

## 2024-11-20 NOTE — ASSESSMENT & PLAN NOTE
Very soft systolic murmur, cardiac echo done 2018 which did not show any evidence of of aortic stenosis    Orders:    Ambulatory Referral to Cardiology; Future

## 2024-11-20 NOTE — ASSESSMENT & PLAN NOTE
Discussed with patient his elevated 10-year estimated risk for atherosclerotic cardiovascular disease, and recommendation to continue statin, patient is on 20 mg daily.  Patient has some concerns about long-term use of statins, discussed that they are still being recommended for their role in preventing heart attack and stroke.  I recommend patient get coenzyme Q 10 100 mg daily available over-the-counter to help with any potential aches if related to statin.    Orders:    Ambulatory Referral to Cardiology; Future

## 2024-11-20 NOTE — PATIENT INSTRUCTIONS
Problem List Items Addressed This Visit          Cardiovascular and Mediastinum    HTN (hypertension), benign - Primary    Patient has been alternating lisinopril 20 mg and 40 mg daily.  He is also on amlodipine 5 mg daily.  Blood pressure is at goal, continue current meds            Digestive    Chronic GERD    Follow up GI and continue PPI as per GI         Relevant Medications    omeprazole (PriLOSEC) 20 mg delayed release capsule    Eosinophilic esophagitis    Follow up GI and continue PPI as per GI         Relevant Medications    omeprazole (PriLOSEC) 20 mg delayed release capsule       Endocrine    Impaired fasting glucose    Hemoglobin A1c good at 5.7, continue with healthy diet and exercise            Urinary    Elevated PSA    Patient has been following with your allergy, and is going to get an MRI of the prostate.            Other    Mixed hyperlipidemia    Discussed with patient his elevated 10-year estimated risk for atherosclerotic cardiovascular disease, and recommendation to continue statin, patient is on 20 mg daily.  Patient has some concerns about long-term use of statins, discussed that they are still being recommended for their role in preventing heart attack and stroke         Systolic murmur    Very soft systolic murmur, cardiac echo done 2018 which did not show any evidence of of aortic stenosis

## 2024-11-20 NOTE — PROGRESS NOTES
Name: John F Eureyecko      : 1950      MRN: [id number]  Encounter Provider: Dc Joyner MD  Encounter Date: 2024   Encounter department: [facility]    Assessment & Plan  Elevated PSA  Patient has been following with your allergy, and is going to get an MRI of the prostate.         Mixed hyperlipidemia  Discussed with patient his elevated 10-year estimated risk for atherosclerotic cardiovascular disease, and recommendation to continue statin, patient is on 20 mg daily.  Patient has some concerns about long-term use of statins, discussed that they are still being recommended for their role in preventing heart attack and stroke.  I recommend patient get coenzyme Q 10 100 mg daily available over-the-counter to help with any potential aches if related to statin.    Orders:    Ambulatory Referral to Cardiology; Future    HTN (hypertension), benign  Patient has been alternating lisinopril 20 mg and 40 mg daily.  He is also on amlodipine 5 mg daily.  Blood pressure is at goal, continue current meds         Impaired fasting glucose  Hemoglobin A1c good at 5.7, continue with healthy diet and exercise         Eosinophilic esophagitis  Follow up GI and continue PPI as per GI         Chronic GERD  Follow up GI and continue PPI as per GI.  Patient has tried titrating down to omeprazole 40 mg once daily and has not noticed any change in his symptoms    Orders:    omeprazole (PriLOSEC) 20 mg delayed release capsule; Take 1 capsule (20 mg total) by mouth 2 (two) times a day    Systolic murmur  Very soft systolic murmur, cardiac echo done 2018 which did not show any evidence of of aortic stenosis    Orders:    Ambulatory Referral to Cardiology; Future         History of Present Illness     Patient here for regular follow-up and some concerns      Review of Systems   Constitutional:  Negative for chills, fatigue and fever.   HENT:  Negative for congestion, nosebleeds, postnasal drip, sore  throat and trouble swallowing.    Eyes:  Negative for pain.   Respiratory:  Negative for cough, chest tightness, shortness of breath and wheezing.    Cardiovascular:  Negative for chest pain, palpitations and leg swelling.   Gastrointestinal:  Negative for abdominal pain, constipation, diarrhea, nausea and vomiting.   Endocrine: Negative for polydipsia and polyuria.   Genitourinary:  Negative for dysuria, flank pain and hematuria.   Musculoskeletal:  Negative for arthralgias.   Skin:  Negative for rash.   Neurological:  Negative for dizziness, tremors, light-headedness and headaches.   Hematological:  Does not bruise/bleed easily.   Psychiatric/Behavioral:  Negative for confusion and dysphoric mood. The patient is not nervous/anxious.      Past Medical History:   Diagnosis Date    Anxiety     Cancer (HCC)     scc back    Carpal tunnel syndrome     Constipation     with general anesthesia    ED (erectile dysfunction)     GERD (gastroesophageal reflux disease)     [place] (hard of hearing)     wears hearing aids    Hypercholesteremia     Hypertension     Numbness in both hands      Past Surgical History:   Procedure Laterality Date    APPENDECTOMY      As a child    CAST APPLICATION Right 8/27/2020    Procedure: Application short-arm splint;  Surgeon: James Dsouza MD;  Location: [facility];  Service: Orthopedics    COLONOSCOPY      INGUINAL HERNIA REPAIR Left 2015    MASS EXCISION N/A 8/14/2018    Procedure: BACK/TRUNK MULTIPLE SEBBORHEIC KERATOSIS SHAVE/EXCISION; COMPLEX CLOSURE VERSUS FLAP RECONSTRUCTION;  Surgeon: José Miguel Acosta MD;  Location: [facility];  Service: Plastics    MOHS RECONSTRUCTION Right 6/23/2022    Procedure: RECONSTRUCTION MOHS DEFECT RIGHT DORSAL HAND AND LOCAL FLAP;  Surgeon: José Miguel Acosta MD;  Location: [facility];  Service: Plastics    AZ EXC B9 LESION MRGN XCP SK TG S/N/H/F/G > 4.0CM Right 6/23/2022    Procedure: EXCISION RIGHT NECK SKIN LESION AND COMPLEX CLOSURE;  Surgeon:  José Miguel Acosta MD;  Location: [facility];  Service: Plastics    IN FASCIOTOMY PALMAR OPEN PARTIAL Right 8/27/2020    Procedure: Excision Dupuytren's central cord right long finger with release of metacarpophalangeal joint.  Excision Dupuytren's right ring finger spiral cord ulnar aspect with release of the proximal interphalangeal joint.  Excision Dupuytren's right small finger central cord and spiral cord to both radial and ulnar side, as well as ulnar-sided cord of the digital sheath and release of the proximal    IN NDSC WRST SURG W/RLS TRANSVRS CARPL LIGM Right 10/20/2020    Procedure: RELEASE CARPAL TUNNEL ENDOSCOPIC;  Surgeon: James Dsouza MD;  Location: [facility];  Service: Orthopedics    IN NDSC WRST SURG W/RLS TRANSVRS CARPL LIGM Left 11/4/2020    Procedure: RELEASE CARPAL TUNNEL ENDOSCOPIC;  Surgeon: James Dsouza MD;  Location: [facility];  Service: Orthopedics    IN NEUROPLASTY &/TRANSPOSITION ULNAR NERVE ELBOW Right 10/20/2020    Procedure: RELEASE CUBITAL TUNNEL;  Surgeon: James Dsouza MD;  Location[facility];  Service: Orthopedics    IN NEUROPLASTY &/TRANSPOSITION ULNAR NERVE ELBOW Left 11/4/2020    Procedure: RELEASE CUBITAL TUNNEL;  Surgeon: James Dsouza MD;  [facility];  Service: Orthopedics    IN TENDON SHEATH INCISION Right 8/27/2020    Procedure: Right long finger trigger finger release;  Surgeon: James Dsouza MD;  Location: [facility];  Service: Orthopedics    SQUAMOUS CELL CARCINOMA EXCISION N/A 8/14/2018    Procedure: BACK SCC IN SITU EXCISION;  Surgeon: José Miguel Acosta MD;  Location: [facility];  Service: Plastics     Family History   Problem Relation Age of Onset    Colon cancer Father     Heart attack Mother     Breast cancer Mother     Hypertension Mother      Social History     Tobacco Use    Smoking status: Never    Smokeless tobacco: Never   Vaping Use    Vaping status: Never Used   Substance and Sexual Activity    Alcohol use: Yes      Alcohol/week: 15.0 standard drinks of alcohol     Types: 15 Standard drinks or equivalent per week     Comment: 2 Drinks a night    Drug use: Never    Sexual activity: Yes     Partners: Female     Birth control/protection: None     Current Outpatient Medications on File Prior to Visit   Medication Sig    amLODIPine (NORVASC) 5 mg tablet Take 1 tablet (5 mg total) by mouth daily    atorvastatin (LIPITOR) 20 mg tablet Take 1 tablet (20 mg total) by mouth daily    b complex vitamins capsule Take 1 capsule by mouth daily    cholecalciferol (VITAMIN D3) 1,000 units tablet Take 1,000 Units by mouth 2 (two) times a day    ciclopirox (LOPROX) 0.77 % cream Apply topically in the morning    lisinopril (ZESTRIL) 20 mg tablet take 1 tablet by mouth once daily ALTERNATING WITH 40 MG EVERY OTHER DAY    lisinopril (ZESTRIL) 40 mg tablet Take 1 tablet (40 mg total) by mouth daily    omeprazole (PriLOSEC) 40 MG capsule Take 2 capsules (80 mg total) by mouth daily before breakfast    Probiotic Product (PROBIOTIC DAILY PO) Take by mouth daily    psyllium (METAMUCIL SMOOTH TEXTURE) 58.6 % powder Take by mouth daily    sildenafil (VIAGRA) 100 mg tablet TAKE ONE TABLET BY MOUTH EVERY DAY AS NEEDED FOR ERECTILE DYSFUNCTION    tadalafil (CIALIS) 5 MG tablet Take 1 tablet (5 mg total) by mouth daily    ipratropium (ATROVENT) 0.06 % nasal spray 2 sprays into each nostril every 6 (six) hours (Patient not taking: Reported on 11/5/2024)    sildenafil (VIAGRA) 100 mg tablet Take 1 tablet (100 mg total) by mouth daily as needed for erectile dysfunction (Patient not taking: Reported on 11/5/2024)     No Known Allergies  Immunization History   Administered Date(s) Administered    COVID-19 MODERNA VACC 0.5 ML IM 01/19/2021, 02/16/2021, 10/28/2021    COVID-19 Pfizer Vac BIVALENT Thomas-sucrose 12 Yr+ IM 11/18/2022    INFLUENZA 11/02/2015, 01/30/2017, 02/06/2018, 09/05/2018, 10/12/2021    Influenza Split High Dose Preservative Free IM 11/02/2015,  "01/30/2017, 02/06/2018    Influenza, high dose seasonal 0.7 mL 09/05/2018, 09/11/2019, 09/30/2020, 10/12/2021, 11/16/2022    Influenza, seasonal, injectable 11/17/2011    Pneumococcal Conjugate 13-Valent 01/30/2017, 09/10/2018    Pneumococcal Polysaccharide PPV23 09/03/2014, 12/06/2019    Tdap 09/10/2018    Zoster 10/07/2014    Zoster Vaccine Recombinant 05/07/2019, 07/11/2019     Objective   /66 (BP Location: Left arm, Patient Position: Sitting, Cuff Size: Large)   Pulse 83   Ht 5' 8\" (1.727 m)   Wt 78.2 kg (172 lb 6.4 oz)   SpO2 96%   BMI 26.21 kg/m²     Physical Exam  Vitals reviewed.   Constitutional:       General: He is not in acute distress.     Appearance: Normal appearance. He is well-developed.   HENT:      Head: Normocephalic and atraumatic.      Right Ear: External ear normal.      Left Ear: External ear normal.      Nose: Nose normal.   Eyes:      General: No scleral icterus.     Conjunctiva/sclera: Conjunctivae normal.   Neck:      Trachea: No tracheal deviation.   Cardiovascular:      Rate and Rhythm: Normal rate and regular rhythm.      Heart sounds: Murmur (soft systolic) heard.   Pulmonary:      Effort: Pulmonary effort is normal. No respiratory distress.      Breath sounds: Normal breath sounds. No wheezing or rales.   Abdominal:      General: Bowel sounds are normal.      Palpations: Abdomen is soft. There is no mass.      Tenderness: There is no abdominal tenderness. There is no guarding.   Musculoskeletal:      Cervical back: Normal range of motion and neck supple.      Right lower leg: No edema.      Left lower leg: No edema.   Lymphadenopathy:      Cervical: No cervical adenopathy.   Skin:     Coloration: Skin is not jaundiced or pale.   Neurological:      General: No focal deficit present.      Mental Status: He is alert and oriented to person, place, and time.   Psychiatric:         Mood and Affect: Mood normal.         Behavior: Behavior normal.         Thought Content: Thought " content normal.         Judgment: Judgment normal.

## 2024-11-20 NOTE — ASSESSMENT & PLAN NOTE
Patient has been alternating lisinopril 20 mg and 40 mg daily.  He is also on amlodipine 5 mg daily.  Blood pressure is at goal, continue current meds

## 2024-11-22 ENCOUNTER — TELEPHONE (OUTPATIENT)
Dept: OTHER | Facility: OTHER | Age: 74
End: 2024-11-22

## 2024-11-22 NOTE — TELEPHONE ENCOUNTER
Patient called to confirm appointment with Dr. Haddad on 12/20/24 at 2:20PM. Appointment details given to patient. No further questions at this time.

## 2024-11-26 ENCOUNTER — HOSPITAL ENCOUNTER (OUTPATIENT)
Facility: MEDICAL CENTER | Age: 74
Discharge: HOME | End: 2024-11-26
Payer: MEDICARE

## 2024-11-26 DIAGNOSIS — R97.20 ELEVATED PSA: ICD-10-CM

## 2024-11-26 PROCEDURE — A9585 GADOBUTROL INJECTION: HCPCS | Performed by: PHYSICIAN ASSISTANT

## 2024-11-26 PROCEDURE — 76377 3D RENDER W/INTRP POSTPROCES: CPT

## 2024-11-26 PROCEDURE — 72197 MRI PELVIS W/O & W/DYE: CPT

## 2024-11-26 RX ORDER — GADOBUTROL 604.72 MG/ML
7 INJECTION INTRAVENOUS
Status: COMPLETED | OUTPATIENT
Start: 2024-11-26 | End: 2024-11-26

## 2024-11-26 RX ADMIN — GADOBUTROL 7 ML: 604.72 INJECTION INTRAVENOUS at 13:43

## 2024-12-20 ENCOUNTER — OFFICE VISIT (OUTPATIENT)
Dept: CARDIOLOGY CLINIC | Facility: CLINIC | Age: 74
End: 2024-12-20
Payer: MEDICARE

## 2024-12-20 VITALS
HEART RATE: 88 BPM | BODY MASS INDEX: 26.22 KG/M2 | DIASTOLIC BLOOD PRESSURE: 76 MMHG | WEIGHT: 173 LBS | SYSTOLIC BLOOD PRESSURE: 140 MMHG | HEIGHT: 68 IN

## 2024-12-20 DIAGNOSIS — I25.10 CORONARY ARTERY DISEASE INVOLVING NATIVE CORONARY ARTERY OF NATIVE HEART WITHOUT ANGINA PECTORIS: Primary | ICD-10-CM

## 2024-12-20 DIAGNOSIS — I10 HTN (HYPERTENSION), BENIGN: ICD-10-CM

## 2024-12-20 DIAGNOSIS — R01.1 SYSTOLIC MURMUR: ICD-10-CM

## 2024-12-20 DIAGNOSIS — E78.2 MIXED HYPERLIPIDEMIA: ICD-10-CM

## 2024-12-20 PROCEDURE — 99204 OFFICE O/P NEW MOD 45 MIN: CPT | Performed by: INTERNAL MEDICINE

## 2024-12-27 DIAGNOSIS — E78.00 HYPERCHOLESTEREMIA: ICD-10-CM

## 2024-12-27 RX ORDER — ATORVASTATIN CALCIUM 20 MG/1
20 TABLET, FILM COATED ORAL DAILY
Qty: 90 TABLET | Refills: 1 | Status: SHIPPED | OUTPATIENT
Start: 2024-12-27

## 2024-12-27 NOTE — TELEPHONE ENCOUNTER
Patient called to request a refill for their atorvastatin advised a refill was requested on 12/27/24 and is pending approval. Patient verbalized understanding and is in agreement.   Verified pharmacy and Patient does have enough medication for 4 more days

## 2024-12-30 NOTE — PROGRESS NOTES
Cardiology Follow Up    John F Eureyecko  1950  [id number]  CARDIOVASC PHYSICIAN  [address] [place], [place] [id number]  [phone]  [phone]    1. Coronary artery disease involving native coronary artery of native heart without angina pectoris  NM myocardial perfusion spect (stress and/or rest)      2. Mixed hyperlipidemia  Ambulatory Referral to Cardiology      3. Systolic murmur  Ambulatory Referral to Cardiology    Echo complete w/ contrast if indicated      4. HTN (hypertension), benign            Interval History: Keith presents for reevaluation.  I have not seen him since 2021.  I previously evaluated him for chest pain and shortness of breath.  He had a catheterization in 2018 that revealed mild nonobstructive epicardial coronary artery disease.    He states over the past 6 months he has been noting more dyspnea with exertion.  He states it comes on with going up an incline or walking up steps.  He typically does not have it on level surface.  His activity continues to be vigorous and he continues to do activities such as skiing without limitation.  He denies chest pain orthopnea paroxysmal nocturnal dyspnea or syncope.    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    Trigger little finger of right hand    Anxiety    Radiculopathy, cervical region    Dupuytren's contracture of right hand    Neck pain    Abnormal MRI    Cubital tunnel syndrome of both upper extremities    History of anesthesia complications    Neck stiffness    Vitamin D deficiency    Other benign neoplasm of skin of scalp and neck    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    Eosinophilic  esophagitis    Change in bowel function    Elevated PSA    Systolic murmur    Coronary artery disease     Past Medical History:   Diagnosis Date    Anxiety     Cancer (HCC)     scc back    Carpal tunnel syndrome     Constipation     with general anesthesia    ED (erectile dysfunction)     GERD (gastroesophageal reflux disease)     Elim IRA (hard of hearing)     wears hearing aids    Hypercholesteremia     Hypertension     Numbness in both hands      Social History     Socioeconomic History    Marital status: /Civil Union     Spouse name: Not on file    Number of children: Not on file    Years of education: Not on file    Highest education level: Not on file   Occupational History    Occupation:    Tobacco Use    Smoking status: Never    Smokeless tobacco: Never   Vaping Use    Vaping status: Never Used   Substance and Sexual Activity    Alcohol use: Yes     Alcohol/week: 15.0 standard drinks of alcohol     Types: 15 Standard drinks or equivalent per week     Comment: 2 Drinks a night    Drug use: Never    Sexual activity: Yes     Partners: Female     Birth control/protection: None   Other Topics Concern    Not on file   Social History Narrative    Not on file     Social Drivers of Health     Financial Resource Strain: Low Risk  (5/10/2023)    Overall Financial Resource Strain (CARDIA)     Difficulty of Paying Living Expenses: Not hard at all   Food Insecurity: No Food Insecurity (5/15/2024)    Nursing - Inadequate Food Risk Classification     Worried About Running Out of Food in the Last Year: Never true     Ran Out of Food in the Last Year: Never true     Ran Out of Food in the Last Year: Not on file   Transportation Needs: No Transportation Needs (5/15/2024)    PRAPARE - Transportation     Lack of Transportation (Medical): No     Lack of Transportation (Non-Medical): No   Physical Activity: Not on file   Stress: Not on file   Social Connections: Not on file   Intimate Partner Violence: Not on  file   Housing Stability: Low Risk  (5/15/2024)    Housing Stability Vital Sign     Unable to Pay for Housing in the Last Year: No     Number of Times Moved in the Last Year: 0     Homeless in the Last Year: No      Family History   Problem Relation Age of Onset    Colon cancer Father     Heart attack Mother     Breast cancer Mother     Hypertension Mother      Past Surgical History:   Procedure Laterality Date    APPENDECTOMY      As a child    CAST APPLICATION Right 8/27/2020    Procedure: Application short-arm splint;  Surgeon: James Dsouza MD;  Location: [facility];  Service: Orthopedics    COLONOSCOPY      INGUINAL HERNIA REPAIR Left 2015    MASS EXCISION N/A 8/14/2018    Procedure: BACK/TRUNK MULTIPLE SEBBORHEIC KERATOSIS SHAVE/EXCISION; COMPLEX CLOSURE VERSUS FLAP RECONSTRUCTION;  Surgeon: José Miguel Acosta MD;  Location: [facility];  Service: Plastics    MOHS RECONSTRUCTION Right 6/23/2022    Procedure: RECONSTRUCTION MOHS DEFECT RIGHT DORSAL HAND AND LOCAL FLAP;  Surgeon: José Miguel Acosta MD;  Location: [facility];  Service: Plastics    AK EXC B9 LESION MRGN XCP SK TG S/N/H/F/G > 4.0CM Right 6/23/2022    Procedure: EXCISION RIGHT NECK SKIN LESION AND COMPLEX CLOSURE;  Surgeon: José Miguel Acosta MD;  Location: [facility];  Service: Plastics    AK FASCIOTOMY PALMAR OPEN PARTIAL Right 8/27/2020    Procedure: Excision Dupuytren's central cord right long finger with release of metacarpophalangeal joint.  Excision Dupuytren's right ring finger spiral cord ulnar aspect with release of the proximal interphalangeal joint.  Excision Dupuytren's right small finger central cord and spiral cord to both radial and ulnar side, as well as ulnar-sided cord of the digital sheath and release of the proximal    AK NDSC WRST SURG W/RLS TRANSVRS CARPL LIGM Right 10/20/2020    Procedure: RELEASE CARPAL TUNNEL ENDOSCOPIC;  Surgeon: James Dsouza MD;  Location: [facility];  Service: Orthopedics    AK NDSC WRST  SURG W/RLS TRANSVRS CARPL LIGM Left 11/4/2020    Procedure: RELEASE CARPAL TUNNEL ENDOSCOPIC;  Surgeon: James Dsouza MD;  [facility];  Service: Orthopedics    LA NEUROPLASTY &/TRANSPOSITION ULNAR NERVE ELBOW Right 10/20/2020    Procedure: RELEASE CUBITAL TUNNEL;  Surgeon: James Dsouza MD;  [facility];  Service: Orthopedics    LA NEUROPLASTY &/TRANSPOSITION ULNAR NERVE ELBOW Left 11/4/2020    Procedure: RELEASE CUBITAL TUNNEL;  Surgeon: James Dsouza MD;  [facility];  Service: Orthopedics    LA TENDON SHEATH INCISION Right 8/27/2020    Procedure: Right long finger trigger finger release;  Surgeon: James Dsouza MD;  Location: [facility];  Service: Orthopedics    SQUAMOUS CELL CARCINOMA EXCISION N/A 8/14/2018    Procedure: BACK SCC IN SITU EXCISION;  Surgeon: José Miguel Acosta MD;  Location: [facility];  Service: Plastics       Current Outpatient Medications:     amLODIPine (NORVASC) 5 mg tablet, Take 1 tablet (5 mg total) by mouth daily, Disp: 90 tablet, Rfl: 3    atorvastatin (LIPITOR) 20 mg tablet, take 1 tablet by mouth once daily, Disp: 90 tablet, Rfl: 1    b complex vitamins capsule, Take 1 capsule by mouth daily, Disp: , Rfl:     cholecalciferol (VITAMIN D3) 1,000 units tablet, Take 1,000 Units by mouth 2 (two) times a day, Disp: , Rfl:     ciclopirox (LOPROX) 0.77 % cream, Apply topically in the morning, Disp: , Rfl:     lisinopril (ZESTRIL) 20 mg tablet, take 1 tablet by mouth once daily ALTERNATING WITH 40 MG EVERY OTHER DAY, Disp: 90 tablet, Rfl: 3    lisinopril (ZESTRIL) 40 mg tablet, Take 1 tablet (40 mg total) by mouth daily, Disp: 90 tablet, Rfl: 3    omeprazole (PriLOSEC) 20 mg delayed release capsule, Take 1 capsule (20 mg total) by mouth 2 (two) times a day, Disp: 200 capsule, Rfl: 3    Probiotic Product (PROBIOTIC DAILY PO), Take by mouth daily, Disp: , Rfl:     psyllium (METAMUCIL SMOOTH TEXTURE) 58.6 % powder, Take by mouth daily, Disp: ,  Rfl:     sildenafil (VIAGRA) 100 mg tablet, TAKE ONE TABLET BY MOUTH EVERY DAY AS NEEDED FOR ERECTILE DYSFUNCTION, Disp: 90 tablet, Rfl: 3    tadalafil (CIALIS) 5 MG tablet, Take 1 tablet (5 mg total) by mouth daily, Disp: 90 tablet, Rfl: 3  No Known Allergies    Labs:  Appointment on 11/12/2024   Component Date Value    PSA, Diagnostic 11/12/2024 4.087 (H)    Orders Only on 10/04/2024   Component Date Value    Hemoglobin 10/04/2024 14.2     HCT 10/04/2024 40.8     White Blood Cell Count 10/04/2024 6.0     Red Blood Cell Count 10/04/2024 4.57     Platelet Count 10/04/2024 200     SL AMB MPV 10/04/2024 8.1     MCV 10/04/2024 89     MCH 10/04/2024 31.1     MCHC 10/04/2024 34.8     RDW 10/04/2024 13.9     Differential Type 10/04/2024 AUTO     Neutrophils (Absolute) 10/04/2024 3.2     Lymphocytes (Absolute) 10/04/2024 2.1     Monocytes (Absolute) 10/04/2024 0.4     Eosinophils (Absolute) 10/04/2024 0.2     Basophils ABS 10/04/2024 0.1     Neutrophils 10/04/2024 52     Lymphocytes 10/04/2024 35     Monocytes 10/04/2024 7     Eosinophils 10/04/2024 4     Basophils PCT 10/04/2024 2     Glucose, Random 10/04/2024 113 (H)     BUN 10/04/2024 22     Creatinine 10/04/2024 0.96     Sodium 10/04/2024 142     Potassium 10/04/2024 4.4     Chloride 10/04/2024 105     CO2 10/04/2024 28     Calcium 10/04/2024 9.2     Alkaline Phosphatase 10/04/2024 60     Albumin 10/04/2024 4.1     TOTAL BILIRUBIN 10/04/2024 0.6     Protein, Total 10/04/2024 6.5     AST 10/04/2024 19     ALT 10/04/2024 25     ANION GAP 10/04/2024 9     eGFR 10/04/2024 82     Comment 10/04/2024 (Note)     Cholesterol, Total 10/04/2024 159     Triglycerides 10/04/2024 66     HDL Cholesterol 10/04/2024 62     Non HDL Chol. (LDL+VLDL) 10/04/2024 97     Chol HDLC Ratio 10/04/2024 2.6     LDL Calculated 10/04/2024 84     TESTOSTERONE TOTAL 10/04/2024 407     Testosterone, Free 10/04/2024 63.0     Testosterone,Free 10/04/2024 1.5     Testosterone, Bioavailab* 10/04/2024 148      BIOAVAILABLE TESTOSTERON* 10/04/2024 36.3     Sex Hormone Binding 10/04/2024 50.1     TSH 10/04/2024 1.49     25-HYDROXY VIT D 10/04/2024 56     Hemoglobin A1C 10/04/2024 5.7 (H)     Estimated Average Glucose 10/04/2024 117      Imaging: No results found.    Review of Systems:  Review of Systems   Constitutional:  Negative for chills and fever.   HENT:  Positive for hearing loss. Negative for ear pain and sore throat.    Eyes:  Negative for pain and visual disturbance.   Respiratory:  Negative for cough and shortness of breath.    Cardiovascular:  Negative for chest pain and palpitations.   Gastrointestinal:  Negative for abdominal pain and vomiting.   Genitourinary:  Negative for dysuria and hematuria.   Musculoskeletal:  Negative for arthralgias and back pain.   Skin:  Negative for color change and rash.   Neurological:  Negative for seizures and syncope.   All other systems reviewed and are negative.      Physical Exam:  Physical Exam  Vitals and nursing note reviewed.   Constitutional:       Appearance: Normal appearance.   HENT:      Head: Normocephalic and atraumatic.      Nose: Nose normal.      Mouth/Throat:      Mouth: Mucous membranes are moist.   Eyes:      Conjunctiva/sclera: Conjunctivae normal.   Cardiovascular:      Rate and Rhythm: Normal rate and regular rhythm.      Heart sounds: Murmur heard.   Pulmonary:      Effort: Pulmonary effort is normal.      Breath sounds: Normal breath sounds.   Abdominal:      Palpations: Abdomen is soft.   Musculoskeletal:         General: Normal range of motion.      Cervical back: Normal range of motion.   Skin:     General: Skin is warm and dry.   Neurological:      General: No focal deficit present.      Mental Status: He is alert and oriented to person, place, and time.   Psychiatric:         Mood and Affect: Mood normal.         Discussion/Summary: Keith has a history of hypertension and hyperlipidemia.  As noted catheterization in 2018 showed mild  nonobstructive epicardial coronary artery disease.  He is maintained on atorvastatin 20 mg daily amlodipine 5 mg daily and lisinopril 40 mg alternating with 20 mg.  He is concerned regarding his symptoms of dyspnea on exertion.  We will reevaluate him with nuclear stress  testing and a 2D echocardiogram to evaluate for ischemic heart disease and get an idea of left-ventricular size and valvular function.  I should note that he had a slight systolic ejection murmur.  I will I will be in touch with him with the results.  He will continue with his current medical regimen.

## 2025-01-03 ENCOUNTER — HOSPITAL ENCOUNTER (OUTPATIENT)
Dept: NON INVASIVE DIAGNOSTICS | Facility: CLINIC | Age: 75
Discharge: HOME | End: 2025-01-03
Payer: MEDICARE

## 2025-01-03 VITALS
WEIGHT: 173 LBS | BODY MASS INDEX: 26.22 KG/M2 | DIASTOLIC BLOOD PRESSURE: 76 MMHG | HEIGHT: 68 IN | HEART RATE: 88 BPM | SYSTOLIC BLOOD PRESSURE: 140 MMHG

## 2025-01-03 VITALS — WEIGHT: 173 LBS | HEIGHT: 68 IN | BODY MASS INDEX: 26.22 KG/M2

## 2025-01-03 DIAGNOSIS — R01.1 SYSTOLIC MURMUR: ICD-10-CM

## 2025-01-03 DIAGNOSIS — I25.10 CORONARY ARTERY DISEASE INVOLVING NATIVE CORONARY ARTERY OF NATIVE HEART WITHOUT ANGINA PECTORIS: ICD-10-CM

## 2025-01-03 LAB
AORTIC ROOT: 3.3 CM
APICAL FOUR CHAMBER EJECTION FRACTION: 68 %
ASCENDING AORTA: 3.3 CM
CHEST PAIN STATEMENT: NORMAL
E WAVE DECELERATION TIME: 157 MS
E/A RATIO: 1.01
FRACTIONAL SHORTENING: 41 (ref 28–44)
INTERVENTRICULAR SEPTUM IN DIASTOLE (PARASTERNAL SHORT AXIS VIEW): 1.2 CM
INTERVENTRICULAR SEPTUM: 1.2 CM (ref 0.6–1.1)
LAAS-AP2: 18.8 CM2
LAAS-AP4: 15.2 CM2
LEFT ATRIUM SIZE: 3.8 CM
LEFT ATRIUM VOLUME (MOD BIPLANE): 49 ML
LEFT ATRIUM VOLUME INDEX (MOD BIPLANE): 25.5 ML/M2
LEFT INTERNAL DIMENSION IN SYSTOLE: 2.4 CM (ref 2.1–4)
LEFT VENTRICULAR INTERNAL DIMENSION IN DIASTOLE: 4.1 CM (ref 3.5–6)
LEFT VENTRICULAR POSTERIOR WALL IN END DIASTOLE: 1.2 CM
LEFT VENTRICULAR STROKE VOLUME: 54 ML
LVSV (TEICH): 54 ML
MAX DIASTOLIC BP: 74 MMHG
MAX HR PERCENT: 90 %
MAX HR: 130 BPM
MAX PREDICTED HEART RATE: 144 BPM
MV E'TISSUE VEL-SEP: 11 CM/S
MV PEAK A VEL: 0.86 M/S
MV PEAK E VEL: 87 CM/S
MV STENOSIS PRESSURE HALF TIME: 45 MS
MV VALVE AREA P 1/2 METHOD: 4.89
NUC STRESS EJECTION FRACTION: 73 %
PROTOCOL NAME: NORMAL
RA PRESSURE ESTIMATED: 3 MMHG
RATE PRESSURE PRODUCT: NORMAL
RIGHT ATRIAL 2D VOLUME: 37 ML
RIGHT ATRIUM AREA SYSTOLE A4C: 15.1 CM2
RIGHT VENTRICLE ID DIMENSION: 4 CM
RV PSP: 31 MMHG
SL CV LEFT ATRIUM LENGTH A2C: 5.6 CM
SL CV LV EF: 65
SL CV PED ECHO LEFT VENTRICLE DIASTOLIC VOLUME (MOD BIPLANE) 2D: 74 ML
SL CV PED ECHO LEFT VENTRICLE SYSTOLIC VOLUME (MOD BIPLANE) 2D: 20 ML
SL CV REST NUCLEAR ISOTOPE DOSE: 10.39 MCI
SL CV STRESS NUCLEAR ISOTOPE DOSE: 33 MCI
SL CV STRESS RECOVERY BP: NORMAL MMHG
SL CV STRESS RECOVERY HR: 82 BPM
SL CV STRESS RECOVERY O2 SAT: 98 %
SL CV STRESS STAGE REACHED: 3
STRESS ANGINA INDEX: 0
STRESS BASELINE BP: NORMAL MMHG
STRESS BASELINE HR: 60 BPM
STRESS DUKE TREADMILL SCORE: -1
STRESS O2 SAT REST: 98 %
STRESS PEAK HR: 130 BPM
STRESS POST ESTIMATED WORKLOAD: 10.1 METS
STRESS POST EXERCISE DUR MIN: 8 MIN
STRESS POST EXERCISE DUR MIN: 8 MIN
STRESS POST EXERCISE DUR SEC: 32 SEC
STRESS POST EXERCISE DUR SEC: 32 SEC
STRESS POST O2 SAT PEAK: 98 %
STRESS POST PEAK BP: 160 MMHG
STRESS POST PEAK HR: 130 BPM
STRESS POST PEAK SYSTOLIC BP: 160 MMHG
STRESS ST DEPRESSION: 2 MM
STRESS/REST PERFUSION RATIO: 1.04
TARGET HR FORMULA: NORMAL
TEST INDICATION: NORMAL
TR MAX PG: 28 MMHG
TR PEAK VELOCITY: 2.7 M/S
TRICUSPID ANNULAR PLANE SYSTOLIC EXCURSION: 1.7 CM
TRICUSPID VALVE PEAK REGURGITATION VELOCITY: 2.65 M/S

## 2025-01-03 PROCEDURE — A9502 TC99M TETROFOSMIN: HCPCS

## 2025-01-03 PROCEDURE — 93017 CV STRESS TEST TRACING ONLY: CPT

## 2025-01-03 PROCEDURE — 93016 CV STRESS TEST SUPVJ ONLY: CPT | Performed by: INTERNAL MEDICINE

## 2025-01-03 PROCEDURE — 93306 TTE W/DOPPLER COMPLETE: CPT

## 2025-01-03 PROCEDURE — 78452 HT MUSCLE IMAGE SPECT MULT: CPT | Performed by: INTERNAL MEDICINE

## 2025-01-03 PROCEDURE — 93018 CV STRESS TEST I&R ONLY: CPT | Performed by: INTERNAL MEDICINE

## 2025-01-03 PROCEDURE — 93306 TTE W/DOPPLER COMPLETE: CPT | Performed by: INTERNAL MEDICINE

## 2025-01-03 PROCEDURE — 78452 HT MUSCLE IMAGE SPECT MULT: CPT

## 2025-01-06 LAB
CHEST PAIN STATEMENT: NORMAL
MAX DIASTOLIC BP: 74 MMHG
MAX PREDICTED HEART RATE: 144 BPM
PROTOCOL NAME: NORMAL
STRESS POST EXERCISE DUR MIN: 8 MIN
STRESS POST EXERCISE DUR SEC: 32 SEC
STRESS POST PEAK HR: 130 BPM
STRESS POST PEAK SYSTOLIC BP: 160 MMHG
TARGET HR FORMULA: NORMAL
TEST INDICATION: NORMAL

## 2025-01-08 ENCOUNTER — TRANSCRIBE ORDERS (OUTPATIENT)
Dept: GASTROENTEROLOGY | Facility: CLINIC | Age: 75
End: 2025-01-08

## 2025-01-08 ENCOUNTER — CONSULT (OUTPATIENT)
Dept: GASTROENTEROLOGY | Facility: CLINIC | Age: 75
End: 2025-01-08
Payer: MEDICARE

## 2025-01-08 VITALS
TEMPERATURE: 98.3 F | DIASTOLIC BLOOD PRESSURE: 60 MMHG | HEIGHT: 68 IN | SYSTOLIC BLOOD PRESSURE: 122 MMHG | WEIGHT: 168 LBS | BODY MASS INDEX: 25.46 KG/M2

## 2025-01-08 DIAGNOSIS — Z86.0100 HISTORY OF COLON POLYPS: ICD-10-CM

## 2025-01-08 DIAGNOSIS — K44.9 HIATAL HERNIA: ICD-10-CM

## 2025-01-08 DIAGNOSIS — K21.9 CHRONIC GERD: Primary | ICD-10-CM

## 2025-01-08 DIAGNOSIS — Z80.0 FAMILY HISTORY OF COLON CANCER: ICD-10-CM

## 2025-01-08 PROCEDURE — 99204 OFFICE O/P NEW MOD 45 MIN: CPT | Performed by: INTERNAL MEDICINE

## 2025-01-08 PROCEDURE — G2211 COMPLEX E/M VISIT ADD ON: HCPCS | Performed by: INTERNAL MEDICINE

## 2025-01-08 NOTE — PROGRESS NOTES
Name: John F Eureyecko      : 1950      MRN: [id number]  Encounter Provider: Marco A Rosado MD  Encounter Date: 2025   Encounter department: [facility] [place]  :  Assessment & Plan  Chronic GERD  He has gastroesophageal reflux disease with reflux esophagitis and was noted to have a small hiatal hernia on his last endoscopy and likely contributing to his symptoms.  Fortunately he has not had any recent heartburn or dysphagia symptoms.  His burping and belching are likely due to the hiatal hernia and I encouraged him to take simethicone as needed and give him a handout detailing diet and lifestyle modifications that should help his symptoms.       Hiatal hernia  See above       History of colon polyps  He has a history of colon polyps and a family history of colon cancer and is due for his next colonoscopy in 2026 or sooner if clinically indicated.  This can be performed with Dr. Xavi Brown who did his last procedure.       Family history of colon cancer  See above           History of Present Illness   HPI  John F Eureyecko is a 74 y.o. male who presents for evaluation because of his history of reflux and dysphagia and his hiatal hernia.  He has been taking omeprazole 20 mg twice a day and feels this is can controlled his symptoms very well.  He has not had any recent reflux or dysphagia.  He still gets belching and burping from time to time.  He notices this especially after larger meals.  He denies abdominal pain, change in bowel habits, bleeding, and weight loss.    His last upper endoscopy revealed a hiatal hernia and reflux esophagitis.  He had an empiric dilation of his esophagus at that time.  His last colonoscopy was normal in 2021 and repeat colonoscopy was recommended in 5 years.      Review of Systems   Constitutional:  Negative for chills, fatigue, fever and unexpected weight change.   HENT:  Negative for trouble swallowing.   "  Eyes:  Negative for visual disturbance.   Respiratory:  Negative for cough and shortness of breath.    Cardiovascular:  Negative for chest pain.   Gastrointestinal:  Negative for abdominal distention, abdominal pain, blood in stool, constipation, diarrhea, nausea and vomiting.   Musculoskeletal:  Negative for arthralgias, gait problem and myalgias.   Skin:  Negative for pallor and rash.   Neurological:  Negative for dizziness, weakness and headaches.   Hematological:  Negative for adenopathy. Does not bruise/bleed easily.   Psychiatric/Behavioral:  Negative for dysphoric mood. The patient is not nervous/anxious.           Objective   /60 (BP Location: Left arm, Patient Position: Sitting, Cuff Size: Standard)   Temp 98.3 °F (36.8 °C) (Tympanic)   Ht 5' 8\" (1.727 m)   Wt 76.2 kg (168 lb)   BMI 25.54 kg/m²      Physical Exam  Constitutional:       Appearance: He is well-developed.   HENT:      Head: Normocephalic and atraumatic.   Eyes:      General: No scleral icterus.     Conjunctiva/sclera: Conjunctivae normal.   Cardiovascular:      Rate and Rhythm: Normal rate and regular rhythm.      Heart sounds: Normal heart sounds.   Pulmonary:      Effort: Pulmonary effort is normal.      Breath sounds: Normal breath sounds. No wheezing.   Abdominal:      General: Bowel sounds are normal. There is no distension.      Palpations: Abdomen is soft. There is no mass.      Tenderness: There is no abdominal tenderness. There is no guarding or rebound.   Musculoskeletal:         General: Normal range of motion.      Cervical back: Normal range of motion and neck supple.   Lymphadenopathy:      Cervical: No cervical adenopathy.   Skin:     General: Skin is warm and dry.      Findings: No rash.   Neurological:      Mental Status: He is alert and oriented to person, place, and time.           "

## 2025-01-08 NOTE — ASSESSMENT & PLAN NOTE
He has gastroesophageal reflux disease with reflux esophagitis and was noted to have a small hiatal hernia on his last endoscopy and likely contributing to his symptoms.  Fortunately he has not had any recent heartburn or dysphagia symptoms.  His burping and belching are likely due to the hiatal hernia and I encouraged him to take simethicone as needed and give him a handout detailing diet and lifestyle modifications that should help his symptoms.

## 2025-01-26 ENCOUNTER — NURSE TRIAGE (OUTPATIENT)
Dept: OTHER | Facility: OTHER | Age: 75
End: 2025-01-26

## 2025-01-26 NOTE — TELEPHONE ENCOUNTER
"Reason for Disposition  • Patient sounds very sick or weak to the triager    Answer Assessment - Initial Assessment Questions  1. STOOL PATTERN OR FREQUENCY: \"How often do you have a bowel movement (BM)?\"  (Normal range: 3 times a day to every 3 days)  \"When was your last BM?\"      Very regular in the morning, but stopped about 4 days ago. Today, patient got concerned; took two packages of Miralax and did a 5 ounce Fleet enema. Gets urge to go, then sits on toilet and patient has a burning sensation    2. STRAINING: \"Do you have to strain to have a BM?\"         Patient stopped straining because he was concerned for hemorrhoids; just sitting on toilet and not having pain. Has urge to go every 5-10 minutes, but then he just has burning sensation    3. ONSET: \"When did the constipation begin?\"        4 days ago    5. RECTAL PAIN: \"Does your rectum hurt when the stool comes out?\" If Yes, ask: \"Do you have hemorrhoids? How bad is the pain?\"  (Scale 1-10; or mild, moderate, severe)    Not normal        6. BM COMPOSITION: \"Are the stools hard?\"         Denies    7. BLOOD ON STOOLS: \"Has there been any blood on the toilet tissue or on the surface of the BM?\" If Yes, ask: \"When was the last time?\"        Denies    8. CHRONIC CONSTIPATION: \"Is this a new problem for you?\"  If No, ask: \"How long have you had this problem?\" (days, weeks, months)         Denies    8. CHANGES IN DIET OR HYDRATION: \"Have there been any recent changes in your diet?\" \"How much fluids are you drinking on a daily basis?\"  \"How much have you had to drink today?\"        Denies; takes Metamucil every day    9. MEDICINES: \"Have you been taking any new medicines?\" \"Are you taking any narcotic pain medicines?\" (e.g., Dilaudid, morphine, Percocet, Vicodin)        Denies new medications or narcotics    10. LAXATIVES: \"Have you been using any stool softeners, laxatives, or enemas?\"  If Yes, ask \"What, how often, and when was the last time?\"          Miralax " "and an Enema    11. ACTIVITY:  \"How much walking do you do every day?\"  \"Has your activity level decreased in the past week?\"           Denies    12. CAUSE: \"What do you think is causing the constipation?\"           Unsure    13. MEDICAL HISTORY: \"Do you have a history of hemorrhoids, rectal fissures, rectal surgery, or rectal abscess?\"            Denies    14. OTHER SYMPTOMS: \"Do you have any other symptoms?\" (e.g., abdomen pain, bloating, fever, vomiting)          Denies pain, vomiting, fever    Protocols used: Constipation-Adult-    "

## 2025-01-26 NOTE — TELEPHONE ENCOUNTER
"Regarding: Constipation  ----- Message from Zoie URIBE sent at 1/26/2025  2:19 PM EST -----  Pt stated, \" I haven't been able to move my bowels in 4 days. I took an edema and Miralax today. I am really backed up and would like to know what I should do.\"    "

## 2025-01-26 NOTE — TELEPHONE ENCOUNTER
Per on-call provider, patient should take an additional dose of Miralax and give it a little bit more time. If no BM within the next few hours, patient should go to the Emergency Department.

## 2025-03-04 ENCOUNTER — TELEPHONE (OUTPATIENT)
Age: 75
End: 2025-03-04

## 2025-03-04 DIAGNOSIS — R05.1 ACUTE COUGH: Primary | ICD-10-CM

## 2025-03-04 RX ORDER — AZITHROMYCIN 250 MG/1
TABLET, FILM COATED ORAL
Qty: 6 TABLET | Refills: 0 | Status: SHIPPED | OUTPATIENT
Start: 2025-03-04 | End: 2025-03-09

## 2025-03-04 NOTE — TELEPHONE ENCOUNTER
Pt called to say he has had a cough with green mucus for the past two weeks. He states he feels fine except for that. He declined an appt and would like something prescribed to [facility] in [place]. Please advise.

## 2025-03-19 ENCOUNTER — OFFICE VISIT (OUTPATIENT)
Dept: INTERNAL MEDICINE CLINIC | Facility: CLINIC | Age: 75
End: 2025-03-19
Payer: MEDICARE

## 2025-03-19 VITALS
WEIGHT: 165 LBS | OXYGEN SATURATION: 96 % | DIASTOLIC BLOOD PRESSURE: 74 MMHG | SYSTOLIC BLOOD PRESSURE: 128 MMHG | BODY MASS INDEX: 25.09 KG/M2 | TEMPERATURE: 97.6 F | HEART RATE: 62 BPM

## 2025-03-19 DIAGNOSIS — R73.01 IMPAIRED FASTING GLUCOSE: ICD-10-CM

## 2025-03-19 DIAGNOSIS — N52.9 ERECTILE DYSFUNCTION, UNSPECIFIED ERECTILE DYSFUNCTION TYPE: ICD-10-CM

## 2025-03-19 DIAGNOSIS — E78.2 MIXED HYPERLIPIDEMIA: ICD-10-CM

## 2025-03-19 DIAGNOSIS — I10 HTN (HYPERTENSION), BENIGN: Primary | ICD-10-CM

## 2025-03-19 PROCEDURE — 99214 OFFICE O/P EST MOD 30 MIN: CPT | Performed by: INTERNAL MEDICINE

## 2025-03-19 PROCEDURE — G2211 COMPLEX E/M VISIT ADD ON: HCPCS | Performed by: INTERNAL MEDICINE

## 2025-03-19 NOTE — PROGRESS NOTES
Name: John F Eureyecko      : 1950      MRN: [id number]  Encounter Provider: Dc Joyner MD  Encounter Date: 3/19/2025   Encounter department: [facility]  :  Assessment & Plan  HTN (hypertension), benign  Blood pressure is well-controlled, continue current meds, no dry cough, no ankle swelling or constipation       Impaired fasting glucose  Continue healthy diet and exercise       Mixed hyperlipidemia  Continue atorvastatin along with healthy diet and exercise       Erectile dysfunction, unspecified erectile dysfunction type  Continue meds as needed             Depression Screening and Follow-up Plan: Patient was screened for depression during today's encounter. They screened negative with a PHQ-2 score of 1.        History of Present Illness   Pt here for regular follow up.  Pt reports he gets a brief episode of lightheadedness.  I reviewed notes from GI and Cardiology.      Review of Systems   Constitutional:  Negative for chills, fatigue and fever.   HENT:  Negative for congestion, nosebleeds, postnasal drip, sore throat and trouble swallowing.    Eyes:  Negative for pain.   Respiratory:  Negative for cough, chest tightness, shortness of breath and wheezing.    Cardiovascular:  Negative for chest pain, palpitations and leg swelling.   Gastrointestinal:  Negative for abdominal pain, constipation, diarrhea, nausea and vomiting.   Endocrine: Negative for polydipsia and polyuria.   Genitourinary:  Negative for dysuria, flank pain and hematuria.   Musculoskeletal:  Negative for arthralgias.   Skin:  Negative for rash.   Neurological:  Positive for light-headedness (rare). Negative for dizziness, tremors and headaches.   Hematological:  Does not bruise/bleed easily.   Psychiatric/Behavioral:  Negative for confusion and dysphoric mood. The patient is not nervous/anxious.        Objective   /74 (BP Location: Left arm, Patient Position: Sitting, Cuff Size: Standard)   Pulse 62    Temp 97.6 °F (36.4 °C) (Tympanic)   Wt 74.8 kg (165 lb)   SpO2 96%   BMI 25.09 kg/m²      Physical Exam  Vitals reviewed.   Constitutional:       General: He is not in acute distress.     Appearance: Normal appearance. He is well-developed.   HENT:      Head: Normocephalic and atraumatic.      Right Ear: External ear normal.      Left Ear: External ear normal.      Nose: Nose normal.   Eyes:      General: No scleral icterus.     Conjunctiva/sclera: Conjunctivae normal.   Neck:      Trachea: No tracheal deviation.   Cardiovascular:      Rate and Rhythm: Normal rate and regular rhythm.      Heart sounds: Normal heart sounds. No murmur heard.  Pulmonary:      Effort: Pulmonary effort is normal. No respiratory distress.      Breath sounds: Normal breath sounds. No wheezing or rales.   Abdominal:      General: Bowel sounds are normal.      Palpations: Abdomen is soft. There is no mass.      Tenderness: There is no abdominal tenderness. There is no guarding.   Musculoskeletal:      Cervical back: Normal range of motion and neck supple.      Right lower leg: No edema.      Left lower leg: No edema.   Lymphadenopathy:      Cervical: No cervical adenopathy.   Skin:     Coloration: Skin is not jaundiced or pale.   Neurological:      General: No focal deficit present.      Mental Status: He is alert and oriented to person, place, and time.   Psychiatric:         Mood and Affect: Mood normal.         Behavior: Behavior normal.         Thought Content: Thought content normal.         Judgment: Judgment normal.

## 2025-03-19 NOTE — ASSESSMENT & PLAN NOTE
Blood pressure is well-controlled, continue current meds, no dry cough, no ankle swelling or constipation

## 2025-03-19 NOTE — PATIENT INSTRUCTIONS
Problem List Items Addressed This Visit          Cardiovascular and Mediastinum    HTN (hypertension), benign - Primary    Blood pressure is well-controlled, continue current meds, no dry cough, no ankle swelling or constipation            Endocrine    Impaired fasting glucose    Continue healthy diet and exercise            Other    Mixed hyperlipidemia    Continue atorvastatin along with healthy diet and exercise         Erectile dysfunction    Continue meds as needed

## 2025-04-24 ENCOUNTER — TELEPHONE (OUTPATIENT)
Age: 75
End: 2025-04-24

## 2025-04-24 NOTE — TELEPHONE ENCOUNTER
Patient seen by Zak at [facility]      Patient called to confirm his appointment for 05/14/25 with Zak .  He would like the psa order fax to his home. He will go [facility] to do it.     Fax number [phone]

## 2025-04-29 ENCOUNTER — APPOINTMENT (OUTPATIENT)
Dept: LAB | Facility: CLINIC | Age: 75
End: 2025-04-29
Payer: MEDICARE

## 2025-04-29 DIAGNOSIS — R97.20 ELEVATED PSA: ICD-10-CM

## 2025-04-29 LAB — PSA SERPL-MCNC: 3.88 NG/ML (ref 0–4)

## 2025-04-29 PROCEDURE — 36415 COLL VENOUS BLD VENIPUNCTURE: CPT

## 2025-04-29 PROCEDURE — 84153 ASSAY OF PSA TOTAL: CPT

## 2025-05-06 DIAGNOSIS — N52.9 ERECTILE DYSFUNCTION, UNSPECIFIED ERECTILE DYSFUNCTION TYPE: ICD-10-CM

## 2025-05-06 RX ORDER — TADALAFIL 5 MG/1
5 TABLET ORAL DAILY
Qty: 90 TABLET | Refills: 3 | Status: SHIPPED | OUTPATIENT
Start: 2025-05-06 | End: 2025-05-07 | Stop reason: SDUPTHER

## 2025-05-07 DIAGNOSIS — N52.9 ERECTILE DYSFUNCTION, UNSPECIFIED ERECTILE DYSFUNCTION TYPE: ICD-10-CM

## 2025-05-07 RX ORDER — TADALAFIL 5 MG/1
5 TABLET ORAL DAILY
Qty: 90 TABLET | Refills: 1 | Status: SHIPPED | OUTPATIENT
Start: 2025-05-07

## 2025-05-07 RX ORDER — SILDENAFIL 100 MG/1
100 TABLET, FILM COATED ORAL DAILY PRN
Qty: 90 TABLET | Refills: 1 | Status: SHIPPED | OUTPATIENT
Start: 2025-05-07

## 2025-05-07 NOTE — TELEPHONE ENCOUNTER
Reason for call:   [x] Refill   [] Prior Auth  [] Other:     Office:   [x] PCP/Provider - Joyner  [] Specialty/Provider -     Medication:   Sildenafil 100 mg, 1 qd, 90  Tadalafil 5 mg, 1 qd, 90    Pharmacy:   [facility] [place]    [facility]   Does the patient have enough for 3 days?   [x] Yes   [] No - Send as HP to POD    [facility]   Does the patient have enough for 10 days?   [] Yes   [] No - Send as HP to POD

## 2025-05-08 ENCOUNTER — TELEPHONE (OUTPATIENT)
Age: 75
End: 2025-05-08

## 2025-05-08 NOTE — TELEPHONE ENCOUNTER
PA for tadalafil 5 mg  EXCLUDED from plan       Reason:(Screenshot if applicable)        Message sent to office clinical pool Yes

## 2025-05-08 NOTE — TELEPHONE ENCOUNTER
PA for tadalafil 5 mg SUBMITTED to [facility]     via    []CMM-KEY:   [x]Surescripts-Case ID # [id number]   []Availity-Auth ID # NDC #   []Faxed to plan   []Other website   []Phone call Case ID #     [x]PA sent as URGENT    All office notes, labs and other pertaining documents and studies sent. Clinical questions answered. Awaiting determination from insurance company.     Turnaround time for your insurance to make a decision on your Prior Authorization can take 7-21 business days.

## 2025-05-12 DIAGNOSIS — I10 ESSENTIAL HYPERTENSION: ICD-10-CM

## 2025-05-12 RX ORDER — AMLODIPINE BESYLATE 5 MG/1
5 TABLET ORAL DAILY
Qty: 90 TABLET | Refills: 1 | Status: SHIPPED | OUTPATIENT
Start: 2025-05-12

## 2025-05-12 NOTE — TELEPHONE ENCOUNTER
Reason for call:   [x] Refill   [] Prior Auth  [] Other:     Office:   [x] PCP/Provider - Joyner  [] Specialty/Provider -     Medication:   Amlodipine 5 mg, 1 qd, 90    Pharmacy:   [facility] [place]    [facility]   Does the patient have enough for 3 days?   [x] Yes   [] No - Send as HP to POD    [facility]   Does the patient have enough for 10 days?   [] Yes   [] No - Send as HP to POD

## 2025-05-14 ENCOUNTER — OFFICE VISIT (OUTPATIENT)
Dept: UROLOGY | Facility: CLINIC | Age: 75
End: 2025-05-14
Payer: MEDICARE

## 2025-05-14 VITALS
OXYGEN SATURATION: 97 % | BODY MASS INDEX: 24.86 KG/M2 | WEIGHT: 164 LBS | SYSTOLIC BLOOD PRESSURE: 134 MMHG | HEART RATE: 66 BPM | HEIGHT: 68 IN | DIASTOLIC BLOOD PRESSURE: 70 MMHG

## 2025-05-14 DIAGNOSIS — R97.20 ELEVATED PSA: Primary | ICD-10-CM

## 2025-05-14 PROCEDURE — 99213 OFFICE O/P EST LOW 20 MIN: CPT | Performed by: PHYSICIAN ASSISTANT

## 2025-05-14 NOTE — PROGRESS NOTES
Name: John F Eureyecko      : 1950      MRN: [id number]  Encounter Provider: Mario Bourgeois PA-C  Encounter Date: 2025   Encounter department: [facility]OLOG[facility]  :  Assessment & Plan  Elevated PSA    Orders:    PSA Total, Diagnostic; Future  Follow-up in 1 year with PSA prior to visit      History of Present Illness   John F Eureyecko is a 74 y.o. male who presents history of BPH, ED and elevated PSA.  His last PSA was 4.08.  MRI showed PI-RADS 2 with a 76 g prostate.  Follow-up PSA now 3.8.  Uses combination Viagra or Cialis.  Urinary pattern is stable.  No other complaints.    Review of Systems       Objective   There were no vitals taken for this visit.    Physical Exam GEN: no acute distress    RESP: breathing comfortably with no accessory muscle use    ABD: soft, non-tender, non-distended   INCISION:    EXT: no significant peripheral edema   (Male): Penis circumcised, phallus normal, meatus patent.  Testicles descended into scrotum bilaterally without masses nor tenderness.  No inguinal hernias bilaterally.  LOWELL: Prostate is enlarged at 50 grams. The prostate is not boggy. The prostate is not tender.  No nodules noted      RADIOLOGY:   IMPRESSION:     1. PI-RADSv2.1 Category 2 - Low (clinically significant cancer is unlikely to be present).     2. Moderate BPH with calculated prostate volume of 79 mL.        Results   Lab Results   Component Value Date    PSA 3.884 2025    PSA 4.087 (H) 2024    PSA 3.75 2023     Lab Results   Component Value Date    GLUCOSE 122 2015    CALCIUM 9.2 10/04/2024     2016    K 4.4 10/04/2024    CO2 28 10/04/2024     10/04/2024    BUN 22 10/04/2024    CREATININE 0.96 10/04/2024     Lab Results   Component Value Date    WBC 6.0 10/04/2024    HGB 14.2 10/04/2024    HCT 40.8 10/04/2024    MCV 89 10/04/2024     10/04/2024       Office Urine Dip  No results found for this or any previous visit  (from the past hour).

## 2025-05-28 ENCOUNTER — OFFICE VISIT (OUTPATIENT)
Dept: INTERNAL MEDICINE CLINIC | Facility: CLINIC | Age: 75
End: 2025-05-28
Payer: MEDICARE

## 2025-05-28 VITALS
BODY MASS INDEX: 24.6 KG/M2 | TEMPERATURE: 96.9 F | HEART RATE: 81 BPM | DIASTOLIC BLOOD PRESSURE: 62 MMHG | OXYGEN SATURATION: 98 % | SYSTOLIC BLOOD PRESSURE: 124 MMHG | WEIGHT: 161.8 LBS

## 2025-05-28 DIAGNOSIS — Z23 ENCOUNTER FOR IMMUNIZATION: ICD-10-CM

## 2025-05-28 DIAGNOSIS — E55.9 VITAMIN D DEFICIENCY: ICD-10-CM

## 2025-05-28 DIAGNOSIS — Z12.5 SCREENING FOR PROSTATE CANCER: ICD-10-CM

## 2025-05-28 DIAGNOSIS — I10 HTN (HYPERTENSION), BENIGN: Primary | ICD-10-CM

## 2025-05-28 DIAGNOSIS — E78.2 MIXED HYPERLIPIDEMIA: ICD-10-CM

## 2025-05-28 DIAGNOSIS — R91.1 PULMONARY NODULE: ICD-10-CM

## 2025-05-28 DIAGNOSIS — R73.01 IMPAIRED FASTING GLUCOSE: ICD-10-CM

## 2025-05-28 DIAGNOSIS — Z00.00 MEDICARE ANNUAL WELLNESS VISIT, SUBSEQUENT: ICD-10-CM

## 2025-05-28 DIAGNOSIS — R97.20 ELEVATED PSA: ICD-10-CM

## 2025-05-28 PROCEDURE — 99214 OFFICE O/P EST MOD 30 MIN: CPT | Performed by: INTERNAL MEDICINE

## 2025-05-28 PROCEDURE — 90677 PCV20 VACCINE IM: CPT

## 2025-05-28 PROCEDURE — G0009 ADMIN PNEUMOCOCCAL VACCINE: HCPCS

## 2025-05-28 PROCEDURE — G2211 COMPLEX E/M VISIT ADD ON: HCPCS | Performed by: INTERNAL MEDICINE

## 2025-05-28 PROCEDURE — G0439 PPPS, SUBSEQ VISIT: HCPCS | Performed by: INTERNAL MEDICINE

## 2025-05-28 NOTE — ASSESSMENT & PLAN NOTE
Discussed preventative health, cancer screening, immunizations, and safety issues.  Last colonoscopy December 1, 2021 with recommendations to recheck again in 5 years.  Patient had both Shingrix shots.  I recommend yearly flu shot.  I recommend Prevnar 20 since it has been more than 5 years since his last pneumonia shot.

## 2025-05-28 NOTE — PROGRESS NOTES
Name: John F Eureyecko      : 1950      MRN: [id number]  Encounter Provider: Dc Joyner MD  Encounter Date: 2025   Encounter department: [facility]  :  Assessment & Plan  Medicare annual wellness visit, subsequent  Discussed preventative health, cancer screening, immunizations, and safety issues.  Last colonoscopy 2021 with recommendations to recheck again in 5 years.  Patient had both Shingrix shots.  I recommend yearly flu shot.  I recommend Prevnar 20 since it has been more than 5 years since his last pneumonia shot.       Encounter for immunization    Orders:  •  Pneumococcal Conjugate Vaccine 20-valent (Pcv20)    HTN (hypertension), benign  Blood pressure is well-controlled, continue current meds, patient on amlodipine 5 mg daily, lisinopril 20 mg alternating with 40 mg.  Continue with healthy diet and exercise, patient is very active       Impaired fasting glucose  Most recent A1c in 2024 was 5.7, continue with healthy diet and exercise  Orders:  •  Hemoglobin A1C; Future    Elevated PSA  Patient following with urology for this       Mixed hyperlipidemia  Continue atorvastatin  Orders:  •  CBC and differential; Future  •  Comprehensive metabolic panel; Future  •  Lipid Panel with Direct LDL reflex; Future  •  TSH, 3rd generation with Free T4 reflex; Future    Pulmonary nodule  As per current guidelines, no further follow-up was needed for this as per the radiology report of 5 years of stability       Screening for prostate cancer    Orders:  •  PSA, Total Screen; Future    Vitamin D deficiency    Orders:  •  Vitamin D 25 hydroxy; Future      Depression Screening and Follow-up Plan: Patient was screened for depression during today's encounter. They screened negative with a PHQ-2 score of 0.        Preventive health issues were discussed with patient, and age appropriate screening tests were ordered as noted in patient's After Visit Summary.  Personalized health advice and appropriate referrals for health education or preventive services given if needed, as noted in patient's After Visit Summary.    History of Present Illness     Patient here for Medicare wellness visit and follow-up of chronic conditions       Patient Care Team:  Dc Joyner MD as PCP - General    Review of Systems   Constitutional:  Negative for chills, fatigue and fever.   HENT:  Negative for congestion, nosebleeds, postnasal drip, sore throat and trouble swallowing.    Eyes:  Negative for pain.   Respiratory:  Negative for cough, chest tightness, shortness of breath and wheezing.    Cardiovascular:  Negative for chest pain, palpitations and leg swelling.   Gastrointestinal:  Negative for abdominal pain, constipation, diarrhea, nausea and vomiting.   Endocrine: Negative for polydipsia and polyuria.   Genitourinary:  Negative for dysuria, flank pain and hematuria.   Musculoskeletal:  Negative for arthralgias.   Skin:  Negative for rash.   Neurological:  Negative for dizziness, tremors, light-headedness and headaches.   Hematological:  Does not bruise/bleed easily.   Psychiatric/Behavioral:  Negative for confusion and dysphoric mood. The patient is not nervous/anxious.      Medical History Reviewed by provider this encounter:  Tobacco  Allergies  Meds  Problems  Med Hx  Surg Hx  Fam Hx       Annual Wellness Visit Questionnaire   Keith is here for his Subsequent Wellness visit.     Health Risk Assessment:   Patient rates overall health as very good. Patient feels that their physical health rating is slightly better. Patient is very satisfied with their life. Eyesight was rated as same. Hearing was rated as same. Patient feels that their emotional and mental health rating is same. Patients states they are never, rarely angry. Patient states they are never, rarely unusually tired/fatigued. Pain experienced in the last 7 days has been none. Patient states that he has experienced no  weight loss or gain in last 6 months.     Depression Screening:   PHQ-2 Score: 0      Fall Risk Screening:   In the past year, patient has experienced: no history of falling in past year      Home Safety:  Patient does not have trouble with stairs inside or outside of their home. Patient has working smoke alarms and has working carbon monoxide detector. Home safety hazards include: none.     Nutrition:   Current diet is Regular.     Medications:   Patient is currently taking over-the-counter supplements. OTC medications include: see medication list. Patient is able to manage medications.     Activities of Daily Living (ADLs)/Instrumental Activities of Daily Living (IADLs):   Walk and transfer into and out of bed and chair?: Yes  Dress and groom yourself?: Yes    Bathe or shower yourself?: Yes    Feed yourself? Yes  Do your laundry/housekeeping?: Yes  Manage your money, pay your bills and track your expenses?: Yes  Make your own meals?: Yes    Do your own shopping?: Yes    Previous Hospitalizations:   Any hospitalizations or ED visits within the last 12 months?: Yes      Advance Care Planning:   Living will: Yes    Durable POA for healthcare: Yes    Advanced directive: Yes    Advanced directive counseling given: Yes      Cognitive Screening:   Provider or family/friend/caregiver concerned regarding cognition?: No    Preventive Screenings      Cardiovascular Screening:    General: Screening Not Indicated, History Lipid Disorder, Risks and Benefits Discussed and Screening Current    Due for: Lipid Panel      Diabetes Screening:     General: Screening Current and Risks and Benefits Discussed      Colorectal Cancer Screening:     General: Screening Current      Prostate Cancer Screening:    General: Screening Not Indicated, Risks and Benefits Discussed and Screening Current      Osteoporosis Screening:    General: Risks and Benefits Discussed and Screening Not Indicated      Abdominal Aortic Aneurysm (AAA) Screening:         General: Risks and Benefits Discussed and Screening Not Indicated      Lung Cancer Screening:     General: Screening Not Indicated and Risks and Benefits Discussed      Hepatitis C Screening:    General: Screening Current and Risks and Benefits Discussed    Screening, Brief Intervention, and Referral to Treatment (SBIRT)     Screening  Typical number of drinks in a day: 3  Typical number of drinks in a week: 12  Interpretation: Low risk drinking behavior.    Single Item Drug Screening:  How often have you used an illegal drug (including marijuana) or a prescription medication for non-medical reasons in the past year? never    Single Item Drug Screen Score: 0  Interpretation: Negative screen for possible drug use disorder    Brief Intervention  Alcohol & drug use screenings were reviewed. No concerns regarding substance use disorder identified.     Other Counseling Topics:   Car/seat belt/driving safety, skin self-exam and sunscreen.     Social Drivers of Health     Financial Resource Strain: Low Risk  (5/10/2023)    Overall Financial Resource Strain (CARDIA)    • Difficulty of Paying Living Expenses: Not hard at all   Food Insecurity: No Food Insecurity (5/28/2025)    Nursing - Inadequate Food Risk Classification    • Worried About Running Out of Food in the Last Year: Never true    • Ran Out of Food in the Last Year: Never true   Transportation Needs: No Transportation Needs (5/28/2025)    PRAPARE - Transportation    • Lack of Transportation (Medical): No    • Lack of Transportation (Non-Medical): No   Housing Stability: Low Risk  (5/28/2025)    Housing Stability Vital Sign    • Unable to Pay for Housing in the Last Year: No    • Number of Times Moved in the Last Year: 0    • Homeless in the Last Year: No   Utilities: Not At Risk (5/28/2025)    [facility] Utilities    • Threatened with loss of utilities: No     No results found.    Objective   /62 (BP Location: Left arm, Patient Position: Sitting, Cuff Size:  Standard)   Pulse 81   Temp (!) 96.9 °F (36.1 °C) (Tympanic)   Wt 73.4 kg (161 lb 12.8 oz)   SpO2 98%   BMI 24.60 kg/m²     Physical Exam  Vitals reviewed.   Constitutional:       General: He is not in acute distress.     Appearance: Normal appearance. He is well-developed.   HENT:      Head: Normocephalic and atraumatic.      Right Ear: External ear normal.      Left Ear: External ear normal.      Nose: Nose normal.     Eyes:      General: No scleral icterus.     Conjunctiva/sclera: Conjunctivae normal.     Neck:      Trachea: No tracheal deviation.     Cardiovascular:      Rate and Rhythm: Normal rate and regular rhythm.      Heart sounds: Normal heart sounds. No murmur heard.  Pulmonary:      Effort: Pulmonary effort is normal. No respiratory distress.      Breath sounds: Normal breath sounds. No wheezing or rales.   Abdominal:      General: Bowel sounds are normal.      Palpations: Abdomen is soft. There is no mass.      Tenderness: There is no abdominal tenderness. There is no guarding.     Musculoskeletal:      Cervical back: Normal range of motion and neck supple.      Right lower leg: No edema.      Left lower leg: No edema.   Lymphadenopathy:      Cervical: No cervical adenopathy.     Skin:     Coloration: Skin is not jaundiced or pale.     Neurological:      General: No focal deficit present.      Mental Status: He is alert and oriented to person, place, and time.     Psychiatric:         Mood and Affect: Mood normal.         Behavior: Behavior normal.         Thought Content: Thought content normal.         Judgment: Judgment normal.

## 2025-05-28 NOTE — ASSESSMENT & PLAN NOTE
As per current guidelines, no further follow-up was needed for this as per the radiology report of 5 years of stability

## 2025-05-28 NOTE — PATIENT INSTRUCTIONS
Problem List Items Addressed This Visit          Cardiovascular and Mediastinum    HTN (hypertension), benign - Primary    Blood pressure is well-controlled, continue current meds, patient on amlodipine 5 mg daily, lisinopril 20 mg alternating with 40 mg.  Continue with healthy diet and exercise, patient is very active            Respiratory    Pulmonary nodule    As per current guidelines, no further follow-up was needed for this as per the radiology report of 5 years of stability            Endocrine    Impaired fasting glucose    Most recent A1c in October 2024 was 5.7, continue with healthy diet and exercise            Urinary    Elevated PSA    Patient following with urology for this            Other    Mixed hyperlipidemia    Continue atorvastatin         Medicare annual wellness visit, subsequent    Discussed preventative health, cancer screening, immunizations, and safety issues.  Last colonoscopy December 1, 2021 with recommendations to recheck again in 5 years.  Patient had both Shingrix shots.  I recommend yearly flu shot.  I recommend Prevnar 20 since it has been more than 5 years since his last pneumonia shot.          Other Visit Diagnoses         Encounter for immunization        Relevant Orders    Pneumococcal Conjugate Vaccine 20-valent (Pcv20)            Medicare Preventive Visit Patient Instructions  Thank you for completing your Welcome to Medicare Visit or Medicare Annual Wellness Visit today. Your next wellness visit will be due in one year (5/29/2026).  The screening/preventive services that you may require over the next 5-10 years are detailed below. Some tests may not apply to you based off risk factors and/or age. Screening tests ordered at today's visit but not completed yet may show as past due. Also, please note that scanned in results may not display below.  Preventive Screenings:  Service Recommendations Previous Testing/Comments   Colorectal Cancer Screening  Colonoscopy    Fecal  Occult Blood Test (FOBT)/Fecal Immunochemical Test (FIT)  Fecal DNA/Cologuard Test  Flexible Sigmoidoscopy Age: 45-75 years old   Colonoscopy: every 10 years (May be performed more frequently if at higher risk)  OR  FOBT/FIT: every 1 year  OR  Cologuard: every 3 years  OR  Sigmoidoscopy: every 5 years  Screening may be recommended earlier than age 45 if at higher risk for colorectal cancer. Also, an individualized decision between you and your healthcare provider will decide whether screening between the ages of 76-85 would be appropriate. Colonoscopy: 12/01/2021  FOBT/FIT: Not on file  Cologuard: Not on file  Sigmoidoscopy: Not on file          Prostate Cancer Screening Individualized decision between patient and health care provider in men between ages of 55-69   Medicare will cover every 12 months beginning on the day after your 50th birthday PSA: 3.884 ng/mL           Hepatitis C Screening Once for adults born between 1945 and 1965  More frequently in patients at high risk for Hepatitis C Hep C Antibody: 01/31/2018        Diabetes Screening 1-2 times per year if you're at risk for diabetes or have pre-diabetes Fasting glucose: 116 mg/dL (6/13/2022)  A1C: 5.7 % (10/4/2024)      Cholesterol Screening Once every 5 years if you don't have a lipid disorder. May order more often based on risk factors. Lipid panel: 10/04/2024         Other Preventive Screenings Covered by Medicare:  Abdominal Aortic Aneurysm (AAA) Screening: covered once if your at risk. You're considered to be at risk if you have a family history of AAA or a male between the age of 65-75 who smoking at least 100 cigarettes in your lifetime.  Lung Cancer Screening: covers low dose CT scan once per year if you meet all of the following conditions: (1) Age 55-77; (2) No signs or symptoms of lung cancer; (3) Current smoker or have quit smoking within the last 15 years; (4) You have a tobacco smoking history of at least 20 pack years (packs per day x  number of years you smoked); (5) You get a written order from a healthcare provider.  Glaucoma Screening: covered annually if you're considered high risk: (1) You have diabetes OR (2) Family history of glaucoma OR (3)  aged 50 and older OR (4)  American aged 65 and older  Osteoporosis Screening: covered every 2 years if you meet one of the following conditions: (1) Have a vertebral abnormality; (2) On glucocorticoid therapy for more than 3 months; (3) Have primary hyperparathyroidism; (4) On osteoporosis medications and need to assess response to drug therapy.  HIV Screening: covered annually if you're between the age of 15-65. Also covered annually if you are younger than 15 and older than 65 with risk factors for HIV infection. For pregnant patients, it is covered up to 3 times per pregnancy.    Immunizations:  Immunization Recommendations   Influenza Vaccine Annual influenza vaccination during flu season is recommended for all persons aged >= 6 months who do not have contraindications   Pneumococcal Vaccine   * Pneumococcal conjugate vaccine = PCV13 (Prevnar 13), PCV15 (Vaxneuvance), PCV20 (Prevnar 20)  * Pneumococcal polysaccharide vaccine = PPSV23 (Pneumovax) Adults 19-63 yo with certain risk factors or if 65+ yo  If never received any pneumonia vaccine: recommend Prevnar 20 (PCV20)  Give PCV20 if previously received 1 dose of PCV13 or PPSV23   Hepatitis B Vaccine 3 dose series if at intermediate or high risk (ex: diabetes, end stage renal disease, liver disease)   Respiratory syncytial virus (RSV) Vaccine - COVERED BY MEDICARE PART D  * RSVPreF3 (Arexvy) CDC recommends that adults 60 years of age and older may receive a single dose of RSV vaccine using shared clinical decision-making (SCDM)   Tetanus (Td) Vaccine - COST NOT COVERED BY MEDICARE PART B Following completion of primary series, a booster dose should be given every 10 years to maintain immunity against tetanus. Td may also be  given as tetanus wound prophylaxis.   Tdap Vaccine - COST NOT COVERED BY MEDICARE PART B Recommended at least once for all adults. For pregnant patients, recommended with each pregnancy.   Shingles Vaccine (Shingrix) - COST NOT COVERED BY MEDICARE PART B  2 shot series recommended in those 19 years and older who have or will have weakened immune systems or those 50 years and older     Health Maintenance Due:      Topic Date Due    Colorectal Cancer Screening  12/01/2026    Hepatitis C Screening  Completed     Immunizations Due:      Topic Date Due    COVID-19 Vaccine (5 - 2024-25 season) 09/01/2024     Advance Directives   What are advance directives?  Advance directives are legal documents that state your wishes and plans for medical care. These plans are made ahead of time in case you lose your ability to make decisions for yourself. Advance directives can apply to any medical decision, such as the treatments you want, and if you want to donate organs.   What are the types of advance directives?  There are many types of advance directives, and each state has rules about how to use them. You may choose a combination of any of the following:  Living will:  This is a written record of the treatment you want. You can also choose which treatments you do not want, which to limit, and which to stop at a certain time. This includes surgery, medicine, IV fluid, and tube feedings.   Durable power of  for healthcare (DPAHC):  This is a written record that states who you want to make healthcare choices for you when you are unable to make them for yourself. This person, called a proxy, is usually a family member or a friend. You may choose more than 1 proxy.  Do not resuscitate (DNR) order:  A DNR order is used in case your heart stops beating or you stop breathing. It is a request not to have certain forms of treatment, such as CPR. A DNR order may be included in other types of advance directives.  Medical directive:   This covers the care that you want if you are in a coma, near death, or unable to make decisions for yourself. You can list the treatments you want for each condition. Treatment may include pain medicine, surgery, blood transfusions, dialysis, IV or tube feedings, and a ventilator (breathing machine).  Values history:  This document has questions about your views, beliefs, and how you feel and think about life. This information can help others choose the care that you would choose.  Why are advance directives important?  An advance directive helps you control your care. Although spoken wishes may be used, it is better to have your wishes written down. Spoken wishes can be misunderstood, or not followed. Treatments may be given even if you do not want them. An advance directive may make it easier for your family to make difficult choices about your care.       © Copyright [employer] 2018 Information is for End User's use only and may not be sold, redistributed or otherwise used for commercial purposes. All illustrations and images included in CareNotes® are the copyrighted property of [employer].D.A.M., Inc. or [employer]

## 2025-05-28 NOTE — ASSESSMENT & PLAN NOTE
Most recent A1c in October 2024 was 5.7, continue with healthy diet and exercise  Orders:  •  Hemoglobin A1C; Future

## 2025-05-28 NOTE — ASSESSMENT & PLAN NOTE
Continue atorvastatin  Orders:  •  CBC and differential; Future  •  Comprehensive metabolic panel; Future  •  Lipid Panel with Direct LDL reflex; Future  •  TSH, 3rd generation with Free T4 reflex; Future

## 2025-05-28 NOTE — ASSESSMENT & PLAN NOTE
Blood pressure is well-controlled, continue current meds, patient on amlodipine 5 mg daily, lisinopril 20 mg alternating with 40 mg.  Continue with healthy diet and exercise, patient is very active

## 2025-06-13 DIAGNOSIS — R05.1 ACUTE COUGH: Primary | ICD-10-CM

## 2025-06-13 RX ORDER — AZITHROMYCIN 250 MG/1
TABLET, FILM COATED ORAL
Qty: 6 TABLET | Refills: 0 | Status: SHIPPED | OUTPATIENT
Start: 2025-06-13 | End: 2025-06-18

## 2025-06-18 DIAGNOSIS — E78.00 HYPERCHOLESTEREMIA: ICD-10-CM

## 2025-06-18 DIAGNOSIS — I10 ESSENTIAL HYPERTENSION: ICD-10-CM

## 2025-06-18 NOTE — TELEPHONE ENCOUNTER
CHANGE OF PHARMACY    Reason for call:   [x] Refill   [] Prior Auth  [] Other:     Office:   [x] PCP/Provider -   [] Specialty/Provider -     Medication:     atorvastatin (LIPITOR) 20 mg tablet     lisinopril (ZESTRIL) 20 mg tablet    lisinopril (ZESTRIL) 40 mg tablet    Pharmacy: [address] [facility] - 1     Local Pharmacy   Does the patient have enough for 3 days?   [x] Yes   [] No - Send as HP to POD    Mail Away Pharmacy   Does the patient have enough for 10 days?   [] Yes   [] No - Send as HP to POD

## 2025-06-19 RX ORDER — LISINOPRIL 40 MG/1
40 TABLET ORAL DAILY
Qty: 90 TABLET | Refills: 1 | Status: SHIPPED | OUTPATIENT
Start: 2025-06-19

## 2025-06-19 RX ORDER — ATORVASTATIN CALCIUM 20 MG/1
20 TABLET, FILM COATED ORAL DAILY
Qty: 90 TABLET | Refills: 1 | Status: SHIPPED | OUTPATIENT
Start: 2025-06-19

## 2025-06-19 RX ORDER — LISINOPRIL 20 MG/1
TABLET ORAL
Qty: 90 TABLET | Refills: 1 | Status: SHIPPED | OUTPATIENT
Start: 2025-06-19

## 2025-06-20 DIAGNOSIS — E78.00 HYPERCHOLESTEREMIA: ICD-10-CM

## 2025-06-20 DIAGNOSIS — I10 ESSENTIAL HYPERTENSION: ICD-10-CM

## 2025-06-22 RX ORDER — LISINOPRIL 20 MG/1
TABLET ORAL
Qty: 90 TABLET | Refills: 0 | OUTPATIENT
Start: 2025-06-22

## 2025-06-22 RX ORDER — LISINOPRIL 40 MG/1
40 TABLET ORAL DAILY
Qty: 90 TABLET | Refills: 0 | OUTPATIENT
Start: 2025-06-22

## 2025-06-22 RX ORDER — ATORVASTATIN CALCIUM 20 MG/1
20 TABLET, FILM COATED ORAL DAILY
Qty: 90 TABLET | Refills: 0 | OUTPATIENT
Start: 2025-06-22

## 2025-06-27 PROBLEM — Z00.00 MEDICARE ANNUAL WELLNESS VISIT, SUBSEQUENT: Status: RESOLVED | Noted: 2019-03-18 | Resolved: 2025-06-27

## 2025-06-30 ENCOUNTER — TELEPHONE (OUTPATIENT)
Age: 75
End: 2025-06-30

## 2025-06-30 NOTE — TELEPHONE ENCOUNTER
Patient called and stated he was seen at the Emergency Room on 6/28/25.[facility] for Syncope. Patient stated he was advised to obtain referral for Neurology or ENT. Patient called for recommendations.Patient stated he is  down at the [place] until Monday.

## 2025-06-30 NOTE — TELEPHONE ENCOUNTER
For syncope, generally evaluation with cardiology as recommended for the possibility of monitoring to look for any heart rhythm problems, so I recommend he contact cardiology for an appointment.  I am also available for any concerns.

## 2025-07-03 ENCOUNTER — TELEPHONE (OUTPATIENT)
Age: 75
End: 2025-07-03

## 2025-07-03 NOTE — TELEPHONE ENCOUNTER
Caller: John Eureyecko    Doctor: Dr. TEMI Haddad    Reason for call: pt was in ER in [facility] in [place]. He would like Dr. Haddad to take a look at the summary that they put in his chart, and let him know if he needs to be seen. He last saw Dr. Haddad in [place], but usually sees him in [place]    Call back#: [phone]

## 2025-07-08 ENCOUNTER — TELEPHONE (OUTPATIENT)
Age: 75
End: 2025-07-08

## 2025-07-08 NOTE — TELEPHONE ENCOUNTER
Patient calling stating he passed out last week and went to the ED at [facility].   He had them forward results of testing and AVS (scanned into media).  Patient would like Dr. LENORA Haddad to review and call him tomorrow with next steps.    Please review and contact patient.

## 2025-07-09 DIAGNOSIS — R55 SYNCOPE: Primary | ICD-10-CM

## 2025-07-17 ENCOUNTER — OFFICE VISIT (OUTPATIENT)
Dept: CARDIOLOGY CLINIC | Facility: CLINIC | Age: 75
End: 2025-07-17
Payer: MEDICARE

## 2025-07-17 VITALS
HEART RATE: 82 BPM | BODY MASS INDEX: 24.71 KG/M2 | OXYGEN SATURATION: 95 % | WEIGHT: 163 LBS | DIASTOLIC BLOOD PRESSURE: 64 MMHG | HEIGHT: 68 IN | SYSTOLIC BLOOD PRESSURE: 144 MMHG

## 2025-07-17 DIAGNOSIS — I10 HTN (HYPERTENSION), BENIGN: ICD-10-CM

## 2025-07-17 DIAGNOSIS — I25.10 CORONARY ARTERY DISEASE INVOLVING NATIVE CORONARY ARTERY OF NATIVE HEART WITHOUT ANGINA PECTORIS: Primary | ICD-10-CM

## 2025-07-17 DIAGNOSIS — R55 SYNCOPE: ICD-10-CM

## 2025-07-17 DIAGNOSIS — E78.2 MIXED HYPERLIPIDEMIA: ICD-10-CM

## 2025-07-17 LAB
ATRIAL RATE: 60 BPM
P AXIS: 70 DEGREES
PR INTERVAL: 132 MS
QRS AXIS: 37 DEGREES
QRSD INTERVAL: 80 MS
QT INTERVAL: 394 MS
QTC INTERVAL: 394 MS
T WAVE AXIS: 23 DEGREES
VENTRICULAR RATE: 60 BPM

## 2025-07-17 PROCEDURE — 99214 OFFICE O/P EST MOD 30 MIN: CPT | Performed by: INTERNAL MEDICINE

## 2025-07-17 PROCEDURE — 93000 ELECTROCARDIOGRAM COMPLETE: CPT | Performed by: INTERNAL MEDICINE
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
## 2025-07-18 ENCOUNTER — TELEPHONE (OUTPATIENT)
Dept: CARDIOLOGY CLINIC | Facility: CLINIC | Age: 75
End: 2025-07-18

## 2025-07-18 NOTE — TELEPHONE ENCOUNTER
7/18/25 Pt called asking for Audra. He was in to see Dr Haddad yesterday. Has questions on the heart monitor(zio). Sent IB to Clinical. CR

## 2025-07-29 ENCOUNTER — OFFICE VISIT (OUTPATIENT)
Dept: GASTROENTEROLOGY | Facility: CLINIC | Age: 75
End: 2025-07-29
Payer: MEDICARE

## 2025-07-29 VITALS
BODY MASS INDEX: 24.71 KG/M2 | TEMPERATURE: 98.8 F | WEIGHT: 163 LBS | HEIGHT: 68 IN | DIASTOLIC BLOOD PRESSURE: 70 MMHG | SYSTOLIC BLOOD PRESSURE: 126 MMHG

## 2025-07-29 DIAGNOSIS — Z86.0100 HISTORY OF COLON POLYPS: ICD-10-CM

## 2025-07-29 DIAGNOSIS — K21.9 CHRONIC GERD: Primary | ICD-10-CM

## 2025-07-29 PROCEDURE — G2211 COMPLEX E/M VISIT ADD ON: HCPCS | Performed by: INTERNAL MEDICINE

## 2025-07-29 PROCEDURE — 99214 OFFICE O/P EST MOD 30 MIN: CPT | Performed by: INTERNAL MEDICINE

## 2025-07-30 ENCOUNTER — DOCUMENTATION (OUTPATIENT)
Dept: CARDIOLOGY CLINIC | Facility: CLINIC | Age: 75
End: 2025-07-30

## 2025-08-12 LAB — CV ZIO PRESCRIPTION STATUS: NORMAL

## 2025-08-20 ENCOUNTER — HOSPITAL ENCOUNTER (OUTPATIENT)
Dept: NON INVASIVE DIAGNOSTICS | Facility: CLINIC | Age: 75
Discharge: HOME | End: 2025-08-20
Attending: INTERNAL MEDICINE
Payer: MEDICARE

## 2025-08-20 VITALS
SYSTOLIC BLOOD PRESSURE: 104 MMHG | OXYGEN SATURATION: 99 % | HEIGHT: 68 IN | WEIGHT: 163 LBS | DIASTOLIC BLOOD PRESSURE: 72 MMHG | BODY MASS INDEX: 24.71 KG/M2 | HEART RATE: 62 BPM

## 2025-08-20 VITALS
SYSTOLIC BLOOD PRESSURE: 126 MMHG | HEIGHT: 68 IN | BODY MASS INDEX: 24.71 KG/M2 | DIASTOLIC BLOOD PRESSURE: 70 MMHG | WEIGHT: 163 LBS | HEART RATE: 82 BPM

## 2025-08-20 DIAGNOSIS — I25.10 CORONARY ARTERY DISEASE INVOLVING NATIVE CORONARY ARTERY OF NATIVE HEART WITHOUT ANGINA PECTORIS: ICD-10-CM

## 2025-08-20 DIAGNOSIS — R55 SYNCOPE: ICD-10-CM

## 2025-08-20 LAB
AORTIC ROOT: 2.9 CM
AORTIC VALVE MEAN VELOCITY: 11.1 M/S
ASCENDING AORTA: 3.2 CM
ATRIAL RATE: 58 BPM
AV AREA BY CONTINUOUS VTI: 1.8 CM2
AV AREA PEAK VELOCITY: 1.9 CM2
AV LVOT MEAN GRADIENT: 2 MMHG
AV LVOT PEAK GRADIENT: 4 MMHG
AV MEAN PRESS GRAD SYS DOP V1V2: 6 MMHG
AV ORIFICE AREA US: 1.83 CM2
AV PEAK GRADIENT: 10 MMHG
AV VELOCITY RATIO: 0.58
AV VMAX SYS DOP: 1.61 M/S
BSA FOR ECHO PROCEDURE: 1.87 M2
CHEST PAIN STATEMENT: NORMAL
DOP CALC AO VTI: 35.45 CM
DOP CALC LVOT AREA: 3.14 CM2
DOP CALC LVOT CARDIAC INDEX: 2.29 L/MIN/M2
DOP CALC LVOT CARDIAC OUTPUT: 4.29 L/MIN
DOP CALC LVOT DIAMETER: 2 CM
DOP CALC LVOT PEAK VEL VTI: 20.71 CM
DOP CALC LVOT PEAK VEL: 0.97 M/S
DOP CALC LVOT STROKE INDEX: 34.8 ML/M2
DOP CALC LVOT STROKE VOLUME: 65.03
E WAVE DECELERATION TIME: 133 MS
E/A RATIO: 1.4
FRACTIONAL SHORTENING: 39 (ref 28–44)
INTERVENTRICULAR SEPTUM IN DIASTOLE (PARASTERNAL SHORT AXIS VIEW): 1 CM
INTERVENTRICULAR SEPTUM: 1 CM (ref 0.6–1.1)
LAAS-AP2: 21.7 CM2
LAAS-AP4: 21.5 CM2
LEFT ATRIUM SIZE: 4 CM
LEFT ATRIUM VOLUME (MOD BIPLANE): 66 ML
LEFT ATRIUM VOLUME INDEX (MOD BIPLANE): 35.3 ML/M2
LEFT INTERNAL DIMENSION IN SYSTOLE: 3 CM (ref 2.1–4)
LEFT VENTRICLE DIASTOLIC VOLUME (MOD BIPLANE): 69 ML
LEFT VENTRICLE DIASTOLIC VOLUME INDEX (MOD BIPLANE): 36.9 ML/M2
LEFT VENTRICLE SYSTOLIC VOLUME (MOD BIPLANE): 27 ML
LEFT VENTRICLE SYSTOLIC VOLUME INDEX (MOD BIPLANE): 14.4 ML/M2
LEFT VENTRICULAR INTERNAL DIMENSION IN DIASTOLE: 4.9 CM (ref 3.5–6)
LEFT VENTRICULAR POSTERIOR WALL IN END DIASTOLE: 1.3 CM
LEFT VENTRICULAR STROKE VOLUME: 74 ML
LV EF BIPLANE MOD: 61 %
LV EF US.2D.A4C+ESTIMATED: 62 %
LVSV (TEICH): 74 ML
MAX DIASTOLIC BP: 70 MMHG
MAX HR PERCENT: 88 %
MAX HR: 126 BPM
MAX PREDICTED HEART RATE: 143 BPM
MV E'TISSUE VEL-SEP: 10 CM/S
MV PEAK A VEL: 0.6 M/S
MV PEAK E VEL: 84 CM/S
MV STENOSIS PRESSURE HALF TIME: 39 MS
MV VALVE AREA P 1/2 METHOD: 5.64
P AXIS: 74 DEGREES
PR INTERVAL: 124 MS
PROTOCOL NAME: NORMAL
QRS AXIS: 74 DEGREES
QRSD INTERVAL: 84 MS
QT INTERVAL: 422 MS
QTC INTERVAL: 414 MS
RA PRESSURE ESTIMATED: 3 MMHG
RATE PRESSURE PRODUCT: NORMAL
RIGHT ATRIUM AREA SYSTOLE A4C: 14.9 CM2
RIGHT VENTRICLE ID DIMENSION: 3 CM
RV PSP: 30 MMHG
SL CV LEFT ATRIUM LENGTH A2C: 6.1 CM
SL CV LV EF: 60
SL CV LV EF: 60
SL CV PED ECHO LEFT VENTRICLE DIASTOLIC VOLUME (MOD BIPLANE) 2D: 111 ML
SL CV PED ECHO LEFT VENTRICLE SYSTOLIC VOLUME (MOD BIPLANE) 2D: 36 ML
SL CV STRESS RECOVERY BP: NORMAL MMHG
SL CV STRESS RECOVERY HR: 88 BPM
SL CV STRESS RECOVERY O2 SAT: 98 %
SL CV STRESS STAGE REACHED: 4
STRESS ANGINA INDEX: 0
STRESS BASELINE BP: NORMAL MMHG
STRESS BASELINE HR: 62 BPM
STRESS DUKE TREADMILL SCORE: 5
STRESS O2 SAT REST: 99 %
STRESS PEAK HR: 126 BPM
STRESS POST ESTIMATED WORKLOAD: 11.7 METS
STRESS POST EXERCISE DUR MIN: 9 MIN
STRESS POST EXERCISE DUR MIN: 9 MIN
STRESS POST EXERCISE DUR SEC: 30 SEC
STRESS POST EXERCISE DUR SEC: 30 SEC
STRESS POST O2 SAT PEAK: 98 %
STRESS POST PEAK BP: 160 MMHG
STRESS POST PEAK HR: 134 BPM
STRESS POST PEAK SYSTOLIC BP: 160 MMHG
STRESS ST DEPRESSION: 1 MM
T WAVE AXIS: 58 DEGREES
TARGET HR FORMULA: NORMAL
TEST INDICATION: NORMAL
TR MAX PG: 27 MMHG
TR PEAK VELOCITY: 2.6 M/S
TRICUSPID ANNULAR PLANE SYSTOLIC EXCURSION: 2.4 CM
TRICUSPID VALVE PEAK REGURGITATION VELOCITY: 2.61 M/S
VENTRICULAR RATE: 58 BPM

## 2025-08-20 PROCEDURE — 93010 ELECTROCARDIOGRAM REPORT: CPT | Performed by: INTERNAL MEDICINE

## 2025-08-20 PROCEDURE — 93350 STRESS TTE ONLY: CPT | Performed by: INTERNAL MEDICINE

## 2025-08-20 PROCEDURE — 93350 STRESS TTE ONLY: CPT

## 2025-08-20 PROCEDURE — 93306 TTE W/DOPPLER COMPLETE: CPT

## 2025-08-20 PROCEDURE — 93005 ELECTROCARDIOGRAM TRACING: CPT

## 2025-08-20 PROCEDURE — 93306 TTE W/DOPPLER COMPLETE: CPT | Performed by: INTERNAL MEDICINE

## (undated) DEVICE — GAUZE SPONGES,16 PLY: Brand: CURITY

## (undated) DEVICE — 3M™ STERI-STRIP™ COMPOUND BENZOIN TINCTURE 40 BAGS/CARTON 4 CARTONS/CASE C1544: Brand: 3M™ STERI-STRIP™

## (undated) DEVICE — ACE WRAP 3 IN STERILE

## (undated) DEVICE — INTENDED FOR TISSUE SEPARATION, AND OTHER PROCEDURES THAT REQUIRE A SHARP SURGICAL BLADE TO PUNCTURE OR CUT.: Brand: BARD-PARKER ® CARBON RIB-BACK BLADES

## (undated) DEVICE — CUFF TOURNIQUET 18 X 4 IN QUICK CONNECT DISP 1 BLADDER

## (undated) DEVICE — SUT VICRYL 3-0 SH 27 IN J416H

## (undated) DEVICE — OCCLUSIVE GAUZE STRIP,3% BISMUTH TRIBROMOPHENATE IN PETROLATUM BLEND: Brand: XEROFORM

## (undated) DEVICE — STERILE BETHLEHEM PLASTIC HAND: Brand: CARDINAL HEALTH

## (undated) DEVICE — VIAL DECANTER

## (undated) DEVICE — INTENDED FOR TISSUE SEPARATION, AND OTHER PROCEDURES THAT REQUIRE A SHARP SURGICAL BLADE TO PUNCTURE OR CUT.: Brand: BARD-PARKER SAFETY BLADES SIZE 15, STERILE

## (undated) DEVICE — CHLORAPREP HI-LITE 26ML ORANGE

## (undated) DEVICE — NEEDLE 27 G X 1 1/4

## (undated) DEVICE — PADDING CAST 4 IN  COTTON STRL

## (undated) DEVICE — SUT PROLENE 4-0 PS-2 18 IN 8682G

## (undated) DEVICE — SPONGE PVP SCRUB WING STERILE

## (undated) DEVICE — STRL COTTON TIP APPLCTR 6IN PK: Brand: CARDINAL HEALTH

## (undated) DEVICE — VESSEL CANNULA

## (undated) DEVICE — GLOVE SRG BIOGEL 7

## (undated) DEVICE — ELECTRODE NEEDLE MEGAFINE 2IN E-Z CLEAN MEGADYNE -0118

## (undated) DEVICE — NEEDLE 25G X 1 1/2

## (undated) DEVICE — 3M™ STERI-STRIP™ REINFORCED ADHESIVE SKIN CLOSURES, R1547, 1/2 IN X 4 IN (12 MM X 100 MM), 6 STRIPS/ENVELOPE: Brand: 3M™ STERI-STRIP™

## (undated) DEVICE — PAD GROUNDING ADULT

## (undated) DEVICE — GLOVE INDICATOR PI UNDERGLOVE SZ 8 BLUE

## (undated) DEVICE — TUBING SUCTION 5MM X 12 FT

## (undated) DEVICE — SUT PROLENE 4-0 PC-3 18 IN 8634G

## (undated) DEVICE — ACE WRAP 4 IN STERILE

## (undated) DEVICE — GLOVE INDICATOR PI UNDERGLOVE SZ 7 BLUE

## (undated) DEVICE — CONMED ACCESSORY ELECTRODE, FLAT BLADE WITH EXTENDED INSULATION: Brand: CONMED

## (undated) DEVICE — ACE WRAP 6 IN UNSTERILE

## (undated) DEVICE — INSULATED BLADE ELECTRODE: Brand: EDGE

## (undated) DEVICE — POV-IOD SOLUTION 4OZ BT

## (undated) DEVICE — SUT VICRYL 5-0 P-S 18 IN J493G

## (undated) DEVICE — PLASTIC ADHESIVE BANDAGE: Brand: CURITY

## (undated) DEVICE — GLOVE SRG BIOGEL 7.5

## (undated) DEVICE — PLUMEPEN PRO 10FT

## (undated) DEVICE — TIBURON SPLIT SHEET: Brand: CONVERTORS

## (undated) DEVICE — STERI DRAPE 1000 NON-STERILE ROLL

## (undated) DEVICE — ACE WRAP 3 IN UNSTERILE

## (undated) DEVICE — LIGHT HANDLE COVER SLEEVE DISP BLUE STELLAR

## (undated) DEVICE — ASTOUND STANDARD SURGICAL GOWN, XL: Brand: CONVERTORS

## (undated) DEVICE — GLOVE INDICATOR PI UNDERGLOVE SZ 7.5 BLUE

## (undated) DEVICE — RETROGRADE KNIFE BOX OF 6: Brand: ECTRA

## (undated) DEVICE — BETHLEHEM UNIVERSAL OUTPATIENT: Brand: CARDINAL HEALTH

## (undated) DEVICE — 3M™ TEGADERM™ TRANSPARENT FILM DRESSING FRAME STYLE, 1626W, 4 IN X 4-3/4 IN (10 CM X 12 CM), 50/CT 4CT/CASE: Brand: 3M™ TEGADERM™

## (undated) DEVICE — CURITY STRETCH BANDAGE: Brand: CURITY

## (undated) DEVICE — PAD CAST 4 IN COTTON NON STERILE

## (undated) DEVICE — REM POLYHESIVE ADULT PATIENT RETURN ELECTRODE: Brand: VALLEYLAB

## (undated) DEVICE — DISPOSABLE OR TOWEL: Brand: CARDINAL HEALTH

## (undated) DEVICE — SUT ETHILON 5-0 P-3 18 IN 698H

## (undated) DEVICE — SKIN MARKER DUAL TIP WITH RULER CAP, FLEXIBLE RULER AND LABELS: Brand: DEVON

## (undated) DEVICE — DISPOSABLE EQUIPMENT COVER: Brand: SMALL TOWEL DRAPE

## (undated) DEVICE — MINOR PROCEDURE DRAPE: Brand: CONVERTORS

## (undated) DEVICE — PENCIL ELECTROSURG E-Z CLEAN -0035H

## (undated) DEVICE — STERI STRIP 1/2 INCH

## (undated) DEVICE — ADHESIVE SKN CLSR HISTOACRYL FLEX 0.5ML LF

## (undated) DEVICE — PADDING CAST 3IN COTTON STRL